# Patient Record
Sex: FEMALE | Race: WHITE | Employment: OTHER | URBAN - METROPOLITAN AREA
[De-identification: names, ages, dates, MRNs, and addresses within clinical notes are randomized per-mention and may not be internally consistent; named-entity substitution may affect disease eponyms.]

---

## 2017-03-28 ENCOUNTER — OFFICE VISIT (OUTPATIENT)
Dept: INTERNAL MEDICINE CLINIC | Age: 47
End: 2017-03-28
Payer: MEDICARE

## 2017-03-28 VITALS
WEIGHT: 293 LBS | RESPIRATION RATE: 21 BRPM | SYSTOLIC BLOOD PRESSURE: 120 MMHG | DIASTOLIC BLOOD PRESSURE: 80 MMHG | HEART RATE: 89 BPM | HEIGHT: 66 IN | OXYGEN SATURATION: 98 % | TEMPERATURE: 98.1 F | BODY MASS INDEX: 47.09 KG/M2

## 2017-03-28 DIAGNOSIS — N32.81 OAB (OVERACTIVE BLADDER): ICD-10-CM

## 2017-03-28 DIAGNOSIS — M15.9 PRIMARY OSTEOARTHRITIS INVOLVING MULTIPLE JOINTS: ICD-10-CM

## 2017-03-28 DIAGNOSIS — E66.01 MORBID OBESITY DUE TO EXCESS CALORIES (HCC): ICD-10-CM

## 2017-03-28 DIAGNOSIS — J31.0 CHRONIC RHINITIS: ICD-10-CM

## 2017-03-28 DIAGNOSIS — F34.1 DYSTHYMIA: Primary | ICD-10-CM

## 2017-03-28 DIAGNOSIS — J45.21 MILD INTERMITTENT ASTHMA WITH ACUTE EXACERBATION: ICD-10-CM

## 2017-03-28 DIAGNOSIS — L21.9 SEBORRHEIC DERMATITIS: ICD-10-CM

## 2017-03-28 PROCEDURE — 99214 OFFICE O/P EST MOD 30 MIN: CPT | Performed by: FAMILY MEDICINE

## 2017-03-28 RX ORDER — KETOCONAZOLE 20 MG/G
CREAM TOPICAL
Qty: 60 G | Refills: 1 | Status: SHIPPED | OUTPATIENT
Start: 2017-03-28 | End: 2020-07-29 | Stop reason: ALTCHOICE

## 2017-03-28 RX ORDER — BUPROPION HYDROCHLORIDE 300 MG/1
300 TABLET ORAL EVERY MORNING
Qty: 30 TABLET | Refills: 3 | Status: SHIPPED | OUTPATIENT
Start: 2017-03-28 | End: 2020-07-29 | Stop reason: ALTCHOICE

## 2017-03-28 RX ORDER — KETOCONAZOLE 20 MG/ML
SHAMPOO TOPICAL
Qty: 1 BOTTLE | Refills: 5 | Status: SHIPPED | OUTPATIENT
Start: 2017-03-28 | End: 2020-07-29 | Stop reason: ALTCHOICE

## 2017-03-28 ASSESSMENT — ENCOUNTER SYMPTOMS
SORE THROAT: 0
COLOR CHANGE: 0
SHORTNESS OF BREATH: 0
WHEEZING: 0
ANAL BLEEDING: 0
CONSTIPATION: 0
EYE DISCHARGE: 0
ABDOMINAL PAIN: 0
BACK PAIN: 0
EYE REDNESS: 0
COUGH: 0
EYE PAIN: 0
TROUBLE SWALLOWING: 0
FACIAL SWELLING: 0
STRIDOR: 0
ABDOMINAL DISTENTION: 0
CHEST TIGHTNESS: 0

## 2017-03-29 ENCOUNTER — TELEPHONE (OUTPATIENT)
Dept: INTERNAL MEDICINE CLINIC | Age: 47
End: 2017-03-29

## 2017-04-05 DIAGNOSIS — Z01.818 PREOPERATIVE CLEARANCE: Primary | ICD-10-CM

## 2017-04-12 ENCOUNTER — HOSPITAL ENCOUNTER (OUTPATIENT)
Age: 47
Discharge: HOME | End: 2017-04-12
Payer: MEDICARE

## 2017-04-12 ENCOUNTER — HOSPITAL ENCOUNTER (OUTPATIENT)
Dept: GENERAL RADIOLOGY | Age: 47
Discharge: HOME | End: 2017-04-12
Payer: MEDICARE

## 2017-04-12 DIAGNOSIS — Z01.818 PREOPERATIVE CLEARANCE: ICD-10-CM

## 2017-04-12 LAB
ABSOLUTE EOS #: 0.1 K/UL (ref 0–0.4)
ABSOLUTE LYMPH #: 2.2 K/UL (ref 1–4.8)
ABSOLUTE MONO #: 0.6 K/UL (ref 0.2–0.8)
ANION GAP SERPL CALCULATED.3IONS-SCNC: 13 MMOL/L (ref 9–17)
BASOPHILS # BLD: 0 % (ref 0–2)
BASOPHILS ABSOLUTE: 0 K/UL (ref 0–0.2)
BUN BLDV-MCNC: 14 MG/DL (ref 6–20)
BUN/CREAT BLD: 12 (ref 9–20)
CALCIUM SERPL-MCNC: 8.7 MG/DL (ref 8.6–10.4)
CHLORIDE BLD-SCNC: 102 MMOL/L (ref 98–107)
CO2: 27 MMOL/L (ref 20–31)
CREAT SERPL-MCNC: 1.16 MG/DL (ref 0.5–0.9)
DIFFERENTIAL TYPE: ABNORMAL
EOSINOPHILS RELATIVE PERCENT: 2 % (ref 1–4)
GFR AFRICAN AMERICAN: >60 ML/MIN
GFR NON-AFRICAN AMERICAN: 51 ML/MIN
GFR SERPL CREATININE-BSD FRML MDRD: ABNORMAL ML/MIN/{1.73_M2}
GFR SERPL CREATININE-BSD FRML MDRD: ABNORMAL ML/MIN/{1.73_M2}
GLUCOSE BLD-MCNC: 86 MG/DL (ref 70–99)
HCT VFR BLD CALC: 36.6 % (ref 36–46)
HEMOGLOBIN: 12.4 G/DL (ref 12–16)
LYMPHOCYTES # BLD: 32 % (ref 24–44)
MCH RBC QN AUTO: 30.4 PG (ref 26–34)
MCHC RBC AUTO-ENTMCNC: 33.7 G/DL (ref 31–37)
MCV RBC AUTO: 90.2 FL (ref 80–100)
MONOCYTES # BLD: 8 % (ref 1–7)
PDW BLD-RTO: 12.9 % (ref 11.5–14.5)
PLATELET # BLD: 234 K/UL (ref 130–400)
PLATELET ESTIMATE: ABNORMAL
PMV BLD AUTO: 7.3 FL (ref 6–12)
POTASSIUM SERPL-SCNC: 4.9 MMOL/L (ref 3.7–5.3)
RBC # BLD: 4.06 M/UL (ref 4–5.2)
RBC # BLD: ABNORMAL 10*6/UL
SEG NEUTROPHILS: 58 % (ref 36–66)
SEGMENTED NEUTROPHILS ABSOLUTE COUNT: 4 K/UL (ref 1.8–7.7)
SODIUM BLD-SCNC: 142 MMOL/L (ref 135–144)
WBC # BLD: 7 K/UL (ref 3.5–11)
WBC # BLD: ABNORMAL 10*3/UL

## 2017-04-12 PROCEDURE — 71020 XR CHEST STANDARD TWO VW: CPT

## 2017-04-12 PROCEDURE — 93005 ELECTROCARDIOGRAM TRACING: CPT

## 2017-04-12 PROCEDURE — 36415 COLL VENOUS BLD VENIPUNCTURE: CPT

## 2017-04-12 PROCEDURE — 85025 COMPLETE CBC W/AUTO DIFF WBC: CPT

## 2017-04-12 PROCEDURE — 80048 BASIC METABOLIC PNL TOTAL CA: CPT

## 2017-04-13 LAB
EKG ATRIAL RATE: 79 BPM
EKG P AXIS: 32 DEGREES
EKG P-R INTERVAL: 140 MS
EKG Q-T INTERVAL: 350 MS
EKG QRS DURATION: 88 MS
EKG QTC CALCULATION (BAZETT): 401 MS
EKG R AXIS: 63 DEGREES
EKG T AXIS: 46 DEGREES
EKG VENTRICULAR RATE: 79 BPM

## 2017-04-17 ENCOUNTER — TELEPHONE (OUTPATIENT)
Dept: INTERNAL MEDICINE CLINIC | Age: 47
End: 2017-04-17

## 2017-05-02 ENCOUNTER — HOSPITAL ENCOUNTER (OUTPATIENT)
Dept: PREADMISSION TESTING | Age: 47
Discharge: HOME | End: 2017-05-02
Payer: MEDICARE

## 2017-05-02 VITALS
BODY MASS INDEX: 47.09 KG/M2 | RESPIRATION RATE: 16 BRPM | HEART RATE: 90 BPM | WEIGHT: 293 LBS | DIASTOLIC BLOOD PRESSURE: 89 MMHG | HEIGHT: 66 IN | SYSTOLIC BLOOD PRESSURE: 147 MMHG | OXYGEN SATURATION: 98 %

## 2017-05-02 LAB
ANION GAP SERPL CALCULATED.3IONS-SCNC: 14 MMOL/L (ref 9–17)
BUN BLDV-MCNC: 9 MG/DL (ref 6–20)
CHLORIDE BLD-SCNC: 97 MMOL/L (ref 98–107)
CO2: 27 MMOL/L (ref 20–31)
CREAT SERPL-MCNC: 0.83 MG/DL (ref 0.5–0.9)
GFR AFRICAN AMERICAN: >60 ML/MIN
GFR NON-AFRICAN AMERICAN: >60 ML/MIN
GFR SERPL CREATININE-BSD FRML MDRD: NORMAL ML/MIN/{1.73_M2}
GFR SERPL CREATININE-BSD FRML MDRD: NORMAL ML/MIN/{1.73_M2}
POTASSIUM SERPL-SCNC: 4.5 MMOL/L (ref 3.7–5.3)
SODIUM BLD-SCNC: 138 MMOL/L (ref 135–144)
VITAMIN D 25-HYDROXY: 23.3 NG/ML (ref 30–100)

## 2017-05-02 PROCEDURE — 36415 COLL VENOUS BLD VENIPUNCTURE: CPT

## 2017-05-02 PROCEDURE — 82306 VITAMIN D 25 HYDROXY: CPT

## 2017-05-02 PROCEDURE — 82565 ASSAY OF CREATININE: CPT

## 2017-05-02 PROCEDURE — 80051 ELECTROLYTE PANEL: CPT

## 2017-05-02 PROCEDURE — 84520 ASSAY OF UREA NITROGEN: CPT

## 2017-05-02 RX ORDER — ALBUTEROL SULFATE 90 UG/1
2 AEROSOL, METERED RESPIRATORY (INHALATION) EVERY 6 HOURS PRN
COMMUNITY
End: 2020-12-01 | Stop reason: ALTCHOICE

## 2017-05-09 ENCOUNTER — OFFICE VISIT (OUTPATIENT)
Dept: INTERNAL MEDICINE CLINIC | Age: 47
End: 2017-05-09
Payer: MEDICARE

## 2017-05-09 VITALS
HEIGHT: 66 IN | RESPIRATION RATE: 19 BRPM | DIASTOLIC BLOOD PRESSURE: 70 MMHG | BODY MASS INDEX: 47.09 KG/M2 | TEMPERATURE: 98.3 F | WEIGHT: 293 LBS | HEART RATE: 92 BPM | OXYGEN SATURATION: 98 % | SYSTOLIC BLOOD PRESSURE: 130 MMHG

## 2017-05-09 DIAGNOSIS — F34.1 DYSTHYMIA: Primary | ICD-10-CM

## 2017-05-09 DIAGNOSIS — N32.81 OAB (OVERACTIVE BLADDER): ICD-10-CM

## 2017-05-09 DIAGNOSIS — J31.0 CHRONIC RHINITIS: ICD-10-CM

## 2017-05-09 DIAGNOSIS — M15.9 PRIMARY OSTEOARTHRITIS INVOLVING MULTIPLE JOINTS: ICD-10-CM

## 2017-05-09 PROCEDURE — 99213 OFFICE O/P EST LOW 20 MIN: CPT | Performed by: FAMILY MEDICINE

## 2017-05-09 RX ORDER — IBUPROFEN 800 MG/1
800 TABLET ORAL EVERY 8 HOURS PRN
Qty: 60 TABLET | Refills: 3 | Status: SHIPPED | OUTPATIENT
Start: 2017-05-09 | End: 2020-10-19

## 2017-05-09 ASSESSMENT — ENCOUNTER SYMPTOMS
STRIDOR: 0
SHORTNESS OF BREATH: 0
EYE DISCHARGE: 0
ABDOMINAL DISTENTION: 0
BACK PAIN: 0
CONSTIPATION: 0
EYE PAIN: 0
SORE THROAT: 0
COLOR CHANGE: 0
WHEEZING: 0
CHEST TIGHTNESS: 0
ABDOMINAL PAIN: 0
TROUBLE SWALLOWING: 0
COUGH: 0
EYE REDNESS: 0
ANAL BLEEDING: 0
FACIAL SWELLING: 0

## 2017-05-15 ENCOUNTER — ANESTHESIA EVENT (OUTPATIENT)
Dept: OPERATING ROOM | Age: 47
End: 2017-05-15
Payer: MEDICARE

## 2017-05-15 DIAGNOSIS — M17.11 PRIMARY OSTEOARTHRITIS OF RIGHT KNEE: ICD-10-CM

## 2017-05-16 ENCOUNTER — HOSPITAL ENCOUNTER (OUTPATIENT)
Age: 47
Setting detail: OUTPATIENT SURGERY
Discharge: HOME | End: 2017-05-16
Attending: ORTHOPAEDIC SURGERY | Admitting: ORTHOPAEDIC SURGERY
Payer: MEDICARE

## 2017-05-16 ENCOUNTER — ANESTHESIA (OUTPATIENT)
Dept: OPERATING ROOM | Age: 47
End: 2017-05-16
Payer: MEDICARE

## 2017-05-16 VITALS
TEMPERATURE: 98.4 F | DIASTOLIC BLOOD PRESSURE: 92 MMHG | HEIGHT: 66 IN | WEIGHT: 293 LBS | BODY MASS INDEX: 47.09 KG/M2 | OXYGEN SATURATION: 100 % | SYSTOLIC BLOOD PRESSURE: 162 MMHG | HEART RATE: 82 BPM | RESPIRATION RATE: 18 BRPM

## 2017-05-16 VITALS — DIASTOLIC BLOOD PRESSURE: 66 MMHG | SYSTOLIC BLOOD PRESSURE: 118 MMHG | OXYGEN SATURATION: 93 %

## 2017-05-16 LAB — HCG, PREGNANCY URINE (POC): NEGATIVE

## 2017-05-16 PROCEDURE — 7100000001 HC PACU RECOVERY - ADDTL 15 MIN: Performed by: ORTHOPAEDIC SURGERY

## 2017-05-16 PROCEDURE — 3700000001 HC ADD 15 MINUTES (ANESTHESIA): Performed by: ORTHOPAEDIC SURGERY

## 2017-05-16 PROCEDURE — 7100000011 HC PHASE II RECOVERY - ADDTL 15 MIN: Performed by: ORTHOPAEDIC SURGERY

## 2017-05-16 PROCEDURE — 2500000003 HC RX 250 WO HCPCS: Performed by: NURSE ANESTHETIST, CERTIFIED REGISTERED

## 2017-05-16 PROCEDURE — 6360000002 HC RX W HCPCS: Performed by: ORTHOPAEDIC SURGERY

## 2017-05-16 PROCEDURE — 2580000003 HC RX 258: Performed by: ORTHOPAEDIC SURGERY

## 2017-05-16 PROCEDURE — 7100000010 HC PHASE II RECOVERY - FIRST 15 MIN: Performed by: ORTHOPAEDIC SURGERY

## 2017-05-16 PROCEDURE — 3600000003 HC SURGERY LEVEL 3 BASE: Performed by: ORTHOPAEDIC SURGERY

## 2017-05-16 PROCEDURE — 84703 CHORIONIC GONADOTROPIN ASSAY: CPT

## 2017-05-16 PROCEDURE — 3600000013 HC SURGERY LEVEL 3 ADDTL 15MIN: Performed by: ORTHOPAEDIC SURGERY

## 2017-05-16 PROCEDURE — 3700000000 HC ANESTHESIA ATTENDED CARE: Performed by: ORTHOPAEDIC SURGERY

## 2017-05-16 PROCEDURE — 6360000002 HC RX W HCPCS: Performed by: NURSE ANESTHETIST, CERTIFIED REGISTERED

## 2017-05-16 PROCEDURE — 2580000003 HC RX 258: Performed by: ANESTHESIOLOGY

## 2017-05-16 PROCEDURE — 7100000000 HC PACU RECOVERY - FIRST 15 MIN: Performed by: ORTHOPAEDIC SURGERY

## 2017-05-16 PROCEDURE — 2500000003 HC RX 250 WO HCPCS: Performed by: ORTHOPAEDIC SURGERY

## 2017-05-16 PROCEDURE — 6360000002 HC RX W HCPCS: Performed by: ANESTHESIOLOGY

## 2017-05-16 PROCEDURE — 6360000002 HC RX W HCPCS

## 2017-05-16 PROCEDURE — 2720000010 HC SURG SUPPLY STERILE: Performed by: ORTHOPAEDIC SURGERY

## 2017-05-16 RX ORDER — SODIUM CHLORIDE 9 MG/ML
INJECTION, SOLUTION INTRAVENOUS CONTINUOUS
Status: DISCONTINUED | OUTPATIENT
Start: 2017-05-16 | End: 2017-05-16

## 2017-05-16 RX ORDER — FENTANYL CITRATE 50 UG/ML
50 INJECTION, SOLUTION INTRAMUSCULAR; INTRAVENOUS EVERY 5 MIN PRN
Status: DISCONTINUED | OUTPATIENT
Start: 2017-05-16 | End: 2017-05-16 | Stop reason: HOSPADM

## 2017-05-16 RX ORDER — PROPOFOL 10 MG/ML
INJECTION, EMULSION INTRAVENOUS PRN
Status: DISCONTINUED | OUTPATIENT
Start: 2017-05-16 | End: 2017-05-16 | Stop reason: SDUPTHER

## 2017-05-16 RX ORDER — BUPIVACAINE HYDROCHLORIDE AND EPINEPHRINE 5; 5 MG/ML; UG/ML
INJECTION, SOLUTION EPIDURAL; INTRACAUDAL; PERINEURAL PRN
Status: DISCONTINUED | OUTPATIENT
Start: 2017-05-16 | End: 2017-05-16 | Stop reason: HOSPADM

## 2017-05-16 RX ORDER — LIDOCAINE HYDROCHLORIDE 20 MG/ML
INJECTION, SOLUTION EPIDURAL; INFILTRATION; INTRACAUDAL; PERINEURAL PRN
Status: DISCONTINUED | OUTPATIENT
Start: 2017-05-16 | End: 2017-05-16 | Stop reason: SDUPTHER

## 2017-05-16 RX ORDER — OXYCODONE HYDROCHLORIDE AND ACETAMINOPHEN 5; 325 MG/1; MG/1
1-2 TABLET ORAL EVERY 6 HOURS PRN
Qty: 40 TABLET | Refills: 0 | Status: SHIPPED | OUTPATIENT
Start: 2017-05-16 | End: 2017-05-23

## 2017-05-16 RX ORDER — SODIUM CHLORIDE, SODIUM LACTATE, POTASSIUM CHLORIDE, CALCIUM CHLORIDE 600; 310; 30; 20 MG/100ML; MG/100ML; MG/100ML; MG/100ML
INJECTION, SOLUTION INTRAVENOUS CONTINUOUS
Status: DISCONTINUED | OUTPATIENT
Start: 2017-05-16 | End: 2017-05-16 | Stop reason: HOSPADM

## 2017-05-16 RX ORDER — ONDANSETRON 2 MG/ML
INJECTION INTRAMUSCULAR; INTRAVENOUS PRN
Status: DISCONTINUED | OUTPATIENT
Start: 2017-05-16 | End: 2017-05-16 | Stop reason: SDUPTHER

## 2017-05-16 RX ORDER — HYDROMORPHONE HCL 110MG/55ML
0.5 PATIENT CONTROLLED ANALGESIA SYRINGE INTRAVENOUS EVERY 5 MIN PRN
Status: DISCONTINUED | OUTPATIENT
Start: 2017-05-16 | End: 2017-05-16 | Stop reason: HOSPADM

## 2017-05-16 RX ORDER — DEXAMETHASONE SODIUM PHOSPHATE 10 MG/ML
INJECTION INTRAMUSCULAR; INTRAVENOUS PRN
Status: DISCONTINUED | OUTPATIENT
Start: 2017-05-16 | End: 2017-05-16 | Stop reason: SDUPTHER

## 2017-05-16 RX ORDER — ONDANSETRON 2 MG/ML
4 INJECTION INTRAMUSCULAR; INTRAVENOUS
Status: DISCONTINUED | OUTPATIENT
Start: 2017-05-16 | End: 2017-05-16 | Stop reason: HOSPADM

## 2017-05-16 RX ORDER — ROCURONIUM BROMIDE 10 MG/ML
INJECTION, SOLUTION INTRAVENOUS PRN
Status: DISCONTINUED | OUTPATIENT
Start: 2017-05-16 | End: 2017-05-16 | Stop reason: SDUPTHER

## 2017-05-16 RX ORDER — FENTANYL CITRATE 50 UG/ML
25 INJECTION, SOLUTION INTRAMUSCULAR; INTRAVENOUS EVERY 5 MIN PRN
Status: DISCONTINUED | OUTPATIENT
Start: 2017-05-16 | End: 2017-05-16 | Stop reason: HOSPADM

## 2017-05-16 RX ORDER — MIDAZOLAM HYDROCHLORIDE 1 MG/ML
INJECTION INTRAMUSCULAR; INTRAVENOUS PRN
Status: DISCONTINUED | OUTPATIENT
Start: 2017-05-16 | End: 2017-05-16 | Stop reason: SDUPTHER

## 2017-05-16 RX ORDER — LIDOCAINE HYDROCHLORIDE 10 MG/ML
1 INJECTION, SOLUTION EPIDURAL; INFILTRATION; INTRACAUDAL; PERINEURAL
Status: DISCONTINUED | OUTPATIENT
Start: 2017-05-16 | End: 2017-05-16 | Stop reason: HOSPADM

## 2017-05-16 RX ORDER — HYDROMORPHONE HCL 110MG/55ML
0.25 PATIENT CONTROLLED ANALGESIA SYRINGE INTRAVENOUS EVERY 5 MIN PRN
Status: DISCONTINUED | OUTPATIENT
Start: 2017-05-16 | End: 2017-05-16 | Stop reason: HOSPADM

## 2017-05-16 RX ORDER — SODIUM CHLORIDE 0.9 % (FLUSH) 0.9 %
10 SYRINGE (ML) INJECTION PRN
Status: DISCONTINUED | OUTPATIENT
Start: 2017-05-16 | End: 2017-05-16 | Stop reason: HOSPADM

## 2017-05-16 RX ORDER — MORPHINE SULFATE 10 MG/ML
INJECTION, SOLUTION INTRAMUSCULAR; INTRAVENOUS PRN
Status: DISCONTINUED | OUTPATIENT
Start: 2017-05-16 | End: 2017-05-16 | Stop reason: HOSPADM

## 2017-05-16 RX ORDER — FENTANYL CITRATE 50 UG/ML
INJECTION, SOLUTION INTRAMUSCULAR; INTRAVENOUS PRN
Status: DISCONTINUED | OUTPATIENT
Start: 2017-05-16 | End: 2017-05-16 | Stop reason: SDUPTHER

## 2017-05-16 RX ORDER — DOCUSATE SODIUM 100 MG/1
100 CAPSULE, LIQUID FILLED ORAL 2 TIMES DAILY PRN
Qty: 40 CAPSULE | Refills: 0 | Status: SHIPPED | OUTPATIENT
Start: 2017-05-16 | End: 2020-07-29 | Stop reason: ALTCHOICE

## 2017-05-16 RX ORDER — SODIUM CHLORIDE 0.9 % (FLUSH) 0.9 %
10 SYRINGE (ML) INJECTION EVERY 12 HOURS SCHEDULED
Status: DISCONTINUED | OUTPATIENT
Start: 2017-05-16 | End: 2017-05-16 | Stop reason: HOSPADM

## 2017-05-16 RX ADMIN — ONDANSETRON 4 MG: 2 INJECTION, SOLUTION INTRAMUSCULAR; INTRAVENOUS at 15:35

## 2017-05-16 RX ADMIN — SODIUM CHLORIDE, POTASSIUM CHLORIDE, SODIUM LACTATE AND CALCIUM CHLORIDE: 600; 310; 30; 20 INJECTION, SOLUTION INTRAVENOUS at 15:30

## 2017-05-16 RX ADMIN — CEFAZOLIN SODIUM 3 G: 1 INJECTION, POWDER, FOR SOLUTION INTRAMUSCULAR; INTRAVENOUS at 14:35

## 2017-05-16 RX ADMIN — FENTANYL CITRATE 50 MCG: 50 INJECTION INTRAMUSCULAR; INTRAVENOUS at 16:00

## 2017-05-16 RX ADMIN — FENTANYL CITRATE 25 MCG: 50 INJECTION INTRAMUSCULAR; INTRAVENOUS at 16:18

## 2017-05-16 RX ADMIN — DEXAMETHASONE SODIUM PHOSPHATE 10 MG: 10 INJECTION INTRAMUSCULAR; INTRAVENOUS at 15:35

## 2017-05-16 RX ADMIN — FENTANYL CITRATE 50 MCG: 50 INJECTION INTRAMUSCULAR; INTRAVENOUS at 16:15

## 2017-05-16 RX ADMIN — PROPOFOL 200 MG: 10 INJECTION, EMULSION INTRAVENOUS at 14:25

## 2017-05-16 RX ADMIN — LIDOCAINE HYDROCHLORIDE 100 MG: 20 INJECTION, SOLUTION EPIDURAL; INFILTRATION; INTRACAUDAL; PERINEURAL at 14:25

## 2017-05-16 RX ADMIN — MIDAZOLAM HYDROCHLORIDE 2 MG: 1 INJECTION, SOLUTION INTRAMUSCULAR; INTRAVENOUS at 14:19

## 2017-05-16 RX ADMIN — ROCURONIUM BROMIDE 50 MG: 10 INJECTION, SOLUTION INTRAVENOUS at 14:25

## 2017-05-16 RX ADMIN — FENTANYL CITRATE 100 MCG: 50 INJECTION, SOLUTION INTRAMUSCULAR; INTRAVENOUS at 14:25

## 2017-05-16 RX ADMIN — SUGAMMADEX 200 MG: 100 INJECTION, SOLUTION INTRAVENOUS at 15:40

## 2017-05-16 RX ADMIN — FENTANYL CITRATE 50 MCG: 50 INJECTION, SOLUTION INTRAMUSCULAR; INTRAVENOUS at 15:02

## 2017-05-16 RX ADMIN — SODIUM CHLORIDE, POTASSIUM CHLORIDE, SODIUM LACTATE AND CALCIUM CHLORIDE: 600; 310; 30; 20 INJECTION, SOLUTION INTRAVENOUS at 13:18

## 2017-05-16 ASSESSMENT — PAIN SCALES - GENERAL
PAINLEVEL_OUTOF10: 7
PAINLEVEL_OUTOF10: 10
PAINLEVEL_OUTOF10: 3
PAINLEVEL_OUTOF10: 9

## 2017-05-16 ASSESSMENT — PAIN DESCRIPTION - DESCRIPTORS
DESCRIPTORS: ACHING
DESCRIPTORS: ACHING

## 2017-05-16 ASSESSMENT — ENCOUNTER SYMPTOMS
SHORTNESS OF BREATH: 0
STRIDOR: 0

## 2017-05-16 ASSESSMENT — PAIN DESCRIPTION - PROGRESSION: CLINICAL_PROGRESSION: NOT CHANGED

## 2017-05-16 ASSESSMENT — PAIN DESCRIPTION - ORIENTATION: ORIENTATION: RIGHT;ANTERIOR

## 2017-05-16 ASSESSMENT — PAIN DESCRIPTION - FREQUENCY: FREQUENCY: CONTINUOUS

## 2017-05-16 ASSESSMENT — PAIN DESCRIPTION - LOCATION: LOCATION: KNEE

## 2017-05-16 ASSESSMENT — PAIN - FUNCTIONAL ASSESSMENT: PAIN_FUNCTIONAL_ASSESSMENT: 0-10

## 2017-05-16 ASSESSMENT — PAIN DESCRIPTION - PAIN TYPE: TYPE: ACUTE PAIN;CHRONIC PAIN;SURGICAL PAIN

## 2017-05-16 ASSESSMENT — PAIN DESCRIPTION - ONSET: ONSET: AWAKENED FROM SLEEP

## 2017-10-05 ENCOUNTER — OFFICE VISIT (OUTPATIENT)
Dept: FAMILY MEDICINE CLINIC | Age: 47
End: 2017-10-05
Payer: MEDICARE

## 2017-10-05 VITALS
HEIGHT: 66 IN | SYSTOLIC BLOOD PRESSURE: 143 MMHG | RESPIRATION RATE: 16 BRPM | HEART RATE: 84 BPM | DIASTOLIC BLOOD PRESSURE: 83 MMHG | WEIGHT: 293 LBS | BODY MASS INDEX: 47.09 KG/M2

## 2017-10-05 DIAGNOSIS — L71.9 ROSACEA: Primary | ICD-10-CM

## 2017-10-05 DIAGNOSIS — L40.9 PSORIASIS: ICD-10-CM

## 2017-10-05 PROCEDURE — 99215 OFFICE O/P EST HI 40 MIN: CPT | Performed by: FAMILY MEDICINE

## 2017-10-05 PROCEDURE — 96127 BRIEF EMOTIONAL/BEHAV ASSMT: CPT | Performed by: FAMILY MEDICINE

## 2017-10-05 RX ORDER — METRONIDAZOLE 10 MG/G
GEL TOPICAL DAILY
Qty: 1 TUBE | Refills: 5 | Status: SHIPPED | OUTPATIENT
Start: 2017-10-05 | End: 2020-07-29 | Stop reason: ALTCHOICE

## 2017-10-05 RX ORDER — CALCIPOTRIENE 0.05 MG/ML
SOLUTION TOPICAL 2 TIMES DAILY
Qty: 60 ML | Refills: 5 | Status: SHIPPED | OUTPATIENT
Start: 2017-10-05 | End: 2018-02-02 | Stop reason: ALTCHOICE

## 2017-10-05 RX ORDER — SOLIFENACIN SUCCINATE 10 MG/1
TABLET, FILM COATED ORAL
Refills: 0 | COMMUNITY
Start: 2017-08-28 | End: 2020-07-29 | Stop reason: ALTCHOICE

## 2017-10-05 ASSESSMENT — PATIENT HEALTH QUESTIONNAIRE - PHQ9
1. LITTLE INTEREST OR PLEASURE IN DOING THINGS: 0
SUM OF ALL RESPONSES TO PHQ9 QUESTIONS 1 & 2: 3
SUM OF ALL RESPONSES TO PHQ QUESTIONS 1-9: 9
10. IF YOU CHECKED OFF ANY PROBLEMS, HOW DIFFICULT HAVE THESE PROBLEMS MADE IT FOR YOU TO DO YOUR WORK, TAKE CARE OF THINGS AT HOME, OR GET ALONG WITH OTHER PEOPLE: 0
3. TROUBLE FALLING OR STAYING ASLEEP: 3
2. FEELING DOWN, DEPRESSED OR HOPELESS: 3
5. POOR APPETITE OR OVEREATING: 1
8. MOVING OR SPEAKING SO SLOWLY THAT OTHER PEOPLE COULD HAVE NOTICED. OR THE OPPOSITE, BEING SO FIGETY OR RESTLESS THAT YOU HAVE BEEN MOVING AROUND A LOT MORE THAN USUAL: 0
6. FEELING BAD ABOUT YOURSELF - OR THAT YOU ARE A FAILURE OR HAVE LET YOURSELF OR YOUR FAMILY DOWN: 2
4. FEELING TIRED OR HAVING LITTLE ENERGY: 0
7. TROUBLE CONCENTRATING ON THINGS, SUCH AS READING THE NEWSPAPER OR WATCHING TELEVISION: 0
9. THOUGHTS THAT YOU WOULD BE BETTER OFF DEAD, OR OF HURTING YOURSELF: 0

## 2017-10-05 NOTE — PROGRESS NOTES
40 MG tablet take 1 tablet by mouth every morning 30 tablet 3    desoximetasone (TOPICORT) 0.25 % OINT Apply twice daily to skin for psoriasis 60 g 3    NAPROXEN 500 MG EC tablet take 1 tablet by mouth twice a day with food 60 tablet 3    docusate sodium (COLACE) 100 MG capsule Take 1 capsule by mouth 2 times daily as needed for Constipation 40 capsule 0    albuterol sulfate  (90 BASE) MCG/ACT inhaler Inhale 2 puffs into the lungs every 6 hours as needed for Wheezing       Current Facility-Administered Medications   Medication Dose Route Frequency Provider Last Rate Last Dose    albuterol (PROVENTIL) nebulizer solution 2.5 mg  2.5 mg Nebulization 4x daily Jenelle Arce MD         ALLERGIES:  No Known Allergies    Social History   Substance Use Topics    Smoking status: Former Smoker     Packs/day: 0.60     Types: Cigarettes    Smokeless tobacco: Never Used    Alcohol use Yes      Comment: rarely        LDL Calculated (mg/dL)   Date Value   04/09/2015 96     HDL (mg/dL)   Date Value   04/09/2015 34 (A)     BUN (mg/dL)   Date Value   05/02/2017 9     CREATININE (mg/dL)   Date Value   05/02/2017 0.83     Glucose (mg/dL)   Date Value   04/12/2017 86     Hemoglobin A1C (%)   Date Value   12/15/2015 5.2              Subjective:      HPI  Here today to establish care  She has a history of psoriasis but was only on Nizoral it is in her skin scalp and was on our parts of her body but now she's developed some sort of a new rash on her face  She would like to go to some diet Center to help with her diet for weight loss and exercise. She is not interested in gastric bypass surgery  S    Review of Systems:     Constitutional: Negative for fever, appetite change and fatigue. Family social and medical history reviewed and unchanged     HENT: Negative. Negative for nosebleeds, trouble swallowing and neck pain. Eyes: Negative for photophobia and visual disturbance. Respiratory: Negative. Negative for chest tightness and shortness of breath. Cardiovascular: Negative. Negative for chest pain and leg swelling. Gastrointestinal: Negative. Negative for abdominal pain and blood in stool. Endocrine: Negative for cold intolerance and polyuria. Genitourinary: Negative for dysuria and hematuria. Musculoskeletal: Negative. Skin: Negative for rash. Allergic/Immunologic: Negative. Neurological: Negative. Negative for dizziness, weakness and numbness. Hematological: Negative. Negative for adenopathy. Does not bruise/bleed easily. Psychiatric/Behavioral: Negative for sleep disturbance, dysphoric mood and  decreased concentration. The patient is not nervous/anxious. Objective:     Physical Exam:     Nursing note and vitals reviewed. BP (!) 143/83  Pulse 84  Resp 16  Ht 5' 6.14\" (1.68 m)  Wt (!) 365 lb (165.6 kg)  BMI 58.66 kg/m2  Constitutional: She is oriented to person, place, and time. She   appears well-developed and well-nourished. HENT:   Head: Normocephalic and atraumatic. Scaling over scalp dry irritation around her face with erythematous base    Right Ear: External ear normal. Tympanic membrane is not erythematous. No middle ear effusion. Left Ear: External ear normal. Tympanic membrane is not erythematous. No middle ear effusion. Nose: No mucosal edema. Mouth/Throat: Oropharynx is clear and moist. No posterior oropharyngeal erythema. Eyes: Conjunctivae and EOM are normal. Pupils are equal, round, and reactive to light. Neck: Normal range of motion. Neck supple. No thyromegaly present. Cardiovascular: Normal rate, regular rhythm and normal heart sounds. No murmur heard. Pulmonary/Chest: Effort normal and breath sounds normal. She has no wheezes. Shehas no rales. Abdominal: Soft. Bowel sounds are normal. She exhibits no distension and no mass. There is no tenderness. There is no rebound and no guarding.

## 2017-10-05 NOTE — MR AVS SNAPSHOT
After Visit Summary             Emy Tolentino   10/5/2017 4:00 PM   Office Visit    Description:  Female : 1970   Provider:  Derrick Valencia DO   Department:  Comprehensive Care              Your Follow-Up and Future Appointments         Below is a list of your follow-up and future appointments. This may not be a complete list as you may have made appointments directly with providers that we are not aware of or your providers may have made some for you. Please call your providers to confirm appointments. It is important to keep your appointments. Please bring your current insurance card, photo ID, co-pay, and all medication bottles to your appointment. If self-pay, payment is expected at the time of service. Information from Your Visit        Department     Name Address Phone [facility] [address] [phone]      You Were Seen for:         Comments    Rosacea   [695. 3. ICD-9-CM]         Vital Signs     Blood Pressure Pulse Respirations Height Weight Body Mass Index    143/83 84 16 5' 6.14\" (1.68 m) 365 lb (165.6 kg) 58.66 kg/m2    Smoking Status                   Former Smoker           Additional Information about your Body Mass Index (BMI)           Your BMI as listed above is considered obese (30 or more). BMI is an estimate of body fat, calculated from your height and weight. The higher your BMI, the greater your risk of heart disease, high blood pressure, type 2 diabetes, stroke, gallstones, arthritis, sleep apnea, and certain cancers. BMI is not perfect. It may overestimate body fat in athletes and people who are more muscular. Even a small weight loss (between 5 and 10 percent of your current weight) by decreasing your calorie intake and becoming more physically active will help lower your risk of developing or worsening diseases associated with obesity. Learn more at: Swarm64.co.uk             Today's Medication Changes          These changes are accurate as of: 10/5/17  4:34 PM.  If you have any questions, ask your nurse or doctor. START taking these medications           calcipotriene 0.005 % solution   Commonly known as:  DOVONEX   Instructions:  Apply topically 2 times daily   Quantity:  60 mL   Refills:  5   Started by:  Mihaela Collins DO       metroNIDAZOLE 1 % gel   Commonly known as:  METROGEL   Instructions:  Apply topically daily   Quantity:  1 Tube   Refills:  5   Started by:  [facility],             Where to Get Your Medications      These medications were sent to [facility], [address] [facility] -  [phone]  [address] [place] [id number]     Phone:  [phone]     calcipotriene 0.005 % solution    metroNIDAZOLE 1 % gel               Your Current Medications Are              VESICARE 10 MG tablet     calcipotriene (DOVONEX) 0.005 % solution Apply topically 2 times daily    metroNIDAZOLE (METROGEL) 1 % gel Apply topically daily    ibuprofen (ADVIL;MOTRIN) 800 MG tablet Take 1 tablet by mouth every 8 hours as needed for Pain    buPROPion (WELLBUTRIN XL) 300 MG extended release tablet Take 1 tablet by mouth every morning    ketoconazole (NIZORAL) 2 % cream Apply twice daily as needed. ketoconazole (NIZORAL) 2 % shampoo Apply topically daily as needed.     PARoxetine (PAXIL) 40 MG tablet take 1 tablet by mouth every morning    desoximetasone (TOPICORT) 0.25 % OINT Apply twice daily to skin for psoriasis    NAPROXEN 500 MG EC tablet take 1 tablet by mouth twice a day with food    docusate sodium (COLACE) 100 MG capsule Take 1 capsule by mouth 2 times daily as needed for Constipation    albuterol sulfate  (90 BASE) MCG/ACT inhaler Inhale 2 puffs into the lungs every 6 hours as needed for Wheezing      Allergies No Known Allergies         Additional Information        Basic Information     Date Of Birth Sex Race Ethnicity Preferred Language    1970 Female White Non-/Non  English      Problem List as of 10/5/2017  Date Reviewed: 5/9/2017                Seborrheic dermatitis    Psoriasis    Anxiety    Primary osteoarthritis of right knee    Headache    Overactive bladder    Obesity    Osteoarthritis    Chronic rhinitis    OAB (overactive bladder)    Asthma    Depression      Preventive Care        Date Due    HIV screening is recommended for all people regardless of risk factors  aged 15-65 years at least once (lifetime) who have never been HIV tested. 11/28/1987    Tetanus Combination Vaccine (1 - Tdap) 11/28/1991    Yearly Flu Vaccine (1) 9/1/2017    Pneumococcal Vaccine - Pneumovax for adults aged 19-64 years with: chronic heart disease, chronic lung disease, diabetes mellitus, alcoholism, chronic liver disease, or cigarette smoking. (1 of 1 - PPSV23) 5/22/2018 (Originally 11/28/1991)    Pap Smear 12/20/2017    Cholesterol Screening 4/9/2020            [employer] Signup           Our records indicate that you have an active [employer] account. You can view your After Visit Summary by going to [email or web address] and logging in with your [employer] username and password. If you don't have a [employer] username and password but a parent or guardian has access to your record, the parent or guardian should login with their own [employer] username and password and access your record to view the After Visit Summary. Additional Information  If you have questions, please contact the physician practice where you receive care. Remember, [employer] is NOT to be used for urgent needs. For medical emergencies, dial 911. For questions regarding your [employer] account call [phone]. If you have a clinical question, please call your doctor's office.

## 2018-01-12 ENCOUNTER — HOSPITAL ENCOUNTER (OUTPATIENT)
Age: 48
Discharge: HOME | End: 2018-01-12
Payer: MEDICARE

## 2018-01-12 LAB
ABSOLUTE EOS #: 0.1 K/UL (ref 0–0.4)
ABSOLUTE IMMATURE GRANULOCYTE: NORMAL K/UL (ref 0–0.3)
ABSOLUTE LYMPH #: 1.7 K/UL (ref 1–4.8)
ABSOLUTE MONO #: 0.3 K/UL (ref 0.2–0.8)
ALBUMIN SERPL-MCNC: 4 G/DL (ref 3.5–5.2)
ALBUMIN/GLOBULIN RATIO: ABNORMAL (ref 1–2.5)
ALP BLD-CCNC: 83 U/L (ref 35–104)
ALT SERPL-CCNC: 14 U/L (ref 5–33)
ANION GAP SERPL CALCULATED.3IONS-SCNC: 14 MMOL/L (ref 9–17)
AST SERPL-CCNC: 17 U/L
BASOPHILS # BLD: 0 % (ref 0–2)
BASOPHILS ABSOLUTE: 0 K/UL (ref 0–0.2)
BILIRUB SERPL-MCNC: 0.67 MG/DL (ref 0.3–1.2)
BUN BLDV-MCNC: 9 MG/DL (ref 6–20)
BUN/CREAT BLD: 9 (ref 9–20)
CALCIUM SERPL-MCNC: 9 MG/DL (ref 8.6–10.4)
CHLORIDE BLD-SCNC: 99 MMOL/L (ref 98–107)
CHOLESTEROL, FASTING: 171 MG/DL
CHOLESTEROL/HDL RATIO: 5
CO2: 27 MMOL/L (ref 20–31)
CREAT SERPL-MCNC: 0.98 MG/DL (ref 0.5–0.9)
DIFFERENTIAL TYPE: NORMAL
EOSINOPHILS RELATIVE PERCENT: 2 % (ref 1–4)
GFR AFRICAN AMERICAN: >60 ML/MIN
GFR NON-AFRICAN AMERICAN: >60 ML/MIN
GFR SERPL CREATININE-BSD FRML MDRD: ABNORMAL ML/MIN/{1.73_M2}
GFR SERPL CREATININE-BSD FRML MDRD: ABNORMAL ML/MIN/{1.73_M2}
GLUCOSE FASTING: 84 MG/DL (ref 70–99)
HCT VFR BLD CALC: 36.7 % (ref 36–46)
HDLC SERPL-MCNC: 34 MG/DL
HEMOGLOBIN: 12.2 G/DL (ref 12–16)
IMMATURE GRANULOCYTES: NORMAL %
LDL CHOLESTEROL: 111 MG/DL (ref 0–130)
LYMPHOCYTES # BLD: 35 % (ref 24–44)
MCH RBC QN AUTO: 29.7 PG (ref 26–34)
MCHC RBC AUTO-ENTMCNC: 33.1 G/DL (ref 31–37)
MCV RBC AUTO: 89.6 FL (ref 80–100)
MONOCYTES # BLD: 6 % (ref 1–7)
PDW BLD-RTO: 12.9 % (ref 11.5–14.5)
PLATELET # BLD: 261 K/UL (ref 130–400)
PLATELET ESTIMATE: NORMAL
PMV BLD AUTO: 7.1 FL (ref 6–12)
POTASSIUM SERPL-SCNC: 4.2 MMOL/L (ref 3.7–5.3)
RBC # BLD: 4.1 M/UL (ref 4–5.2)
RBC # BLD: NORMAL 10*6/UL
RHEUMATOID FACTOR: 10.7 IU/ML
SEG NEUTROPHILS: 57 % (ref 36–66)
SEGMENTED NEUTROPHILS ABSOLUTE COUNT: 2.8 K/UL (ref 1.8–7.7)
SODIUM BLD-SCNC: 140 MMOL/L (ref 135–144)
TESTOSTERONE TOTAL: 23 NG/DL (ref 20–70)
TOTAL PROTEIN: 7.4 G/DL (ref 6.4–8.3)
TRIGLYCERIDE, FASTING: 128 MG/DL
TSH SERPL DL<=0.05 MIU/L-ACNC: 0.54 MIU/L (ref 0.3–5)
VITAMIN D 25-HYDROXY: 27 NG/ML (ref 30–100)
VLDLC SERPL CALC-MCNC: ABNORMAL MG/DL (ref 1–30)
WBC # BLD: 4.9 K/UL (ref 3.5–11)
WBC # BLD: NORMAL 10*3/UL

## 2018-01-12 PROCEDURE — 85025 COMPLETE CBC W/AUTO DIFF WBC: CPT

## 2018-01-12 PROCEDURE — 80053 COMPREHEN METABOLIC PANEL: CPT

## 2018-01-12 PROCEDURE — 84443 ASSAY THYROID STIM HORMONE: CPT

## 2018-01-12 PROCEDURE — 82627 DEHYDROEPIANDROSTERONE: CPT

## 2018-01-12 PROCEDURE — 83036 HEMOGLOBIN GLYCOSYLATED A1C: CPT

## 2018-01-12 PROCEDURE — 82671 ASSAY OF ESTROGENS: CPT

## 2018-01-12 PROCEDURE — 80061 LIPID PANEL: CPT

## 2018-01-12 PROCEDURE — 82306 VITAMIN D 25 HYDROXY: CPT

## 2018-01-12 PROCEDURE — 86431 RHEUMATOID FACTOR QUANT: CPT

## 2018-01-12 PROCEDURE — 86038 ANTINUCLEAR ANTIBODIES: CPT

## 2018-01-12 PROCEDURE — 84403 ASSAY OF TOTAL TESTOSTERONE: CPT

## 2018-01-12 PROCEDURE — 36415 COLL VENOUS BLD VENIPUNCTURE: CPT

## 2018-01-14 LAB
ESTIMATED AVERAGE GLUCOSE: 108 MG/DL
HBA1C MFR BLD: 5.4 % (ref 4–6)

## 2018-01-15 LAB
ANTI-NUCLEAR ANTIBODY (ANA): NEGATIVE
DHEAS (DHEA SULFATE): 131 UG/DL (ref 32–240)
ESTRADIOL LEVEL: 37.2 PG/ML
ESTROGEN TOTAL: 115.4 PG/ML
ESTRONE: 78.2 PG/ML

## 2018-01-22 ENCOUNTER — OFFICE VISIT (OUTPATIENT)
Dept: PODIATRY | Age: 48
End: 2018-01-22
Payer: MEDICARE

## 2018-01-22 VITALS — SYSTOLIC BLOOD PRESSURE: 132 MMHG | DIASTOLIC BLOOD PRESSURE: 82 MMHG

## 2018-01-22 DIAGNOSIS — M79.671 PAIN IN BOTH FEET: ICD-10-CM

## 2018-01-22 DIAGNOSIS — M79.672 PAIN IN BOTH FEET: ICD-10-CM

## 2018-01-22 DIAGNOSIS — B35.1 DERMATOPHYTOSIS OF NAIL: Primary | ICD-10-CM

## 2018-01-22 DIAGNOSIS — R60.0 EDEMA OF LOWER EXTREMITY: ICD-10-CM

## 2018-01-22 PROCEDURE — 1036F TOBACCO NON-USER: CPT | Performed by: PODIATRIST

## 2018-01-22 PROCEDURE — G8427 DOCREV CUR MEDS BY ELIG CLIN: HCPCS | Performed by: PODIATRIST

## 2018-01-22 PROCEDURE — 11721 DEBRIDE NAIL 6 OR MORE: CPT | Performed by: PODIATRIST

## 2018-01-22 PROCEDURE — G8417 CALC BMI ABV UP PARAM F/U: HCPCS | Performed by: PODIATRIST

## 2018-01-22 PROCEDURE — G8484 FLU IMMUNIZE NO ADMIN: HCPCS | Performed by: PODIATRIST

## 2018-02-02 ENCOUNTER — OFFICE VISIT (OUTPATIENT)
Dept: BARIATRICS/WEIGHT MGMT | Age: 48
End: 2018-02-02
Payer: MEDICARE

## 2018-02-02 VITALS
HEART RATE: 74 BPM | HEIGHT: 65 IN | DIASTOLIC BLOOD PRESSURE: 72 MMHG | WEIGHT: 293 LBS | SYSTOLIC BLOOD PRESSURE: 128 MMHG | RESPIRATION RATE: 20 BRPM | BODY MASS INDEX: 48.82 KG/M2

## 2018-02-02 DIAGNOSIS — F41.9 ANXIETY AND DEPRESSION: ICD-10-CM

## 2018-02-02 DIAGNOSIS — I10 ESSENTIAL HYPERTENSION: Primary | ICD-10-CM

## 2018-02-02 DIAGNOSIS — N32.81 OAB (OVERACTIVE BLADDER): ICD-10-CM

## 2018-02-02 DIAGNOSIS — M15.9 PRIMARY OSTEOARTHRITIS INVOLVING MULTIPLE JOINTS: ICD-10-CM

## 2018-02-02 DIAGNOSIS — F32.A ANXIETY AND DEPRESSION: ICD-10-CM

## 2018-02-02 DIAGNOSIS — E66.01 MORBID OBESITY WITH BMI OF 60.0-69.9, ADULT (HCC): ICD-10-CM

## 2018-02-02 DIAGNOSIS — M54.9 CHRONIC BACK PAIN, UNSPECIFIED BACK LOCATION, UNSPECIFIED BACK PAIN LATERALITY: ICD-10-CM

## 2018-02-02 DIAGNOSIS — G47.33 OSA (OBSTRUCTIVE SLEEP APNEA): ICD-10-CM

## 2018-02-02 DIAGNOSIS — G89.29 CHRONIC BACK PAIN, UNSPECIFIED BACK LOCATION, UNSPECIFIED BACK PAIN LATERALITY: ICD-10-CM

## 2018-02-02 PROCEDURE — 1036F TOBACCO NON-USER: CPT | Performed by: NURSE PRACTITIONER

## 2018-02-02 PROCEDURE — G8427 DOCREV CUR MEDS BY ELIG CLIN: HCPCS | Performed by: NURSE PRACTITIONER

## 2018-02-02 PROCEDURE — 99204 OFFICE O/P NEW MOD 45 MIN: CPT | Performed by: NURSE PRACTITIONER

## 2018-02-02 PROCEDURE — G8417 CALC BMI ABV UP PARAM F/U: HCPCS | Performed by: NURSE PRACTITIONER

## 2018-02-02 PROCEDURE — G8484 FLU IMMUNIZE NO ADMIN: HCPCS | Performed by: NURSE PRACTITIONER

## 2018-02-02 RX ORDER — LISINOPRIL 20 MG/1
20 TABLET ORAL DAILY
COMMUNITY
End: 2020-07-29 | Stop reason: ALTCHOICE

## 2018-02-02 RX ORDER — MELOXICAM 7.5 MG/1
7.5 TABLET ORAL DAILY
COMMUNITY
End: 2020-07-29 | Stop reason: ALTCHOICE

## 2018-02-02 NOTE — PROGRESS NOTES
unspecified back pain laterality     6. OAB (overactive bladder)     7. Morbid obesity with BMI of 60.0-69.9, adult ([facility] [address]         Plan:      Class schedule reviewed. Consent and confidentiality agreement discussed. Patient aware group medical appointment is voluntary and individual appointments are available as desired. [x]  EKG from 4/12/17 reviewed and acceptable. [x]  Labs from 1/12/18 done by PCP reviewed and acceptable. []  Diabetic teaching done. Low blood sugar protocol reviewed with pt.      []  Discussed targets and management of blood sugar, Hgb A1C, lipids, and blood pressure. []  Reviewed medications and discussed potential need for adjustments while following the program and losing weight.     []  Smoking cessation discussed. []  Avoidance of alcohol discussed. [x]  Specific questions answered. Follow up:  Return in about 6 weeks (around 3/13/2018). This encounters orders:  No orders of the defined types were placed in this encounter. This encounters prescriptions:  No orders of the defined types were placed in this encounter.       Electronically signed by:  Dg Powell CNP

## 2018-02-16 ENCOUNTER — HOSPITAL ENCOUNTER (OUTPATIENT)
Dept: SLEEP CENTER | Age: 48
Discharge: HOME | End: 2018-02-18
Payer: MEDICARE

## 2018-02-16 PROCEDURE — 95810 POLYSOM 6/> YRS 4/> PARAM: CPT

## 2018-02-16 ASSESSMENT — SLEEP AND FATIGUE QUESTIONNAIRES
ESS TOTAL SCORE: 3
HOW LIKELY ARE YOU TO NOD OFF OR FALL ASLEEP WHEN YOU ARE A PASSENGER IN A CAR FOR AN HOUR WITHOUT A BREAK: 1
HOW LIKELY ARE YOU TO NOD OFF OR FALL ASLEEP WHILE SITTING AND TALKING TO SOMEONE: 0
HOW LIKELY ARE YOU TO NOD OFF OR FALL ASLEEP WHILE LYING DOWN TO REST IN THE AFTERNOON WHEN CIRCUMSTANCES PERMIT: 1
HOW LIKELY ARE YOU TO NOD OFF OR FALL ASLEEP WHILE SITTING QUIETLY AFTER LUNCH WITHOUT ALCOHOL: 1
HOW LIKELY ARE YOU TO NOD OFF OR FALL ASLEEP WHILE SITTING INACTIVE IN A PUBLIC PLACE: 0
HOW LIKELY ARE YOU TO NOD OFF OR FALL ASLEEP IN A CAR, WHILE STOPPED FOR A FEW MINUTES IN TRAFFIC: 0
HOW LIKELY ARE YOU TO NOD OFF OR FALL ASLEEP WHILE WATCHING TV: 0
NECK CIRCUMFERENCE (INCHES): 18.5
HOW LIKELY ARE YOU TO NOD OFF OR FALL ASLEEP WHILE SITTING AND READING: 0

## 2018-02-17 VITALS — HEIGHT: 66 IN | BODY MASS INDEX: 47.09 KG/M2 | RESPIRATION RATE: 12 BRPM | WEIGHT: 293 LBS | HEART RATE: 83 BPM

## 2018-03-13 ENCOUNTER — OFFICE VISIT (OUTPATIENT)
Dept: BARIATRICS/WEIGHT MGMT | Age: 48
End: 2018-03-13
Payer: MEDICARE

## 2018-03-13 VITALS
HEART RATE: 68 BPM | WEIGHT: 293 LBS | HEIGHT: 66 IN | SYSTOLIC BLOOD PRESSURE: 128 MMHG | BODY MASS INDEX: 47.09 KG/M2 | DIASTOLIC BLOOD PRESSURE: 78 MMHG

## 2018-03-13 DIAGNOSIS — J45.909 UNCOMPLICATED ASTHMA, UNSPECIFIED ASTHMA SEVERITY, UNSPECIFIED WHETHER PERSISTENT: ICD-10-CM

## 2018-03-13 DIAGNOSIS — I10 ESSENTIAL HYPERTENSION: Primary | ICD-10-CM

## 2018-03-13 DIAGNOSIS — G47.33 OSA (OBSTRUCTIVE SLEEP APNEA): ICD-10-CM

## 2018-03-13 DIAGNOSIS — F41.9 ANXIETY AND DEPRESSION: ICD-10-CM

## 2018-03-13 DIAGNOSIS — G89.29 CHRONIC BACK PAIN, UNSPECIFIED BACK LOCATION, UNSPECIFIED BACK PAIN LATERALITY: ICD-10-CM

## 2018-03-13 DIAGNOSIS — E66.01 MORBID OBESITY WITH BMI OF 60.0-69.9, ADULT (HCC): ICD-10-CM

## 2018-03-13 DIAGNOSIS — F32.A ANXIETY AND DEPRESSION: ICD-10-CM

## 2018-03-13 DIAGNOSIS — M17.11 PRIMARY OSTEOARTHRITIS OF RIGHT KNEE: ICD-10-CM

## 2018-03-13 DIAGNOSIS — M54.9 CHRONIC BACK PAIN, UNSPECIFIED BACK LOCATION, UNSPECIFIED BACK PAIN LATERALITY: ICD-10-CM

## 2018-03-13 PROCEDURE — G8484 FLU IMMUNIZE NO ADMIN: HCPCS | Performed by: NURSE PRACTITIONER

## 2018-03-13 PROCEDURE — G8417 CALC BMI ABV UP PARAM F/U: HCPCS | Performed by: NURSE PRACTITIONER

## 2018-03-13 PROCEDURE — 99213 OFFICE O/P EST LOW 20 MIN: CPT | Performed by: NURSE PRACTITIONER

## 2018-03-13 PROCEDURE — 1036F TOBACCO NON-USER: CPT | Performed by: NURSE PRACTITIONER

## 2018-03-13 PROCEDURE — G8427 DOCREV CUR MEDS BY ELIG CLIN: HCPCS | Performed by: NURSE PRACTITIONER

## 2018-03-14 NOTE — PROGRESS NOTES
encounter. Prescriptions:  No orders of the defined types were placed in this encounter.       Electronically signed by:  Dg Powell CNP

## 2018-03-17 ENCOUNTER — HOSPITAL ENCOUNTER (OUTPATIENT)
Dept: SLEEP CENTER | Age: 48
Discharge: HOME | End: 2018-03-19
Payer: MEDICARE

## 2018-03-17 PROCEDURE — 95811 POLYSOM 6/>YRS CPAP 4/> PARM: CPT

## 2018-03-20 ENCOUNTER — OFFICE VISIT (OUTPATIENT)
Dept: BARIATRICS/WEIGHT MGMT | Age: 48
End: 2018-03-20
Payer: MEDICARE

## 2018-03-20 VITALS
BODY MASS INDEX: 47.09 KG/M2 | DIASTOLIC BLOOD PRESSURE: 74 MMHG | SYSTOLIC BLOOD PRESSURE: 126 MMHG | HEART RATE: 72 BPM | WEIGHT: 293 LBS | HEIGHT: 66 IN

## 2018-03-20 DIAGNOSIS — J45.909 UNCOMPLICATED ASTHMA, UNSPECIFIED ASTHMA SEVERITY, UNSPECIFIED WHETHER PERSISTENT: ICD-10-CM

## 2018-03-20 DIAGNOSIS — L40.9 PSORIASIS: ICD-10-CM

## 2018-03-20 DIAGNOSIS — M54.9 CHRONIC BACK PAIN, UNSPECIFIED BACK LOCATION, UNSPECIFIED BACK PAIN LATERALITY: ICD-10-CM

## 2018-03-20 DIAGNOSIS — N32.81 OVERACTIVE BLADDER: ICD-10-CM

## 2018-03-20 DIAGNOSIS — G47.33 OSA (OBSTRUCTIVE SLEEP APNEA): ICD-10-CM

## 2018-03-20 DIAGNOSIS — M17.11 PRIMARY OSTEOARTHRITIS OF RIGHT KNEE: ICD-10-CM

## 2018-03-20 DIAGNOSIS — F41.9 ANXIETY AND DEPRESSION: ICD-10-CM

## 2018-03-20 DIAGNOSIS — F32.A ANXIETY AND DEPRESSION: ICD-10-CM

## 2018-03-20 DIAGNOSIS — I10 ESSENTIAL HYPERTENSION: Primary | ICD-10-CM

## 2018-03-20 DIAGNOSIS — G89.29 CHRONIC BACK PAIN, UNSPECIFIED BACK LOCATION, UNSPECIFIED BACK PAIN LATERALITY: ICD-10-CM

## 2018-03-20 PROCEDURE — 99213 OFFICE O/P EST LOW 20 MIN: CPT | Performed by: NURSE PRACTITIONER

## 2018-03-20 PROCEDURE — 1036F TOBACCO NON-USER: CPT | Performed by: NURSE PRACTITIONER

## 2018-03-20 PROCEDURE — G8427 DOCREV CUR MEDS BY ELIG CLIN: HCPCS | Performed by: NURSE PRACTITIONER

## 2018-03-20 PROCEDURE — G8417 CALC BMI ABV UP PARAM F/U: HCPCS | Performed by: NURSE PRACTITIONER

## 2018-03-20 PROCEDURE — G8484 FLU IMMUNIZE NO ADMIN: HCPCS | Performed by: NURSE PRACTITIONER

## 2018-04-12 LAB
STATUS: NORMAL
STATUS: NORMAL

## 2018-05-17 ENCOUNTER — OFFICE VISIT (OUTPATIENT)
Dept: PODIATRY | Age: 48
End: 2018-05-17
Payer: MEDICARE

## 2018-05-17 VITALS — HEIGHT: 65 IN | WEIGHT: 293 LBS | RESPIRATION RATE: 16 BRPM | BODY MASS INDEX: 48.82 KG/M2 | HEART RATE: 68 BPM

## 2018-05-17 DIAGNOSIS — B35.1 DERMATOPHYTOSIS OF NAIL: ICD-10-CM

## 2018-05-17 DIAGNOSIS — I73.9 PVD (PERIPHERAL VASCULAR DISEASE) (HCC): ICD-10-CM

## 2018-05-17 DIAGNOSIS — M79.675 PAIN OF TOES OF BOTH FEET: Primary | ICD-10-CM

## 2018-05-17 DIAGNOSIS — M79.674 PAIN OF TOES OF BOTH FEET: Primary | ICD-10-CM

## 2018-05-17 DIAGNOSIS — R60.0 EDEMA OF LOWER EXTREMITY: ICD-10-CM

## 2018-05-17 PROCEDURE — G8427 DOCREV CUR MEDS BY ELIG CLIN: HCPCS | Performed by: PODIATRIST

## 2018-05-17 PROCEDURE — G8417 CALC BMI ABV UP PARAM F/U: HCPCS | Performed by: PODIATRIST

## 2018-05-17 PROCEDURE — 1036F TOBACCO NON-USER: CPT | Performed by: PODIATRIST

## 2018-05-17 PROCEDURE — 99213 OFFICE O/P EST LOW 20 MIN: CPT | Performed by: PODIATRIST

## 2018-05-17 PROCEDURE — 11721 DEBRIDE NAIL 6 OR MORE: CPT | Performed by: PODIATRIST

## 2019-03-27 ENCOUNTER — HOSPITAL ENCOUNTER (OUTPATIENT)
Age: 49
Discharge: HOME | End: 2019-03-27
Payer: MEDICARE

## 2019-03-27 LAB
ALBUMIN SERPL-MCNC: 3.5 G/DL (ref 3.5–5.2)
ALBUMIN/GLOBULIN RATIO: 1.2 (ref 1–2.5)
ALP BLD-CCNC: 79 U/L (ref 35–104)
ALT SERPL-CCNC: 10 U/L (ref 5–33)
ANION GAP SERPL CALCULATED.3IONS-SCNC: 12 MMOL/L (ref 9–17)
AST SERPL-CCNC: 11 U/L
BILIRUB SERPL-MCNC: 0.64 MG/DL (ref 0.3–1.2)
BUN BLDV-MCNC: 9 MG/DL (ref 6–20)
BUN/CREAT BLD: ABNORMAL (ref 9–20)
CALCIUM SERPL-MCNC: 8.2 MG/DL (ref 8.6–10.4)
CHLORIDE BLD-SCNC: 105 MMOL/L (ref 98–107)
CHOLESTEROL, FASTING: 115 MG/DL
CHOLESTEROL/HDL RATIO: 3.6
CO2: 24 MMOL/L (ref 20–31)
CREAT SERPL-MCNC: 0.8 MG/DL (ref 0.5–0.9)
GFR AFRICAN AMERICAN: >60 ML/MIN
GFR NON-AFRICAN AMERICAN: >60 ML/MIN
GFR SERPL CREATININE-BSD FRML MDRD: ABNORMAL ML/MIN/{1.73_M2}
GFR SERPL CREATININE-BSD FRML MDRD: ABNORMAL ML/MIN/{1.73_M2}
GLUCOSE BLD-MCNC: 98 MG/DL (ref 70–99)
HCT VFR BLD CALC: 34.4 % (ref 36.3–47.1)
HDLC SERPL-MCNC: 32 MG/DL
HEMOGLOBIN: 10.7 G/DL (ref 11.9–15.1)
LDL CHOLESTEROL: 70 MG/DL (ref 0–130)
MCH RBC QN AUTO: 28.8 PG (ref 25.2–33.5)
MCHC RBC AUTO-ENTMCNC: 31.1 G/DL (ref 28.4–34.8)
MCV RBC AUTO: 92.7 FL (ref 82.6–102.9)
NRBC AUTOMATED: 0 PER 100 WBC
PDW BLD-RTO: 13.2 % (ref 11.8–14.4)
PLATELET # BLD: 208 K/UL (ref 138–453)
PMV BLD AUTO: 10 FL (ref 8.1–13.5)
POTASSIUM SERPL-SCNC: 4.1 MMOL/L (ref 3.7–5.3)
RBC # BLD: 3.71 M/UL (ref 3.95–5.11)
SEDIMENTATION RATE, ERYTHROCYTE: 57 MM (ref 0–20)
SODIUM BLD-SCNC: 141 MMOL/L (ref 135–144)
TOTAL PROTEIN: 6.5 G/DL (ref 6.4–8.3)
TRIGLYCERIDE, FASTING: 65 MG/DL
VLDLC SERPL CALC-MCNC: ABNORMAL MG/DL (ref 1–30)
WBC # BLD: 6.2 K/UL (ref 3.5–11.3)

## 2019-03-27 PROCEDURE — 85027 COMPLETE CBC AUTOMATED: CPT

## 2019-03-27 PROCEDURE — 36415 COLL VENOUS BLD VENIPUNCTURE: CPT

## 2019-03-27 PROCEDURE — 80053 COMPREHEN METABOLIC PANEL: CPT

## 2019-03-27 PROCEDURE — 85651 RBC SED RATE NONAUTOMATED: CPT

## 2019-03-27 PROCEDURE — 80061 LIPID PANEL: CPT

## 2019-10-23 ENCOUNTER — HOSPITAL ENCOUNTER (OUTPATIENT)
Age: 49
Discharge: HOME | End: 2019-10-23
Payer: MEDICARE

## 2019-10-23 LAB
ALBUMIN SERPL-MCNC: 3.5 G/DL (ref 3.5–5.2)
ALBUMIN/GLOBULIN RATIO: 1.1 (ref 1–2.5)
ALP BLD-CCNC: 71 U/L (ref 35–104)
ALT SERPL-CCNC: 12 U/L (ref 5–33)
ANION GAP SERPL CALCULATED.3IONS-SCNC: 15 MMOL/L (ref 9–17)
AST SERPL-CCNC: 13 U/L
BILIRUB SERPL-MCNC: 0.48 MG/DL (ref 0.3–1.2)
BUN BLDV-MCNC: 12 MG/DL (ref 6–20)
BUN/CREAT BLD: NORMAL (ref 9–20)
CALCIUM SERPL-MCNC: 9 MG/DL (ref 8.6–10.4)
CHLORIDE BLD-SCNC: 105 MMOL/L (ref 98–107)
CHOLESTEROL, FASTING: 119 MG/DL
CHOLESTEROL/HDL RATIO: 3.2
CO2: 21 MMOL/L (ref 20–31)
CREAT SERPL-MCNC: 0.84 MG/DL (ref 0.5–0.9)
GFR AFRICAN AMERICAN: >60 ML/MIN
GFR NON-AFRICAN AMERICAN: >60 ML/MIN
GFR SERPL CREATININE-BSD FRML MDRD: NORMAL ML/MIN/{1.73_M2}
GFR SERPL CREATININE-BSD FRML MDRD: NORMAL ML/MIN/{1.73_M2}
GLUCOSE BLD-MCNC: 90 MG/DL (ref 70–99)
HDLC SERPL-MCNC: 37 MG/DL
LDL CHOLESTEROL: 66 MG/DL (ref 0–130)
POTASSIUM SERPL-SCNC: 4.5 MMOL/L (ref 3.7–5.3)
SODIUM BLD-SCNC: 141 MMOL/L (ref 135–144)
T3 FREE: 2.93 PG/ML (ref 2.02–4.43)
THYROXINE, FREE: 1.02 NG/DL (ref 0.93–1.7)
TOTAL PROTEIN: 6.8 G/DL (ref 6.4–8.3)
TRIGLYCERIDE, FASTING: 79 MG/DL
TSH SERPL DL<=0.05 MIU/L-ACNC: 1.04 MIU/L (ref 0.3–5)
VLDLC SERPL CALC-MCNC: ABNORMAL MG/DL (ref 1–30)

## 2019-10-23 PROCEDURE — 84439 ASSAY OF FREE THYROXINE: CPT

## 2019-10-23 PROCEDURE — 36415 COLL VENOUS BLD VENIPUNCTURE: CPT

## 2019-10-23 PROCEDURE — 83036 HEMOGLOBIN GLYCOSYLATED A1C: CPT

## 2019-10-23 PROCEDURE — 80053 COMPREHEN METABOLIC PANEL: CPT

## 2019-10-23 PROCEDURE — 84443 ASSAY THYROID STIM HORMONE: CPT

## 2019-10-23 PROCEDURE — 84481 FREE ASSAY (FT-3): CPT

## 2019-10-23 PROCEDURE — 80061 LIPID PANEL: CPT

## 2019-10-24 LAB
ESTIMATED AVERAGE GLUCOSE: 108 MG/DL
HBA1C MFR BLD: 5.4 % (ref 4–6)

## 2020-01-29 ENCOUNTER — TELEPHONE (OUTPATIENT)
Dept: DERMATOLOGY | Age: 50
End: 2020-01-29

## 2020-01-29 NOTE — TELEPHONE ENCOUNTER
LVM for patient to call back and schedule np appt with first available provider. Referral scanned into media.

## 2020-03-03 ENCOUNTER — OFFICE VISIT (OUTPATIENT)
Dept: BARIATRICS/WEIGHT MGMT | Age: 50
End: 2020-03-03
Payer: MEDICARE

## 2020-03-03 VITALS
HEART RATE: 78 BPM | BODY MASS INDEX: 47.09 KG/M2 | WEIGHT: 293 LBS | SYSTOLIC BLOOD PRESSURE: 126 MMHG | RESPIRATION RATE: 18 BRPM | HEIGHT: 66 IN | DIASTOLIC BLOOD PRESSURE: 74 MMHG

## 2020-03-03 PROBLEM — G89.29 BILATERAL CHRONIC KNEE PAIN: Status: ACTIVE | Noted: 2020-03-03

## 2020-03-03 PROBLEM — M25.561 BILATERAL CHRONIC KNEE PAIN: Status: ACTIVE | Noted: 2020-03-03

## 2020-03-03 PROBLEM — M25.562 BILATERAL CHRONIC KNEE PAIN: Status: ACTIVE | Noted: 2020-03-03

## 2020-03-03 PROCEDURE — 99214 OFFICE O/P EST MOD 30 MIN: CPT | Performed by: NURSE PRACTITIONER

## 2020-03-03 PROCEDURE — G8419 CALC BMI OUT NRM PARAM NOF/U: HCPCS | Performed by: NURSE PRACTITIONER

## 2020-03-03 PROCEDURE — G8484 FLU IMMUNIZE NO ADMIN: HCPCS | Performed by: NURSE PRACTITIONER

## 2020-03-03 PROCEDURE — 1036F TOBACCO NON-USER: CPT | Performed by: NURSE PRACTITIONER

## 2020-03-03 PROCEDURE — G8427 DOCREV CUR MEDS BY ELIG CLIN: HCPCS | Performed by: NURSE PRACTITIONER

## 2020-03-03 RX ORDER — ESCITALOPRAM OXALATE 20 MG/1
10 TABLET ORAL DAILY
COMMUNITY

## 2020-03-03 NOTE — PROGRESS NOTES
Clinical Induction for Group Lifestyle Balance Program Progress Note    Subjective     The patient is a 54 y.o. female being seen regarding supervised weight loss. The patient's PCP is Michelle Mcneal MD .  Highest adult weight was 398 lbs and lowest adult weight was 180 lbs. Her Body mass index is 64.4 kg/m². Waldo Boswell Her weight goal is 250 lbs. The patient reports that her obesity is significantly affecting her life on a daily basis. Weight Loss Program History:   She has tried nutritional counseling with dietitian. This attempt has been somewhat successful with weight loss, but has failed to sustain adequate weight loss. The patient has also tried self directed diet and exercise in attempts to sustain weight loss. Comorbid Conditions:  Significant diseases affecting this patient are: DENISHA, HTN, Anxiety/Depression, Chronic Back Pain, Psoriasis, Asthma, Arthritis, Chronic Bilateral Knee Pain, OAB. Allergies: Allergies   Allergen Reactions    No Known Allergies        Past Medical History:     Past Medical History:   Diagnosis Date    Allergic rhinitis     Anxiety     Arthritis     Asthma     Headache(784.0)     Obesity     Overactive bladder    .     Past Surgical History:  Past Surgical History:   Procedure Laterality Date    EYE SURGERY Bilateral     cataract removed with lens implants    KNEE SURGERY Right 05/16/2017    [place] KNEE SCOPE,DIAGNOSTIC Right 5/16/2017    RIGHT KNEE ARTHROSCOPY WITH MEDIAL MENISECTOMY AND CHONDROPLASTY performed by Brendon López DO at [address] [place] [address]         Family History:  Family History   Problem Relation Age of Onset    Arthritis Father        Social History:  Social History     Socioeconomic History    Marital status:      Spouse name: Not on file    Number of children: Not on file    Years of education: Not on file    Highest education level: Not on file   Occupational History    Not on file   Social Needs    Financial resource strain: Not on file    Food insecurity:     Worry: Not on file     Inability: Not on file    Transportation needs:     Medical: Not on file     Non-medical: Not on file   Tobacco Use    Smoking status: Former Smoker     Packs/day: 0.60     Types: Cigarettes    Smokeless tobacco: Never Used   Substance and Sexual Activity    Alcohol use: Yes     Comment: rarely    Drug use: No    Sexual activity: Not on file   Lifestyle    Physical activity:     Days per week: Not on file     Minutes per session: Not on file    Stress: Not on file   Relationships    Social connections:     Talks on phone: Not on file     Gets together: Not on file     Attends [employer] service: Not on file     Active member of club or organization: Not on file     Attends meetings of clubs or organizations: Not on file     Relationship status: Not on file    Intimate partner violence:     Fear of current or ex partner: Not on file     Emotionally abused: Not on file     Physically abused: Not on file     Forced sexual activity: Not on file   Other Topics Concern    Not on file   Social History Narrative    Not on file       Current Medications:  Current Outpatient Medications   Medication Sig Dispense Refill    VITAMIN D PO Take 1,000 Units by mouth      escitalopram (LEXAPRO) 20 MG tablet Take 20 mg by mouth daily      Compression Stockings MISC 30- 40 mm/hg 2 each 1    VESICARE 10 MG tablet   0    buPROPion HBr (APLENZIN) 174 MG TB24 Aplenzin 174 mg tablet,extended release      meloxicam (MOBIC) 7.5 MG tablet Take 7.5 mg by mouth daily      lisinopril (PRINIVIL;ZESTRIL) 20 MG tablet Take 20 mg by mouth daily      PARoxetine (PAXIL) 40 MG tablet take 1 tablet by mouth every morning (Patient not taking: Reported on 3/3/2020) 30 tablet 5    metroNIDAZOLE (METROGEL) 1 % gel Apply topically daily (Patient not taking: Reported on 3/3/2020) 1 Tube 5    NAPROXEN 500 MG EC tablet take 1 tablet by mouth twice a day with food

## 2020-03-10 ENCOUNTER — HOSPITAL ENCOUNTER (OUTPATIENT)
Age: 50
Setting detail: SPECIMEN
Discharge: HOME | End: 2020-03-10
Payer: MEDICARE

## 2020-03-10 ENCOUNTER — HOSPITAL ENCOUNTER (OUTPATIENT)
Age: 50
Discharge: HOME | End: 2020-03-10
Payer: MEDICARE

## 2020-03-10 LAB
ABSOLUTE EOS #: 0.13 K/UL (ref 0–0.44)
ABSOLUTE IMMATURE GRANULOCYTE: 0.03 K/UL (ref 0–0.3)
ABSOLUTE LYMPH #: 1.6 K/UL (ref 1.1–3.7)
ABSOLUTE MONO #: 0.48 K/UL (ref 0.1–1.2)
BASOPHILS # BLD: 0 % (ref 0–2)
BASOPHILS ABSOLUTE: <0.03 K/UL (ref 0–0.2)
DIFFERENTIAL TYPE: ABNORMAL
EOSINOPHILS RELATIVE PERCENT: 2 % (ref 1–4)
ESTIMATED AVERAGE GLUCOSE: 105 MG/DL
HBA1C MFR BLD: 5.3 % (ref 4–6)
HCT VFR BLD CALC: 36.8 % (ref 36.3–47.1)
HEMOGLOBIN: 11.8 G/DL (ref 11.9–15.1)
IMMATURE GRANULOCYTES: 1 %
LYMPHOCYTES # BLD: 27 % (ref 24–43)
MCH RBC QN AUTO: 29.4 PG (ref 25.2–33.5)
MCHC RBC AUTO-ENTMCNC: 32.1 G/DL (ref 28.4–34.8)
MCV RBC AUTO: 91.5 FL (ref 82.6–102.9)
MONOCYTES # BLD: 8 % (ref 3–12)
NRBC AUTOMATED: 0 PER 100 WBC
PDW BLD-RTO: 13.2 % (ref 11.8–14.4)
PLATELET # BLD: 267 K/UL (ref 138–453)
PLATELET ESTIMATE: ABNORMAL
PMV BLD AUTO: 9.8 FL (ref 8.1–13.5)
RBC # BLD: 4.02 M/UL (ref 3.95–5.11)
RBC # BLD: ABNORMAL 10*6/UL
SEG NEUTROPHILS: 62 % (ref 36–65)
SEGMENTED NEUTROPHILS ABSOLUTE COUNT: 3.69 K/UL (ref 1.5–8.1)
URIC ACID: 5.3 MG/DL (ref 2.4–5.7)
WBC # BLD: 6 K/UL (ref 3.5–11.3)
WBC # BLD: ABNORMAL 10*3/UL

## 2020-03-10 PROCEDURE — 85025 COMPLETE CBC W/AUTO DIFF WBC: CPT

## 2020-03-10 PROCEDURE — 36415 COLL VENOUS BLD VENIPUNCTURE: CPT

## 2020-03-10 PROCEDURE — 83036 HEMOGLOBIN GLYCOSYLATED A1C: CPT

## 2020-03-10 PROCEDURE — 93005 ELECTROCARDIOGRAM TRACING: CPT | Performed by: NURSE PRACTITIONER

## 2020-03-10 PROCEDURE — 84550 ASSAY OF BLOOD/URIC ACID: CPT

## 2020-03-11 LAB
EKG ATRIAL RATE: 75 BPM
EKG P AXIS: 14 DEGREES
EKG P-R INTERVAL: 172 MS
EKG Q-T INTERVAL: 386 MS
EKG QRS DURATION: 84 MS
EKG QTC CALCULATION (BAZETT): 431 MS
EKG R AXIS: 51 DEGREES
EKG T AXIS: 22 DEGREES
EKG VENTRICULAR RATE: 75 BPM

## 2020-06-21 ENCOUNTER — APPOINTMENT (OUTPATIENT)
Dept: CT IMAGING | Age: 50
End: 2020-06-21
Payer: MEDICARE

## 2020-06-21 ENCOUNTER — HOSPITAL ENCOUNTER (EMERGENCY)
Age: 50
Discharge: HOME | End: 2020-06-21
Attending: EMERGENCY MEDICINE
Payer: MEDICARE

## 2020-06-21 VITALS
HEART RATE: 91 BPM | SYSTOLIC BLOOD PRESSURE: 149 MMHG | DIASTOLIC BLOOD PRESSURE: 100 MMHG | RESPIRATION RATE: 14 BRPM | WEIGHT: 293 LBS | OXYGEN SATURATION: 97 % | BODY MASS INDEX: 48.82 KG/M2 | HEIGHT: 65 IN | TEMPERATURE: 98.1 F

## 2020-06-21 LAB
-: ABNORMAL
-: NORMAL
ABSOLUTE EOS #: 0.12 K/UL (ref 0–0.44)
ABSOLUTE IMMATURE GRANULOCYTE: 0.04 K/UL (ref 0–0.3)
ABSOLUTE LYMPH #: 1.76 K/UL (ref 1.1–3.7)
ABSOLUTE MONO #: 0.57 K/UL (ref 0.1–1.2)
ALBUMIN SERPL-MCNC: 3.6 G/DL (ref 3.5–5.2)
ALBUMIN/GLOBULIN RATIO: NORMAL (ref 1–2.5)
ALP BLD-CCNC: 77 U/L (ref 35–104)
ALT SERPL-CCNC: 15 U/L (ref 5–33)
AMORPHOUS: ABNORMAL
AMYLASE: 54 U/L (ref 28–100)
ANION GAP SERPL CALCULATED.3IONS-SCNC: 12 MMOL/L (ref 9–17)
AST SERPL-CCNC: 16 U/L
BACTERIA: ABNORMAL
BASOPHILS # BLD: 0 % (ref 0–2)
BASOPHILS ABSOLUTE: 0.03 K/UL (ref 0–0.2)
BILIRUB SERPL-MCNC: 0.44 MG/DL (ref 0.3–1.2)
BILIRUBIN URINE: NEGATIVE
BUN BLDV-MCNC: 11 MG/DL (ref 6–20)
BUN/CREAT BLD: 12 (ref 9–20)
CALCIUM SERPL-MCNC: 8.9 MG/DL (ref 8.6–10.4)
CASTS UA: ABNORMAL /LPF
CHLORIDE BLD-SCNC: 102 MMOL/L (ref 98–107)
CHP ED QC CHECK: NORMAL
CO2: 26 MMOL/L (ref 20–31)
COLOR: YELLOW
COMMENT UA: ABNORMAL
CREAT SERPL-MCNC: 0.9 MG/DL (ref 0.5–0.9)
CRYSTALS, UA: ABNORMAL /HPF
DIFFERENTIAL TYPE: ABNORMAL
EOSINOPHILS RELATIVE PERCENT: 1 % (ref 1–4)
EPITHELIAL CELLS UA: ABNORMAL /HPF (ref 0–5)
GFR AFRICAN AMERICAN: >60 ML/MIN
GFR NON-AFRICAN AMERICAN: >60 ML/MIN
GFR SERPL CREATININE-BSD FRML MDRD: NORMAL ML/MIN/{1.73_M2}
GFR SERPL CREATININE-BSD FRML MDRD: NORMAL ML/MIN/{1.73_M2}
GLUCOSE BLD-MCNC: 85 MG/DL (ref 70–99)
GLUCOSE URINE: NEGATIVE
HCT VFR BLD CALC: 35 % (ref 36.3–47.1)
HEMOGLOBIN: 11.2 G/DL (ref 11.9–15.1)
IMMATURE GRANULOCYTES: 0 %
KETONES, URINE: NEGATIVE
LEUKOCYTE ESTERASE, URINE: NEGATIVE
LIPASE: 38 U/L (ref 13–60)
LYMPHOCYTES # BLD: 20 % (ref 24–43)
MCH RBC QN AUTO: 29.1 PG (ref 25.2–33.5)
MCHC RBC AUTO-ENTMCNC: 32 G/DL (ref 28.4–34.8)
MCV RBC AUTO: 90.9 FL (ref 82.6–102.9)
MONOCYTES # BLD: 6 % (ref 3–12)
MUCUS: ABNORMAL
NITRITE, URINE: NEGATIVE
NRBC AUTOMATED: 0 PER 100 WBC
OTHER OBSERVATIONS UA: ABNORMAL
PDW BLD-RTO: 13 % (ref 11.8–14.4)
PH UA: 6 (ref 5–8)
PLATELET # BLD: 224 K/UL (ref 138–453)
PLATELET ESTIMATE: ABNORMAL
PMV BLD AUTO: 9.1 FL (ref 8.1–13.5)
POTASSIUM SERPL-SCNC: 4.4 MMOL/L (ref 3.7–5.3)
PREGNANCY TEST URINE, POC: NORMAL
PROTEIN UA: NEGATIVE
RBC # BLD: 3.85 M/UL (ref 3.95–5.11)
RBC # BLD: ABNORMAL 10*6/UL
RBC UA: ABNORMAL /HPF (ref 0–2)
REASON FOR REJECTION: NORMAL
RENAL EPITHELIAL, UA: ABNORMAL /HPF
SEG NEUTROPHILS: 73 % (ref 36–65)
SEGMENTED NEUTROPHILS ABSOLUTE COUNT: 6.48 K/UL (ref 1.5–8.1)
SODIUM BLD-SCNC: 140 MMOL/L (ref 135–144)
SPECIFIC GRAVITY UA: 1.02 (ref 1–1.03)
TOTAL PROTEIN: 6.5 G/DL (ref 6.4–8.3)
TRICHOMONAS: ABNORMAL
TURBIDITY: ABNORMAL
URINE HGB: ABNORMAL
UROBILINOGEN, URINE: NORMAL
WBC # BLD: 9 K/UL (ref 3.5–11.3)
WBC # BLD: ABNORMAL 10*3/UL
WBC UA: ABNORMAL /HPF (ref 0–5)
YEAST: ABNORMAL
ZZ NTE CLEAN UP: ORDERED TEST: NORMAL
ZZ NTE WITH NAME CLEAN UP: SPECIMEN SOURCE: NORMAL

## 2020-06-21 PROCEDURE — 6360000004 HC RX CONTRAST MEDICATION: Performed by: EMERGENCY MEDICINE

## 2020-06-21 PROCEDURE — 6370000000 HC RX 637 (ALT 250 FOR IP): Performed by: EMERGENCY MEDICINE

## 2020-06-21 PROCEDURE — 80053 COMPREHEN METABOLIC PANEL: CPT

## 2020-06-21 PROCEDURE — 74177 CT ABD & PELVIS W/CONTRAST: CPT

## 2020-06-21 PROCEDURE — 96374 THER/PROPH/DIAG INJ IV PUSH: CPT

## 2020-06-21 PROCEDURE — 2580000003 HC RX 258: Performed by: EMERGENCY MEDICINE

## 2020-06-21 PROCEDURE — 85025 COMPLETE CBC W/AUTO DIFF WBC: CPT

## 2020-06-21 PROCEDURE — 83690 ASSAY OF LIPASE: CPT

## 2020-06-21 PROCEDURE — 82150 ASSAY OF AMYLASE: CPT

## 2020-06-21 PROCEDURE — 99284 EMERGENCY DEPT VISIT MOD MDM: CPT

## 2020-06-21 PROCEDURE — 6360000002 HC RX W HCPCS: Performed by: EMERGENCY MEDICINE

## 2020-06-21 PROCEDURE — 81001 URINALYSIS AUTO W/SCOPE: CPT

## 2020-06-21 PROCEDURE — 81025 URINE PREGNANCY TEST: CPT

## 2020-06-21 RX ORDER — METRONIDAZOLE 500 MG/1
500 TABLET ORAL 3 TIMES DAILY
Qty: 21 TABLET | Refills: 0 | Status: SHIPPED | OUTPATIENT
Start: 2020-06-21 | End: 2020-06-28

## 2020-06-21 RX ORDER — FLUCONAZOLE 150 MG/1
150 TABLET ORAL DAILY
Qty: 3 TABLET | Refills: 0 | Status: SHIPPED | OUTPATIENT
Start: 2020-06-21 | End: 2020-06-24

## 2020-06-21 RX ORDER — 0.9 % SODIUM CHLORIDE 0.9 %
80 INTRAVENOUS SOLUTION INTRAVENOUS ONCE
Status: COMPLETED | OUTPATIENT
Start: 2020-06-21 | End: 2020-06-21

## 2020-06-21 RX ORDER — ONDANSETRON 4 MG/1
4 TABLET, ORALLY DISINTEGRATING ORAL 3 TIMES DAILY PRN
Qty: 21 TABLET | Refills: 0 | Status: SHIPPED | OUTPATIENT
Start: 2020-06-21 | End: 2020-07-29 | Stop reason: ALTCHOICE

## 2020-06-21 RX ORDER — CIPROFLOXACIN 500 MG/1
500 TABLET, FILM COATED ORAL ONCE
Status: COMPLETED | OUTPATIENT
Start: 2020-06-21 | End: 2020-06-21

## 2020-06-21 RX ORDER — METRONIDAZOLE 500 MG/1
500 TABLET ORAL ONCE
Status: COMPLETED | OUTPATIENT
Start: 2020-06-21 | End: 2020-06-21

## 2020-06-21 RX ORDER — ONDANSETRON 2 MG/ML
4 INJECTION INTRAMUSCULAR; INTRAVENOUS ONCE
Status: COMPLETED | OUTPATIENT
Start: 2020-06-21 | End: 2020-06-21

## 2020-06-21 RX ORDER — HYDROCODONE BITARTRATE AND ACETAMINOPHEN 5; 325 MG/1; MG/1
1 TABLET ORAL ONCE
Status: COMPLETED | OUTPATIENT
Start: 2020-06-21 | End: 2020-06-21

## 2020-06-21 RX ORDER — SODIUM CHLORIDE 0.9 % (FLUSH) 0.9 %
10 SYRINGE (ML) INJECTION ONCE
Status: COMPLETED | OUTPATIENT
Start: 2020-06-21 | End: 2020-06-21

## 2020-06-21 RX ORDER — CIPROFLOXACIN 500 MG/1
500 TABLET, FILM COATED ORAL 2 TIMES DAILY
Qty: 14 TABLET | Refills: 0 | Status: SHIPPED | OUTPATIENT
Start: 2020-06-21 | End: 2020-06-28

## 2020-06-21 RX ADMIN — ONDANSETRON 4 MG: 2 INJECTION INTRAMUSCULAR; INTRAVENOUS at 16:17

## 2020-06-21 RX ADMIN — Medication 10 ML: at 17:01

## 2020-06-21 RX ADMIN — METRONIDAZOLE 500 MG: 500 TABLET ORAL at 18:43

## 2020-06-21 RX ADMIN — IOPAMIDOL 75 ML: 755 INJECTION, SOLUTION INTRAVENOUS at 17:01

## 2020-06-21 RX ADMIN — CIPROFLOXACIN 500 MG: 500 TABLET ORAL at 18:43

## 2020-06-21 RX ADMIN — HYDROCODONE BITARTRATE AND ACETAMINOPHEN 1 TABLET: 5; 325 TABLET ORAL at 18:44

## 2020-06-21 RX ADMIN — SODIUM CHLORIDE 80 ML: 9 INJECTION, SOLUTION INTRAVENOUS at 17:01

## 2020-06-21 ASSESSMENT — ENCOUNTER SYMPTOMS
EYE DISCHARGE: 0
CHEST TIGHTNESS: 0
NAUSEA: 1
ABDOMINAL PAIN: 1
FACIAL SWELLING: 0
SHORTNESS OF BREATH: 0
EYE PAIN: 0
ABDOMINAL DISTENTION: 0
BACK PAIN: 0

## 2020-06-21 ASSESSMENT — PAIN DESCRIPTION - PAIN TYPE: TYPE: ACUTE PAIN

## 2020-06-21 ASSESSMENT — PAIN SCALES - GENERAL
PAINLEVEL_OUTOF10: 7
PAINLEVEL_OUTOF10: 9

## 2020-06-21 NOTE — ED PROVIDER NOTES
removed with lens implants    KNEE SURGERY Right 05/16/2017    [place] KNEE SCOPE,DIAGNOSTIC Right 5/16/2017    RIGHT KNEE ARTHROSCOPY WITH MEDIAL MENISECTOMY AND CHONDROPLASTY performed by Jeromy Jenkins DO at [address] [facility]       Previous Medications    ALBUTEROL SULFATE  (90 BASE) MCG/ACT INHALER    Inhale 2 puffs into the lungs every 6 hours as needed for Wheezing    BUPROPION (WELLBUTRIN XL) 300 MG EXTENDED RELEASE TABLET    Take 1 tablet by mouth every morning    BUPROPION HBR (APLENZIN) 174 MG TB24    Aplenzin 174 mg tablet,extended release    COMPRESSION STOCKINGS MISC    30- 40 mm/hg    DESOXIMETASONE (TOPICORT) 0.25 % OINT    Apply twice daily to skin for psoriasis    DOCUSATE SODIUM (COLACE) 100 MG CAPSULE    Take 1 capsule by mouth 2 times daily as needed for Constipation    ESCITALOPRAM (LEXAPRO) 20 MG TABLET    Take 20 mg by mouth daily    IBUPROFEN (ADVIL;MOTRIN) 800 MG TABLET    Take 1 tablet by mouth every 8 hours as needed for Pain    KETOCONAZOLE (NIZORAL) 2 % CREAM    Apply twice daily as needed. KETOCONAZOLE (NIZORAL) 2 % SHAMPOO    Apply topically daily as needed. LISINOPRIL (PRINIVIL;ZESTRIL) 20 MG TABLET    Take 20 mg by mouth daily    MELOXICAM (MOBIC) 7.5 MG TABLET    Take 7.5 mg by mouth daily    METRONIDAZOLE (METROGEL) 1 % GEL    Apply topically daily    NAPROXEN 500 MG EC TABLET    take 1 tablet by mouth twice a day with food    PAROXETINE (PAXIL) 40 MG TABLET    take 1 tablet by mouth every morning    VESICARE 10 MG TABLET        VITAMIN D PO    Take 1,000 Units by mouth     ALLERGIES     is allergic to no known allergies. FAMILY HISTORY     She indicated that her mother is alive. She indicated that her father is alive. SOCIAL HISTORY       Social History     Tobacco Use    Smoking status: Former Smoker     Packs/day: 0.60     Types: Cigarettes    Smokeless tobacco: Never Used   Substance Use Topics    Alcohol use:  Yes and concerns were answered prior to disposition and patient and/or family expressed understanding and agreement of treatment plan. CRITICAL CARE:              NIH STROKE SCALE:            PROCEDURES:    Procedures    DIAGNOSTIC RESULTS   EKG:All EKG's are interpreted by the Emergency Department Physician who either signs or Co-signs this chart in the absence of a cardiologist.        RADIOLOGY:All plain film, CT, MRI, and formal ultrasound images (except ED bedside ultrasound) are read by the radiologist, see reports below, unless otherwisenoted in MDM or here. CT ABDOMEN PELVIS W IV CONTRAST Additional Contrast? None   Final Result   1. Sigmoid diverticulosis with questionable mild inflammatory changes seen   along the sigmoid colon possibly representing early diverticulitis. CT   follow-up is recommended. 2. Cholelithiasis. LABS: All lab results were reviewed by myself, and all abnormals are listed below.   Labs Reviewed   URINALYSIS WITH MICROSCOPIC - Abnormal; Notable for the following components:       Result Value    Turbidity UA SLIGHTLY CLOUDY (*)     Urine Hgb 2+ (*)     All other components within normal limits   CBC WITH AUTO DIFFERENTIAL - Abnormal; Notable for the following components:    RBC 3.85 (*)     Hemoglobin 11.2 (*)     Hematocrit 35.0 (*)     Seg Neutrophils 73 (*)     Lymphocytes 20 (*)     All other components within normal limits   POCT URINE PREGNANCY - Normal   COMPREHENSIVE METABOLIC PANEL W/ REFLEX TO MG FOR LOW K   LIPASE   AMYLASE   SPECIMEN REJECTION       EMERGENCY DEPARTMENTCOURSE:         Vitals:    Vitals:    06/21/20 1459 06/21/20 1500   BP: (!) 149/100    Pulse: 91    Resp: 14    Temp: 98.1 °F (36.7 °C)    SpO2: 97%    Weight:  (!) 398 lb (180.5 kg)   Height:  5' 5\" (1.651 m)       The patient was given the following medications while in the emergency department:  Orders Placed This Encounter   Medications    ondansetron (ZOFRAN) injection 4 mg    0.9 %

## 2020-06-22 ENCOUNTER — CARE COORDINATION (OUTPATIENT)
Dept: CARE COORDINATION | Age: 50
End: 2020-06-22

## 2020-06-22 LAB — HCG, PREGNANCY URINE (POC): NEGATIVE

## 2020-06-22 NOTE — CARE COORDINATION
Sloan Rivas was seen at [facility] 2020- abd pain, nausea. Dx-diverticulitis colon, nausea. Given Cipro, Flagyl, Diflucan, and Zofran,. Advised to f/u with PCP. Spoke with Sloan Rivas. She stated her pain is coming down. She is taking her prescriptions- she has not needed nausea. Diet discussed and she voiced understanding. She stated she is going to call PCP to schedule f/u appt. She was in agreement to Get Well Loop- enrolled. Patient contacted regarding Alo Najera. Discussed COVID-19 related testing which was not done at this time. Test results were not done. Patient informed of results, if available? N/A    Care Transition Nurse/ Ambulatory Care Manager contacted the patient by telephone to perform post discharge assessment. Verified name and  with patient as identifiers. Provided introduction to self, and explanation of the CTN/ACM role, and reason for call due to risk factors for infection and/or exposure to COVID-19. Symptoms reviewed with patient who verbalized the following symptoms: no new symptoms, no worsening symptoms and abd pain and cramping, nausea. Due to no new or worsening symptoms encounter was not routed to provider for escalation. Discussed follow-up appointments. If no appointment was previously scheduled, appointment scheduling offered: Yes  [facility] follow up appointment(s):   Future Appointments   Date Time Provider Beata Philip   2020  5:30 PM [facility]   2020  3:15 PM Jose Roth MD  derm [facility]     Non-[facility] follow up appointment(s): N/A     Patient has following risk factors of: DENISHA, Obesity. CTN/ACM reviewed discharge instructions, medical action plan and red flags such as increased shortness of breath, increasing fever and signs of decompensation with patient who verbalized understanding.    Discussed exposure protocols and quarantine with CDC Guidelines What to do if you are sick with coronavirus disease 2019. Patient was given an opportunity for questions and concerns. The patient agrees to contact the [facility] exposure line [phone], [facility] department [place] [facility] Health: ([phone]) and PCP office for questions related to their healthcare. CTN/ACM provided contact information for future needs. Reviewed and educated patient on any new and changed medications related to discharge diagnosis     Patient/family/caregiver given information for GetWell Loop and agrees to enroll yes  Patient's preferred e-mail: [email or web address]   Patient's preferred phone number: [phone]  Based on Loop alert triggers, patient will be contacted by nurse care manager for worsening symptoms. Pt will be further monitored by COVID Loop Team based on severity of symptoms and risk factors.

## 2020-06-23 ENCOUNTER — TELEPHONE (OUTPATIENT)
Dept: GASTROENTEROLOGY | Age: 50
End: 2020-06-23

## 2020-07-06 NOTE — TELEPHONE ENCOUNTER
Patient called office asking if we have received her referral and informed that we have not. Stated that she will follow up with them. Writer thanked and call ended.

## 2020-07-14 ENCOUNTER — OFFICE VISIT (OUTPATIENT)
Dept: BARIATRICS/WEIGHT MGMT | Age: 50
End: 2020-07-14
Payer: MEDICARE

## 2020-07-14 VITALS
DIASTOLIC BLOOD PRESSURE: 78 MMHG | WEIGHT: 293 LBS | HEART RATE: 76 BPM | BODY MASS INDEX: 48.82 KG/M2 | SYSTOLIC BLOOD PRESSURE: 128 MMHG | HEIGHT: 65 IN

## 2020-07-14 PROCEDURE — 1036F TOBACCO NON-USER: CPT | Performed by: NURSE PRACTITIONER

## 2020-07-14 PROCEDURE — G8417 CALC BMI ABV UP PARAM F/U: HCPCS | Performed by: NURSE PRACTITIONER

## 2020-07-14 PROCEDURE — G8427 DOCREV CUR MEDS BY ELIG CLIN: HCPCS | Performed by: NURSE PRACTITIONER

## 2020-07-14 PROCEDURE — 99213 OFFICE O/P EST LOW 20 MIN: CPT | Performed by: NURSE PRACTITIONER

## 2020-07-15 NOTE — PROGRESS NOTES
Group Lifestyle Balance Follow-up Progress Note      Subjective     Patient is here for group medical appointment for Group Lifestyle Balance weight management program follow-up for the chronic conditions of DENISHA, HTN, Anxiety/Depression, Chronic Back Pain, Psoriasis, Asthma, Arthritis, Chronic Bilateral Knee Pain, OAB. [place] Perales Patient continues on the program and tolerating well. Total weight gain of 7 lbs. No current issues. Allergies: Allergies   Allergen Reactions    No Known Allergies        Past Medical History:     Past Medical History:   Diagnosis Date    Allergic rhinitis     Anxiety     Anxiety and depression     Arthritis     Asthma     Headache(784.0)     Obesity     Overactive bladder    .     Past Surgical History:  Past Surgical History:   Procedure Laterality Date    EYE SURGERY Bilateral     cataract removed with lens implants    KNEE SURGERY Right 05/16/2017    [place] KNEE SCOPE,DIAGNOSTIC Right 5/16/2017    RIGHT KNEE ARTHROSCOPY WITH MEDIAL MENISECTOMY AND CHONDROPLASTY performed by Gary Hatfield DO at [address]         Family History:  Family History   Problem Relation Age of Onset    Arthritis Father        Social History:  Social History     Socioeconomic History    Marital status:      Spouse name: Not on file    Number of children: Not on file    Years of education: Not on file    Highest education level: Not on file   Occupational History    Not on file   Social Needs    Financial resource strain: Not on file    Food insecurity     Worry: Not on file     Inability: Not on file   [employer] needs     Medical: Not on file     Non-medical: Not on file   Tobacco Use    Smoking status: Former Smoker     Packs/day: 0.60     Types: Cigarettes    Smokeless tobacco: Never Used   Substance and Sexual Activity    Alcohol use: Yes     Comment: rarely    Drug use: No    Sexual activity: Not on file   Lifestyle    Physical activity     Days per week: Not on file     Minutes per session: Not on file    Stress: Not on file   Relationships    Social connections     Talks on phone: Not on file     Gets together: Not on file     Attends [employer] service: Not on file     Active member of club or organization: Not on file     Attends meetings of clubs or organizations: Not on file     Relationship status: Not on file    Intimate partner violence     Fear of current or ex partner: Not on file     Emotionally abused: Not on file     Physically abused: Not on file     Forced sexual activity: Not on file   Other Topics Concern    Not on file   Social History Narrative    Not on file       Current Medications:  Current Outpatient Medications   Medication Sig Dispense Refill    ondansetron (ZOFRAN-ODT) 4 MG disintegrating tablet Take 1 tablet by mouth 3 times daily as needed for Nausea or Vomiting 21 tablet 0    VITAMIN D PO Take 1,000 Units by mouth      buPROPion HBr (APLENZIN) 174 MG TB24 Aplenzin 174 mg tablet,extended release      escitalopram (LEXAPRO) 20 MG tablet Take 20 mg by mouth daily      meloxicam (MOBIC) 7.5 MG tablet Take 7.5 mg by mouth daily      lisinopril (PRINIVIL;ZESTRIL) 20 MG tablet Take 20 mg by mouth daily      Compression Stockings MISC 30- 40 mm/hg 2 each 1    PARoxetine (PAXIL) 40 MG tablet take 1 tablet by mouth every morning (Patient not taking: Reported on 3/3/2020) 30 tablet 5    VESICARE 10 MG tablet   0    metroNIDAZOLE (METROGEL) 1 % gel Apply topically daily (Patient not taking: Reported on 3/3/2020) 1 Tube 5    NAPROXEN 500 MG EC tablet take 1 tablet by mouth twice a day with food (Patient not taking: Reported on 3/3/2020) 60 tablet 3    docusate sodium (COLACE) 100 MG capsule Take 1 capsule by mouth 2 times daily as needed for Constipation (Patient not taking: Reported on 3/3/2020) 40 capsule 0    ibuprofen (ADVIL;MOTRIN) 800 MG tablet Take 1 tablet by mouth every 8 hours as needed for Pain (Patient not taking: Reported on 3/3/2020) 60 tablet 3    albuterol sulfate  (90 BASE) MCG/ACT inhaler Inhale 2 puffs into the lungs every 6 hours as needed for Wheezing      buPROPion (WELLBUTRIN XL) 300 MG extended release tablet Take 1 tablet by mouth every morning (Patient not taking: Reported on 3/3/2020) 30 tablet 3    ketoconazole (NIZORAL) 2 % cream Apply twice daily as needed. (Patient not taking: Reported on 3/3/2020) 60 g 1    ketoconazole (NIZORAL) 2 % shampoo Apply topically daily as needed. (Patient not taking: Reported on 3/3/2020) 1 Bottle 5    desoximetasone (TOPICORT) 0.25 % OINT Apply twice daily to skin for psoriasis (Patient not taking: Reported on 3/3/2020) 60 g 3     Current Facility-Administered Medications   Medication Dose Route Frequency Provider Last Rate Last Dose    albuterol (PROVENTIL) nebulizer solution 2.5 mg  2.5 mg Nebulization 4x daily Kylee Mcdonough MD           Vital Signs:  /78 (Site: Right Upper Arm, Position: Sitting, Cuff Size: Large Adult)   Pulse 76   Ht 5' 5\" (1.651 m)   Wt (!) 400 lb (181.4 kg)   LMP 06/17/2020   BMI 66.56 kg/m²     BMI/Height/Weight:  Body mass index is 66.56 kg/m². Review of Systems  Constitutional: Weight gain      Objective:      Physical Exam   Vital signs reviewed. General Appearance: Well-developed and well-nourished. No acute distress. Skin: Warm, dry. Head: Normocephalic. Pulmonary/Chest: Normal respiratory effort. Musculoskeletal: Movement x4. Neurological:  Alert and oriented. Individual goal for this encounter:  I need both knees replaced and I need to get to at least 48 BMI for approval for surgery. After surgery I hopefully will feel better when I walk and then exercise will bd bearable. I have terrible eating habits and need help and support. X? Vital signs reviewed and discussed with patient. X? Labs/EKG reviewed and discussed with patient. ?  Blood sugar log reviewed and discussed with patient. ? Physical activity discussed. ? Medication changes recommended. ? Smoking cessation discussed. X ? Specific questions/concerns addressed. Specific group medical question(s) addressed in this encounter:  Discussion about metabolic syndrome. Group discussion topic for this encounter:     x? Welcome Jumpstart   ? Be a Fat & Calorie    ? Healthy Eating   ? Move Those Muscles   ? Tip the Calorie Balance   ? Take charge of What's Around You   ? Problem Solving   ? Step Up Your Physical Activity Plan   ? Manage Slips and Self-Defeating Thoughts   ? [address]   ? Make Social Cues Work for Josef Prajapati   ? Ways to Stay Motivated   ? Preparing for Long Term Self-Management   ? Take Charge of Your Lifestyle   ? Mindful Eating, Mindful Movement   ? Manage Your Stress   ? Sit Less for Health   ? More Volume, Fewer Calories   ? Stay Active   ? Balance Your Thoughts   ? Heart Health    ? Looking Back and Looking Forward    Total time:  90 minutes, greater than 50% of visit spent in group counseling/education. Assessment:       Diagnosis Orders   1. Essential hypertension     2. DENISHA (obstructive sleep apnea)     3. Anxiety and depression     4. Morbid obesity with BMI of 60.0-69.9, adult ([facility] [address]     5. Primary osteoarthritis involving multiple joints     6. Uncomplicated asthma, unspecified asthma severity, unspecified whether persistent     7. Psoriasis     8. Chronic back pain, unspecified back location, unspecified back pain laterality     9. Bilateral chronic knee pain         Plan:      Track food and weight. Return to clinic as per group medical appointment schedule. Follow-up:  Return in about 1 week (around 7/21/2020). Orders:  No orders of the defined types were placed in this encounter. Prescriptions:  No orders of the defined types were placed in this encounter.       Electronically signed by:  Yenni Ramirez CNP

## 2020-07-21 ENCOUNTER — OFFICE VISIT (OUTPATIENT)
Dept: BARIATRICS/WEIGHT MGMT | Age: 50
End: 2020-07-21
Payer: MEDICARE

## 2020-07-21 VITALS
DIASTOLIC BLOOD PRESSURE: 76 MMHG | HEART RATE: 80 BPM | WEIGHT: 293 LBS | HEIGHT: 65 IN | BODY MASS INDEX: 48.82 KG/M2 | SYSTOLIC BLOOD PRESSURE: 122 MMHG

## 2020-07-21 PROCEDURE — G8417 CALC BMI ABV UP PARAM F/U: HCPCS | Performed by: NURSE PRACTITIONER

## 2020-07-21 PROCEDURE — 99213 OFFICE O/P EST LOW 20 MIN: CPT | Performed by: NURSE PRACTITIONER

## 2020-07-21 PROCEDURE — G8427 DOCREV CUR MEDS BY ELIG CLIN: HCPCS | Performed by: NURSE PRACTITIONER

## 2020-07-21 PROCEDURE — 1036F TOBACCO NON-USER: CPT | Performed by: NURSE PRACTITIONER

## 2020-07-21 NOTE — PROGRESS NOTES
Group Lifestyle Balance Follow-up Progress Note      Subjective     Patient is here for group medical appointment for Group Lifestyle Balance weight management program follow-up for the chronic conditions of DENISHA, HTN, Anxiety/Depression, Chronic Back Pain, Psoriasis, Asthma, Arthritis, Chronic Bilateral Knee Pain, OAB. Allan Cowart Patient continues on the program and tolerating well. Total weight gain of 12 lbs. No current issues. Allergies: Allergies   Allergen Reactions    No Known Allergies        Past Medical History:     Past Medical History:   Diagnosis Date    Allergic rhinitis     Anxiety     Anxiety and depression     Arthritis     Asthma     Headache(784.0)     Obesity     Overactive bladder    .     Past Surgical History:  Past Surgical History:   Procedure Laterality Date    EYE SURGERY Bilateral     cataract removed with lens implants    KNEE SURGERY Right 05/16/2017    [place] KNEE SCOPE,DIAGNOSTIC Right 5/16/2017    RIGHT KNEE ARTHROSCOPY WITH MEDIAL MENISECTOMY AND CHONDROPLASTY performed by Antonio Brennan DO at [address]         Family History:  Family History   Problem Relation Age of Onset    Arthritis Father        Social History:  Social History     Socioeconomic History    Marital status:      Spouse name: Not on file    Number of children: Not on file    Years of education: Not on file    Highest education level: Not on file   Occupational History    Not on file   Social Needs    Financial resource strain: Not on file    Food insecurity     Worry: Not on file     Inability: Not on file   [employer] needs     Medical: Not on file     Non-medical: Not on file   Tobacco Use    Smoking status: Former Smoker     Packs/day: 0.60     Types: Cigarettes    Smokeless tobacco: Never Used   Substance and Sexual Activity    Alcohol use: Yes     Comment: rarely    Drug use: No    Sexual activity: Not on file   Lifestyle    Physical activity     Days per activity discussed. ? Medication changes recommended. ? Smoking cessation discussed. X ? Specific questions/concerns addressed. Specific group medical question(s) addressed in this encounter:  Discussion about fatty liver      Group discussion topic for this encounter:     ? Welcome Jumpstart   X? Be a Fat & Calorie    ? Healthy Eating   ? Move Those Muscles   ? Tip the Calorie Balance   ? Take charge of What's Around You   ? Problem Solving   ? Step Up Your Physical Activity Plan   ? Manage Slips and Self-Defeating Thoughts   ? [address]   ? Make Social Cues Work for Cesario Fountain   ? Ways to Stay Motivated   ? Preparing for Long Term Self-Management   ? Take Charge of Your Lifestyle   ? Mindful Eating, Mindful Movement   ? Manage Your Stress   ? Sit Less for Health   ? More Volume, Fewer Calories   ? Stay Active   ? Balance Your Thoughts   ? Heart Health    ? Looking Back and Looking Forward    Total time:  90 minutes, greater than 50% of visit spent in group counseling/education. Assessment:       Diagnosis Orders   1. Essential hypertension     2. DENISHA (obstructive sleep apnea)     3. Anxiety and depression     4. Morbid obesity with BMI of 60.0-69.9, adult ([facility] [address]     5. Uncomplicated asthma, unspecified asthma severity, unspecified whether persistent     6. Primary osteoarthritis of right knee     7. Psoriasis     8. Bilateral chronic knee pain     9. Chronic back pain, unspecified back location, unspecified back pain laterality         Plan:      Track food and weight. Return to clinic as per group medical appointment schedule. Follow-up:  Return in about 1 week (around 7/28/2020). Orders:  No orders of the defined types were placed in this encounter. Prescriptions:  No orders of the defined types were placed in this encounter.       Electronically signed by:  Peri Ruiz CNP

## 2020-07-29 ENCOUNTER — OFFICE VISIT (OUTPATIENT)
Dept: GASTROENTEROLOGY | Age: 50
End: 2020-07-29
Payer: MEDICARE

## 2020-07-29 ENCOUNTER — TELEPHONE (OUTPATIENT)
Dept: GASTROENTEROLOGY | Age: 50
End: 2020-07-29

## 2020-07-29 ENCOUNTER — OFFICE VISIT (OUTPATIENT)
Dept: DERMATOLOGY | Age: 50
End: 2020-07-29
Payer: MEDICARE

## 2020-07-29 VITALS
OXYGEN SATURATION: 96 % | DIASTOLIC BLOOD PRESSURE: 84 MMHG | HEART RATE: 82 BPM | TEMPERATURE: 97.5 F | HEIGHT: 65 IN | BODY MASS INDEX: 48.82 KG/M2 | WEIGHT: 293 LBS | SYSTOLIC BLOOD PRESSURE: 124 MMHG

## 2020-07-29 VITALS — TEMPERATURE: 98 F | BODY MASS INDEX: 66.9 KG/M2 | WEIGHT: 293 LBS | RESPIRATION RATE: 19 BRPM

## 2020-07-29 PROCEDURE — 99213 OFFICE O/P EST LOW 20 MIN: CPT | Performed by: INTERNAL MEDICINE

## 2020-07-29 PROCEDURE — G8427 DOCREV CUR MEDS BY ELIG CLIN: HCPCS | Performed by: DERMATOLOGY

## 2020-07-29 PROCEDURE — G8417 CALC BMI ABV UP PARAM F/U: HCPCS | Performed by: INTERNAL MEDICINE

## 2020-07-29 PROCEDURE — 99202 OFFICE O/P NEW SF 15 MIN: CPT | Performed by: DERMATOLOGY

## 2020-07-29 PROCEDURE — G8427 DOCREV CUR MEDS BY ELIG CLIN: HCPCS | Performed by: INTERNAL MEDICINE

## 2020-07-29 PROCEDURE — 1036F TOBACCO NON-USER: CPT | Performed by: INTERNAL MEDICINE

## 2020-07-29 PROCEDURE — 1036F TOBACCO NON-USER: CPT | Performed by: DERMATOLOGY

## 2020-07-29 PROCEDURE — G8417 CALC BMI ABV UP PARAM F/U: HCPCS | Performed by: DERMATOLOGY

## 2020-07-29 RX ORDER — KETOCONAZOLE 20 MG/ML
SHAMPOO TOPICAL
Qty: 120 ML | Refills: 5 | Status: SHIPPED | OUTPATIENT
Start: 2020-07-29 | End: 2020-10-19

## 2020-07-29 RX ORDER — PIMECROLIMUS 10 MG/G
CREAM TOPICAL
Qty: 30 G | Refills: 5 | Status: SHIPPED | OUTPATIENT
Start: 2020-07-29 | End: 2020-10-19

## 2020-07-29 RX ORDER — POLYETHYLENE GLYCOL 3350, SODIUM SULFATE ANHYDROUS, SODIUM BICARBONATE, SODIUM CHLORIDE, POTASSIUM CHLORIDE 236; 22.74; 6.74; 5.86; 2.97 G/4L; G/4L; G/4L; G/4L; G/4L
4 POWDER, FOR SOLUTION ORAL ONCE
Qty: 4000 ML | Refills: 0 | Status: SHIPPED | OUTPATIENT
Start: 2020-07-29 | End: 2020-07-29

## 2020-07-29 RX ORDER — TRIAMCINOLONE ACETONIDE 1 MG/ML
LOTION TOPICAL
Qty: 60 ML | Refills: 5 | Status: SHIPPED | OUTPATIENT
Start: 2020-07-29 | End: 2020-08-05

## 2020-07-29 ASSESSMENT — ENCOUNTER SYMPTOMS
ALLERGIC/IMMUNOLOGIC NEGATIVE: 1
ABDOMINAL PAIN: 1
SHORTNESS OF BREATH: 1
EYES NEGATIVE: 1

## 2020-07-29 NOTE — PROGRESS NOTES
Subjective:      Patient ID: Rola Melendez is a 54 y.o. female. HPI    Review of Systems   Constitutional: Negative. HENT: Negative. Eyes: Negative. Respiratory: Positive for shortness of breath. Gastrointestinal: Positive for abdominal pain. Endocrine: Negative. Genitourinary: Negative. Musculoskeletal: Positive for gait problem. Skin: Negative. Allergic/Immunologic: Negative. Hematological: Negative. Psychiatric/Behavioral: Negative. All other systems reviewed and are negative.       Objective:   Physical Exam    Assessment:            Plan:
54 y.o. female with a past history remarkable for obesity, anxiety, depression, referred for evaluation of prior diverticulitis. Smoker: quit 2 yrs ago   Drinking history: socially   Abdominal surgeries: none  Prior Colonoscopy: none   Prior EGD: none   FH of GI issues: none    PLAN:    1) left-sided diverticulitis-resolved patient had completed her antibiotic regimen and clinically improved with normal bowel movements. Will plan for diagnostic colonoscopy in approximately 4 weeks. Would like patient to return in approximate 4 weeks to evaluate clinically. 2) patient has no other GI symptoms    3) colonoscopy in approximately 1 months. Thank you for allowing me to participate in the care of Ms. Wilver Fink. For any further questions please do not hesitate to contact me. I have reviewed and agree with the MA/LPN ROS please refer to their documentation from today's encounter on a separate note. Arcadio Anderson MD, MPH   [facility]  Office #: [phone]          this note is created with the assistance of a speech recognition program.  While intending to generate a document that actually reflects the content of the visit, the document can still have some errors including those of syntax and sound a like substitutions which may escape proof reading. It such instances, actual meaning can be extrapolated by contextual diversion.

## 2020-07-29 NOTE — TELEPHONE ENCOUNTER
Patient scheduled for colonoscopy per Dr. Ambika Mims. COVID test: August 1 @ 10:10 am           [address]. Call #[phone] when arrive for COVID test  Procedure:  Date/Time: August 5 @ 8:30 am          * arrive 6:30  Location: [facility]  Reviewed Demetris Bustamante/Dul bowel prep in detail with patient. Advised patient will need a  for procedure. Patient expressed understanding. Advised to call the office with any questions. Post Procedure follow up: 09/22/2020 @ [facility]

## 2020-07-29 NOTE — PATIENT INSTRUCTIONS
- Elidel (pimecrolimus) twice daily to active psoriasis  - Triamcinolone lotion to scalp 3 times weekly  - Ketoconazole shampoo daily, leaving on scalp for 5 minutes prior to rinsing out  - Follow up in 6 months    It is possible your prescription(s) from today's visit will require a prior authorization. If your insurance requires a prior authorization or is too costly to fill, please notify our office as soon as possible so we may take the appropriate action.

## 2020-07-29 NOTE — PROGRESS NOTES
Dermatology Patient Note  [facility] [address]  [address]  [address] [place] [address]  Dept: [phone]  Dept Fax: [phone]      VISIT DATE: 7/29/2020   REFERRING PROVIDER: No ref. provider found      Fuentes Pool is a 54 y.o. female  who presents today in the office for:    New Patient (Pt states that she has used Psoriasis, she has used ketoconazole and topicort. She doesnt remember any names of the things she has tried. )      HISTORY OF PRESENT ILLNESS:  HPI Rash:    Negro Carter was seen today for initial evaluation of Psoriasis    Duration of Rash: years    Course: Persistent    Areas of Involvement: scalp, hands    Associated Symptoms: Irritation    Exacerbating Factors: none     Current Medications for this Rash:  None     Previously Tried Medications: ketoconazole, topicort    Problem Specific Family Hx: No Contributory Family History     Follows with rheum for arthritis      CURRENT MEDICATIONS:   Current Outpatient Medications   Medication Sig Dispense Refill    ketoconazole (NIZORAL) 2 % shampoo Apply 3-4 times weekly to scalp, leave on for five minutes prior to washing off 120 mL 5    triamcinolone (KENALOG) 0.1 % lotion Apply topically 3 times weekly to scalp.  60 mL 5    pimecrolimus (ELIDEL) 1 % cream Apply topically 2 times daily to face and hands 30 g 5    buPROPion HBr (APLENZIN) 174 MG TB24 Aplenzin 174 mg tablet,extended release      escitalopram (LEXAPRO) 20 MG tablet Take 20 mg by mouth daily      Compression Stockings MISC 30- 40 mm/hg 2 each 1    ibuprofen (ADVIL;MOTRIN) 800 MG tablet Take 1 tablet by mouth every 8 hours as needed for Pain 60 tablet 3    albuterol sulfate  (90 BASE) MCG/ACT inhaler Inhale 2 puffs into the lungs every 6 hours as needed for Wheezing      polyethylene glycol (GOLYTELY) 236 g solution Take 4,000 mLs by mouth once for 1 dose (Patient not taking: Reported on 7/29/2020) 4000 mL 0     Current Facility-Administered Medications   Medication Dose Route Frequency Provider Last Rate Last Dose    albuterol (PROVENTIL) nebulizer solution 2.5 mg  2.5 mg Nebulization 4x daily Alcides Teresa MD           ALLERGIES:   Allergies   Allergen Reactions    No Known Allergies        SOCIAL HISTORY:  Social History     Tobacco Use    Smoking status: Former Smoker     Packs/day: 0.60     Types: Cigarettes    Smokeless tobacco: Never Used   Substance Use Topics    Alcohol use: Yes     Comment: rarely       REVIEW OF SYSTEMS:  Review of Systems   Constitutional: Negative. Skin:Denies any new changing, growing or bleeding lesions or rashes except as described in the HPI     PHYSICAL EXAM:   /84 (Site: Left Upper Arm, Position: Sitting, Cuff Size: Large Adult)   Pulse 82   Temp 97.5 °F (36.4 °C)   Ht 5' 5\" (1.651 m)   Wt (!) 404 lb 6.4 oz (183.4 kg)   SpO2 96%   BMI 67.30 kg/m²     General Exam:  General Appearance: No acute distress, Well nourished     Neuro: Alert and oriented to person, place and time  Psych: Normal affect   Lymph Node: Not performed    Cutaneous Exam: Performed as documented in clinic note below. Sun-exposed skin,which includes the head/face, neck, both arms, digits and/or nails was examined. Pertinent Physical Exam Findings:  Physical Exam  Skin:     Comments: Scaling of scalp and ears    Scaling of cheeks    Psoriasis on knuckles         Medical Necessity of Exam Performed:   Distribution of patient concerns    Additional Diagnostic Testing performed during exam: Not performed ,  Not performed    ASSESSMENT:   Diagnosis Orders   1. Sebopsoriasis         Plan of Action is as Follows:  Assessment 1.  Sebopsoriasis  - Elidel (pimecrolimus) twice daily to active psoriasis - facial psoriasis - failed topicort  - Triamcinolone lotion to scalp 3 times weekly  - Ketoconazole shampoo daily, leaving on scalp for 5 minutes prior to rinsing out  - Follow up in 6 months Patient Instructions   - Elidel (pimecrolimus) twice daily to active psoriasis  - Triamcinolone lotion to scalp 3 times weekly  - Ketoconazole shampoo daily, leaving on scalp for 5 minutes prior to rinsing out  - Follow up in 6 months    It is possible your prescription(s) from today's visit will require a prior authorization. If your insurance requires a prior authorization or is too costly to fill, please notify our office as soon as possible so we may take the appropriate action. Photo surveillance performed: No    Follow-up: 6 months    This note was created with the assistance of aspeech-recognition program.  Although the intention is to generate a document that actually reflects thecontent of the visit, no guarantees can be provided that every mistake has been identified and corrected by editing.     Electronically signed by Bryn Quesada MD on 7/29/20 at [address] [place] [address] EDT

## 2020-07-29 NOTE — TELEPHONE ENCOUNTER
Patient called the office stating that she was lost in the buildings trying to find our office and that is why she is late. Informed  that the patient was on her way. Writer thanked and call ended.

## 2020-08-01 ENCOUNTER — HOSPITAL ENCOUNTER (OUTPATIENT)
Dept: PREADMISSION TESTING | Age: 50
Setting detail: SPECIMEN
Discharge: HOME | End: 2020-08-05
Payer: MEDICARE

## 2020-08-01 PROCEDURE — U0003 INFECTIOUS AGENT DETECTION BY NUCLEIC ACID (DNA OR RNA); SEVERE ACUTE RESPIRATORY SYNDROME CORONAVIRUS 2 (SARS-COV-2) (CORONAVIRUS DISEASE [COVID-19]), AMPLIFIED PROBE TECHNIQUE, MAKING USE OF HIGH THROUGHPUT TECHNOLOGIES AS DESCRIBED BY CMS-2020-01-R: HCPCS

## 2020-08-03 LAB — SARS-COV-2, NAA: NOT DETECTED

## 2020-08-04 ENCOUNTER — TELEPHONE (OUTPATIENT)
Dept: PRIMARY CARE CLINIC | Age: 50
End: 2020-08-04

## 2020-08-05 ENCOUNTER — ANESTHESIA (OUTPATIENT)
Dept: ENDOSCOPY | Age: 50
End: 2020-08-05
Payer: MEDICARE

## 2020-08-05 ENCOUNTER — ANESTHESIA EVENT (OUTPATIENT)
Dept: ENDOSCOPY | Age: 50
End: 2020-08-05
Payer: MEDICARE

## 2020-08-05 ENCOUNTER — HOSPITAL ENCOUNTER (OUTPATIENT)
Age: 50
Setting detail: OUTPATIENT SURGERY
Discharge: HOME | End: 2020-08-05
Attending: INTERNAL MEDICINE | Admitting: INTERNAL MEDICINE
Payer: MEDICARE

## 2020-08-05 VITALS — SYSTOLIC BLOOD PRESSURE: 140 MMHG | DIASTOLIC BLOOD PRESSURE: 92 MMHG | TEMPERATURE: 96.8 F | OXYGEN SATURATION: 94 %

## 2020-08-05 VITALS
HEIGHT: 65 IN | BODY MASS INDEX: 48.82 KG/M2 | RESPIRATION RATE: 18 BRPM | HEART RATE: 69 BPM | TEMPERATURE: 96.7 F | WEIGHT: 293 LBS | SYSTOLIC BLOOD PRESSURE: 130 MMHG | OXYGEN SATURATION: 96 % | DIASTOLIC BLOOD PRESSURE: 69 MMHG

## 2020-08-05 LAB
-: NORMAL
HCG, PREGNANCY URINE (POC): NEGATIVE

## 2020-08-05 PROCEDURE — 7100000031 HC ASPR PHASE II RECOVERY - ADDTL 15 MIN: Performed by: INTERNAL MEDICINE

## 2020-08-05 PROCEDURE — 7100000001 HC PACU RECOVERY - ADDTL 15 MIN: Performed by: INTERNAL MEDICINE

## 2020-08-05 PROCEDURE — 7100000011 HC PHASE II RECOVERY - ADDTL 15 MIN: Performed by: INTERNAL MEDICINE

## 2020-08-05 PROCEDURE — 2580000003 HC RX 258: Performed by: NURSE ANESTHETIST, CERTIFIED REGISTERED

## 2020-08-05 PROCEDURE — 45380 COLONOSCOPY AND BIOPSY: CPT | Performed by: INTERNAL MEDICINE

## 2020-08-05 PROCEDURE — 7100000030 HC ASPR PHASE II RECOVERY - FIRST 15 MIN: Performed by: INTERNAL MEDICINE

## 2020-08-05 PROCEDURE — 45385 COLONOSCOPY W/LESION REMOVAL: CPT | Performed by: INTERNAL MEDICINE

## 2020-08-05 PROCEDURE — 88305 TISSUE EXAM BY PATHOLOGIST: CPT

## 2020-08-05 PROCEDURE — 2709999900 HC NON-CHARGEABLE SUPPLY: Performed by: INTERNAL MEDICINE

## 2020-08-05 PROCEDURE — 3609010600 HC COLONOSCOPY POLYPECTOMY SNARE/COLD BIOPSY: Performed by: INTERNAL MEDICINE

## 2020-08-05 PROCEDURE — 3700000001 HC ADD 15 MINUTES (ANESTHESIA): Performed by: INTERNAL MEDICINE

## 2020-08-05 PROCEDURE — 3700000000 HC ANESTHESIA ATTENDED CARE: Performed by: INTERNAL MEDICINE

## 2020-08-05 PROCEDURE — 7100000010 HC PHASE II RECOVERY - FIRST 15 MIN: Performed by: INTERNAL MEDICINE

## 2020-08-05 PROCEDURE — 81025 URINE PREGNANCY TEST: CPT

## 2020-08-05 PROCEDURE — 2580000003 HC RX 258: Performed by: ANESTHESIOLOGY

## 2020-08-05 PROCEDURE — 6360000002 HC RX W HCPCS: Performed by: NURSE ANESTHETIST, CERTIFIED REGISTERED

## 2020-08-05 PROCEDURE — 7100000000 HC PACU RECOVERY - FIRST 15 MIN: Performed by: INTERNAL MEDICINE

## 2020-08-05 RX ORDER — PROMETHAZINE HYDROCHLORIDE 25 MG/ML
6.25 INJECTION, SOLUTION INTRAMUSCULAR; INTRAVENOUS
Status: DISCONTINUED | OUTPATIENT
Start: 2020-08-05 | End: 2020-08-05 | Stop reason: CLARIF

## 2020-08-05 RX ORDER — 0.9 % SODIUM CHLORIDE 0.9 %
500 INTRAVENOUS SOLUTION INTRAVENOUS
Status: DISCONTINUED | OUTPATIENT
Start: 2020-08-05 | End: 2020-08-05 | Stop reason: HOSPADM

## 2020-08-05 RX ORDER — HYDRALAZINE HYDROCHLORIDE 20 MG/ML
5 INJECTION INTRAMUSCULAR; INTRAVENOUS EVERY 10 MIN PRN
Status: DISCONTINUED | OUTPATIENT
Start: 2020-08-05 | End: 2020-08-05 | Stop reason: HOSPADM

## 2020-08-05 RX ORDER — SODIUM CHLORIDE, SODIUM LACTATE, POTASSIUM CHLORIDE, CALCIUM CHLORIDE 600; 310; 30; 20 MG/100ML; MG/100ML; MG/100ML; MG/100ML
INJECTION, SOLUTION INTRAVENOUS CONTINUOUS PRN
Status: DISCONTINUED | OUTPATIENT
Start: 2020-08-05 | End: 2020-08-05 | Stop reason: SDUPTHER

## 2020-08-05 RX ORDER — DIPHENHYDRAMINE HYDROCHLORIDE 50 MG/ML
12.5 INJECTION INTRAMUSCULAR; INTRAVENOUS
Status: DISCONTINUED | OUTPATIENT
Start: 2020-08-05 | End: 2020-08-05 | Stop reason: HOSPADM

## 2020-08-05 RX ORDER — LABETALOL 20 MG/4 ML (5 MG/ML) INTRAVENOUS SYRINGE
5 EVERY 10 MIN PRN
Status: DISCONTINUED | OUTPATIENT
Start: 2020-08-05 | End: 2020-08-05 | Stop reason: HOSPADM

## 2020-08-05 RX ORDER — PROPOFOL 10 MG/ML
INJECTION, EMULSION INTRAVENOUS CONTINUOUS PRN
Status: DISCONTINUED | OUTPATIENT
Start: 2020-08-05 | End: 2020-08-05 | Stop reason: SDUPTHER

## 2020-08-05 RX ORDER — PROPOFOL 10 MG/ML
INJECTION, EMULSION INTRAVENOUS PRN
Status: DISCONTINUED | OUTPATIENT
Start: 2020-08-05 | End: 2020-08-05 | Stop reason: SDUPTHER

## 2020-08-05 RX ORDER — SODIUM CHLORIDE, SODIUM LACTATE, POTASSIUM CHLORIDE, CALCIUM CHLORIDE 600; 310; 30; 20 MG/100ML; MG/100ML; MG/100ML; MG/100ML
INJECTION, SOLUTION INTRAVENOUS CONTINUOUS
Status: DISCONTINUED | OUTPATIENT
Start: 2020-08-05 | End: 2020-08-05 | Stop reason: HOSPADM

## 2020-08-05 RX ORDER — ONDANSETRON 2 MG/ML
4 INJECTION INTRAMUSCULAR; INTRAVENOUS
Status: DISCONTINUED | OUTPATIENT
Start: 2020-08-05 | End: 2020-08-05 | Stop reason: HOSPADM

## 2020-08-05 RX ADMIN — PROPOFOL 50 MG: 10 INJECTION, EMULSION INTRAVENOUS at 08:31

## 2020-08-05 RX ADMIN — SODIUM CHLORIDE, POTASSIUM CHLORIDE, SODIUM LACTATE AND CALCIUM CHLORIDE: 600; 310; 30; 20 INJECTION, SOLUTION INTRAVENOUS at 07:39

## 2020-08-05 RX ADMIN — SODIUM CHLORIDE, POTASSIUM CHLORIDE, SODIUM LACTATE AND CALCIUM CHLORIDE: 600; 310; 30; 20 INJECTION, SOLUTION INTRAVENOUS at 08:26

## 2020-08-05 RX ADMIN — PROPOFOL 125 MCG/KG/MIN: 10 INJECTION, EMULSION INTRAVENOUS at 08:31

## 2020-08-05 RX ADMIN — PROPOFOL 50 MG: 10 INJECTION, EMULSION INTRAVENOUS at 08:35

## 2020-08-05 ASSESSMENT — PULMONARY FUNCTION TESTS
PIF_VALUE: 1
PIF_VALUE: 0
PIF_VALUE: 1
PIF_VALUE: 0
PIF_VALUE: 1
PIF_VALUE: 1
PIF_VALUE: 0
PIF_VALUE: 1
PIF_VALUE: 0
PIF_VALUE: 1
PIF_VALUE: 0
PIF_VALUE: 1
PIF_VALUE: 0
PIF_VALUE: 0
PIF_VALUE: 1
PIF_VALUE: 1
PIF_VALUE: 0
PIF_VALUE: 0
PIF_VALUE: 1
PIF_VALUE: 1
PIF_VALUE: 0
PIF_VALUE: 0
PIF_VALUE: 1
PIF_VALUE: 0
PIF_VALUE: 1

## 2020-08-05 ASSESSMENT — PAIN SCALES - GENERAL
PAINLEVEL_OUTOF10: 0

## 2020-08-05 ASSESSMENT — ENCOUNTER SYMPTOMS: SHORTNESS OF BREATH: 0

## 2020-08-05 ASSESSMENT — PAIN - FUNCTIONAL ASSESSMENT: PAIN_FUNCTIONAL_ASSESSMENT: 0-10

## 2020-08-05 NOTE — ANESTHESIA POSTPROCEDURE EVALUATION
Department of Anesthesiology  Postprocedure Note    Patient: Yovanny Hackett  MRN: [id number]  YOB: 1970  Date of evaluation: 8/5/2020  Time:  10:33 AM     Procedure Summary     Date:  08/05/20 Room / Location:  [place] [address] 03 / [address] [facility]    Anesthesia Start:  2991 Anesthesia Stop:  1627    Procedure:  COLONOSCOPY POLYPECTOMY SNARE/COLD BIOPSY (N/A ) Diagnosis:       (DIVERTICULITIS)      (PAT ON ADMIT)    Surgeon:  Bria Mccarthy MD Responsible Provider:  Peyton Felipe MD    Anesthesia Type:  MAC ASA Status:  3          Anesthesia Type: MAC    Isa Phase I: Isa Score: 9    Isa Phase II: Isa Score: 10    Last vitals: Reviewed and per EMR flowsheets.        Anesthesia Post Evaluation    Comments: POST- ANESTHESIA EVALUATION       Pt Name: Yovanny Hackett  MRN: [id number]  YOB: 1970  Date of evaluation: 8/5/2020  Time:  10:33 AM      /69   Pulse 69   Temp 96.7 °F (35.9 °C) (Temporal)   Resp 18   Ht 5' 5\" (1.651 m)   Wt (!) 302 lb (137 kg)   SpO2 96%   BMI 50.26 kg/m²      Consciousness Level  Awake  Cardiopulmonary Status  Stable  Pain Adequately Treated YES  Nausea / Vomiting  NO  Adequate Hydration  YES  Anesthesia Related Complications NONE      Electronically signed by Peyton Felipe MD on 8/5/2020 at 10:33 AM

## 2020-08-05 NOTE — OP NOTE
[facility]     [facility] ENDOSCOPY     COLONOSCOPY    PROCEDURE DATE: 08/05/20    REFERRING PHYSICIAN: No ref. provider found     PRIMARY CARE PROVIDER: Lashae Covington MD    ATTENDING PHYSICIAN: Una Beard MD     HISTORY: Ms. Matt Waters is a 54 y.o. female who presents to the [address] [place] [address] endoscopy unit for colonoscopy. The patient's clinical history is remarkable for morbid obesity, HTN, DENISHA, chronic lower back pain, prior history of diverticulitis, referred for diagnostic colonoscopy. She is currently medically stable and appropriate for the planned procedure. PREOPERATIVE DIAGNOSIS: Prior history of diverticulitis. PROCEDURES:   Transanal Colonoscopy with polypectomy (snare cautery), polypectomy (cold biopsy). POSTPROCEDURE DIAGNOSIS:    FAIR to POOR PREP (worse on the right colon, semi-solid stool present occupying the cecum and right colon, poor visual)     1) 6 mm Sessile appearing polyp identified in the proximal ascending colon s/p cold biopsy and removal  2) No obvious large diverticular disease identified on the left colon to explain prior history of diverticulitis  3) Rectosigmoid polyp (#1), 7mm, pedunculated s/p hot snare polypectomy and removal  4) Rectosigmoid polyp (#2), 6mm s/p hot snare polypectomy and removal  5) Rectal polyp 5mm s/p hot snare polypectomy and removal  6) Small external hemorrhoids    MEDICATIONS:     MAC per anesthesia     EBL <10cc    INSTRUMENT: Olympus CF-H180 AL Pediatric flexible Colonoscope. PREPARATION: The nature and character of the procedure as well as risks, benefits, and alternatives were discussed with the patient and informed consent was obtained. Complications were said to include, but were not limited to: medication allergy, medication reaction, cardiovascular and respiratory problems, bleeding, perforation, infection, and/or missed diagnosis.  Following arrival in the endoscopy room, the patient was placed in the left lateral decubitus position and final time-out accomplished in the presence of the nursing staff. Baseline vital signs were obtained and reviewed, and IV sedation was subsequently initiated. FINDINGS: Rectal examination demonstrated no significant visible external abnormality and digital palpation was unremarkable. Following adequate conscious sedation the colonoscope was introduced and advanced under direct visualization to the cecum, which was identified by the ileocecal valve and appendiceal orifice. The bowel preparation was felt to be FAIR to POOR. This included large amounts of green, thick stool that was not completely able to be adequately irrigated and aspirated. Cecal intubation time was 9 minutes. Once maximally inserted, the endoscope was withdrawn and the mucosa was carefully inspected. The mucosal exam was revealed polyps in the ascending colon, rectosigmoid, and rectal segments (described below). Retroflexion was performed in the rectum and small internal hemorrhoids. Withdrawal time was 23 minutes. IMPRESSION:     FAIR to POOR PREP (worse on the right colon, semi-solid stool present occupying the cecum and right colon, poor visual)     1) 6 mm Sessile appearing polyp identified in the proximal ascending colon s/p cold biopsy and removal  2) No obvious large diverticular disease identified on the left colon to explain prior history of diverticulitis  3) Rectosigmoid polyp (#1), 7mm, pedunculated s/p hot snare polypectomy and removal  4) Rectosigmoid polyp (#2), 6mm s/p hot snare polypectomy and removal  5) Rectal polyp 5mm s/p hot snare polypectomy and removal  6) Small external hemorrhoids    RECOMMENDATIONS:   1) Follow up with referring provider, as previously scheduled. 2) Repeat Colonoscopy in 6 months to 1 yr with extended bowel prep regimen. 3) Follow up path results in GI clinic. Avoid NSAIDS, AP/AC therapy for  24 hrs.  Recommend counseling of dietary modifications and weight

## 2020-08-05 NOTE — FLOWSHEET NOTE
08/05/20 0826   Encounter Summary   Services provided to: Patient   Referral/Consult From: [facility]   Continue Visiting   (8/5/20)   Complexity of Encounter Low   Length of Encounter 15 minutes   Spiritual Assessment Completed Yes   Routine   Type Pre-procedure   Assessment Coping; Approachable;Calm   Intervention Sustaining presence/ Ministry of presence;Nurtured hope;Prayer   Outcome Comfort;Expressed gratitude

## 2020-08-05 NOTE — ANESTHESIA PRE PROCEDURE
knee M17.11    Psoriasis L40.9    Seborrheic dermatitis L21.9    Essential hypertension I10    DENISHA (obstructive sleep apnea) G47.33    Anxiety and depression F41.9, F32.9    Chronic back pain M54.9, G89.29    Morbid obesity with BMI of 60.0-69.9, adult ([facility]) E66.01, Z68.44    Bilateral chronic knee pain M25.561, M25.562, G89.29       Past Medical History:        Diagnosis Date    Allergic rhinitis     Anxiety     Anxiety and depression     Arthritis     Asthma     Diverticulitis     Headache(784.0)     Obesity     Overactive bladder        Past Surgical History:        Procedure Laterality Date    EYE SURGERY Bilateral     cataract removed with lens implants    KNEE SURGERY Right 05/16/2017    DC KNEE SCOPE,DIAGNOSTIC Right 5/16/2017    RIGHT KNEE ARTHROSCOPY WITH MEDIAL MENISECTOMY AND CHONDROPLASTY performed by Melody Mcmahon DO at [facility] 12 History:    Social History     Tobacco Use    Smoking status: Former Smoker     Packs/day: 0.60     Types: Cigarettes    Smokeless tobacco: Never Used   Substance Use Topics    Alcohol use: Yes     Comment: rarely                                Counseling given: Not Answered      Vital Signs (Current):   Vitals:    08/05/20 0715   BP: 99/67   Pulse: 74   Resp: 16   Temp: 97.8 °F (36.6 °C)   TempSrc: Infrared   SpO2: 94%   Weight: (!) 302 lb (137 kg)   Height: 5' 5\" (1.651 m)                                              BP Readings from Last 3 Encounters:   08/05/20 99/67   07/29/20 124/84   07/21/20 122/76       NPO Status: Time of last liquid consumption: 0023                        Time of last solid consumption: 1800                        Date of last liquid consumption: 08/05/20                        Date of last solid food consumption: 08/03/20    BMI:   Wt Readings from Last 3 Encounters:   08/05/20 (!) 302 lb (137 kg)   07/29/20 (!) 404 lb 6.4 oz (183.4 kg)   07/29/20 (!) 402 lb (182.3 kg)     Body mass index is 50.26 kg/m². CBC:   Lab Results   Component Value Date    WBC 9.0 06/21/2020    RBC 3.85 06/21/2020    HGB 11.2 06/21/2020    HCT 35.0 06/21/2020    MCV 90.9 06/21/2020    RDW 13.0 06/21/2020     06/21/2020       CMP:   Lab Results   Component Value Date     06/21/2020    K 4.4 06/21/2020     06/21/2020    CO2 26 06/21/2020    BUN 11 06/21/2020    CREATININE 0.90 06/21/2020    GFRAA >60 06/21/2020    LABGLOM >60 06/21/2020    GLUCOSE 85 06/21/2020    PROT 6.5 06/21/2020    CALCIUM 8.9 06/21/2020    BILITOT 0.44 06/21/2020    ALKPHOS 77 06/21/2020    AST 16 06/21/2020    ALT 15 06/21/2020       POC Tests: No results for input(s): POCGLU, POCNA, POCK, POCCL, POCBUN, POCHEMO, POCHCT in the last 72 hours.     Coags: No results found for: PROTIME, INR, APTT    HCG (If Applicable):   Lab Results   Component Value Date    PREGTESTUR  06/21/2020      Comment:      neg    HCG NEGATIVE 06/21/2020        ABGs: No results found for: PHART, PO2ART, GFS9BLZ, KMA5JKX, BEART, V8GJGGHS     Type & Screen (If Applicable):  No results found for: LABABO, LABRH    Drug/Infectious Status (If Applicable):  No results found for: HIV, HEPCAB    COVID-19 Screening (If Applicable):   Lab Results   Component Value Date    COVID19 Not Detected 08/01/2020         Anesthesia Evaluation  Patient summary reviewed and Nursing notes reviewed no history of anesthetic complications:   Airway: Mallampati: III  TM distance: >3 FB   Neck ROM: full  Mouth opening: > = 3 FB Dental:          Pulmonary:normal exam  breath sounds clear to auscultation  (+) sleep apnea: on CPAP,  asthma:     (-) shortness of breath and recent URI                           Cardiovascular:    (+) hypertension:,         Rhythm: regular  Rate: normal                    Neuro/Psych:   (+) headaches:, psychiatric history:            GI/Hepatic/Renal:   (+) bowel prep, morbid obesity          Endo/Other:    (+) : arthritis: OA., .                 Abdominal:

## 2020-08-05 NOTE — H&P
sigmoid colon possibly representing early diverticulitis.         Pelvis: Urinary bladder is grossly unremarkable.  Uterus is grossly    unremarkable.  No adnexal mass.         Peritoneum/Retroperitoneum: No free air, free fluid or lymphadenopathy.         Bones/Soft Tissues: Abdominal wall demonstrates no acute findings.  Osseous    structures demonstrate degenerative change.              Impression    1. Sigmoid diverticulosis with questionable mild inflammatory changes seen    along the sigmoid colon possibly representing early diverticulitis.  CT    follow-up is recommended. 2. Cholelithiasis.                   PAST MEDICAL HISTORY     Past Medical History:   Diagnosis Date    Allergic rhinitis     Anxiety     Anxiety and depression     Arthritis     Asthma     Diverticulitis     Headache(784.0)     Obesity     Overactive bladder        SURGICAL HISTORY       Past Surgical History:   Procedure Laterality Date    EYE SURGERY Bilateral     cataract removed with lens implants    KNEE SURGERY Right 05/16/2017    CT KNEE SCOPE,DIAGNOSTIC Right 5/16/2017    RIGHT KNEE ARTHROSCOPY WITH MEDIAL MENISECTOMY AND CHONDROPLASTY [facility]       Family History   Problem Relation Age of Onset    Arthritis Father        SOCIAL HISTORY       Social History     Socioeconomic History    Marital status:      Spouse name: None    Number of children: None    Years of education: None    Highest education level: None   Occupational History    None   Social Needs    Financial resource strain: None    Food insecurity     Worry: None     Inability: None    Transportation needs     Medical: None     Non-medical: None   Tobacco Use    Smoking status: Former Smoker     Packs/day: 0.60     Types: Cigarettes    Smokeless tobacco: Never Used   Substance and Sexual Activity    Alcohol use: Yes     Comment: rarely    Drug use: No    Sexual activity: None   Lifestyle    Physical activity     Days per week: None     Minutes per session: None    Stress: None   Relationships    Social connections     Talks on phone: None     Gets together: None     Attends Mu-ism service: None     Active member of club or organization: None     Attends meetings of clubs or organizations: None     Relationship status: None    Intimate partner violence     Fear of current or ex partner: None     Emotionally abused: None     Physically abused: None     Forced sexual activity: None   Other Topics Concern    None   Social History Narrative    None         REVIEW OF SYSTEMS      Allergies   Allergen Reactions    No Known Allergies        No current facility-administered medications on file prior to encounter. Current Outpatient Medications on File Prior to Encounter   Medication Sig Dispense Refill    buPROPion HBr (APLENZIN) 174 MG TB24 Aplenzin 174 mg tablet,extended release      escitalopram (LEXAPRO) 20 MG tablet Take 20 mg by mouth daily      ketoconazole (NIZORAL) 2 % shampoo Apply 3-4 times weekly to scalp, leave on for five minutes prior to washing off 120 mL 5    triamcinolone (KENALOG) 0.1 % lotion Apply topically 3 times weekly to scalp. 60 mL 5    pimecrolimus (ELIDEL) 1 % cream Apply topically 2 times daily to face and hands 30 g 5    Compression Stockings MISC 30- 40 mm/hg 2 each 1    ibuprofen (ADVIL;MOTRIN) 800 MG tablet Take 1 tablet by mouth every 8 hours as needed for Pain 60 tablet 3    albuterol sulfate  (90 BASE) MCG/ACT inhaler Inhale 2 puffs into the lungs every 6 hours as needed for Wheezing       Negative except for what is mentioned in the HPI. GENERAL PHYSICAL EXAM     Vitals: BP 99/67   Pulse 74   Temp 97.8 °F (36.6 °C) (Infrared)   Resp 16   Ht 5' 5\" (1.651 m)   Wt (!) 302 lb (137 kg)   SpO2 94%   BMI 50.26 kg/m²  Body mass index is 50.26 kg/m².      GENERAL APPEARANCE:   Juvenal Burden is 54 y.o.,  female, moderately obese, nourished, conscious, alert. Does not appear to be in any distress or pain at this time. SKIN:  Warm, dry, no cyanosis or jaundice. HEAD:  Normocephalic, atraumatic. EYES:  Pupils equal, reactive to light. EARS:  No discharge, no marked hearing loss. NOSE:  No rhinorrhea, epistaxis or septal deformity. THROAT:  Not congested. No ulceration bleeding or discharge. NECK:  No stiffness, trachea central.  No palpable masses or L.N.                 CHEST:  Symmetrical and equal on expansion. HEART:  RRR S1 > S2. No audible murmurs or gallops. LUNGS:  Equal on expansion, normal breath sounds. No wheezing, rhonchi or rales. ABDOMEN:  Obese. NABS x 4 quads. Soft on palpation. No localized tenderness. No guarding or rigidity. LYMPHATICS:  No palpable cervical lymphadenopathy. LOCOMOTOR, BACK AND SPINE:  No tenderness or deformities. EXTREMITIES:  Symmetrical, no pretibial edema. No calf tenderness. No discoloration or ulcerations. NEUROLOGIC:  The patient is conscious, alert, oriented. No facial drooping. Speech clear. No apparent focal sensory or motor deficits.              PROVISIONAL DIAGNOSES / SURGERY:      DIVERTICULITIS    COLONOSCOPY DIAGNOSTIC    Patient Active Problem List    Diagnosis Date Noted    Bilateral chronic knee pain 03/03/2020    Essential hypertension 02/02/2018    DENISHA (obstructive sleep apnea) 02/02/2018    Anxiety and depression 02/02/2018    Chronic back pain 02/02/2018    Morbid obesity with BMI of 60.0-69.9, [facility] 02/02/2018    Seborrheic dermatitis 03/28/2017    Psoriasis 04/13/2016    Primary osteoarthritis of right knee 10/02/2015    Osteoarthritis 04/18/2014    Chronic rhinitis 04/18/2014    OAB (overactive bladder) 04/18/2014    Asthma 04/18/2014    Headache     Overactive bladder     Obesity            JONATHAN Warner - CNP on 8/5/2020 at 7:23 AM

## 2020-08-06 ENCOUNTER — OFFICE VISIT (OUTPATIENT)
Dept: BARIATRICS/WEIGHT MGMT | Age: 50
End: 2020-08-06
Payer: MEDICARE

## 2020-08-06 VITALS
WEIGHT: 293 LBS | DIASTOLIC BLOOD PRESSURE: 82 MMHG | SYSTOLIC BLOOD PRESSURE: 120 MMHG | HEART RATE: 84 BPM | HEIGHT: 65 IN | BODY MASS INDEX: 48.82 KG/M2

## 2020-08-06 LAB — SURGICAL PATHOLOGY REPORT: NORMAL

## 2020-08-06 PROCEDURE — 1036F TOBACCO NON-USER: CPT | Performed by: NURSE PRACTITIONER

## 2020-08-06 PROCEDURE — G8427 DOCREV CUR MEDS BY ELIG CLIN: HCPCS | Performed by: NURSE PRACTITIONER

## 2020-08-06 PROCEDURE — G8417 CALC BMI ABV UP PARAM F/U: HCPCS | Performed by: NURSE PRACTITIONER

## 2020-08-06 PROCEDURE — 99213 OFFICE O/P EST LOW 20 MIN: CPT | Performed by: NURSE PRACTITIONER

## 2020-08-06 NOTE — PROGRESS NOTES
Group Lifestyle Balance Follow-up Progress Note      Subjective     Patient is here for group medical appointment for Group Lifestyle Balance weight management program follow-up for the chronic conditions of DENISHA, HTN, Anxiety/Depression, Chronic Back Pain, Psoriasis, Asthma, Arthritis, Chronic Bilateral Knee Pain, OAB. Audie Quigley Patient continues on the program and tolerating well. Total weight gain of 1 lb. No current issues. Allergies: Allergies   Allergen Reactions    No Known Allergies        Past Medical History:     Past Medical History:   Diagnosis Date    Allergic rhinitis     Anxiety     Anxiety and depression     Arthritis     Asthma     Diverticulitis     Headache(784.0)     Obesity     Overactive bladder    .     Past Surgical History:  Past Surgical History:   Procedure Laterality Date    COLONOSCOPY N/A 8/5/2020    COLONOSCOPY POLYPECTOMY SNARE/COLD BIOPSY performed by Zoraida Mo MD at [address] Bilateral     cataract removed with lens implants    KNEE SURGERY Right 05/16/2017    [place] KNEE SCOPE,DIAGNOSTIC Right 5/16/2017    RIGHT KNEE ARTHROSCOPY WITH MEDIAL MENISECTOMY AND CHONDROPLASTY performed by Kaila Paulino DO at [address]         Family History:  Family History   Problem Relation Age of Onset    Arthritis Father        Social History:  Social History     Socioeconomic History    Marital status:      Spouse name: Not on file    Number of children: Not on file    Years of education: Not on file    Highest education level: Not on file   Occupational History    Not on file   Social Needs    Financial resource strain: Not on file    Food insecurity     Worry: Not on file     Inability: Not on file    Transportation needs     Medical: Not on file     Non-medical: Not on file   Tobacco Use    Smoking status: Former Smoker     Packs/day: 0.60     Types: Cigarettes    Smokeless tobacco: Never Used   Substance and Sexual Activity    Alcohol use: Yes     Comment: rarely    Drug use: No    Sexual activity: Not on file   Lifestyle    Physical activity     Days per week: Not on file     Minutes per session: Not on file    Stress: Not on file   Relationships    Social connections     Talks on phone: Not on file     Gets together: Not on file     Attends Hinduism service: Not on file     Active member of club or organization: Not on file     Attends meetings of clubs or organizations: Not on file     Relationship status: Not on file    Intimate partner violence     Fear of current or ex partner: Not on file     Emotionally abused: Not on file     Physically abused: Not on file     Forced sexual activity: Not on file   Other Topics Concern    Not on file   Social History Narrative    Not on file       Current Medications:  Current Outpatient Medications   Medication Sig Dispense Refill    ketoconazole (NIZORAL) 2 % shampoo Apply 3-4 times weekly to scalp, leave on for five minutes prior to washing off 120 mL 5    pimecrolimus (ELIDEL) 1 % cream Apply topically 2 times daily to face and hands 30 g 5    buPROPion HBr (APLENZIN) 174 MG TB24 Aplenzin 174 mg tablet,extended release      escitalopram (LEXAPRO) 20 MG tablet Take 20 mg by mouth daily      Compression Stockings MISC 30- 40 mm/hg 2 each 1    ibuprofen (ADVIL;MOTRIN) 800 MG tablet Take 1 tablet by mouth every 8 hours as needed for Pain 60 tablet 3    albuterol sulfate  (90 BASE) MCG/ACT inhaler Inhale 2 puffs into the lungs every 6 hours as needed for Wheezing       Current Facility-Administered Medications   Medication Dose Route Frequency Provider Last Rate Last Dose    albuterol (PROVENTIL) nebulizer solution 2.5 mg  2.5 mg Nebulization 4x daily Brien Mary MD           Vital Signs:  /82 (Site: Right Upper Arm, Position: Sitting, Cuff Size: Large Adult)   Pulse 84   Ht 5' 5\" (1.651 m)   Wt (!) 394 lb (178.7 kg)   BMI 65.57 kg/m² BMI/Height/Weight:  Body mass index is 65.57 kg/m². Review of Systems  Constitutional: Weight gain      Objective:      Physical Exam   Vital signs reviewed. General Appearance: Well-developed and well-nourished. No acute distress. Skin: Warm, dry. Head: Normocephalic. Pulmonary/Chest: Normal respiratory effort. Musculoskeletal: Movement x4. Neurological:  Alert and oriented. Individual goal for this encounter:  I need both knees replaced and I need to get to at least 48 BMI for approval for surgery. After surgery I hopefully will feel better when I walk and then exercise will bd bearable. I have terrible eating habits and need help and support. X? Vital signs reviewed and discussed with patient. ? Labs/EKG reviewed and discussed with patient. ? Blood sugar log reviewed and discussed with patient. ? Physical activity discussed. ? Medication changes recommended. ? Smoking cessation discussed. X ? Specific questions/concerns addressed. Specific group medical question(s) addressed in this encounter:  Discussion about gallbladder disease. Group discussion topic for this encounter:     ? Kelly Coley   ? Be a Fat & Calorie    ? Healthy Eating   ? Move Those Muscles  X ? Tip the Calorie Balance   ? Take charge of What's Around You   ? Problem Solving   ? Step Up Your Physical Activity Plan   ? Manage Slips and Self-Defeating Thoughts   ? [address]   ? Make Social Cues Work for Mitzy Ferguson   ? Ways to Stay Motivated   ? Preparing for Long Term Self-Management   ? Take Charge of Your Lifestyle   ? Mindful Eating, Mindful Movement   ? Manage Your Stress   ? Sit Less for Health   ? More Volume, Fewer Calories   ? Stay Active   ? Balance Your Thoughts   ? Heart Health    ? Looking Back and Looking Forward    Total time:  90 minutes, greater than 50% of visit spent in group counseling/education. Assessment:       Diagnosis Orders   1. Essential hypertension     2. DENISHA (obstructive sleep apnea)     3. Anxiety and depression     4. Morbid obesity with BMI of 60.0-69.9, adult ([facility]     5. Uncomplicated asthma, unspecified asthma severity, unspecified whether persistent     6. Primary osteoarthritis of right knee     7. Psoriasis     8. Chronic back pain, unspecified back location, unspecified back pain laterality     9. Bilateral chronic knee pain         Plan:      Track food and weight. Return to clinic as per group medical appointment schedule. Follow-up:  Return in about 1 week (around 8/13/2020). Orders:  No orders of the defined types were placed in this encounter. Prescriptions:  No orders of the defined types were placed in this encounter.       Electronically signed by:  Maria Luisa Marin CNP

## 2020-08-20 ENCOUNTER — OFFICE VISIT (OUTPATIENT)
Dept: BARIATRICS/WEIGHT MGMT | Age: 50
End: 2020-08-20
Payer: MEDICARE

## 2020-08-20 VITALS
HEIGHT: 65 IN | DIASTOLIC BLOOD PRESSURE: 78 MMHG | BODY MASS INDEX: 48.82 KG/M2 | HEART RATE: 80 BPM | WEIGHT: 293 LBS | SYSTOLIC BLOOD PRESSURE: 122 MMHG

## 2020-08-20 PROCEDURE — 1036F TOBACCO NON-USER: CPT | Performed by: NURSE PRACTITIONER

## 2020-08-20 PROCEDURE — 99213 OFFICE O/P EST LOW 20 MIN: CPT | Performed by: NURSE PRACTITIONER

## 2020-08-20 PROCEDURE — G8417 CALC BMI ABV UP PARAM F/U: HCPCS | Performed by: NURSE PRACTITIONER

## 2020-08-20 PROCEDURE — G8427 DOCREV CUR MEDS BY ELIG CLIN: HCPCS | Performed by: NURSE PRACTITIONER

## 2020-08-20 NOTE — PROGRESS NOTES
Group Lifestyle Balance Follow-up Progress Note      Subjective     Patient is here for group medical appointment for Group Lifestyle Balance weight management program follow-up for the chronic conditions of DENISHA, HTN, Anxiety/Depression, Chronic Back Pain, Psoriasis, Asthma, Arthritis, Chronic Bilateral Knee Pain, OAB. Martin Hammond Patient continues on the program and tolerating well. Total weight gain of 4 lbs. No current issues. Allergies: Allergies   Allergen Reactions    No Known Allergies        Past Medical History:     Past Medical History:   Diagnosis Date    Allergic rhinitis     Anxiety     Anxiety and depression     Arthritis     Asthma     Diverticulitis     Headache(784.0)     Obesity     Overactive bladder    .     Past Surgical History:  Past Surgical History:   Procedure Laterality Date    COLONOSCOPY N/A 8/5/2020    COLONOSCOPY POLYPECTOMY SNARE/COLD BIOPSY performed by Maria Castro MD at [address] Bilateral     cataract removed with lens implants    KNEE SURGERY Right 05/16/2017    DE KNEE SCOPE,DIAGNOSTIC Right 5/16/2017    RIGHT KNEE ARTHROSCOPY WITH MEDIAL MENISECTOMY AND CHONDROPLASTY performed by Domingo Christiansen DO at [address]         Family History:  Family History   Problem Relation Age of Onset    Arthritis Father        Social History:  Social History     Socioeconomic History    Marital status:      Spouse name: Not on file    Number of children: Not on file    Years of education: Not on file    Highest education level: Not on file   Occupational History    Not on file   Social Needs    Financial resource strain: Not on file    Food insecurity     Worry: Not on file     Inability: Not on file    Transportation needs     Medical: Not on file     Non-medical: Not on file   Tobacco Use    Smoking status: Former Smoker     Packs/day: 0.60     Types: Cigarettes    Smokeless tobacco: Never Used   Substance and Sexual Activity sleep apnea)     2. Essential hypertension     3. Anxiety and depression     4. Morbid obesity with BMI of 60.0-69.9, adult ([facility]     5. Primary osteoarthritis involving multiple joints     6. Uncomplicated asthma, unspecified asthma severity, unspecified whether persistent     7. Chronic back pain, unspecified back location, unspecified back pain laterality     8. Psoriasis     9. Bilateral chronic knee pain         Plan:      Track food and weight. Return to clinic as per group medical appointment schedule. Follow-up:  Return in about 1 week (around 8/27/2020). Orders:  No orders of the defined types were placed in this encounter. Prescriptions:  No orders of the defined types were placed in this encounter.       Electronically signed by:  Buzz Royal CNP

## 2020-08-27 ENCOUNTER — OFFICE VISIT (OUTPATIENT)
Dept: BARIATRICS/WEIGHT MGMT | Age: 50
End: 2020-08-27
Payer: MEDICARE

## 2020-08-27 VITALS
WEIGHT: 293 LBS | SYSTOLIC BLOOD PRESSURE: 126 MMHG | DIASTOLIC BLOOD PRESSURE: 74 MMHG | BODY MASS INDEX: 48.82 KG/M2 | HEIGHT: 65 IN | HEART RATE: 80 BPM

## 2020-08-27 PROCEDURE — G8417 CALC BMI ABV UP PARAM F/U: HCPCS | Performed by: NURSE PRACTITIONER

## 2020-08-27 PROCEDURE — G8427 DOCREV CUR MEDS BY ELIG CLIN: HCPCS | Performed by: NURSE PRACTITIONER

## 2020-08-27 PROCEDURE — 99213 OFFICE O/P EST LOW 20 MIN: CPT | Performed by: NURSE PRACTITIONER

## 2020-08-27 PROCEDURE — 1036F TOBACCO NON-USER: CPT | Performed by: NURSE PRACTITIONER

## 2020-08-27 NOTE — PROGRESS NOTES
Group Lifestyle Balance Follow-up Progress Note      Subjective     Patient is here for group medical appointment for Group Lifestyle Balance weight management program follow-up for the chronic conditions of DENISHA, HTN, Anxiety/Depression, Chronic Back Pain, Psoriasis, Asthma, Arthritis, Chronic Bilateral Knee Pain, OAB. Enid Morales Patient continues on the program and tolerating well. Total weight gain of 2 lbs. No current issues. Allergies: Allergies   Allergen Reactions    No Known Allergies        Past Medical History:     Past Medical History:   Diagnosis Date    Allergic rhinitis     Anxiety     Anxiety and depression     Arthritis     Asthma     Diverticulitis     Headache(784.0)     Obesity     Overactive bladder    .     Past Surgical History:  Past Surgical History:   Procedure Laterality Date    COLONOSCOPY N/A 8/5/2020    COLONOSCOPY POLYPECTOMY SNARE/COLD BIOPSY performed by Aniyah Zavaleta MD at [address] [facility] Bilateral     cataract removed with lens implants    KNEE SURGERY Right 05/16/2017    [place] KNEE SCOPE,DIAGNOSTIC Right 5/16/2017    RIGHT KNEE ARTHROSCOPY WITH MEDIAL MENISECTOMY AND CHONDROPLASTY performed by Emma Mcclure DO at [facility]         Family History:  Family History   Problem Relation Age of Onset    Arthritis Father        Social History:  Social History     Socioeconomic History    Marital status:      Spouse name: Not on file    Number of children: Not on file    Years of education: Not on file    Highest education level: Not on file   Occupational History    Not on file   Social Needs    Financial resource strain: Not on file    Food insecurity     Worry: Not on file     Inability: Not on file    Transportation needs     Medical: Not on file     Non-medical: Not on file   Tobacco Use    Smoking status: Former Smoker     Packs/day: 0.60     Types: Cigarettes    Smokeless tobacco: Never Used   Substance and Sexual Activity  Alcohol use: Yes     Comment: rarely    Drug use: No    Sexual activity: Not on file   Lifestyle    Physical activity     Days per week: Not on file     Minutes per session: Not on file    Stress: Not on file   Relationships    Social connections     Talks on phone: Not on file     Gets together: Not on file     Attends Mosque service: Not on file     Active member of club or organization: Not on file     Attends meetings of clubs or organizations: Not on file     Relationship status: Not on file    Intimate partner violence     Fear of current or ex partner: Not on file     Emotionally abused: Not on file     Physically abused: Not on file     Forced sexual activity: Not on file   Other Topics Concern    Not on file   Social History Narrative    Not on file       Current Medications:  Current Outpatient Medications   Medication Sig Dispense Refill    ketoconazole (NIZORAL) 2 % shampoo Apply 3-4 times weekly to scalp, leave on for five minutes prior to washing off 120 mL 5    pimecrolimus (ELIDEL) 1 % cream Apply topically 2 times daily to face and hands 30 g 5    buPROPion HBr (APLENZIN) 174 MG TB24 Aplenzin 174 mg tablet,extended release      escitalopram (LEXAPRO) 20 MG tablet Take 20 mg by mouth daily      Compression Stockings MISC 30- 40 mm/hg 2 each 1    ibuprofen (ADVIL;MOTRIN) 800 MG tablet Take 1 tablet by mouth every 8 hours as needed for Pain 60 tablet 3    albuterol sulfate  (90 BASE) MCG/ACT inhaler Inhale 2 puffs into the lungs every 6 hours as needed for Wheezing       Current Facility-Administered Medications   Medication Dose Route Frequency Provider Last Rate Last Dose    albuterol (PROVENTIL) nebulizer solution 2.5 mg  2.5 mg Nebulization 4x daily Terese Andrea MD           Vital Signs:  /74 (Site: Right Upper Arm, Position: Sitting, Cuff Size: Large Adult)   Pulse 80   Ht 5' 5\" (1.651 m)   Wt (!) 395 lb (179.2 kg)   BMI 65.73 kg/m² BMI/Height/Weight:  Body mass index is 65.73 kg/m². Review of Systems  Constitutional: Weight gain      Objective:      Physical Exam   Vital signs reviewed. General Appearance: Well-developed and well-nourished. No acute distress. Skin: Warm, dry. Head: Normocephalic. Pulmonary/Chest: Normal respiratory effort. Musculoskeletal: Movement x4. Neurological:  Alert and oriented. Individual goal for this encounter:  I need both knees replaced and I need to get to at least 48 BMI for approval for surgery. After surgery I hopefully will feel better when I walk and then exercise will bd bearable. I have terrible eating habits and need help and support. X? Vital signs reviewed and discussed with patient. ? Labs/EKG reviewed and discussed with patient. ? Blood sugar log reviewed and discussed with patient. ? Physical activity discussed. ? Medication changes recommended. ? Smoking cessation discussed. X ? Specific questions/concerns addressed. Specific group medical question(s) addressed in this encounter:  Discussion about stress. Group discussion topic for this encounter:     ? Yessi Yoo   ? Be a Fat & Calorie    ? Healthy Eating   ? Move Those Muscles   ? Tip the Calorie Balance   ? Take charge of What's Around You   ? Problem Solving  X ? Step Up Your Physical Activity Plan   ? Manage Slips and Self-Defeating Thoughts   ? [address]   ? Make Social Cues Work for Kylee Daughters   ? Ways to Stay Motivated   ? Preparing for Long Term Self-Management   ? Take Charge of Your Lifestyle   ? Mindful Eating, Mindful Movement   ? Manage Your Stress   ? Sit Less for Health   ? More Volume, Fewer Calories   ? Stay Active   ? Balance Your Thoughts   ? Heart Health    ? Looking Back and Looking Forward    Total time:  90 minutes, greater than 50% of visit spent in group counseling/education. Assessment:       Diagnosis Orders   1.  Essential

## 2020-09-10 ENCOUNTER — OFFICE VISIT (OUTPATIENT)
Dept: BARIATRICS/WEIGHT MGMT | Age: 50
End: 2020-09-10
Payer: MEDICARE

## 2020-09-10 VITALS
SYSTOLIC BLOOD PRESSURE: 120 MMHG | HEIGHT: 65 IN | DIASTOLIC BLOOD PRESSURE: 80 MMHG | WEIGHT: 293 LBS | HEART RATE: 74 BPM | BODY MASS INDEX: 48.82 KG/M2

## 2020-09-10 PROCEDURE — 1036F TOBACCO NON-USER: CPT | Performed by: NURSE PRACTITIONER

## 2020-09-10 PROCEDURE — 99213 OFFICE O/P EST LOW 20 MIN: CPT | Performed by: NURSE PRACTITIONER

## 2020-09-10 PROCEDURE — G8427 DOCREV CUR MEDS BY ELIG CLIN: HCPCS | Performed by: NURSE PRACTITIONER

## 2020-09-10 PROCEDURE — G8417 CALC BMI ABV UP PARAM F/U: HCPCS | Performed by: NURSE PRACTITIONER

## 2020-09-10 NOTE — PROGRESS NOTES
 Alcohol use: Yes     Comment: rarely    Drug use: No    Sexual activity: Not on file   Lifestyle    Physical activity     Days per week: Not on file     Minutes per session: Not on file    Stress: Not on file   Relationships    Social connections     Talks on phone: Not on file     Gets together: Not on file     Attends Christianity service: Not on file     Active member of club or organization: Not on file     Attends meetings of clubs or organizations: Not on file     Relationship status: Not on file    Intimate partner violence     Fear of current or ex partner: Not on file     Emotionally abused: Not on file     Physically abused: Not on file     Forced sexual activity: Not on file   Other Topics Concern    Not on file   Social History Narrative    Not on file       Current Medications:  Current Outpatient Medications   Medication Sig Dispense Refill    ketoconazole (NIZORAL) 2 % shampoo Apply 3-4 times weekly to scalp, leave on for five minutes prior to washing off 120 mL 5    pimecrolimus (ELIDEL) 1 % cream Apply topically 2 times daily to face and hands 30 g 5    buPROPion HBr (APLENZIN) 174 MG TB24 Aplenzin 174 mg tablet,extended release      escitalopram (LEXAPRO) 20 MG tablet Take 20 mg by mouth daily      Compression Stockings MISC 30- 40 mm/hg 2 each 1    ibuprofen (ADVIL;MOTRIN) 800 MG tablet Take 1 tablet by mouth every 8 hours as needed for Pain 60 tablet 3    albuterol sulfate  (90 BASE) MCG/ACT inhaler Inhale 2 puffs into the lungs every 6 hours as needed for Wheezing       Current Facility-Administered Medications   Medication Dose Route Frequency Provider Last Rate Last Dose    albuterol (PROVENTIL) nebulizer solution 2.5 mg  2.5 mg Nebulization 4x daily Rani Caballero MD           Vital Signs:  /80 (Site: Right Upper Arm, Position: Sitting, Cuff Size: Large Adult)   Pulse 74   Ht 5' 5\" (1.651 m)   Wt (!) 393 lb (178.3 kg)   BMI 65.40 kg/m² BMI/Height/Weight:  Body mass index is 65.4 kg/m². Review of Systems  Constitutional: No weight loss/gain      Objective:      Physical Exam   Vital signs reviewed. General Appearance: Well-developed and well-nourished. No acute distress. Skin: Warm, dry. Head: Normocephalic. Pulmonary/Chest: Normal respiratory effort. Musculoskeletal: Movement x4. Neurological:  Alert and oriented. Individual goal for this encounter:  I need both knees replaced and I need to get to at least 48 BMI for approval for surgery. After surgery I hopefully will feel better when I walk and then exercise will bd bearable. I have terrible eating habits and need help and support. X? Vital signs reviewed and discussed with patient. ? Labs/EKG reviewed and discussed with patient. ? Blood sugar log reviewed and discussed with patient. ? Physical activity discussed. ? Medication changes recommended. ? Smoking cessation discussed. X ? Specific questions/concerns addressed. Specific group medical question(s) addressed in this encounter:  Discussion about varicose veins. Group discussion topic for this encounter:     ? Rainer Everts   ? Be a Fat & Calorie    ? Healthy Eating   ? Move Those Muscles   ? Tip the Calorie Balance   ? Take charge of What's Around You   ? Problem Solving   ? Step Up Your Physical Activity Plan   ? Manage Slips and Self-Defeating Thoughts  X ? [address]   ? Make Social Cues Work for Rohan Carrasquillo   ? Ways to Stay Motivated   ? Preparing for Long Term Self-Management   ? Take Charge of Your Lifestyle   ? Mindful Eating, Mindful Movement   ? Manage Your Stress   ? Sit Less for Health   ? More Volume, Fewer Calories   ? Stay Active   ? Balance Your Thoughts   ? Heart Health    ? Looking Back and Looking Forward    Total time:  90 minutes, greater than 50% of visit spent in group counseling/education. Assessment:       Diagnosis Orders   1. Essential hypertension     2. DENISHA (obstructive sleep apnea)     3. Anxiety and depression     4. Primary osteoarthritis involving multiple joints     5. Uncomplicated asthma, unspecified asthma severity, unspecified whether persistent     6. Psoriasis     7. Chronic back pain, unspecified back location, unspecified back pain laterality     8. Bilateral chronic knee pain     9. Morbid obesity with BMI of 60.0-69.9, adult ([facility] [address]         Plan:      Track food and weight. Return to clinic as per group medical appointment schedule. Follow-up:  Return in about 1 week (around 9/17/2020). Orders:  No orders of the defined types were placed in this encounter. Prescriptions:  No orders of the defined types were placed in this encounter.       Electronically signed by:  Joseph Crabtree CNP

## 2020-09-16 ENCOUNTER — OFFICE VISIT (OUTPATIENT)
Dept: GASTROENTEROLOGY | Age: 50
End: 2020-09-16
Payer: MEDICARE

## 2020-09-16 VITALS — WEIGHT: 293 LBS | BODY MASS INDEX: 66.13 KG/M2 | TEMPERATURE: 97.3 F

## 2020-09-16 PROCEDURE — 99213 OFFICE O/P EST LOW 20 MIN: CPT | Performed by: INTERNAL MEDICINE

## 2020-09-16 PROCEDURE — G8427 DOCREV CUR MEDS BY ELIG CLIN: HCPCS | Performed by: INTERNAL MEDICINE

## 2020-09-16 PROCEDURE — 1036F TOBACCO NON-USER: CPT | Performed by: INTERNAL MEDICINE

## 2020-09-16 PROCEDURE — G8417 CALC BMI ABV UP PARAM F/U: HCPCS | Performed by: INTERNAL MEDICINE

## 2020-09-16 ASSESSMENT — ENCOUNTER SYMPTOMS
ALLERGIC/IMMUNOLOGIC NEGATIVE: 1
EYES NEGATIVE: 1
ANAL BLEEDING: 0
RECTAL PAIN: 0
ABDOMINAL DISTENTION: 0
WHEEZING: 0
SINUS PRESSURE: 0
CHOKING: 0
CONSTIPATION: 0
ABDOMINAL PAIN: 0
BLOOD IN STOOL: 0
VOMITING: 0
DIARRHEA: 0
SHORTNESS OF BREATH: 0
TROUBLE SWALLOWING: 0
RESPIRATORY NEGATIVE: 1
GASTROINTESTINAL NEGATIVE: 1
BACK PAIN: 0
COUGH: 0
SORE THROAT: 0
VOICE CHANGE: 0
NAUSEA: 0

## 2020-09-16 NOTE — PROGRESS NOTES
Emotionally abused: Not on file     Physically abused: Not on file     Forced sexual activity: Not on file   Other Topics Concern    Not on file   Social History Narrative    Not on file       REVIEW OF SYSTEMS: A 12-point review of systems was obtained and pertinent positives and negatives were listed below. REVIEW OF SYSTEMS:     Constitutional: No fever, no chills, no lethargy, no weakness. HEENT:  No headache, otalgia, itchy eyes, nasal discharge or sore throat. Cardiac:  No chest pain, dyspnea, orthopnea or PND. Chest:   No cough, phlegm or wheezing. Abdomen:      Detailed by MA   Neuro:  No focal weakness, abnormal movements or seizure like activity. Skin:   No rashes, no itching. :   No hematuria, no pyuria, no dysuria, no flank pain. Extremities:  No swelling or joint pains. ROS was otherwise negative    Review of Systems    PHYSICAL EXAMINATION: Vital signs reviewed per the nursing documentation. Temp 97.3 °F (36.3 °C) (Temporal)   Wt (!) 397 lb 6.4 oz (180.3 kg)   BMI 66.13 kg/m²   Body mass index is 66.13 kg/m². Physical Exam    Physical Exam   Constitutional: Patient is oriented to person, place, and time. Patient appears well-developed and well-nourished. HENT:   Head: Normocephalic and atraumatic. Eyes: Pupils are equal, round, and reactive to light. EOM are normal.   Neck: Normal range of motion. Neck supple. No JVD present. No tracheal deviation present. No thyromegaly present. Cardiovascular: Normal rate, regular rhythm, normal heart sounds and intact distal pulses. Pulmonary/Chest: Effort normal and breath sounds normal. No stridor. No respiratory distress. He has no wheezes. He has no rales. He exhibits no tenderness. Abdominal: Soft. Bowel sounds are normal. He exhibits no distension and no mass. There is no tenderness. There is no rebound and no guarding. No hernia. Musculoskeletal: Normal range of motion.    Lymphadenopathy:    Patient has no cervical adenopathy. Neurological: Patient is alert and oriented to person, place, and time. Psychiatric: Patient has a normal mood and affect. Patient behavior is normal.       LABORATORY DATA: Reviewed  Lab Results   Component Value Date    WBC 9.0 06/21/2020    HGB 11.2 (L) 06/21/2020    HCT 35.0 (L) 06/21/2020    MCV 90.9 06/21/2020     06/21/2020     06/21/2020    K 4.4 06/21/2020     06/21/2020    CO2 26 06/21/2020    BUN 11 06/21/2020    CREATININE 0.90 06/21/2020    LABALBU 3.6 06/21/2020    BILITOT 0.44 06/21/2020    ALKPHOS 77 06/21/2020    AST 16 06/21/2020    ALT 15 06/21/2020         Lab Results   Component Value Date    RBC 3.85 (L) 06/21/2020    HGB 11.2 (L) 06/21/2020    MCV 90.9 06/21/2020    MCH 29.1 06/21/2020    MCHC 32.0 06/21/2020    RDW 13.0 06/21/2020    MPV 9.1 06/21/2020    BASOPCT 0 06/21/2020    LYMPHSABS 1.76 06/21/2020    MONOSABS 0.57 06/21/2020    NEUTROABS 6.48 06/21/2020    EOSABS 0.12 06/21/2020    BASOSABS 0.03 06/21/2020         DIAGNOSTIC TESTING:     [facility]      [facility] ENDOSCOPY      COLONOSCOPY     PROCEDURE DATE: 08/05/20     REFERRING PHYSICIAN: No ref. provider found      PRIMARY CARE PROVIDER: Arnie Laws MD     ATTENDING PHYSICIAN: Alycia Saravia MD      HISTORY: Ms. Miah Mahmood is a 54 y.o. female who presents to the [facility] endoscopy unit for colonoscopy. The patient's clinical history is remarkable for morbid obesity, HTN, DENISHA, chronic lower back pain, prior history of diverticulitis, referred for diagnostic colonoscopy.  She is currently medically stable and appropriate for the planned procedure.         PREOPERATIVE DIAGNOSIS: Prior history of diverticulitis.      PROCEDURES:   Transanal Colonoscopy with polypectomy (snare cautery), polypectomy (cold biopsy).      POSTPROCEDURE DIAGNOSIS:     FAIR to POOR PREP (worse on the right colon, semi-solid stool present occupying the cecum and right colon, poor visual)      1) 6 mm Sessile appearing polyp identified in the proximal ascending colon s/p cold biopsy and removal  2) No obvious large diverticular disease identified on the left colon to explain prior history of diverticulitis  3) Rectosigmoid polyp (#1), 7mm, pedunculated s/p hot snare polypectomy and removal  4) Rectosigmoid polyp (#2), 6mm s/p hot snare polypectomy and removal  5) Rectal polyp 5mm s/p hot snare polypectomy and removal  6) Small external hemorrhoids     MEDICATIONS:      MAC per anesthesia      EBL <10cc     INSTRUMENT: Olympus CF-H180 AL Pediatric flexible Colonoscope.      PREPARATION: The nature and character of the procedure as well as risks, benefits, and alternatives were discussed with the patient and informed consent was obtained. Complications were said to include, but were not limited to: medication allergy, medication reaction, cardiovascular and respiratory problems, bleeding, perforation, infection, and/or missed diagnosis. Following arrival in the endoscopy room, the patient was placed in the left lateral decubitus position and final time-out accomplished in the presence of the nursing staff. Baseline vital signs were obtained and reviewed, and IV sedation was subsequently initiated.         FINDINGS: Rectal examination demonstrated no significant visible external abnormality and digital palpation was unremarkable. Following adequate conscious sedation the colonoscope was introduced and advanced under direct visualization to the cecum, which was identified by the ileocecal valve and appendiceal orifice. The bowel preparation was felt to be FAIR to POOR. This included large amounts of green, thick stool that was not completely able to be adequately irrigated and aspirated. Cecal intubation time was 9 minutes.      Once maximally inserted, the endoscope was withdrawn and the mucosa was carefully inspected.  The mucosal exam was revealed polyps in the ascending colon, rectosigmoid, and rectal segments (described below). Retroflexion was performed in the rectum and small internal hemorrhoids. Withdrawal time was 23 minutes.         IMPRESSION:      FAIR to POOR PREP (worse on the right colon, semi-solid stool present occupying the cecum and right colon, poor visual)      1) 6 mm Sessile appearing polyp identified in the proximal ascending colon s/p cold biopsy and removal  2) No obvious large diverticular disease identified on the left colon to explain prior history of diverticulitis  3) Rectosigmoid polyp (#1), 7mm, pedunculated s/p hot snare polypectomy and removal  4) Rectosigmoid polyp (#2), 6mm s/p hot snare polypectomy and removal  5) Rectal polyp 5mm s/p hot snare polypectomy and removal  6) Small external hemorrhoids     RECOMMENDATIONS:   1) Follow up with referring provider, as previously scheduled. 2) Repeat Colonoscopy in 6 months to 1 yr with extended bowel prep regimen. 3) Follow up path results in GI clinic. Avoid NSAIDS, AP/AC therapy for  24 hrs. Recommend counseling of dietary modifications and weight loss management. SPECIMEN \"A\":  ASCENDING COLON, POLYPECTOMY:          SESSILE SERRATED ADENOMA           SPECIMEN \"B\":  RECTOSIGMOID REGION, POLYPECTOMY:          HYPERPLASTIC POLYP           SPECIMEN \"C\": [address], POLYPECTOMY:          HYPERPLASTIC POLYP                 SPECIMEN \"D\":  RECTUM, POLYPECTOMY:          HYPERPLASTIC POLYP     IMPRESSION: Winston Zuniga is a 54 y.o. female with a past history remarkable for obesity, anxiety, depression, referred for evaluation of prior diverticulitis. Patient underwent a recent colonoscopy, here to discuss results. Denies any GI complaints. Colonoscopy findings and pathology discussed with the patient and she was identified to have a sessile serrated adenomas and hyperplastic polyps    PLAN:    1) sessile serrated adenoma and hyperplastic polyps-follow up colonoscopy in 6 months.   She will require extended bowel prep regimen for next

## 2020-09-16 NOTE — PROGRESS NOTES
Review of Systems   Constitutional: Negative. Negative for appetite change, fatigue and unexpected weight change. HENT: Negative. Negative for dental problem, postnasal drip, sinus pressure, sore throat, trouble swallowing and voice change. Denies   Eyes: Negative. Negative for visual disturbance. Respiratory: Negative. Negative for cough, choking, shortness of breath and wheezing. Cardiovascular: Negative for chest pain, palpitations and leg swelling. Gastrointestinal: Negative. Negative for abdominal distention, abdominal pain, anal bleeding, blood in stool, constipation, diarrhea, nausea, rectal pain and vomiting. Denies   Endocrine: Negative. Genitourinary: Negative. Negative for difficulty urinating. Musculoskeletal: Positive for gait problem. Negative for arthralgias, back pain and myalgias. Skin: Negative. Allergic/Immunologic: Negative. Negative for environmental allergies and food allergies. Neurological: Negative for dizziness, weakness, light-headedness, numbness and headaches. Hematological: Negative. Does not bruise/bleed easily. Psychiatric/Behavioral: Negative. Negative for sleep disturbance. The patient is not nervous/anxious. All other systems reviewed and are negative.

## 2020-10-08 ENCOUNTER — OFFICE VISIT (OUTPATIENT)
Dept: BARIATRICS/WEIGHT MGMT | Age: 50
End: 2020-10-08
Payer: MEDICARE

## 2020-10-08 VITALS
DIASTOLIC BLOOD PRESSURE: 80 MMHG | HEIGHT: 65 IN | HEART RATE: 74 BPM | SYSTOLIC BLOOD PRESSURE: 126 MMHG | BODY MASS INDEX: 48.82 KG/M2 | WEIGHT: 293 LBS

## 2020-10-08 PROCEDURE — G8427 DOCREV CUR MEDS BY ELIG CLIN: HCPCS | Performed by: NURSE PRACTITIONER

## 2020-10-08 PROCEDURE — G8484 FLU IMMUNIZE NO ADMIN: HCPCS | Performed by: NURSE PRACTITIONER

## 2020-10-08 PROCEDURE — 1036F TOBACCO NON-USER: CPT | Performed by: NURSE PRACTITIONER

## 2020-10-08 PROCEDURE — G8417 CALC BMI ABV UP PARAM F/U: HCPCS | Performed by: NURSE PRACTITIONER

## 2020-10-08 PROCEDURE — 99213 OFFICE O/P EST LOW 20 MIN: CPT | Performed by: NURSE PRACTITIONER

## 2020-10-08 NOTE — PROGRESS NOTES
Group Lifestyle Balance Follow-up Progress Note      Subjective     Patient is here for group medical appointment for Group Lifestyle Balance weight management program follow-up for the chronic conditions of DENISHA, HTN, Anxiety/Depression, Chronic Back Pain, Psoriasis, Asthma, Arthritis, Chronic Bilateral Knee Pain, OAB. Nura Malcolm Patient continues on the program and tolerating well. Total weight gain of 7 lbs. No current issues. Allergies: Allergies   Allergen Reactions    No Known Allergies        Past Medical History:     Past Medical History:   Diagnosis Date    Allergic rhinitis     Anxiety     Anxiety and depression     Arthritis     Asthma     Diverticulitis     Headache(784.0)     Obesity     Overactive bladder    .     Past Surgical History:  Past Surgical History:   Procedure Laterality Date    COLONOSCOPY N/A 8/5/2020    COLONOSCOPY POLYPECTOMY SNARE/COLD BIOPSY performed by Zaina Martinez MD at [address] [place] [address] Bilateral     cataract removed with lens implants    KNEE SURGERY Right 05/16/2017    [place] KNEE SCOPE,DIAGNOSTIC Right 5/16/2017    RIGHT KNEE ARTHROSCOPY WITH MEDIAL MENISECTOMY AND CHONDROPLASTY performed by Nicolás Soriano DO at [facility]         Family History:  Family History   Problem Relation Age of Onset    Arthritis Father        Social History:  Social History     Socioeconomic History    Marital status:      Spouse name: Not on file    Number of children: Not on file    Years of education: Not on file    Highest education level: Not on file   Occupational History    Not on file   Social Needs    Financial resource strain: Not on file    Food insecurity     Worry: Not on file     Inability: Not on file    Transportation needs     Medical: Not on file     Non-medical: Not on file   Tobacco Use    Smoking status: Former Smoker     Packs/day: 0.60     Types: Cigarettes    Smokeless tobacco: Never Used   Substance and Sexual Activity  Alcohol use: Yes     Comment: rarely    Drug use: No    Sexual activity: Not on file   Lifestyle    Physical activity     Days per week: Not on file     Minutes per session: Not on file    Stress: Not on file   Relationships    Social connections     Talks on phone: Not on file     Gets together: Not on file     Attends Holiness service: Not on file     Active member of club or organization: Not on file     Attends meetings of clubs or organizations: Not on file     Relationship status: Not on file    Intimate partner violence     Fear of current or ex partner: Not on file     Emotionally abused: Not on file     Physically abused: Not on file     Forced sexual activity: Not on file   Other Topics Concern    Not on file   Social History Narrative    Not on file       Current Medications:  Current Outpatient Medications   Medication Sig Dispense Refill    ketoconazole (NIZORAL) 2 % shampoo Apply 3-4 times weekly to scalp, leave on for five minutes prior to washing off 120 mL 5    pimecrolimus (ELIDEL) 1 % cream Apply topically 2 times daily to face and hands 30 g 5    buPROPion HBr (APLENZIN) 174 MG TB24 Aplenzin 174 mg tablet,extended release      escitalopram (LEXAPRO) 20 MG tablet Take 20 mg by mouth daily      Compression Stockings MISC 30- 40 mm/hg 2 each 1    ibuprofen (ADVIL;MOTRIN) 800 MG tablet Take 1 tablet by mouth every 8 hours as needed for Pain 60 tablet 3    albuterol sulfate  (90 BASE) MCG/ACT inhaler Inhale 2 puffs into the lungs every 6 hours as needed for Wheezing       Current Facility-Administered Medications   Medication Dose Route Frequency Provider Last Rate Last Dose    albuterol (PROVENTIL) nebulizer solution 2.5 mg  2.5 mg Nebulization 4x daily Alecia Prieto MD           Vital Signs:  /80 (Site: Right Upper Arm, Position: Sitting, Cuff Size: Large Adult)   Pulse 74   Ht 5' 5\" (1.651 m)   Wt (!) 400 lb (181.4 kg)   BMI 66.56 kg/m² BMI/Height/Weight:  Body mass index is 66.56 kg/m². Review of Systems  Constitutional: Weight gain      Objective:      Physical Exam   Vital signs reviewed. General Appearance: Well-developed and well-nourished. No acute distress. Skin: Warm, dry. Head: Normocephalic. Pulmonary/Chest: Normal respiratory effort. Musculoskeletal: Movement x4. Neurological:  Alert and oriented. Individual goal for this encounter:  I need both knees replaced and I need to get to at least 48 BMI for approval for surgery. After surgery I hopefully will feel better when I walk and then exercise will bd bearable. I have terrible eating habits and need help and support. X? Vital signs reviewed and discussed with patient. ? Labs/EKG reviewed and discussed with patient. ? Blood sugar log reviewed and discussed with patient. ? Physical activity discussed. ? Medication changes recommended. ? Smoking cessation discussed. X ? Specific questions/concerns addressed. Specific group medical question(s) addressed in this encounter:  Discussion about GERD. Group discussion topic for this encounter:     ? Tiffany Reilly   ? Be a Fat & Calorie   X ? Healthy Eating   ? Move Those Muscles   ? Tip the Calorie Balance   ? Take charge of What's Around You   ? Problem Solving   ? Step Up Your Physical Activity Plan   ? Manage Slips and Self-Defeating Thoughts   ? [address]   ? Make Social Cues Work for Luis Garrison   ? Ways to Stay Motivated   ? Preparing for Long Term Self-Management   ? Take Charge of Your Lifestyle   ? Mindful Eating, Mindful Movement   ? Manage Your Stress   ? Sit Less for Health   ? More Volume, Fewer Calories   ? Stay Active   ? Balance Your Thoughts   ? Heart Health    ? Looking Back and Looking Forward    Total time:  90 minutes, greater than 50% of visit spent in group counseling/education. Assessment:       Diagnosis Orders   1.  DENISHA (obstructive sleep

## 2020-10-19 ENCOUNTER — HOSPITAL ENCOUNTER (EMERGENCY)
Age: 50
Discharge: HOME | End: 2020-10-19
Attending: EMERGENCY MEDICINE
Payer: MEDICARE

## 2020-10-19 ENCOUNTER — APPOINTMENT (OUTPATIENT)
Dept: CT IMAGING | Age: 50
End: 2020-10-19
Payer: MEDICARE

## 2020-10-19 VITALS
SYSTOLIC BLOOD PRESSURE: 139 MMHG | OXYGEN SATURATION: 99 % | HEART RATE: 72 BPM | HEIGHT: 65 IN | RESPIRATION RATE: 20 BRPM | TEMPERATURE: 98.1 F | BODY MASS INDEX: 48.82 KG/M2 | WEIGHT: 293 LBS | DIASTOLIC BLOOD PRESSURE: 67 MMHG

## 2020-10-19 LAB
ABSOLUTE EOS #: 0.09 K/UL (ref 0–0.44)
ABSOLUTE IMMATURE GRANULOCYTE: 0.03 K/UL (ref 0–0.3)
ABSOLUTE LYMPH #: 1.47 K/UL (ref 1.1–3.7)
ABSOLUTE MONO #: 0.41 K/UL (ref 0.1–1.2)
ALBUMIN SERPL-MCNC: 3.7 G/DL (ref 3.5–5.2)
ALBUMIN/GLOBULIN RATIO: ABNORMAL (ref 1–2.5)
ALP BLD-CCNC: 86 U/L (ref 35–104)
ALT SERPL-CCNC: 14 U/L (ref 5–33)
ANION GAP SERPL CALCULATED.3IONS-SCNC: 10 MMOL/L (ref 9–17)
AST SERPL-CCNC: 16 U/L
BASOPHILS # BLD: 0 % (ref 0–2)
BASOPHILS ABSOLUTE: <0.03 K/UL (ref 0–0.2)
BILIRUB SERPL-MCNC: 0.56 MG/DL (ref 0.3–1.2)
BUN BLDV-MCNC: 11 MG/DL (ref 6–20)
BUN/CREAT BLD: 13 (ref 9–20)
CALCIUM SERPL-MCNC: 8.7 MG/DL (ref 8.6–10.4)
CHLORIDE BLD-SCNC: 101 MMOL/L (ref 98–107)
CO2: 26 MMOL/L (ref 20–31)
CREAT SERPL-MCNC: 0.86 MG/DL (ref 0.5–0.9)
DIFFERENTIAL TYPE: ABNORMAL
EOSINOPHILS RELATIVE PERCENT: 2 % (ref 1–4)
GFR AFRICAN AMERICAN: >60 ML/MIN
GFR NON-AFRICAN AMERICAN: >60 ML/MIN
GFR SERPL CREATININE-BSD FRML MDRD: ABNORMAL ML/MIN/{1.73_M2}
GFR SERPL CREATININE-BSD FRML MDRD: ABNORMAL ML/MIN/{1.73_M2}
GLUCOSE BLD-MCNC: 109 MG/DL (ref 70–99)
HCG QUALITATIVE: NEGATIVE
HCT VFR BLD CALC: 37.6 % (ref 36.3–47.1)
HEMOGLOBIN: 11.8 G/DL (ref 11.9–15.1)
IMMATURE GRANULOCYTES: 1 %
LIPASE: 25 U/L (ref 13–60)
LYMPHOCYTES # BLD: 27 % (ref 24–43)
MCH RBC QN AUTO: 28.6 PG (ref 25.2–33.5)
MCHC RBC AUTO-ENTMCNC: 31.4 G/DL (ref 28.4–34.8)
MCV RBC AUTO: 91 FL (ref 82.6–102.9)
MONOCYTES # BLD: 8 % (ref 3–12)
NRBC AUTOMATED: 0 PER 100 WBC
PDW BLD-RTO: 13.2 % (ref 11.8–14.4)
PLATELET # BLD: 227 K/UL (ref 138–453)
PLATELET ESTIMATE: ABNORMAL
PMV BLD AUTO: 9 FL (ref 8.1–13.5)
POTASSIUM SERPL-SCNC: 4.3 MMOL/L (ref 3.7–5.3)
RBC # BLD: 4.13 M/UL (ref 3.95–5.11)
RBC # BLD: ABNORMAL 10*6/UL
SEG NEUTROPHILS: 62 % (ref 36–65)
SEGMENTED NEUTROPHILS ABSOLUTE COUNT: 3.36 K/UL (ref 1.5–8.1)
SODIUM BLD-SCNC: 137 MMOL/L (ref 135–144)
TOTAL PROTEIN: 6.9 G/DL (ref 6.4–8.3)
WBC # BLD: 5.4 K/UL (ref 3.5–11.3)
WBC # BLD: ABNORMAL 10*3/UL

## 2020-10-19 PROCEDURE — 85025 COMPLETE CBC W/AUTO DIFF WBC: CPT

## 2020-10-19 PROCEDURE — 6360000004 HC RX CONTRAST MEDICATION: Performed by: EMERGENCY MEDICINE

## 2020-10-19 PROCEDURE — 99282 EMERGENCY DEPT VISIT SF MDM: CPT

## 2020-10-19 PROCEDURE — 74177 CT ABD & PELVIS W/CONTRAST: CPT

## 2020-10-19 PROCEDURE — 96375 TX/PRO/DX INJ NEW DRUG ADDON: CPT

## 2020-10-19 PROCEDURE — 81003 URINALYSIS AUTO W/O SCOPE: CPT

## 2020-10-19 PROCEDURE — 99283 EMERGENCY DEPT VISIT LOW MDM: CPT

## 2020-10-19 PROCEDURE — 84703 CHORIONIC GONADOTROPIN ASSAY: CPT

## 2020-10-19 PROCEDURE — 80053 COMPREHEN METABOLIC PANEL: CPT

## 2020-10-19 PROCEDURE — 2580000003 HC RX 258: Performed by: PHYSICIAN ASSISTANT

## 2020-10-19 PROCEDURE — 83690 ASSAY OF LIPASE: CPT

## 2020-10-19 PROCEDURE — 96374 THER/PROPH/DIAG INJ IV PUSH: CPT

## 2020-10-19 PROCEDURE — 2580000003 HC RX 258: Performed by: EMERGENCY MEDICINE

## 2020-10-19 PROCEDURE — 6360000002 HC RX W HCPCS: Performed by: PHYSICIAN ASSISTANT

## 2020-10-19 RX ORDER — 0.9 % SODIUM CHLORIDE 0.9 %
1000 INTRAVENOUS SOLUTION INTRAVENOUS ONCE
Status: COMPLETED | OUTPATIENT
Start: 2020-10-19 | End: 2020-10-19

## 2020-10-19 RX ORDER — MORPHINE SULFATE 4 MG/ML
4 INJECTION, SOLUTION INTRAMUSCULAR; INTRAVENOUS ONCE
Status: COMPLETED | OUTPATIENT
Start: 2020-10-19 | End: 2020-10-19

## 2020-10-19 RX ORDER — HYDROCODONE BITARTRATE AND ACETAMINOPHEN 5; 325 MG/1; MG/1
1-2 TABLET ORAL EVERY 8 HOURS PRN
Qty: 15 TABLET | Refills: 0 | Status: SHIPPED | OUTPATIENT
Start: 2020-10-19 | End: 2020-10-22

## 2020-10-19 RX ORDER — 0.9 % SODIUM CHLORIDE 0.9 %
80 INTRAVENOUS SOLUTION INTRAVENOUS ONCE
Status: COMPLETED | OUTPATIENT
Start: 2020-10-19 | End: 2020-10-19

## 2020-10-19 RX ORDER — ONDANSETRON 2 MG/ML
4 INJECTION INTRAMUSCULAR; INTRAVENOUS ONCE
Status: COMPLETED | OUTPATIENT
Start: 2020-10-19 | End: 2020-10-19

## 2020-10-19 RX ORDER — SODIUM CHLORIDE 0.9 % (FLUSH) 0.9 %
10 SYRINGE (ML) INJECTION PRN
Status: DISCONTINUED | OUTPATIENT
Start: 2020-10-19 | End: 2020-10-19 | Stop reason: HOSPADM

## 2020-10-19 RX ADMIN — MORPHINE SULFATE 4 MG: 4 INJECTION, SOLUTION INTRAMUSCULAR; INTRAVENOUS at 13:15

## 2020-10-19 RX ADMIN — SODIUM CHLORIDE 80 ML: 9 INJECTION, SOLUTION INTRAVENOUS at 14:11

## 2020-10-19 RX ADMIN — IOPAMIDOL 100 ML: 755 INJECTION, SOLUTION INTRAVENOUS at 14:11

## 2020-10-19 RX ADMIN — ONDANSETRON 4 MG: 2 INJECTION INTRAMUSCULAR; INTRAVENOUS at 13:15

## 2020-10-19 RX ADMIN — SODIUM CHLORIDE 1000 ML: 9 INJECTION, SOLUTION INTRAVENOUS at 13:18

## 2020-10-19 RX ADMIN — Medication 10 ML: at 14:12

## 2020-10-19 ASSESSMENT — PAIN SCALES - GENERAL: PAINLEVEL_OUTOF10: 8

## 2020-10-19 NOTE — ED PROVIDER NOTES
EMERGENCY DEPARTMENT ENCOUNTER   ATTENDING ATTESTATION     Pt Name: Meet Fernando  MRN: [id number]  [place] 1970  Date of evaluation: 10/19/20   Meet Fernando is a 54 y.o. female with CC: Abdominal Pain    MDM:   The patient is a 51-year-old female who presented to the emergency department secondary to abdominal pain. No acute findings and laboratory analysis or imaging, patient is discharged home with outpatient follow-up and given parameters to return to the emergency department. CRITICAL CARE:       EKG: All EKG's are interpreted by the Emergency Department Physician who either signs or Co-signs this chart in the absence of a cardiologist.      RADIOLOGY:All plain film, CT, MRI, and formal ultrasound images (except ED bedside ultrasound) are read by the radiologist, see reports below, unless otherwise noted in MDM or here. CT ABDOMEN PELVIS W IV CONTRAST    (Results Pending)     LABS: All lab results were reviewed by myself, and all abnormals are listed below. Labs Reviewed   CBC WITH AUTO DIFFERENTIAL   LIPASE   COMPREHENSIVE METABOLIC PANEL   HCG, SERUM, QUALITATIVE     CONSULTS:  None  FINAL IMPRESSION    No diagnosis found.         PASTMEDICAL HISTORY     Past Medical History:   Diagnosis Date    Allergic rhinitis     Anxiety     Anxiety and depression     Arthritis     Asthma     Diverticulitis     Headache(784.0)     Obesity     Overactive bladder      SURGICAL HISTORY       Past Surgical History:   Procedure Laterality Date    COLONOSCOPY N/A 8/5/2020    COLONOSCOPY POLYPECTOMY SNARE/COLD BIOPSY performed by Aissatou Rodriges MD at [address] Bilateral     cataract removed with lens implants    KNEE SURGERY Right 05/16/2017    [place] KNEE SCOPE,DIAGNOSTIC Right 5/16/2017    RIGHT KNEE ARTHROSCOPY WITH MEDIAL MENISECTOMY AND CHONDROPLASTY performed by Marce Major DO at [address]       Previous Medications    ALBUTEROL SULFATE  (90 BASE) MCG/ACT INHALER    Inhale 2 puffs into the lungs every 6 hours as needed for Wheezing    BUPROPION HBR (APLENZIN) 174 MG TB24    Aplenzin 174 mg tablet,extended release    COMPRESSION STOCKINGS MISC    30- 40 mm/hg    ESCITALOPRAM (LEXAPRO) 20 MG TABLET    Take 20 mg by mouth daily     ALLERGIES     is allergic to no known allergies. FAMILY HISTORY     She indicated that her mother is alive. She indicated that her father is alive. SOCIAL HISTORY       Social History     Tobacco Use    Smoking status: Former Smoker     Packs/day: 0.60     Types: Cigarettes    Smokeless tobacco: Never Used   Substance Use Topics    Alcohol use: Yes     Comment: rarely    Drug use: No       I personally evaluated and examined the patient in conjunction with the APC and agree with the assessment, treatment plan, and disposition of the patient as recorded by the APC.    Betzy Singh MD  Attending Emergency Physician          Betzy Singh MD  22/37/69 [id number]

## 2020-10-19 NOTE — ED PROVIDER NOTES
[facility] ED  [facility]      Pt Name: Darrel Melissa  MRN: [id number]  [place] 1970  Date ofevaluation: 10/19/2020  Provider: Charmayne Roys, PA-C    CHIEF COMPLAINT       Chief Complaint   Patient presents with    Abdominal Pain         HISTORY OF PRESENT ILLNESS  (Location/Symptom, Timing/Onset, Context/Setting, Quality, Duration, Modifying Factors, Severity.)   Darrel Melissa is a 54 y.o. female who presents to the emergency department with right-sided abdominal pain. 9.  No history of gallbladder disease but however feels the pain is more lower on the right side today. For some nausea denies any vomiting. No fevers or chills. Appointment left stools. No definite alleviating or aggravating factors. Pain described as mild to moderate, constant and sharp. Nursing Notes were reviewed.     ALLERGIES     No known allergies    CURRENT MEDICATIONS       Previous Medications    ALBUTEROL SULFATE  (90 BASE) MCG/ACT INHALER    Inhale 2 puffs into the lungs every 6 hours as needed for Wheezing    BUPROPION HBR (APLENZIN) 174 MG TB24    Aplenzin 174 mg tablet,extended release    COMPRESSION STOCKINGS MISC    30- 40 mm/hg    ESCITALOPRAM (LEXAPRO) 20 MG TABLET    Take 20 mg by mouth daily       PAST MEDICAL HISTORY         Diagnosis Date    Allergic rhinitis     Anxiety     Anxiety and depression     Arthritis     Asthma     Diverticulitis     Headache(784.0)     Obesity     Overactive bladder        SURGICAL HISTORY           Procedure Laterality Date    COLONOSCOPY N/A 8/5/2020    COLONOSCOPY POLYPECTOMY SNARE/COLD BIOPSY performed by Lashell Alonzo MD at [address] Bilateral     cataract removed with lens implants    KNEE SURGERY Right 05/16/2017    [place] KNEE SCOPE,DIAGNOSTIC Right 5/16/2017    RIGHT KNEE ARTHROSCOPY WITH MEDIAL MENISECTOMY AND CHONDROPLASTY performed by Demetrice River DO at [address]  FAMILY HISTORY           Problem Relation Age of Onset    Arthritis Father      Family Status   Relation Name Status    Mother  Alive    Father  Alive        SOCIAL HISTORY      reports that she has quit smoking. Her smoking use included cigarettes. She smoked 0.60 packs per day. She has never used smokeless tobacco. She reports current alcohol use. She reports that she does not use drugs. REVIEW OFSYSTEMS    (2-9 systems for level 4, 10 or more for level 5)   Review of Systems    Except as noted above the remainder of the review of systems was reviewed and negative. PHYSICAL EXAM    (up to 7 for level 4, 8 or more for level 5)     ED Triage Vitals [10/19/20 1228]   BP Temp Temp src Pulse Resp SpO2 Height Weight   139/67 98.1 °F (36.7 °C) -- 72 20 99 % 5' 5\" (1.651 m) (!) 400 lb (181.4 kg)      Physical Exam  Constitutional:       Appearance: She is well-developed. HENT:      Head: Normocephalic and atraumatic. Neck:      Musculoskeletal: Normal range of motion and neck supple. Cardiovascular:      Rate and Rhythm: Normal rate and regular rhythm. Pulmonary:      Effort: Pulmonary effort is normal.      Breath sounds: Normal breath sounds. Abdominal:      Palpations: Abdomen is soft. Tenderness: There is abdominal tenderness in the right upper quadrant and right lower quadrant. Musculoskeletal: Normal range of motion. Skin:     General: Skin is warm. Findings: No rash. Neurological:      Mental Status: She is alert and oriented to person, place, and time.    Psychiatric:         Behavior: Behavior normal.                 DIAGNOSTIC RESULTS     EKG: All EKG's are interpreted by the Emergency Department Physician who either signs or Co-signs this chart in the absence of a cardiologist.        RADIOLOGY:   Non-plain film images such as CT, Ultrasound and MRI are read by the radiologist. Plain radiographic images arevisualized and preliminarily interpreted by the emergency

## 2020-10-19 NOTE — ED NOTES
Bed: 12  Expected date: 10/19/20  Expected time: 12:21 PM  Means of arrival: [address]  Comments:  [facility] [address] [facility].  Dede Crespo  10/19/20 [id number]

## 2020-11-03 ENCOUNTER — OFFICE VISIT (OUTPATIENT)
Dept: BARIATRICS/WEIGHT MGMT | Age: 50
End: 2020-11-03
Payer: MEDICARE

## 2020-11-03 VITALS
HEART RATE: 70 BPM | BODY MASS INDEX: 48.82 KG/M2 | DIASTOLIC BLOOD PRESSURE: 78 MMHG | SYSTOLIC BLOOD PRESSURE: 126 MMHG | WEIGHT: 293 LBS | HEIGHT: 65 IN

## 2020-11-03 PROCEDURE — G8417 CALC BMI ABV UP PARAM F/U: HCPCS | Performed by: NURSE PRACTITIONER

## 2020-11-03 PROCEDURE — G8484 FLU IMMUNIZE NO ADMIN: HCPCS | Performed by: NURSE PRACTITIONER

## 2020-11-03 PROCEDURE — 99213 OFFICE O/P EST LOW 20 MIN: CPT | Performed by: NURSE PRACTITIONER

## 2020-11-03 PROCEDURE — 1036F TOBACCO NON-USER: CPT | Performed by: NURSE PRACTITIONER

## 2020-11-03 PROCEDURE — G8427 DOCREV CUR MEDS BY ELIG CLIN: HCPCS | Performed by: NURSE PRACTITIONER

## 2020-11-03 NOTE — PROGRESS NOTES
Group Lifestyle Balance Follow-up Progress Note      Subjective     Patient is here for group medical appointment for Group Lifestyle Balance weight management program follow-up for the chronic conditions of DENISHA, HTN, Anxiety/Depression, Chronic Back Pain, Psoriasis, Asthma, Arthritis, Chronic Bilateral Knee Pain, OAB. Tomas Granados Patient continues on the program and tolerating well. Total weight gain of 2 lbs. No current issues. Allergies: Allergies   Allergen Reactions    No Known Allergies        Past Medical History:     Past Medical History:   Diagnosis Date    Allergic rhinitis     Anxiety     Anxiety and depression     Arthritis     Asthma     Diverticulitis     Headache(784.0)     Obesity     Overactive bladder    .     Past Surgical History:  Past Surgical History:   Procedure Laterality Date    COLONOSCOPY N/A 8/5/2020    COLONOSCOPY POLYPECTOMY SNARE/COLD BIOPSY performed by Aida Jean Baptiste MD at [address] [place] [address] Bilateral     cataract removed with lens implants    KNEE SURGERY Right 05/16/2017    [place] KNEE SCOPE,DIAGNOSTIC Right 5/16/2017    RIGHT KNEE ARTHROSCOPY WITH MEDIAL MENISECTOMY AND CHONDROPLASTY performed by Sienna Webb DO at [facility]         Family History:  Family History   Problem Relation Age of Onset    Arthritis Father        Social History:  Social History     Socioeconomic History    Marital status:      Spouse name: Not on file    Number of children: Not on file    Years of education: Not on file    Highest education level: Not on file   Occupational History    Not on file   Social Needs    Financial resource strain: Not on file    Food insecurity     Worry: Not on file     Inability: Not on file    Transportation needs     Medical: Not on file     Non-medical: Not on file   Tobacco Use    Smoking status: Former Smoker     Packs/day: 0.60     Types: Cigarettes    Smokeless tobacco: Never Used   Substance and Sexual Activity  Alcohol use: Yes     Comment: rarely    Drug use: No    Sexual activity: Not on file   Lifestyle    Physical activity     Days per week: Not on file     Minutes per session: Not on file    Stress: Not on file   Relationships    Social connections     Talks on phone: Not on file     Gets together: Not on file     Attends Taoism service: Not on file     Active member of club or organization: Not on file     Attends meetings of clubs or organizations: Not on file     Relationship status: Not on file    Intimate partner violence     Fear of current or ex partner: Not on file     Emotionally abused: Not on file     Physically abused: Not on file     Forced sexual activity: Not on file   Other Topics Concern    Not on file   Social History Narrative    Not on file       Current Medications:  Current Outpatient Medications   Medication Sig Dispense Refill    buPROPion HBr (APLENZIN) 174 MG TB24 Aplenzin 174 mg tablet,extended release      escitalopram (LEXAPRO) 20 MG tablet Take 20 mg by mouth daily      Compression Stockings MISC 30- 40 mm/hg 2 each 1    albuterol sulfate  (90 BASE) MCG/ACT inhaler Inhale 2 puffs into the lungs every 6 hours as needed for Wheezing       Current Facility-Administered Medications   Medication Dose Route Frequency Provider Last Rate Last Dose    albuterol (PROVENTIL) nebulizer solution 2.5 mg  2.5 mg Nebulization 4x daily Yokasta Montana MD           Vital Signs:  /78 (Site: Right Upper Arm, Position: Sitting, Cuff Size: Large Adult)   Pulse 70   Ht 5' 5\" (1.651 m)   Wt (!) 395 lb (179.2 kg)   LMP 10/19/2020   BMI 65.73 kg/m²     BMI/Height/Weight:  Body mass index is 65.73 kg/m². Review of Systems  Constitutional: Weight gain      Objective:      Physical Exam   Vital signs reviewed. General Appearance: Well-developed and well-nourished. No acute distress. Skin: Warm, dry. Head: Normocephalic.    Pulmonary/Chest: Normal respiratory effort. Musculoskeletal: Movement x4. Neurological:  Alert and oriented. Individual goal for this encounter:  I need both knees replaced and I need to get to at least 48 BMI for approval for surgery. After surgery I hopefully will feel better when I walk and then exercise will bd bearable. I have terrible eating habits and need help and support. X? Vital signs reviewed and discussed with patient. ? Labs/EKG reviewed and discussed with patient. ? Blood sugar log reviewed and discussed with patient. ? Physical activity discussed. ? Medication changes recommended. ? Smoking cessation discussed. X ? Specific questions/concerns addressed. Specific group medical question(s) addressed in this encounter:  Discussion about metabolism. Group discussion topic for this encounter:     ? [place]   ? Be a Fat & Calorie    ? Healthy Eating   ? Move Those Muscles   ? Tip the Calorie Balance   ? Take charge of What's Around You   ? Problem Solving   ? Step Up Your Physical Activity Plan   ? Manage Slips and Self-Defeating Thoughts   ? [address]   ? Make Social Cues Work for Teodoro Tru   ? Ways to Stay Motivated   ? Preparing for Long Term Self-Management   ? Take Charge of Your Lifestyle   ? Mindful Eating, Mindful Movement   ? Manage Your Stress   ? Sit Less for Health   ? More Volume, Fewer Calories   ? Stay Active  X ? Balance Your Thoughts   ? Heart Health    ? Looking Back and Looking Forward    Total time:  90 minutes, greater than 50% of visit spent in group counseling/education. Assessment:       Diagnosis Orders   1. Essential hypertension     2. DENISHA (obstructive sleep apnea)     3. Anxiety and depression     4. OAB (overactive bladder)     5. Uncomplicated asthma, unspecified asthma severity, unspecified whether persistent     6. Primary osteoarthritis of right knee     7. Psoriasis     8.  Chronic back pain, unspecified back location, unspecified back pain laterality     9. Bilateral chronic knee pain     10. Morbid obesity with BMI of 60.0-69.9, adult ([facility] [address]         Plan:      Track food and weight. Return to clinic as per group medical appointment schedule. Follow-up:  Return in about 1 month (around 12/3/2020). Orders:  No orders of the defined types were placed in this encounter. Prescriptions:  No orders of the defined types were placed in this encounter.       Electronically signed by:  Rosario Winters CNP

## 2020-12-01 ENCOUNTER — HOSPITAL ENCOUNTER (OUTPATIENT)
Dept: PREADMISSION TESTING | Age: 50
Discharge: HOME | End: 2020-12-05
Payer: MEDICARE

## 2020-12-01 VITALS
DIASTOLIC BLOOD PRESSURE: 70 MMHG | TEMPERATURE: 96.5 F | BODY MASS INDEX: 48.82 KG/M2 | WEIGHT: 293 LBS | HEIGHT: 65 IN | RESPIRATION RATE: 20 BRPM | HEART RATE: 88 BPM | OXYGEN SATURATION: 96 % | SYSTOLIC BLOOD PRESSURE: 144 MMHG

## 2020-12-01 LAB
ANION GAP SERPL CALCULATED.3IONS-SCNC: 9 MMOL/L (ref 9–17)
BUN BLDV-MCNC: 10 MG/DL (ref 6–20)
BUN/CREAT BLD: 11 (ref 9–20)
CALCIUM SERPL-MCNC: 8.9 MG/DL (ref 8.6–10.4)
CHLORIDE BLD-SCNC: 102 MMOL/L (ref 98–107)
CO2: 28 MMOL/L (ref 20–31)
CREAT SERPL-MCNC: 0.9 MG/DL (ref 0.5–0.9)
GFR AFRICAN AMERICAN: >60 ML/MIN
GFR NON-AFRICAN AMERICAN: >60 ML/MIN
GFR SERPL CREATININE-BSD FRML MDRD: ABNORMAL ML/MIN/{1.73_M2}
GFR SERPL CREATININE-BSD FRML MDRD: ABNORMAL ML/MIN/{1.73_M2}
GLUCOSE BLD-MCNC: 106 MG/DL (ref 70–99)
HCT VFR BLD CALC: 37.8 % (ref 36.3–47.1)
HEMOGLOBIN: 11.5 G/DL (ref 11.9–15.1)
MCH RBC QN AUTO: 28 PG (ref 25.2–33.5)
MCHC RBC AUTO-ENTMCNC: 30.4 G/DL (ref 28.4–34.8)
MCV RBC AUTO: 92 FL (ref 82.6–102.9)
NRBC AUTOMATED: 0 PER 100 WBC
PDW BLD-RTO: 13.1 % (ref 11.8–14.4)
PLATELET # BLD: 231 K/UL (ref 138–453)
PMV BLD AUTO: 9.3 FL (ref 8.1–13.5)
POTASSIUM SERPL-SCNC: 4.2 MMOL/L (ref 3.7–5.3)
RBC # BLD: 4.11 M/UL (ref 3.95–5.11)
SODIUM BLD-SCNC: 139 MMOL/L (ref 135–144)
WBC # BLD: 5.5 K/UL (ref 3.5–11.3)

## 2020-12-01 PROCEDURE — 85027 COMPLETE CBC AUTOMATED: CPT

## 2020-12-01 PROCEDURE — 36415 COLL VENOUS BLD VENIPUNCTURE: CPT

## 2020-12-01 PROCEDURE — 93005 ELECTROCARDIOGRAM TRACING: CPT | Performed by: ANESTHESIOLOGY

## 2020-12-01 PROCEDURE — 80048 BASIC METABOLIC PNL TOTAL CA: CPT

## 2020-12-01 RX ORDER — DICYCLOMINE HCL 20 MG
1 TABLET ORAL EVERY 8 HOURS PRN
COMMUNITY
Start: 2020-11-20 | End: 2022-01-05 | Stop reason: SDUPTHER

## 2020-12-01 RX ORDER — ACETAMINOPHEN 500 MG
1000 TABLET ORAL EVERY 6 HOURS PRN
Status: ON HOLD | COMMUNITY
End: 2022-11-01 | Stop reason: HOSPADM

## 2020-12-01 NOTE — H&P
History and Physical Service   [facility] [place] - INPATIENT    HISTORY AND PHYSICAL EXAMINATION            Date of Evaluation: 12/1/2020  Patient name:  Aria Wiley  MRN:   [id number]  YOB: 1970  PCP:    So Rust MD    History Obtained From:     Patient, medical records    History of Present Illness: This is Aria Wiley a 52 y.o. female who presents today for a PRE-TETING APPOINTMENT PRIOR TO A LAPAROSCOPIC ROBOTIC CHOLECYSTECTOMY  by Dr. Moe Main 12/15/2020 @1300. For TREATMENT OF GALL STONES. THE PATIENT HAS HAD PROBLEMS WITH HER GALL BLADDER FOR \"YEARS \" THE COLIC ATTACKS HAVE BECOME MORE FREQUENT AND MORE SEVERE. SHE HAS OCCASIONAL NAUSEA ,NO VOMITING. HAS OCCASIONAL DIARRHEA , SHE HAS CHILLS . ON OCCASION. SHE PRESENETS FOR SURGICAL CORRECTION. Past Medical History:     Past Medical History:   Diagnosis Date    Allergic rhinitis     Anxiety     Anxiety and depression     Arthritis     Asthma     no longer using inhaler or nebulizer    Diverticulitis     Headache(784.0)     Obesity     Overactive bladder     Sleep apnea     uses machine nightly        Past Surgical History:     Past Surgical History:   Procedure Laterality Date    COLONOSCOPY N/A 8/5/2020    COLONOSCOPY POLYPECTOMY SNARE/COLD BIOPSY performed by Becky Thacker MD at [address] [place] [address] Bilateral     cataract removed with lens implants    KNEE SURGERY Right 05/16/2017    CA KNEE SCOPE,DIAGNOSTIC Right 5/16/2017    RIGHT KNEE ARTHROSCOPY WITH MEDIAL MENISECTOMY AND CHONDROPLASTY performed by James Valladares DO at [facility]          Medications Prior to Admission:     Prior to Admission medications    Medication Sig Start Date End Date Taking?  Authorizing Provider   dicyclomine (BENTYL) 20 MG tablet Take 1 tablet by mouth every 8 hours as needed 11/20/20  Yes Historical Provider, MD   acetaminophen (TYLENOL) 500 MG tablet Take 1,000 mg by mouth every 6 hours as needed for Pain   Yes Historical Provider, MD   buPROPion HBr (APLENZIN) 174 MG TB24 Take 1 tablet by mouth daily    Yes Historical Provider, MD   escitalopram (LEXAPRO) 20 MG tablet Take 20 mg by mouth daily   Yes Historical Provider, MD   Compression Stockings MISC 30- 36 mm/hg 1/22/18   German Alaniz DPM        Allergies:     No known allergies    Social History:     Tobacco:    reports that she quit smoking about 3 years ago. Her smoking use included cigarettes. She smoked 0.60 packs per day. She has never used smokeless tobacco.  Alcohol:      reports current alcohol use. Drug Use:  reports no history of drug use. Family History:     Family History   Problem Relation Age of Onset    Arthritis Father        Review of Systems:     Positive and Negative as described in HPI. CONSTITUTIONAL:  negative for fevers, HAS OCCASIONALchills, sweats, fatigue, weight loss  HEENT:  WEARS GLASSES  for vision, hearing changes, runny nose, throat pain  RESPIRATORY:  negative for shortness of breath, cough, congestion, wheezing. HAS OBSTRUCTIVE SLEEP APNEA USES A C-PAP MACHINE  CARDIOVASCULAR:  negative for chest pain, palpitations.   GASTROINTESTINAL:  negative for nausea, vomiting, diarrhea, constipation, change in bowel habits, abdominal pain   GENITOURINARY:  negative for difficulty of urination, burning with urination, frequency   INTEGUMENT:  negative for rash, skin lesions, easy bruising HAS PSORIASIS   HEMATOLOGIC/LYMPHATIC:  negative for swelling/edema   ALLERGIC/IMMUNOLOGIC:  negative for urticaria , itching  ENDOCRINE:  negative increase in drinking, increase in urination, hot or cold intolerance  MUSCULOSKELETAL:  HAS BACK AND KNEE  joint pains, muscle aches, swelling of joints IMPAIRED MOBILITY ,USES A CANE AND A WHEEL CHAIR FOR ASSISTANCE   NEUROLOGICAL:  negative for headaches, dizziness, lightheadedness, numbness, pain, tingling extremities  BEHAVIOR/PSYCH: IS BEING TREATED FOR  depression, anxiety    Physical Exam:   BP (!) 144/70   Pulse 88   Temp 96.5 °F (35.8 °C) (Temporal)   Resp 20   Ht 5' 5\" (1.651 m)   Wt (!) 402 lb 11.2 oz (182.7 kg)   LMP 11/02/2020   SpO2 96%   BMI 67.01 kg/m²   Patient's last menstrual period was 11/02/2020. No obstetric history on file. No results for input(s): POCGLU in the last 72 hours. General Appearance:  alert, well appearing, and in no acute distress MORBIDLY OBESE BMI = 67.1  Mental status: oriented to person, place, and time with normal affect  Head:  normocephalic, atraumatic. Eye: no icterus, redness, pupils equal and reactive, extraocular eye movements intact, conjunctiva clear  Ear: normal external ear, no discharge, hearing intact  Nose:  no drainage noted  Mouth: mucous membranes moist  Neck: supple, no carotid bruits, thyroid not palpable  Lungs: Bilateral DIMINISHED BUT  equal air entry, clear to ausculation, no wheezing, rales or rhonchi, normal effort  Cardiovascular: HR  normal rate, regular rhythm, no murmur, gallop, rub.   Abdomen: Soft, nontender, nondistended, normal bowel sounds  Neurologic: There are no new focal motor or sensory deficits, normal muscle tone and bulk, no abnormal sensation, normal speech, cranial nerves II through XII grossly intact  Skin: No gross lesions, rashes, bruising or bleeding on exposed skin area  Extremities:  peripheral pulses palpable, no pedal edema or calf pain with palpation  Psych: normal affect     Investigations:      Laboratory Testing:  Recent Results (from the past 24 hour(s))   CBC    Collection Time: 12/01/20  1:33 PM   Result Value Ref Range    WBC 5.5 3.5 - 11.3 k/uL    RBC 4.11 3.95 - 5.11 m/uL    Hemoglobin 11.5 (L) 11.9 - 15.1 g/dL    Hematocrit 37.8 36.3 - 47.1 %    MCV 92.0 82.6 - 102.9 fL    MCH 28.0 25.2 - 33.5 pg    MCHC 30.4 28.4 - 34.8 g/dL    RDW 13.1 11.8 - 14.4 %    Platelets 772 286 - 229 k/uL    MPV 9.3 8.1 - 13.5 fL    NRBC Automated 0.0 0.0 per 100 WBC   Basic Metabolic Panel Collection Time: 12/01/20  1:33 PM   Result Value Ref Range    Glucose 106 (H) 70 - 99 mg/dL    BUN 10 6 - 20 mg/dL    CREATININE 0.90 0.50 - 0.90 mg/dL    Bun/Cre Ratio 11 9 - 20    Calcium 8.9 8.6 - 10.4 mg/dL    Sodium 139 135 - 144 mmol/L    Potassium 4.2 3.7 - 5.3 mmol/L    Chloride 102 98 - 107 mmol/L    CO2 28 20 - 31 mmol/L    Anion Gap 9 9 - 17 mmol/L    GFR Non-African American >60 >60 mL/min    GFR African American >60 >60 mL/min    GFR Comment          GFR Staging NOT REPORTED        Recent Labs     12/01/20  1333   HGB 11.5*   HCT 37.8   WBC 5.5   MCV 92.0      K 4.2      CO2 28   BUN 10   CREATININE 0.90   GLUCOSE 106*       No results for input(s): COVID19 in the last 720 hours. Imaging/Diagnostics:    No results found. Diagnosis:      1. GALL STONES     Plans:     [facility]  12/1/2020  2:22 PM

## 2020-12-01 NOTE — PRE-PROCEDURE INSTRUCTIONS
ARRIVE AT [facility] [address] ON Tuesday, 12/15/2020 at 11:00 AM    COVID test scheduled for 12/11/2020 @ 1:40pm    On the Day of Surgery: Drive up to the main entrance and remain in your vehicle. Call [phone] from your vehicle for additional instructions. No family members or other visitors will be able to enter the building with you. Continue to take your home medications as you normally do up to and including the night before surgery with the exception of any blood thinning medications. Please stop any blood thinning medications as directed by your surgeon or prescribing physician. Failure to stop certain medications may interfere with your scheduled surgery. These may include:  Aspirin, Warfarin (Coumadin), Clopidogrel (Plavix), Ibuprofen (Motrin, Advil), Naproxen (Aleve), Meloxicam (Mobic), Celecoxib (Celebrex), Eliquis, Pradaxa, Xarelto, Effient, Fish Oil, Herbal supplements. none       Please take the following medication(s) the day of surgery with a small sip of water:  none      PREPARING FOR YOUR SURGERY:     Before surgery, you can play an important role in your own health. Because skin is not sterile, we need to be sure that your skin is as free of germs as possible before surgery by carefully washing before surgery. Preparing or prepping skin before surgery can reduce the risk of a surgical site infection.   Do not shave the area of your body where your surgery will be performed unless you received specific permission from your physician. You will need to shower at home the night before surgery and the morning of surgery with a special soap called chlorhexidine gluconate (CHG*). *Not to be used by people allergic to Chlorhexidine Gluconate (CHG). Following these instructions will help you be sure that your skin is clean before surgery. Instructions on cleaning your skin before surgery:      The night before your surgery:      You will need to shower with warm water (not hot) and the CHG soap.  Use a clean wash cloth and a clean towel. Have clean clothes available to put on after the shower.   First wash your hair with regular shampoo. Rinse your hair and body thoroughly to remove the shampoo.  Wash your face and genital area (private parts) with your regular soap or water only. Thoroughly rinse your body with warm water from the neck down.  Turn water off to prevent rinsing the soap off too soon.  With a clean wet washcloth and half of the CHG soap in the bottle, lather your entire body from the neck down. Do not use CHG soap near your eyes or ears to avoid injury to those areas.  Wash thoroughly, paying special attention to the area where your surgery will be performed.  Wash your body gently for five (5) minutes. Avoid scrubbing your skin too hard.  Turn the water back on and rinse your body thoroughly.  Pat yourself dry with a clean, soft towel. Do not apply lotion, cream or powder.  Dress with clean freshly washed clothes. The morning of surgery:     Repeat shower following steps above - using remaining half of CHG soap in bottle. Patient Instructions:    [facility] If you are having any type of anesthesia you are to have nothing to eat or drink after midnight the night before your surgery. This includes gum, hard candy, mints, water or smoking or chewing tobacco.  The only exception to this is a small sip of water to take with any morning dose of heart, blood pressure, or seizure medications. No alcoholic beverages for 24 hours prior to surgery.  Brush your teeth but do not swallow water.  Bring your eye glasses and case with you. No contacts are to be worn the day of surgery. You also may bring your hearing aids. Most surgical procedures involving anesthesia will require that you remove your dentures prior to surgery.      If you are on C-PAP or Bi-PAP at home and plan on staying in the hospital overnight for your surgery please bring the machine with you. · Do not wear any jewelry or body piercings day of surgery. Also, NO lotion, perfume or deodorant to be used the day of surgery. No nail polish on the operative extremity (arm/leg surgeries)    · If you are staying overnight with us, please bring a small bag of necessary personal items.  Please wear loose, comfortable clothing. If you are potentially going to have a cast or brace bring clothing that will fit over them.  In case of illness - If you have cold or flu like symptoms (high fever, runny nose, sore throat, cough, etc.) rash, nausea, vomiting, loose stools, and/or recent contact with someone who has a contagious disease (chicken pox, measles, etc.) Please call your doctor before coming to the hospital.         Day of Surgery/Procedure:    As a patient at [facility] you can expect quality medical and nursing care that is centered on your individual needs. Our goal is to make your surgical experience as comfortable as possible    . Transportation After Your Surgery/Procedure: You will need a friend or family member to drive you home after your procedure. Your  must be 25years of age or older. Discharge instructions will be reviewed with family/friend by phone. A taxi cab or any other form of public transportation is not acceptable. Someone must remain with you for the first 24 hours after your surgery if you receive anesthesia or medication. If you do not have someone to stay with you, your procedure may be cancelled.       If you have any other questions regarding your procedure or the day of surgery, please call [phone]      _________________________  ____________________________

## 2020-12-01 NOTE — H&P (VIEW-ONLY)
History and Physical Service   [facility] - INPATIENT    HISTORY AND PHYSICAL EXAMINATION            Date of Evaluation: 12/1/2020  Patient name:  Jennifer Fuentes  MRN:   [id number]  YOB: 1970  PCP:    Melody Sigala MD    History Obtained From:     Patient, medical records    History of Present Illness: This is Jennifer Fuentes a 52 y.o. female who presents today for a PRE-TETING APPOINTMENT PRIOR TO A LAPAROSCOPIC ROBOTIC CHOLECYSTECTOMY  by Dr. Marli Redd 12/15/2020 @1300. For TREATMENT OF GALL STONES. THE PATIENT HAS HAD PROBLEMS WITH HER GALL BLADDER FOR \"YEARS \" THE COLIC ATTACKS HAVE BECOME MORE FREQUENT AND MORE SEVERE. SHE HAS OCCASIONAL NAUSEA ,NO VOMITING. HAS OCCASIONAL DIARRHEA , SHE HAS CHILLS . ON OCCASION. SHE PRESENETS FOR SURGICAL CORRECTION. Past Medical History:     Past Medical History:   Diagnosis Date    Allergic rhinitis     Anxiety     Anxiety and depression     Arthritis     Asthma     no longer using inhaler or nebulizer    Diverticulitis     Headache(784.0)     Obesity     Overactive bladder     Sleep apnea     uses machine nightly        Past Surgical History:     Past Surgical History:   Procedure Laterality Date    COLONOSCOPY N/A 8/5/2020    COLONOSCOPY POLYPECTOMY SNARE/COLD BIOPSY performed by Laure Bruno MD at [address] [place] [address] Bilateral     cataract removed with lens implants    KNEE SURGERY Right 05/16/2017    [place] KNEE SCOPE,DIAGNOSTIC Right 5/16/2017    RIGHT KNEE ARTHROSCOPY WITH MEDIAL MENISECTOMY AND CHONDROPLASTY performed by Fabi Marie DO at [address] [place] [address]          Medications Prior to Admission:     Prior to Admission medications    Medication Sig Start Date End Date Taking?  Authorizing Provider   dicyclomine (BENTYL) 20 MG tablet Take 1 tablet by mouth every 8 hours as needed 11/20/20  Yes Historical Provider, MD acetaminophen (TYLENOL) 500 MG tablet Take 1,000 mg by mouth every 6 hours as needed for Pain   Yes Historical Provider, MD   buPROPion HBr (APLENZIN) 174 MG TB24 Take 1 tablet by mouth daily    Yes Historical Provider, MD   escitalopram (LEXAPRO) 20 MG tablet Take 20 mg by mouth daily   Yes Historical Provider, MD   Compression Stockings MISC 30- 36 mm/hg 1/22/18   Renita Villarreal DPM        Allergies:     No known allergies    Social History:     Tobacco:    reports that she quit smoking about 3 years ago. Her smoking use included cigarettes. She smoked 0.60 packs per day. She has never used smokeless tobacco.  Alcohol:      reports current alcohol use. Drug Use:  reports no history of drug use. Family History:     Family History   Problem Relation Age of Onset    Arthritis Father        Review of Systems:     Positive and Negative as described in HPI. CONSTITUTIONAL:  negative for fevers, HAS OCCASIONALchills, sweats, fatigue, weight loss  HEENT:  WEARS GLASSES  for vision, hearing changes, runny nose, throat pain  RESPIRATORY:  negative for shortness of breath, cough, congestion, wheezing. HAS OBSTRUCTIVE SLEEP APNEA USES A C-PAP MACHINE  CARDIOVASCULAR:  negative for chest pain, palpitations.   GASTROINTESTINAL:  negative for nausea, vomiting, diarrhea, constipation, change in bowel habits, abdominal pain   GENITOURINARY:  negative for difficulty of urination, burning with urination, frequency   INTEGUMENT:  negative for rash, skin lesions, easy bruising HAS PSORIASIS   HEMATOLOGIC/LYMPHATIC:  negative for swelling/edema   ALLERGIC/IMMUNOLOGIC:  negative for urticaria , itching  ENDOCRINE:  negative increase in drinking, increase in urination, hot or cold intolerance  MUSCULOSKELETAL:  HAS BACK AND KNEE  joint pains, muscle aches, swelling of joints IMPAIRED MOBILITY ,USES A CANE AND A WHEEL CHAIR FOR ASSISTANCE NEUROLOGICAL:  negative for headaches, dizziness, lightheadedness, numbness, pain, tingling extremities  BEHAVIOR/PSYCH: IS BEING TREATED FOR  depression, anxiety    Physical Exam:   BP (!) 144/70   Pulse 88   Temp 96.5 °F (35.8 °C) (Temporal)   Resp 20   Ht 5' 5\" (1.651 m)   Wt (!) 402 lb 11.2 oz (182.7 kg)   LMP 11/02/2020   SpO2 96%   BMI 67.01 kg/m²   Patient's last menstrual period was 11/02/2020. No obstetric history on file. No results for input(s): POCGLU in the last 72 hours. General Appearance:  alert, well appearing, and in no acute distress MORBIDLY OBESE BMI = 67.1  Mental status: oriented to person, place, and time with normal affect  Head:  normocephalic, atraumatic. Eye: no icterus, redness, pupils equal and reactive, extraocular eye movements intact, conjunctiva clear  Ear: normal external ear, no discharge, hearing intact  Nose:  no drainage noted  Mouth: mucous membranes moist  Neck: supple, no carotid bruits, thyroid not palpable  Lungs: Bilateral DIMINISHED BUT  equal air entry, clear to ausculation, no wheezing, rales or rhonchi, normal effort  Cardiovascular: HR  normal rate, regular rhythm, no murmur, gallop, rub.   Abdomen: Soft, nontender, nondistended, normal bowel sounds  Neurologic: There are no new focal motor or sensory deficits, normal muscle tone and bulk, no abnormal sensation, normal speech, cranial nerves II through XII grossly intact  Skin: No gross lesions, rashes, bruising or bleeding on exposed skin area  Extremities:  peripheral pulses palpable, no pedal edema or calf pain with palpation  Psych: normal affect     Investigations:      Laboratory Testing:  Recent Results (from the past 24 hour(s))   CBC    Collection Time: 12/01/20  1:33 PM   Result Value Ref Range    WBC 5.5 3.5 - 11.3 k/uL    RBC 4.11 3.95 - 5.11 m/uL    Hemoglobin 11.5 (L) 11.9 - 15.1 g/dL    Hematocrit 37.8 36.3 - 47.1 %    MCV 92.0 82.6 - 102.9 fL    MCH 28.0 25.2 - 33.5 pg MCHC 30.4 28.4 - 34.8 g/dL    RDW 13.1 11.8 - 14.4 %    Platelets 493 865 - 547 k/uL    MPV 9.3 8.1 - 13.5 fL    NRBC Automated 0.0 0.0 per 100 WBC   Basic Metabolic Panel    Collection Time: 12/01/20  1:33 PM   Result Value Ref Range    Glucose 106 (H) 70 - 99 mg/dL    BUN 10 6 - 20 mg/dL    CREATININE 0.90 0.50 - 0.90 mg/dL    Bun/Cre Ratio 11 9 - 20    Calcium 8.9 8.6 - 10.4 mg/dL    Sodium 139 135 - 144 mmol/L    Potassium 4.2 3.7 - 5.3 mmol/L    Chloride 102 98 - 107 mmol/L    CO2 28 20 - 31 mmol/L    Anion Gap 9 9 - 17 mmol/L    GFR Non-African American >60 >60 mL/min    GFR African American >60 >60 mL/min    GFR Comment          GFR Staging NOT REPORTED        Recent Labs     12/01/20  1333   HGB 11.5*   HCT 37.8   WBC 5.5   MCV 92.0      K 4.2      CO2 28   BUN 10   CREATININE 0.90   GLUCOSE 106*       No results for input(s): COVID19 in the last 720 hours. Imaging/Diagnostics:    No results found. Diagnosis:      1. GALL STONES     Plans:     1. [facility]  12/1/2020  2:22 PM

## 2020-12-02 LAB
EKG ATRIAL RATE: 72 BPM
EKG P AXIS: 45 DEGREES
EKG P-R INTERVAL: 192 MS
EKG Q-T INTERVAL: 376 MS
EKG QRS DURATION: 96 MS
EKG QTC CALCULATION (BAZETT): 411 MS
EKG R AXIS: 39 DEGREES
EKG T AXIS: 29 DEGREES
EKG VENTRICULAR RATE: 72 BPM

## 2020-12-02 PROCEDURE — 93010 ELECTROCARDIOGRAM REPORT: CPT | Performed by: INTERNAL MEDICINE

## 2020-12-08 ENCOUNTER — OFFICE VISIT (OUTPATIENT)
Dept: BARIATRICS/WEIGHT MGMT | Age: 50
End: 2020-12-08
Payer: MEDICARE

## 2020-12-08 PROCEDURE — G8427 DOCREV CUR MEDS BY ELIG CLIN: HCPCS | Performed by: NURSE PRACTITIONER

## 2020-12-08 PROCEDURE — 1036F TOBACCO NON-USER: CPT | Performed by: NURSE PRACTITIONER

## 2020-12-08 PROCEDURE — 99213 OFFICE O/P EST LOW 20 MIN: CPT | Performed by: NURSE PRACTITIONER

## 2020-12-08 PROCEDURE — G8484 FLU IMMUNIZE NO ADMIN: HCPCS | Performed by: NURSE PRACTITIONER

## 2020-12-08 PROCEDURE — G8417 CALC BMI ABV UP PARAM F/U: HCPCS | Performed by: NURSE PRACTITIONER

## 2020-12-10 VITALS
SYSTOLIC BLOOD PRESSURE: 122 MMHG | DIASTOLIC BLOOD PRESSURE: 72 MMHG | WEIGHT: 293 LBS | BODY MASS INDEX: 48.82 KG/M2 | HEART RATE: 68 BPM | HEIGHT: 65 IN

## 2020-12-10 NOTE — PROGRESS NOTES
Group Lifestyle Balance Follow-up Progress Note      Subjective     Patient is here for group medical appointment for Group Lifestyle Balance weight management program follow-up for the chronic conditions of DENISHA, HTN, Anxiety/Depression, Chronic Back Pain, Psoriasis, Asthma, Arthritis, Chronic Bilateral Knee Pain, OAB. Patient continues on the program and tolerating well. Total weight gain of 6 lbs. No current issues. Allergies: Allergies   Allergen Reactions    No Known Allergies        Past Medical History:     Past Medical History:   Diagnosis Date    Allergic rhinitis     Anxiety     Anxiety and depression     Arthritis     Asthma     no longer using inhaler or nebulizer    Diverticulitis     Headache(784.0)     Obesity     Overactive bladder     Sleep apnea     uses machine nightly   .     Past Surgical History:  Past Surgical History:   Procedure Laterality Date    COLONOSCOPY N/A 8/5/2020    COLONOSCOPY POLYPECTOMY SNARE/COLD BIOPSY performed by Lisa Leal MD at [facility] Bilateral     cataract removed with lens implants    KNEE SURGERY Right 05/16/2017    GA KNEE SCOPE,DIAGNOSTIC Right 5/16/2017    RIGHT KNEE ARTHROSCOPY WITH MEDIAL MENISECTOMY AND CHONDROPLASTY performed by Tfif Parra DO at [address] [facility]         Family History:  Family History   Problem Relation Age of Onset    Arthritis Father        Social History:  Social History     Socioeconomic History    Marital status:      Spouse name: Not on file    Number of children: Not on file    Years of education: Not on file    Highest education level: Not on file   Occupational History    Not on file   Social Needs    Financial resource strain: Not on file    Food insecurity     Worry: Not on file     Inability: Not on file    Transportation needs     Medical: Not on file     Non-medical: Not on file   Tobacco Use    Smoking status: Former Smoker     Packs/day: 0.60     Types: Cigarettes     Last attempt to quit: 2017     Years since quitting: 3.9    Smokeless tobacco: Never Used   Substance and Sexual Activity    Alcohol use: Yes     Comment: rarely    Drug use: No    Sexual activity: Not on file   Lifestyle    Physical activity     Days per week: Not on file     Minutes per session: Not on file    Stress: Not on file   Relationships    Social connections     Talks on phone: Not on file     Gets together: Not on file     Attends [employer]ism service: Not on file     Active member of club or organization: Not on file     Attends meetings of clubs or organizations: Not on file     Relationship status: Not on file    Intimate partner violence     Fear of current or ex partner: Not on file     Emotionally abused: Not on file     Physically abused: Not on file     Forced sexual activity: Not on file   Other Topics Concern    Not on file   Social History Narrative    Not on file       Current Medications:  Current Outpatient Medications   Medication Sig Dispense Refill    dicyclomine (BENTYL) 20 MG tablet Take 1 tablet by mouth every 8 hours as needed      acetaminophen (TYLENOL) 500 MG tablet Take 1,000 mg by mouth every 6 hours as needed for Pain      buPROPion HBr (APLENZIN) 174 MG TB24 Take 1 tablet by mouth daily       escitalopram (LEXAPRO) 20 MG tablet Take 20 mg by mouth daily      Compression Stockings MISC 30- 40 mm/hg 2 each 1     Current Facility-Administered Medications   Medication Dose Route Frequency Provider Last Rate Last Dose    albuterol (PROVENTIL) nebulizer solution 2.5 mg  2.5 mg Nebulization 4x daily Robbin Arango MD           Vital Signs:  /72 (Site: Right Upper Arm, Position: Sitting, Cuff Size: Large Adult)   Pulse 68   Ht 5' 5\" (1.651 m)   Wt (!) 399 lb (181 kg)   BMI 66.40 kg/m²     BMI/Height/Weight:  Body mass index is 66.4 kg/m².       Review of Systems  Constitutional: Weight gain      Objective:      Physical Exam   Vital signs reviewed. General Appearance: Well-developed and well-nourished. No acute distress. Skin: Warm, dry. Head: Normocephalic. Pulmonary/Chest: Normal respiratory effort. Musculoskeletal: Movement x4. Neurological:  Alert and oriented. Individual goal for this encounter:  I need both knees replaced and I need to get to at least 48 BMI for approval for surgery. After surgery I hopefully will feel better when I walk and then exercise will bd bearable. I have terrible eating habits and need help and support. X? Vital signs reviewed and discussed with patient. ? Labs/EKG reviewed and discussed with patient. ? Blood sugar log reviewed and discussed with patient. ? Physical activity discussed. ? Medication changes recommended. ? Smoking cessation discussed. X ? Specific questions/concerns addressed. Specific group medical question(s) addressed in this encounter:  Discussion about fluid retention. Group discussion topic for this encounter:     ? [place]   ? Be a Fat & Calorie    ? Healthy Eating   ? Move Those Muscles   ? Tip the Calorie Balance   ? Take charge of What's Around You   ? Problem Solving   ? Step Up Your Physical Activity Plan   ? Manage Slips and Self-Defeating Thoughts   ? [address]   ? Make Social Cues Work for [place]   ? Ways to Stay Motivated   ? Preparing for Long Term Self-Management   ? Take Charge of Your Lifestyle   ? Mindful Eating, Mindful Movement   ? Manage Your Stress   ? Sit Less for Health   ? More Volume, Fewer Calories   ? Stay Active   ? Balance Your Thoughts  X ? Heart Health    ? Looking Back and Looking Forward    Total time:  90 minutes, greater than 50% of visit spent in group counseling/education. Assessment:       Diagnosis Orders   1. DENISHA (obstructive sleep apnea)     2. Essential hypertension     3. Morbid obesity with BMI of 60.0-69.9, adult ([facility] [address]     4. Anxiety and depression     5.  Osteoarthritis of multiple joints, unspecified osteoarthritis type     6. Uncomplicated asthma, unspecified asthma severity, unspecified whether persistent     7. Bilateral chronic knee pain     8. Chronic back pain, unspecified back location, unspecified back pain laterality     9. Psoriasis         Plan:      Track food and weight. Return to clinic as per group medical appointment schedule. Follow-up:  Return in about 1 month (around 1/8/2021). Orders:  No orders of the defined types were placed in this encounter. Prescriptions:  No orders of the defined types were placed in this encounter.       Electronically signed by:  Mayelin Lopez CNP

## 2020-12-11 ENCOUNTER — HOSPITAL ENCOUNTER (OUTPATIENT)
Dept: LAB | Age: 50
Setting detail: SPECIMEN
Discharge: HOME | End: 2020-12-11
Payer: MEDICARE

## 2020-12-11 PROCEDURE — U0003 INFECTIOUS AGENT DETECTION BY NUCLEIC ACID (DNA OR RNA); SEVERE ACUTE RESPIRATORY SYNDROME CORONAVIRUS 2 (SARS-COV-2) (CORONAVIRUS DISEASE [COVID-19]), AMPLIFIED PROBE TECHNIQUE, MAKING USE OF HIGH THROUGHPUT TECHNOLOGIES AS DESCRIBED BY CMS-2020-01-R: HCPCS

## 2020-12-12 LAB
SARS-COV-2, RAPID: NORMAL
SARS-COV-2: NORMAL
SARS-COV-2: NOT DETECTED
SOURCE: NORMAL

## 2020-12-15 ENCOUNTER — HOSPITAL ENCOUNTER (OUTPATIENT)
Age: 50
Setting detail: OUTPATIENT SURGERY
Discharge: HOME | End: 2020-12-15
Attending: SURGERY | Admitting: SURGERY
Payer: MEDICARE

## 2020-12-15 ENCOUNTER — ANESTHESIA EVENT (OUTPATIENT)
Dept: OPERATING ROOM | Age: 50
End: 2020-12-15
Payer: MEDICARE

## 2020-12-15 ENCOUNTER — ANESTHESIA (OUTPATIENT)
Dept: OPERATING ROOM | Age: 50
End: 2020-12-15
Payer: MEDICARE

## 2020-12-15 VITALS — SYSTOLIC BLOOD PRESSURE: 190 MMHG | DIASTOLIC BLOOD PRESSURE: 102 MMHG | OXYGEN SATURATION: 93 % | TEMPERATURE: 96.8 F

## 2020-12-15 VITALS
OXYGEN SATURATION: 94 % | HEART RATE: 68 BPM | DIASTOLIC BLOOD PRESSURE: 93 MMHG | RESPIRATION RATE: 18 BRPM | TEMPERATURE: 97.3 F | SYSTOLIC BLOOD PRESSURE: 175 MMHG

## 2020-12-15 LAB — HCG, PREGNANCY URINE (POC): NEGATIVE

## 2020-12-15 PROCEDURE — 7100000010 HC PHASE II RECOVERY - FIRST 15 MIN: Performed by: SURGERY

## 2020-12-15 PROCEDURE — 6360000002 HC RX W HCPCS: Performed by: SURGERY

## 2020-12-15 PROCEDURE — 2500000003 HC RX 250 WO HCPCS: Performed by: SURGERY

## 2020-12-15 PROCEDURE — 6360000002 HC RX W HCPCS: Performed by: ANESTHESIOLOGY

## 2020-12-15 PROCEDURE — 7100000011 HC PHASE II RECOVERY - ADDTL 15 MIN: Performed by: SURGERY

## 2020-12-15 PROCEDURE — 6370000000 HC RX 637 (ALT 250 FOR IP): Performed by: ANESTHESIOLOGY

## 2020-12-15 PROCEDURE — 2580000003 HC RX 258: Performed by: ANESTHESIOLOGY

## 2020-12-15 PROCEDURE — 7100000000 HC PACU RECOVERY - FIRST 15 MIN: Performed by: SURGERY

## 2020-12-15 PROCEDURE — 88304 TISSUE EXAM BY PATHOLOGIST: CPT

## 2020-12-15 PROCEDURE — 6360000002 HC RX W HCPCS: Performed by: NURSE ANESTHETIST, CERTIFIED REGISTERED

## 2020-12-15 PROCEDURE — 2500000003 HC RX 250 WO HCPCS: Performed by: NURSE ANESTHETIST, CERTIFIED REGISTERED

## 2020-12-15 PROCEDURE — 2780000010 HC IMPLANT OTHER: Performed by: SURGERY

## 2020-12-15 PROCEDURE — 3600000019 HC SURGERY ROBOT ADDTL 15MIN: Performed by: SURGERY

## 2020-12-15 PROCEDURE — 2580000003 HC RX 258: Performed by: SURGERY

## 2020-12-15 PROCEDURE — 2709999900 HC NON-CHARGEABLE SUPPLY: Performed by: SURGERY

## 2020-12-15 PROCEDURE — 3700000001 HC ADD 15 MINUTES (ANESTHESIA): Performed by: SURGERY

## 2020-12-15 PROCEDURE — 3600000009 HC SURGERY ROBOT BASE: Performed by: SURGERY

## 2020-12-15 PROCEDURE — 3700000000 HC ANESTHESIA ATTENDED CARE: Performed by: SURGERY

## 2020-12-15 PROCEDURE — 81025 URINE PREGNANCY TEST: CPT

## 2020-12-15 PROCEDURE — S2900 ROBOTIC SURGICAL SYSTEM: HCPCS | Performed by: SURGERY

## 2020-12-15 PROCEDURE — 7100000001 HC PACU RECOVERY - ADDTL 15 MIN: Performed by: SURGERY

## 2020-12-15 RX ORDER — HYDROMORPHONE HCL 110MG/55ML
0.25 PATIENT CONTROLLED ANALGESIA SYRINGE INTRAVENOUS EVERY 5 MIN PRN
Status: DISCONTINUED | OUTPATIENT
Start: 2020-12-15 | End: 2020-12-15 | Stop reason: HOSPADM

## 2020-12-15 RX ORDER — LIDOCAINE HYDROCHLORIDE 20 MG/ML
INJECTION, SOLUTION EPIDURAL; INFILTRATION; INTRACAUDAL; PERINEURAL PRN
Status: DISCONTINUED | OUTPATIENT
Start: 2020-12-15 | End: 2020-12-15 | Stop reason: SDUPTHER

## 2020-12-15 RX ORDER — NEOSTIGMINE METHYLSULFATE 5 MG/5 ML
SYRINGE (ML) INTRAVENOUS PRN
Status: DISCONTINUED | OUTPATIENT
Start: 2020-12-15 | End: 2020-12-15 | Stop reason: SDUPTHER

## 2020-12-15 RX ORDER — INDOCYANINE GREEN AND WATER 25 MG
7.5 KIT INJECTION ONCE
Status: COMPLETED | OUTPATIENT
Start: 2020-12-15 | End: 2020-12-15

## 2020-12-15 RX ORDER — ROCURONIUM BROMIDE 10 MG/ML
INJECTION, SOLUTION INTRAVENOUS PRN
Status: DISCONTINUED | OUTPATIENT
Start: 2020-12-15 | End: 2020-12-15 | Stop reason: SDUPTHER

## 2020-12-15 RX ORDER — SENNA AND DOCUSATE SODIUM 50; 8.6 MG/1; MG/1
2 TABLET, FILM COATED ORAL DAILY
Qty: 14 TABLET | Refills: 0 | Status: SHIPPED | OUTPATIENT
Start: 2020-12-15 | End: 2020-12-22

## 2020-12-15 RX ORDER — SUCCINYLCHOLINE/SOD CL,ISO/PF 100 MG/5ML
SYRINGE (ML) INTRAVENOUS PRN
Status: DISCONTINUED | OUTPATIENT
Start: 2020-12-15 | End: 2020-12-15 | Stop reason: SDUPTHER

## 2020-12-15 RX ORDER — ONDANSETRON 2 MG/ML
INJECTION INTRAMUSCULAR; INTRAVENOUS PRN
Status: DISCONTINUED | OUTPATIENT
Start: 2020-12-15 | End: 2020-12-15 | Stop reason: SDUPTHER

## 2020-12-15 RX ORDER — ROCURONIUM BROMIDE 10 MG/ML
INJECTION, SOLUTION INTRAVENOUS PRN
Status: DISCONTINUED | OUTPATIENT
Start: 2020-12-15 | End: 2020-12-15

## 2020-12-15 RX ORDER — SODIUM CHLORIDE, SODIUM LACTATE, POTASSIUM CHLORIDE, CALCIUM CHLORIDE 600; 310; 30; 20 MG/100ML; MG/100ML; MG/100ML; MG/100ML
INJECTION, SOLUTION INTRAVENOUS CONTINUOUS
Status: DISCONTINUED | OUTPATIENT
Start: 2020-12-15 | End: 2020-12-15 | Stop reason: HOSPADM

## 2020-12-15 RX ORDER — SODIUM CHLORIDE 0.9 % (FLUSH) 0.9 %
10 SYRINGE (ML) INJECTION PRN
Status: DISCONTINUED | OUTPATIENT
Start: 2020-12-15 | End: 2020-12-15 | Stop reason: HOSPADM

## 2020-12-15 RX ORDER — FENTANYL CITRATE 50 UG/ML
INJECTION, SOLUTION INTRAMUSCULAR; INTRAVENOUS PRN
Status: DISCONTINUED | OUTPATIENT
Start: 2020-12-15 | End: 2020-12-15 | Stop reason: SDUPTHER

## 2020-12-15 RX ORDER — GLYCOPYRROLATE 1 MG/5 ML
SYRINGE (ML) INTRAVENOUS PRN
Status: DISCONTINUED | OUTPATIENT
Start: 2020-12-15 | End: 2020-12-15 | Stop reason: SDUPTHER

## 2020-12-15 RX ORDER — DEXAMETHASONE SODIUM PHOSPHATE 10 MG/ML
INJECTION, SOLUTION INTRAMUSCULAR; INTRAVENOUS PRN
Status: DISCONTINUED | OUTPATIENT
Start: 2020-12-15 | End: 2020-12-15 | Stop reason: SDUPTHER

## 2020-12-15 RX ORDER — SODIUM CHLORIDE 0.9 % (FLUSH) 0.9 %
10 SYRINGE (ML) INJECTION EVERY 12 HOURS SCHEDULED
Status: DISCONTINUED | OUTPATIENT
Start: 2020-12-15 | End: 2020-12-15 | Stop reason: HOSPADM

## 2020-12-15 RX ORDER — FENTANYL CITRATE 50 UG/ML
25 INJECTION, SOLUTION INTRAMUSCULAR; INTRAVENOUS EVERY 5 MIN PRN
Status: DISCONTINUED | OUTPATIENT
Start: 2020-12-15 | End: 2020-12-15 | Stop reason: HOSPADM

## 2020-12-15 RX ORDER — FENTANYL CITRATE 50 UG/ML
50 INJECTION, SOLUTION INTRAMUSCULAR; INTRAVENOUS EVERY 5 MIN PRN
Status: COMPLETED | OUTPATIENT
Start: 2020-12-15 | End: 2020-12-15

## 2020-12-15 RX ORDER — PROPOFOL 10 MG/ML
INJECTION, EMULSION INTRAVENOUS PRN
Status: DISCONTINUED | OUTPATIENT
Start: 2020-12-15 | End: 2020-12-15 | Stop reason: SDUPTHER

## 2020-12-15 RX ORDER — OXYCODONE HYDROCHLORIDE AND ACETAMINOPHEN 5; 325 MG/1; MG/1
1 TABLET ORAL EVERY 6 HOURS PRN
Qty: 20 TABLET | Refills: 0 | Status: SHIPPED | OUTPATIENT
Start: 2020-12-15 | End: 2020-12-20

## 2020-12-15 RX ORDER — SODIUM CHLORIDE 9 MG/ML
INJECTION, SOLUTION INTRAVENOUS CONTINUOUS
Status: DISCONTINUED | OUTPATIENT
Start: 2020-12-15 | End: 2020-12-15

## 2020-12-15 RX ORDER — ONDANSETRON 4 MG/1
4 TABLET, FILM COATED ORAL 3 TIMES DAILY PRN
Qty: 15 TABLET | Refills: 0 | Status: SHIPPED | OUTPATIENT
Start: 2020-12-15 | End: 2021-12-20

## 2020-12-15 RX ORDER — OXYCODONE HYDROCHLORIDE AND ACETAMINOPHEN 5; 325 MG/1; MG/1
1 TABLET ORAL ONCE
Status: COMPLETED | OUTPATIENT
Start: 2020-12-15 | End: 2020-12-15

## 2020-12-15 RX ORDER — ONDANSETRON 2 MG/ML
4 INJECTION INTRAMUSCULAR; INTRAVENOUS
Status: DISCONTINUED | OUTPATIENT
Start: 2020-12-15 | End: 2020-12-15 | Stop reason: HOSPADM

## 2020-12-15 RX ORDER — LIDOCAINE HYDROCHLORIDE 10 MG/ML
1 INJECTION, SOLUTION EPIDURAL; INFILTRATION; INTRACAUDAL; PERINEURAL
Status: DISCONTINUED | OUTPATIENT
Start: 2020-12-15 | End: 2020-12-15 | Stop reason: HOSPADM

## 2020-12-15 RX ORDER — HYDROMORPHONE HCL 110MG/55ML
0.5 PATIENT CONTROLLED ANALGESIA SYRINGE INTRAVENOUS EVERY 5 MIN PRN
Status: DISCONTINUED | OUTPATIENT
Start: 2020-12-15 | End: 2020-12-15 | Stop reason: HOSPADM

## 2020-12-15 RX ADMIN — FENTANYL CITRATE 50 MCG: 50 INJECTION, SOLUTION INTRAMUSCULAR; INTRAVENOUS at 14:41

## 2020-12-15 RX ADMIN — PROPOFOL 200 MG: 10 INJECTION, EMULSION INTRAVENOUS at 12:56

## 2020-12-15 RX ADMIN — DEXAMETHASONE SODIUM PHOSPHATE 10 MG: 10 INJECTION INTRAMUSCULAR; INTRAVENOUS at 13:05

## 2020-12-15 RX ADMIN — Medication 100 MCG: at 12:56

## 2020-12-15 RX ADMIN — Medication 3 MG: at 13:45

## 2020-12-15 RX ADMIN — FENTANYL CITRATE 50 MCG: 50 INJECTION, SOLUTION INTRAMUSCULAR; INTRAVENOUS at 14:20

## 2020-12-15 RX ADMIN — SODIUM CHLORIDE, POTASSIUM CHLORIDE, SODIUM LACTATE AND CALCIUM CHLORIDE: 600; 310; 30; 20 INJECTION, SOLUTION INTRAVENOUS at 12:16

## 2020-12-15 RX ADMIN — Medication 100 MG: at 12:56

## 2020-12-15 RX ADMIN — LIDOCAINE HYDROCHLORIDE 100 MG: 20 INJECTION, SOLUTION EPIDURAL; INFILTRATION; INTRACAUDAL; PERINEURAL at 12:56

## 2020-12-15 RX ADMIN — INDOCYANINE GREEN AND WATER 7.5 MG: KIT at 12:18

## 2020-12-15 RX ADMIN — ROCURONIUM BROMIDE 50 MG: 10 INJECTION, SOLUTION INTRAVENOUS at 13:05

## 2020-12-15 RX ADMIN — FENTANYL CITRATE 50 MCG: 50 INJECTION, SOLUTION INTRAMUSCULAR; INTRAVENOUS at 14:33

## 2020-12-15 RX ADMIN — ONDANSETRON 4 MG: 2 INJECTION, SOLUTION INTRAMUSCULAR; INTRAVENOUS at 13:40

## 2020-12-15 RX ADMIN — PROPOFOL 50 MG: 10 INJECTION, EMULSION INTRAVENOUS at 13:29

## 2020-12-15 RX ADMIN — FENTANYL CITRATE 50 MCG: 50 INJECTION, SOLUTION INTRAMUSCULAR; INTRAVENOUS at 14:25

## 2020-12-15 RX ADMIN — CEFAZOLIN SODIUM 3 G: 10 INJECTION, POWDER, FOR SOLUTION INTRAVENOUS at 13:09

## 2020-12-15 RX ADMIN — OXYCODONE HYDROCHLORIDE AND ACETAMINOPHEN 1 TABLET: 5; 325 TABLET ORAL at 15:15

## 2020-12-15 RX ADMIN — Medication 0.6 MG: at 13:45

## 2020-12-15 ASSESSMENT — PULMONARY FUNCTION TESTS
PIF_VALUE: 3
PIF_VALUE: 35
PIF_VALUE: 33
PIF_VALUE: 33
PIF_VALUE: 36
PIF_VALUE: 35
PIF_VALUE: 35
PIF_VALUE: 10
PIF_VALUE: 32
PIF_VALUE: 33
PIF_VALUE: 34
PIF_VALUE: 31
PIF_VALUE: 3
PIF_VALUE: 35
PIF_VALUE: 35
PIF_VALUE: 34
PIF_VALUE: 28
PIF_VALUE: 2
PIF_VALUE: 2
PIF_VALUE: 35
PIF_VALUE: 35
PIF_VALUE: 31
PIF_VALUE: 1
PIF_VALUE: 35
PIF_VALUE: 10
PIF_VALUE: 1
PIF_VALUE: 1
PIF_VALUE: 36
PIF_VALUE: 27
PIF_VALUE: 28
PIF_VALUE: 35
PIF_VALUE: 3
PIF_VALUE: 31
PIF_VALUE: 28
PIF_VALUE: 35
PIF_VALUE: 10
PIF_VALUE: 32
PIF_VALUE: 35
PIF_VALUE: 32
PIF_VALUE: 35
PIF_VALUE: 35
PIF_VALUE: 2
PIF_VALUE: 12
PIF_VALUE: 2
PIF_VALUE: 35
PIF_VALUE: 32
PIF_VALUE: 35
PIF_VALUE: 1
PIF_VALUE: 2
PIF_VALUE: 1
PIF_VALUE: 9
PIF_VALUE: 2
PIF_VALUE: 35
PIF_VALUE: 2
PIF_VALUE: 33
PIF_VALUE: 3
PIF_VALUE: 35
PIF_VALUE: 34
PIF_VALUE: 2
PIF_VALUE: 33
PIF_VALUE: 0
PIF_VALUE: 0
PIF_VALUE: 34

## 2020-12-15 ASSESSMENT — PAIN DESCRIPTION - ONSET
ONSET: ON-GOING
ONSET: ON-GOING

## 2020-12-15 ASSESSMENT — PAIN DESCRIPTION - FREQUENCY
FREQUENCY: CONTINUOUS
FREQUENCY: CONTINUOUS

## 2020-12-15 ASSESSMENT — PAIN SCALES - GENERAL
PAINLEVEL_OUTOF10: 7
PAINLEVEL_OUTOF10: 9
PAINLEVEL_OUTOF10: 7
PAINLEVEL_OUTOF10: 8

## 2020-12-15 ASSESSMENT — PAIN - FUNCTIONAL ASSESSMENT
PAIN_FUNCTIONAL_ASSESSMENT: PREVENTS OR INTERFERES SOME ACTIVE ACTIVITIES AND ADLS
PAIN_FUNCTIONAL_ASSESSMENT: PREVENTS OR INTERFERES SOME ACTIVE ACTIVITIES AND ADLS

## 2020-12-15 ASSESSMENT — PAIN DESCRIPTION - PAIN TYPE
TYPE: SURGICAL PAIN
TYPE: SURGICAL PAIN

## 2020-12-15 ASSESSMENT — PAIN DESCRIPTION - DESCRIPTORS
DESCRIPTORS: PATIENT UNABLE TO DESCRIBE
DESCRIPTORS: PATIENT UNABLE TO DESCRIBE

## 2020-12-15 ASSESSMENT — PAIN DESCRIPTION - PROGRESSION
CLINICAL_PROGRESSION: GRADUALLY IMPROVING
CLINICAL_PROGRESSION: NOT CHANGED

## 2020-12-15 ASSESSMENT — PAIN DESCRIPTION - ORIENTATION
ORIENTATION: MID
ORIENTATION: MID

## 2020-12-15 ASSESSMENT — PAIN DESCRIPTION - LOCATION
LOCATION: ABDOMEN
LOCATION: ABDOMEN

## 2020-12-15 NOTE — ANESTHESIA PRE PROCEDURE
Department of Anesthesiology  Preprocedure Note       Name:  Vivian Chaudhry   Age:  52 y.o.  :  1970                                          MRN:  [id number]         Date:  12/15/2020      Surgeon: Elicia Suarez):  Acacia Mejia MD    Procedure: Procedure(s):  CHOLECYSTECTOMY LAPAROSCOPIC ROBOTIC XI    Medications prior to admission:   Prior to Admission medications    Medication Sig Start Date End Date Taking? Authorizing Provider   dicyclomine (BENTYL) 20 MG tablet Take 1 tablet by mouth every 8 hours as needed 20  Yes Historical Provider, MD   buPROPion HBr (APLENZIN) 174 MG TB24 Take 1 tablet by mouth daily    Yes Historical Provider, MD   escitalopram (LEXAPRO) 20 MG tablet Take 20 mg by mouth daily   Yes Historical Provider, MD   acetaminophen (TYLENOL) 500 MG tablet Take 1,000 mg by mouth every 6 hours as needed for Pain    Historical Provider, MD   Compression Stockings MISC 30- 40 mm/hg 18   Jurgen Rose DPM       Current medications:    Current Facility-Administered Medications   Medication Dose Route Frequency Provider Last Rate Last Admin    lactated ringers infusion   Intravenous Continuous Germania Wadsworth  mL/hr at 12/15/20 1216 New Bag at 12/15/20 1216    sodium chloride flush 0.9 % injection 10 mL  10 mL Intravenous 2 times per day Germania Wadsworth MD        sodium chloride flush 0.9 % injection 10 mL  10 mL Intravenous PRN Germania Wadsworth MD        lidocaine PF 1 % injection 1 mL  1 mL Intradermal Once PRN Germania Wadsworth MD        ceFAZolin (ANCEF) 3 g in dextrose 5 % 100 mL IVPB  3 g Intravenous Once Acacia Mejia MD           Allergies:     Allergies   Allergen Reactions    No Known Allergies        Problem List:    Patient Active Problem List   Diagnosis Code    Headache R51.9    Overactive bladder N32.81    Osteoarthritis M19.90    Chronic rhinitis J31.0    OAB (overactive bladder) N32.81    Asthma J45.909    Primary osteoarthritis of right knee M17.11  Psoriasis L40.9    Seborrheic dermatitis L21.9    Essential hypertension I10    DENISHA (obstructive sleep apnea) G47.33    Anxiety and depression F41.9, F32.9    Chronic back pain M54.9, G89.29    Morbid obesity with BMI of 60.0-69.9, adult ([facility]) E66.01, Z68.44    Bilateral chronic knee pain M25.561, M25.562, G89.29    Diverticulitis of large intestine without perforation or abscess without bleeding K57.32       Past Medical History:        Diagnosis Date    Allergic rhinitis     Anxiety     Anxiety and depression     Arthritis     Asthma     no longer using inhaler or nebulizer    Diverticulitis     Headache(784.0)     Obesity     Overactive bladder     Sleep apnea     uses machine nightly       Past Surgical History:        Procedure Laterality Date    COLONOSCOPY N/A 8/5/2020    COLONOSCOPY POLYPECTOMY SNARE/COLD BIOPSY performed by Isacc Huynh MD at [address] Bilateral     cataract removed with lens implants    KNEE SURGERY Right 05/16/2017    [place] KNEE SCOPE,DIAGNOSTIC Right 5/16/2017    RIGHT KNEE ARTHROSCOPY WITH MEDIAL MENISECTOMY AND CHONDROPLASTY performed by Cm Hebert DO at [address] [facility]         Social History:    Social History     Tobacco Use    Smoking status: Former Smoker     Packs/day: 0.60     Types: Cigarettes     Quit date: 2017     Years since quitting: 3.9    Smokeless tobacco: Never Used   Substance Use Topics    Alcohol use: Yes     Comment: rarely                                Counseling given: Not Answered      Vital Signs (Current):   Vitals:    12/15/20 1200 12/15/20 1201   BP:  (!) 177/79   Pulse: 82 80   Resp: 22    Temp: 96.9 °F (36.1 °C)    TempSrc: Temporal    SpO2: 99% 99%                                              BP Readings from Last 3 Encounters:   12/15/20 (!) 177/79   12/10/20 122/72   12/01/20 (!) 144/70       NPO Status: Time of last liquid consumption: 2300 Time of last solid consumption: 2200                        Date of last liquid consumption: 12/14/20                        Date of last solid food consumption: 12/14/20    BMI:   Wt Readings from Last 3 Encounters:   12/10/20 (!) 399 lb (181 kg)   12/01/20 (!) 402 lb 11.2 oz (182.7 kg)   11/03/20 (!) 395 lb (179.2 kg)     There is no height or weight on file to calculate BMI.    CBC:   Lab Results   Component Value Date    WBC 5.5 12/01/2020    RBC 4.11 12/01/2020    HGB 11.5 12/01/2020    HCT 37.8 12/01/2020    MCV 92.0 12/01/2020    RDW 13.1 12/01/2020     12/01/2020       CMP:   Lab Results   Component Value Date     12/01/2020    K 4.2 12/01/2020     12/01/2020    CO2 28 12/01/2020    BUN 10 12/01/2020    CREATININE 0.90 12/01/2020    GFRAA >60 12/01/2020    LABGLOM >60 12/01/2020    GLUCOSE 106 12/01/2020    PROT 6.9 10/19/2020    CALCIUM 8.9 12/01/2020    BILITOT 0.56 10/19/2020    ALKPHOS 86 10/19/2020    AST 16 10/19/2020    ALT 14 10/19/2020       POC Tests: No results for input(s): POCGLU, POCNA, POCK, POCCL, POCBUN, POCHEMO, POCHCT in the last 72 hours.     Coags: No results found for: PROTIME, INR, APTT    HCG (If Applicable):   Lab Results   Component Value Date    PREGTESTUR  06/21/2020      Comment:      neg    HCG NEGATIVE 12/15/2020        ABGs: No results found for: PHART, PO2ART, YME3RWI, PUW5OXN, BEART, U8TPDNEW     Type & Screen (If Applicable):  No results found for: LABABO, LABRH    Drug/Infectious Status (If Applicable):  No results found for: HIV, HEPCAB    COVID-19 Screening (If Applicable):   Lab Results   Component Value Date    COVID19 Not Detected 12/11/2020    COVID19 Not Detected 08/01/2020         Anesthesia Evaluation   no history of anesthetic complications:   Airway: Mallampati: II  TM distance: >3 FB   Neck ROM: full  Mouth opening: > = 3 FB Dental:          Pulmonary:   (+) sleep apnea: on CPAP, Cardiovascular:    (+) hypertension:,     (-)  angina and PND                Neuro/Psych:               GI/Hepatic/Renal:   (+) morbid obesity          Endo/Other:                     Abdominal:           Vascular:                                        Anesthesia Plan      general     ASA 3       Induction: intravenous. Anesthetic plan and risks discussed with patient.                       Tadeo Rios MD   12/15/2020

## 2020-12-15 NOTE — OP NOTE
Operative Note      Patient: Good Spaulding  YOB: 1970  MRN: [id number]    Date of Procedure: 12/15/2020    Pre-Op Diagnosis: Symptomatic cholelithiasis    Post-Op Diagnosis: Same       Procedure(s):  CHOLECYSTECTOMY LAPAROSCOPIC ROBOTIC XI    Surgeon(s):  Sailaja Trinidad MD    Assistant:   Resident: Abhijeet Horton DO    Anesthesia: General    Estimated Blood Loss (mL): Minimal    Complications: None    Specimens:   ID Type Source Tests Collected by Time Destination   A : GALLBLADDER AND CONTENTS Tissue Gallbladder SURGICAL PATHOLOGY Sailaja Trinidad MD 12/15/2020 1337        Implants:  * No implants in log *      Drains: * No LDAs found *    Findings: Chronic cholecystitis with cholelithiasis    Indications for procedure: This is a very pleasant 63-year-old female with a history of recurrent episodes of biliary colic. Secondary to her morbid obesity, she was put off by several surgeons. She became increasingly symptomatic. The risks and benefits of robotic assisted laparoscopic cholecystectomy were discussed with the patient and verbal and written consent was obtained. This includes the risks of bleeding, infection, bile leak, bile duct injury, bowel injury, retained common bile duct stones, postcholecystectomy dumping syndrome, risks of anesthetic, incisional or port site hernias, need for conversion to an open procedure and patient verbalized understanding. Detailed Description of Procedure:   After verbal and written consent, the patient was brought to the operating room. After appropriate monitoring, general anesthesia was administered. A timeout was performed with the staff in the room confirming both the patient and the procedure to be performed. The procedure was begun with a sterile prep and drape. Local anesthesia was infiltrated into the skin and subcutaneous tissue of the infraumbilical abdominal wall. A vertical infraumbilical incision was made with a scalpel.   Dissection was carried down through the subcutaneous tissue with cautery. The midline fascia was identified and opened vertically. Transfascial Vicryl sutures were placed. The peritoneal cavity was entered bluntly. A balloontipped trocar was inserted into the peritoneal cavity and pneumoperitoneum was established. The peritoneal cavity was inspected. There was noted to be a markedly distended gallbladder with severe fatty infiltration of the liver in the right upper quadrant. 3 additional blunt trochars were placed into the peritoneal cavity under direct visualization. This includes an 8 Winters trocar in the left midabdomen, an 8 Winters trocar in the right lower abdomen, and a 5 mm assistant port in the right upper quadrant. An additional 8 mm trocar was telescoped through the balloontipped trocar. The patient was positioned in steep head up and rolled to the left. Through the assistant port, the fundus of the gallbladder was retracted cephalad. Peritoneal adhesions were taken down with a monopolar hook and fenestrated bipolar. The neck of the gallbladder was exposed. The lower third of the gallbladder was dissected free from the gallbladder fossa with cautery. The cystic duct and cystic artery identified and dissected circumferentially. The critical view was obtained. ICG was used to confirm the biliary anatomy. Clips were then placed proximally and distally on the cystic duct and cystic artery. These structures were then divided with the cut mode of cautery. The gallbladder was amputated from the gallbladder fossa with cautery. There was a hepatic vein branch in the gallbladder fossa which was also clipped. The gallbladder was placed with an Endo Catch bag and treated through the periumbilical trocar site. This trocar was reinserted. The right upper quadrant was irrigated locally and all visible irrigant was evacuated.   Once hemostasis was assured and all visible irrigant had been evacuated, the instruments and trochars were removed. Pneumoperitoneum was evacuated. The periumbilical fascial defect was closed with a figure-of-eight PDS suture. Skin was closed with running Monocryl suture. Dermabond was applied to the wounds. The sponge, lap, needle, and instrument count were correct at the end of the case. The patient was awakened from anesthesia and transported to the recovery in stable condition. There were no immediate complications.     Electronically signed by Sailaja Trinidad MD on 12/15/2020 at 1:55 PM

## 2020-12-15 NOTE — INTERVAL H&P NOTE
History and Physical Update    Pt Name: Good Spaulding  MRN: [id number]  YOB: 1970  Date of evaluation: 12/15/2020      [x] I have reviewed the H&P found in [facility] dated  by Adán Freeman NP which meets the criteria for an Interval History and Physical note and is attached below. Procedure: Laparoscopic Robotic Cholecystectomy  Dx: Leodan Lingkers Stones  [x] I have examined  Good Spaulding and there are no changes to the patient or plans. States last gallbladder attack was 2 months ago. Denies recent illness fever or chills. Denies the use of blood thinners. Last ate and drank 10pm.     Vital signs: BP (!) 177/79   Pulse 80   Temp 96.9 °F (36.1 °C) (Temporal)   Resp 22   SpO2 99%     Allergies:  No known allergies    Medications:   No current facility-administered medications on file prior to encounter. Current Outpatient Medications on File Prior to Encounter   Medication Sig Dispense Refill    buPROPion HBr (APLENZIN) 174 MG TB24 Take 1 tablet by mouth daily       escitalopram (LEXAPRO) 20 MG tablet Take 20 mg by mouth daily      Compression Stockings MISC 30- 40 mm/hg 2 each 1            Prior to Admission medications    Medication Sig Start Date End Date Taking? Authorizing Provider   dicyclomine (BENTYL) 20 MG tablet Take 1 tablet by mouth every 8 hours as needed 11/20/20   Historical Provider, MD   acetaminophen (TYLENOL) 500 MG tablet Take 1,000 mg by mouth every 6 hours as needed for Pain    Historical Provider, MD   buPROPion HBr (APLENZIN) 174 MG TB24 Take 1 tablet by mouth daily     Historical Provider, MD   escitalopram (LEXAPRO) 20 MG tablet Take 20 mg by mouth daily    Historical Provider, MD   Compression Stockings MISC 30- 40 mm/hg 1/22/18   Alma Delia Elizabeth DPM       This is a 52 y.o. female who is  pleasant, cooperative, alert and oriented x3, in no acute distress. Heart: Heart sounds are normal.  Regular rate and rhythm without murmur, gallop or rub.    Lungs:clear to auscultation without wheezes or rales    Abdomen: soft, nontender, nondistended, no masses or organomegaly.        Recent Labs     12/11/20  [address] Not Detected                   JONATHAN Moscoso CNP  Electronically signed 12/15/2020 at 12:09 PM

## 2020-12-17 LAB — SURGICAL PATHOLOGY REPORT: NORMAL

## 2021-01-08 ENCOUNTER — TELEPHONE (OUTPATIENT)
Dept: BARIATRICS/WEIGHT MGMT | Age: 51
End: 2021-01-08

## 2021-01-08 NOTE — TELEPHONE ENCOUNTER
Attended Surgical Info Session on 1/6/21  With Dr. Denise Denny   with  [facility]. Patient informed the following: This is NOT a guarantee of payment  When stating that you have a Benefit or Coverage for Bariatric Surgery - that means that you may qualify for the surgery  Bariatric Surgery is considered an elective procedure, patient is responsible to know their benefits . Any information we obtain when calling your insurance  is not  a guarantee of  coverage  and/or  benefit. Appointment Note :   New Patient , [facility]   3   month visits,  PG Fee $200,  Advise to bring completed new    patient  packet. Remind  Patient of  $200  Program fee with  $ 100  Required at  Second visit with office on initial dietician visit. Remind Patient they must be nicotine free. They will be tested at the beginning of the program and prior to surgery. Advise  Patient  Responsible for out of pocket, copay at medical visits,  Deductible and coinsurance applied to medical visits and procedure. You will be responsible for any of the following:  · Copays   · Deductibles   · Co insurances     The items mentioned above are  indicated or required by your insurance plan. Your deductible and coinsurance are applied to medical visits and procedures.      Verified with patient if he or she has had any previous bariatric surgery? no  ( If yes ,advise patient of transfer of care process and program fee)

## 2021-01-12 ENCOUNTER — OFFICE VISIT (OUTPATIENT)
Dept: BARIATRICS/WEIGHT MGMT | Age: 51
End: 2021-01-12
Payer: MEDICARE

## 2021-01-12 VITALS
DIASTOLIC BLOOD PRESSURE: 82 MMHG | SYSTOLIC BLOOD PRESSURE: 136 MMHG | WEIGHT: 293 LBS | HEART RATE: 76 BPM | BODY MASS INDEX: 48.82 KG/M2 | HEIGHT: 65 IN

## 2021-01-12 DIAGNOSIS — F32.A ANXIETY AND DEPRESSION: ICD-10-CM

## 2021-01-12 DIAGNOSIS — F41.9 ANXIETY AND DEPRESSION: ICD-10-CM

## 2021-01-12 DIAGNOSIS — G89.29 CHRONIC BACK PAIN, UNSPECIFIED BACK LOCATION, UNSPECIFIED BACK PAIN LATERALITY: ICD-10-CM

## 2021-01-12 DIAGNOSIS — G47.33 OSA (OBSTRUCTIVE SLEEP APNEA): ICD-10-CM

## 2021-01-12 DIAGNOSIS — M25.561 BILATERAL CHRONIC KNEE PAIN: ICD-10-CM

## 2021-01-12 DIAGNOSIS — M25.562 BILATERAL CHRONIC KNEE PAIN: ICD-10-CM

## 2021-01-12 DIAGNOSIS — G89.29 BILATERAL CHRONIC KNEE PAIN: ICD-10-CM

## 2021-01-12 DIAGNOSIS — E66.01 MORBID OBESITY WITH BMI OF 60.0-69.9, ADULT (HCC): ICD-10-CM

## 2021-01-12 DIAGNOSIS — I10 ESSENTIAL HYPERTENSION: Primary | ICD-10-CM

## 2021-01-12 DIAGNOSIS — L40.9 PSORIASIS: ICD-10-CM

## 2021-01-12 DIAGNOSIS — M54.9 CHRONIC BACK PAIN, UNSPECIFIED BACK LOCATION, UNSPECIFIED BACK PAIN LATERALITY: ICD-10-CM

## 2021-01-12 DIAGNOSIS — J45.909 UNCOMPLICATED ASTHMA, UNSPECIFIED ASTHMA SEVERITY, UNSPECIFIED WHETHER PERSISTENT: ICD-10-CM

## 2021-01-12 PROCEDURE — 1036F TOBACCO NON-USER: CPT | Performed by: NURSE PRACTITIONER

## 2021-01-12 PROCEDURE — G8484 FLU IMMUNIZE NO ADMIN: HCPCS | Performed by: NURSE PRACTITIONER

## 2021-01-12 PROCEDURE — 99213 OFFICE O/P EST LOW 20 MIN: CPT | Performed by: NURSE PRACTITIONER

## 2021-01-12 PROCEDURE — G8417 CALC BMI ABV UP PARAM F/U: HCPCS | Performed by: NURSE PRACTITIONER

## 2021-01-12 PROCEDURE — G8427 DOCREV CUR MEDS BY ELIG CLIN: HCPCS | Performed by: NURSE PRACTITIONER

## 2021-01-12 NOTE — PROGRESS NOTES
Group Lifestyle Balance Follow-up Progress Note      Subjective     Patient is here for group medical appointment for Group Lifestyle Balance weight management program follow-up for the chronic conditions of DENISHA, HTN, Anxiety/Depression, Chronic Back Pain, Psoriasis, Asthma, Arthritis, Chronic Bilateral Knee Pain, OAB. Patient continues on the program and tolerating well. Total weight gain of 2 lbs. No current issues. Allergies: Allergies   Allergen Reactions    No Known Allergies        Past Medical History:     Past Medical History:   Diagnosis Date    Allergic rhinitis     Anxiety     Anxiety and depression     Arthritis     Asthma     no longer using inhaler or nebulizer    Diverticulitis     Headache(784.0)     Obesity     Overactive bladder     Sleep apnea     uses machine nightly   .     Past Surgical History:  Past Surgical History:   Procedure Laterality Date    CHOLECYSTECTOMY, LAPAROSCOPIC N/A 12/15/2020    CHOLECYSTECTOMY LAPAROSCOPIC ROBOTIC XI performed by Abena Naidu MD at [address] COLONOSCOPY N/A 8/5/2020    COLONOSCOPY POLYPECTOMY SNARE/COLD BIOPSY performed by Des Newton MD at [address] [place] [address] Bilateral     cataract removed with lens implants    KNEE SURGERY Right 05/16/2017    MA KNEE SCOPE,DIAGNOSTIC Right 5/16/2017    RIGHT KNEE ARTHROSCOPY WITH MEDIAL MENISECTOMY AND CHONDROPLASTY performed by Juan Jackson DO at [address] [place] [address]         Family History:  Family History   Problem Relation Age of Onset    Arthritis Father        Social History:  Social History     Socioeconomic History    Marital status:      Spouse name: Not on file    Number of children: Not on file    Years of education: Not on file    Highest education level: Not on file   Occupational History    Not on file   Social Needs    Financial resource strain: Not on file    Food insecurity     Worry: Not on file     Inability: Not on file  Transportation needs     Medical: Not on file     Non-medical: Not on file   Tobacco Use    Smoking status: Former Smoker     Packs/day: 0.60     Types: Cigarettes     Quit date: 2017     Years since quittin.0    Smokeless tobacco: Never Used   Substance and Sexual Activity    Alcohol use: Yes     Comment: rarely    Drug use: No    Sexual activity: Not on file   Lifestyle    Physical activity     Days per week: Not on file     Minutes per session: Not on file    Stress: Not on file   Relationships    Social connections     Talks on phone: Not on file     Gets together: Not on file     Attends Catholic service: Not on file     Active member of club or organization: Not on file     Attends meetings of clubs or organizations: Not on file     Relationship status: Not on file    Intimate partner violence     Fear of current or ex partner: Not on file     Emotionally abused: Not on file     Physically abused: Not on file     Forced sexual activity: Not on file   Other Topics Concern    Not on file   Social History Narrative    Not on file       Current Medications:  Current Outpatient Medications   Medication Sig Dispense Refill    ondansetron (ZOFRAN) 4 MG tablet Take 1 tablet by mouth 3 times daily as needed for Nausea or Vomiting 15 tablet 0    dicyclomine (BENTYL) 20 MG tablet Take 1 tablet by mouth every 8 hours as needed      acetaminophen (TYLENOL) 500 MG tablet Take 1,000 mg by mouth every 6 hours as needed for Pain      buPROPion HBr (APLENZIN) 174 MG TB24 Take 1 tablet by mouth daily       escitalopram (LEXAPRO) 20 MG tablet Take 20 mg by mouth daily      Compression Stockings MISC 30- 40 mm/hg 2 each 1     Current Facility-Administered Medications   Medication Dose Route Frequency Provider Last Rate Last Admin    albuterol (PROVENTIL) nebulizer solution 2.5 mg  2.5 mg Nebulization 4x daily Hannah Sanders MD           Vital Signs: /82 (Site: Right Upper Arm, Position: Sitting, Cuff Size: Large Adult)   Pulse 76   Ht 5' 5\" (1.651 m)   Wt (!) 395 lb (179.2 kg)   BMI 65.73 kg/m²     BMI/Height/Weight:  Body mass index is 65.73 kg/m². Review of Systems  Constitutional: Weight gain      Objective:      Physical Exam   Vital signs reviewed. General Appearance: Well-developed and well-nourished. No acute distress. Skin: Warm, dry. Head: Normocephalic. Pulmonary/Chest: Normal respiratory effort. Musculoskeletal: Movement x4. Neurological:  Alert and oriented. Individual goal for this encounter:  I need both knees replaced and I need to get to at least 48 BMI for approval for surgery. After surgery I hopefully will feel better when I walk and then exercise will bd bearable. I have terrible eating habits and need help and support. X? Vital signs reviewed and discussed with patient. ? Labs/EKG reviewed and discussed with patient. ? Blood sugar log reviewed and discussed with patient. ? Physical activity discussed. ? Medication changes recommended. ? Smoking cessation discussed. X ? Specific questions/concerns addressed. Specific group medical question(s) addressed in this encounter:  Discussion about cancer. Group discussion topic for this encounter:     ? Cleatrice Fiddler   ? Be a Fat & Calorie    ? Healthy Eating   ? Move Those Muscles   ? Tip the Calorie Balance   ? Take charge of What's Around You   ? Problem Solving   ? Step Up Your Physical Activity Plan   ? Manage Slips and Self-Defeating Thoughts   ? [address]   ? Make Social Cues Work for [employer]   ? Ways to Stay Motivated   ? Preparing for Long Term Self-Management   ? Take Charge of Your Lifestyle   ? Mindful Eating, Mindful Movement   ? Manage Your Stress  X ? Sit Less for Health   ? More Volume, Fewer Calories   ? Stay Active   ? Balance Your Thoughts   ?  Heart Health ? Looking Back and Looking Forward    Total time:  90 minutes, greater than 50% of visit spent in group counseling/education. Assessment:       Diagnosis Orders   1. Essential hypertension     2. DENISHA (obstructive sleep apnea)     3. Anxiety and depression     4. Morbid obesity with BMI of 60.0-69.9, adult ([facility] [address]     5. Uncomplicated asthma, unspecified asthma severity, unspecified whether persistent     6. Psoriasis     7. Chronic back pain, unspecified back location, unspecified back pain laterality     8. Bilateral chronic knee pain         Plan:      Track food and weight. Return to clinic as per group medical appointment schedule. Follow-up:  Return in about 1 month (around 2/12/2021). Orders:  No orders of the defined types were placed in this encounter. Prescriptions:  No orders of the defined types were placed in this encounter.       Electronically signed by:  Jamari Teixeira CNP

## 2021-01-14 ENCOUNTER — TELEPHONE (OUTPATIENT)
Dept: GASTROENTEROLOGY | Age: 51
End: 2021-01-14

## 2021-01-14 NOTE — TELEPHONE ENCOUNTER
Writer returned call. Reviewed patients symptoms with her. She is having abdominal pain, bloating. Unsure if the blood was from her rectum. No black stools. Patient was advised to continue taking PPI and Bentyl until we see her in office. She states they seem to be helping her. Patient thanked Tran Haque.

## 2021-01-14 NOTE — TELEPHONE ENCOUNTER
Patient called to schedule an appointment with Dr. Leonor Angelo for abdominal pain and blood in her stool. Scheduled patient to be seen next Friday, 1/22/21 in [place] but patient questioned if she should wait that long to be seen. Patient asked if a nurse could call her and advise her on her symptoms and verify if she should wait that long. Please advise.

## 2021-02-02 ENCOUNTER — OFFICE VISIT (OUTPATIENT)
Dept: BARIATRICS/WEIGHT MGMT | Age: 51
End: 2021-02-02
Payer: MEDICARE

## 2021-02-02 VITALS
WEIGHT: 293 LBS | BODY MASS INDEX: 48.82 KG/M2 | HEIGHT: 65 IN | HEART RATE: 80 BPM | SYSTOLIC BLOOD PRESSURE: 130 MMHG | DIASTOLIC BLOOD PRESSURE: 74 MMHG

## 2021-02-02 DIAGNOSIS — G47.33 OSA (OBSTRUCTIVE SLEEP APNEA): ICD-10-CM

## 2021-02-02 DIAGNOSIS — G89.29 BILATERAL CHRONIC KNEE PAIN: ICD-10-CM

## 2021-02-02 DIAGNOSIS — M25.562 BILATERAL CHRONIC KNEE PAIN: ICD-10-CM

## 2021-02-02 DIAGNOSIS — I10 ESSENTIAL HYPERTENSION: Primary | ICD-10-CM

## 2021-02-02 DIAGNOSIS — E66.01 MORBID OBESITY WITH BMI OF 60.0-69.9, ADULT (HCC): ICD-10-CM

## 2021-02-02 DIAGNOSIS — G89.29 CHRONIC BACK PAIN, UNSPECIFIED BACK LOCATION, UNSPECIFIED BACK PAIN LATERALITY: ICD-10-CM

## 2021-02-02 DIAGNOSIS — M54.9 CHRONIC BACK PAIN, UNSPECIFIED BACK LOCATION, UNSPECIFIED BACK PAIN LATERALITY: ICD-10-CM

## 2021-02-02 DIAGNOSIS — F41.9 ANXIETY AND DEPRESSION: ICD-10-CM

## 2021-02-02 DIAGNOSIS — J45.909 UNCOMPLICATED ASTHMA, UNSPECIFIED ASTHMA SEVERITY, UNSPECIFIED WHETHER PERSISTENT: ICD-10-CM

## 2021-02-02 DIAGNOSIS — F32.A ANXIETY AND DEPRESSION: ICD-10-CM

## 2021-02-02 DIAGNOSIS — M25.561 BILATERAL CHRONIC KNEE PAIN: ICD-10-CM

## 2021-02-02 PROCEDURE — 99213 OFFICE O/P EST LOW 20 MIN: CPT | Performed by: NURSE PRACTITIONER

## 2021-02-02 PROCEDURE — G8417 CALC BMI ABV UP PARAM F/U: HCPCS | Performed by: NURSE PRACTITIONER

## 2021-02-02 PROCEDURE — 1036F TOBACCO NON-USER: CPT | Performed by: NURSE PRACTITIONER

## 2021-02-02 PROCEDURE — G8484 FLU IMMUNIZE NO ADMIN: HCPCS | Performed by: NURSE PRACTITIONER

## 2021-02-02 PROCEDURE — G8427 DOCREV CUR MEDS BY ELIG CLIN: HCPCS | Performed by: NURSE PRACTITIONER

## 2021-02-02 NOTE — PROGRESS NOTES
Group Lifestyle Balance Follow-up Progress Note      Subjective     Patient is here for group medical appointment for Group Lifestyle Balance weight management program follow-up for the chronic conditions of DENISHA, HTN, Anxiety/Depression, Chronic Back Pain, Psoriasis, Asthma, Arthritis, Chronic Bilateral Knee Pain, OAB. Patient continues on the program and tolerating well. Total weight gain of 6 lbs. No current issues. Allergies: Allergies   Allergen Reactions    No Known Allergies        Past Medical History:     Past Medical History:   Diagnosis Date    Allergic rhinitis     Anxiety     Anxiety and depression     Arthritis     Asthma     no longer using inhaler or nebulizer    Diverticulitis     Headache(784.0)     Obesity     Overactive bladder     Sleep apnea     uses machine nightly   .     Past Surgical History:  Past Surgical History:   Procedure Laterality Date    CHOLECYSTECTOMY, LAPAROSCOPIC N/A 12/15/2020    CHOLECYSTECTOMY LAPAROSCOPIC ROBOTIC XI performed by Abena Naidu MD at [address] [facility] COLONOSCOPY N/A 8/5/2020    COLONOSCOPY POLYPECTOMY SNARE/COLD BIOPSY performed by Des Newton MD at [address] [facility] Bilateral     cataract removed with lens implants    KNEE SURGERY Right 05/16/2017    MI KNEE SCOPE,DIAGNOSTIC Right 5/16/2017    RIGHT KNEE ARTHROSCOPY WITH MEDIAL MENISECTOMY AND CHONDROPLASTY performed by Juan Jackson DO at [address]         Family History:  Family History   Problem Relation Age of Onset    Arthritis Father        Social History:  Social History     Socioeconomic History    Marital status:      Spouse name: Not on file    Number of children: Not on file    Years of education: Not on file    Highest education level: Not on file   Occupational History    Not on file   Social Needs    Financial resource strain: Not on file    Food insecurity     Worry: Not on file     Inability: Not on file   [employer] Large Adult)   Pulse 80   Ht 5' 5\" (1.651 m)   Wt (!) 399 lb (181 kg)   BMI 66.40 kg/m²     BMI/Height/Weight:  Body mass index is 66.4 kg/m². Review of Systems  Constitutional: Weight gain      Objective:      Physical Exam   Vital signs reviewed. General Appearance: Well-developed and well-nourished. No acute distress. Skin: Warm, dry. Head: Normocephalic. Pulmonary/Chest: Normal respiratory effort. Musculoskeletal: Movement x4. Neurological:  Alert and oriented. Individual goal for this encounter:  I need both knees replaced and I need to get to at least 48 BMI for approval for surgery. After surgery I hopefully will feel better when I walk and then exercise will bd bearable. I have terrible eating habits and need help and support. X? Vital signs reviewed and discussed with patient. ? Labs/EKG reviewed and discussed with patient. ? Blood sugar log reviewed and discussed with patient. ? Physical activity discussed. ? Medication changes recommended. ? Smoking cessation discussed. X ? Specific questions/concerns addressed. Specific group medical question(s) addressed in this encounter:  Discussion about obesity hypoventilation syndrome. Group discussion topic for this encounter:     ? [employer]   ? Be a Fat & Calorie    ? Healthy Eating   ? Move Those Muscles   ? Tip the Calorie Balance   ? Take charge of What's Around You   ? Problem Solving   ? Step Up Your Physical Activity Plan   ? Manage Slips and Self-Defeating Thoughts   ? [address]   ? Make Social Cues Work for [employer] [place]   ? Ways to Stay Motivated   ? Preparing for Long Term Self-Management   ? Take Charge of Your Lifestyle   ? Mindful Eating, Mindful Movement   ? Manage Your Stress   ? Sit Less for Health  X ? More Volume, Fewer Calories   ? Stay Active   ? Balance Your Thoughts   ? Heart Health    ?  Looking Back and Looking Forward    Total time:  90 minutes, greater than 50% of visit spent in group counseling/education. Assessment:       Diagnosis Orders   1. Essential hypertension     2. DENISHA (obstructive sleep apnea)     3. Anxiety and depression     4. Morbid obesity with BMI of 60.0-69.9, adult ([facility] [address]     5. Uncomplicated asthma, unspecified asthma severity, unspecified whether persistent     6. Chronic back pain, unspecified back location, unspecified back pain laterality     7. Bilateral chronic knee pain         Plan:      Track food and weight. Return to clinic as per group medical appointment schedule. Follow-up:  Return in about 1 month (around 3/2/2021). Orders:  No orders of the defined types were placed in this encounter. Prescriptions:  No orders of the defined types were placed in this encounter.       Electronically signed by:  Brandon Pack CNP

## 2021-02-10 ENCOUNTER — TELEPHONE (OUTPATIENT)
Dept: GASTROENTEROLOGY | Age: 51
End: 2021-02-10

## 2021-02-10 ENCOUNTER — OFFICE VISIT (OUTPATIENT)
Dept: GASTROENTEROLOGY | Age: 51
End: 2021-02-10
Payer: MEDICARE

## 2021-02-10 VITALS — DIASTOLIC BLOOD PRESSURE: 79 MMHG | WEIGHT: 293 LBS | BODY MASS INDEX: 66.4 KG/M2 | SYSTOLIC BLOOD PRESSURE: 138 MMHG

## 2021-02-10 DIAGNOSIS — R10.13 DYSPEPSIA: Primary | ICD-10-CM

## 2021-02-10 PROCEDURE — G8484 FLU IMMUNIZE NO ADMIN: HCPCS | Performed by: INTERNAL MEDICINE

## 2021-02-10 PROCEDURE — G8417 CALC BMI ABV UP PARAM F/U: HCPCS | Performed by: INTERNAL MEDICINE

## 2021-02-10 PROCEDURE — 99213 OFFICE O/P EST LOW 20 MIN: CPT | Performed by: INTERNAL MEDICINE

## 2021-02-10 PROCEDURE — 1036F TOBACCO NON-USER: CPT | Performed by: INTERNAL MEDICINE

## 2021-02-10 PROCEDURE — G8427 DOCREV CUR MEDS BY ELIG CLIN: HCPCS | Performed by: INTERNAL MEDICINE

## 2021-02-10 RX ORDER — POLYETHYLENE GLYCOL 3350 17 G/17G
POWDER, FOR SOLUTION ORAL
Qty: 238 G | Refills: 0 | Status: SHIPPED | OUTPATIENT
Start: 2021-02-10 | End: 2021-03-11

## 2021-02-10 RX ORDER — POLYETHYLENE GLYCOL 3350 17 G/17G
17 POWDER, FOR SOLUTION ORAL DAILY
Qty: 2 BOTTLE | Refills: 0 | COMMUNITY
Start: 2021-02-10 | End: 2021-02-12

## 2021-02-10 RX ORDER — MONTELUKAST SODIUM 4 MG/1
1 TABLET, CHEWABLE ORAL 2 TIMES DAILY
Qty: 60 TABLET | Refills: 3 | Status: SHIPPED | OUTPATIENT
Start: 2021-02-10 | End: 2021-03-11

## 2021-02-10 RX ORDER — BISACODYL 5 MG
TABLET, DELAYED RELEASE (ENTERIC COATED) ORAL
Qty: 4 TABLET | Refills: 0 | Status: SHIPPED | OUTPATIENT
Start: 2021-02-10 | End: 2021-03-11

## 2021-02-10 RX ORDER — OMEPRAZOLE 20 MG/1
20 CAPSULE, DELAYED RELEASE ORAL DAILY
Qty: 30 CAPSULE | Refills: 3 | Status: SHIPPED | OUTPATIENT
Start: 2021-02-10 | End: 2021-04-14 | Stop reason: ALTCHOICE

## 2021-02-10 ASSESSMENT — ENCOUNTER SYMPTOMS
EYES NEGATIVE: 1
ANAL BLEEDING: 0
ALLERGIC/IMMUNOLOGIC NEGATIVE: 1
WHEEZING: 0
SINUS PRESSURE: 0
RECTAL PAIN: 0
SORE THROAT: 0
BLOOD IN STOOL: 0
BACK PAIN: 0
DIARRHEA: 1
ABDOMINAL DISTENTION: 0
SHORTNESS OF BREATH: 0
ABDOMINAL PAIN: 1
CONSTIPATION: 0
TROUBLE SWALLOWING: 0
VOMITING: 0
VOICE CHANGE: 0
RESPIRATORY NEGATIVE: 1
NAUSEA: 1
COUGH: 0
CHOKING: 0

## 2021-02-10 NOTE — PROGRESS NOTES
Quit date: 2017     Years since quittin.1    Smokeless tobacco: Never Used   Substance and Sexual Activity    Alcohol use: Yes     Comment: rarely    Drug use: No    Sexual activity: Not on file   Lifestyle    Physical activity     Days per week: Not on file     Minutes per session: Not on file    Stress: Not on file   Relationships    Social connections     Talks on phone: Not on file     Gets together: Not on file     Attends Catholic service: Not on file     Active member of club or organization: Not on file     Attends meetings of clubs or organizations: Not on file     Relationship status: Not on file    Intimate partner violence     Fear of current or ex partner: Not on file     Emotionally abused: Not on file     Physically abused: Not on file     Forced sexual activity: Not on file   Other Topics Concern    Not on file   Social History Narrative    Not on file       REVIEW OF SYSTEMS: A 12-point review of systems was obtained and pertinent positives and negatives were listed below. REVIEW OF SYSTEMS:     Constitutional: No fever, no chills, no lethargy, no weakness. HEENT:  No headache, otalgia, itchy eyes, nasal discharge or sore throat. Cardiac:  No chest pain, dyspnea, orthopnea or PND. Chest:   No cough, phlegm or wheezing. Abdomen:      Detailed by MA   Neuro:  No focal weakness, abnormal movements or seizure like activity. Skin:   No rashes, no itching. :   No hematuria, no pyuria, no dysuria, no flank pain. Extremities:  No swelling or joint pains. ROS was otherwise negative    Review of Systems   Constitutional: Negative. Negative for appetite change, fatigue and unexpected weight change. HENT: Negative. Negative for dental problem, postnasal drip, sinus pressure, sore throat, trouble swallowing and voice change. Denies   Eyes: Negative. Negative for visual disturbance. Respiratory: Negative. Negative for cough, choking, shortness of breath and wheezing. Cardiovascular: Negative for chest pain, palpitations and leg swelling. Gastrointestinal: Positive for abdominal pain, diarrhea and nausea. Negative for abdominal distention, anal bleeding, blood in stool, constipation, rectal pain and vomiting. Denies   Endocrine: Negative. Genitourinary: Negative. Negative for difficulty urinating. Musculoskeletal: Positive for gait problem. Negative for arthralgias, back pain and myalgias. Skin: Negative. Allergic/Immunologic: Negative. Negative for environmental allergies and food allergies. Neurological: Negative for dizziness, weakness, light-headedness, numbness and headaches. Hematological: Negative. Does not bruise/bleed easily. Psychiatric/Behavioral: Negative. Negative for sleep disturbance. The patient is not nervous/anxious. All other systems reviewed and are negative. PHYSICAL EXAMINATION: Vital signs reviewed per the nursing documentation. BP (!) 187/84   Wt (!) 399 lb (181 kg)   BMI 66.40 kg/m²   Body mass index is 66.4 kg/m². Physical Exam    Physical Exam   Constitutional: Patient is oriented to person, place, and time. Patient appears well-developed and well-nourished. HENT:   Head: Normocephalic and atraumatic. Eyes: Pupils are equal, round, and reactive to light. EOM are normal.   Neck: Normal range of motion. Neck supple. No JVD present. No tracheal deviation present. No thyromegaly present. Cardiovascular: Normal rate, regular rhythm, normal heart sounds and intact distal pulses. Pulmonary/Chest: Effort normal and breath sounds normal. No stridor. No respiratory distress. He has no wheezes. He has no rales. He exhibits no tenderness. Abdominal: Soft. Bowel sounds are normal. He exhibits no distension and no mass. There is no tenderness. There is no rebound and no guarding. No hernia. Musculoskeletal: Normal range of motion. Lymphadenopathy:    Patient has no cervical adenopathy.    Neurological: Patient is alert and oriented to person, place, and time. Psychiatric: Patient has a normal mood and affect. Patient behavior is normal.       LABORATORY DATA: Reviewed  Lab Results   Component Value Date    WBC 5.5 12/01/2020    HGB 11.5 (L) 12/01/2020    HCT 37.8 12/01/2020    MCV 92.0 12/01/2020     12/01/2020     12/01/2020    K 4.2 12/01/2020     12/01/2020    CO2 28 12/01/2020    BUN 10 12/01/2020    CREATININE 0.90 12/01/2020    LABALBU 3.7 10/19/2020    BILITOT 0.56 10/19/2020    ALKPHOS 86 10/19/2020    AST 16 10/19/2020    ALT 14 10/19/2020         Lab Results   Component Value Date    RBC 4.11 12/01/2020    HGB 11.5 (L) 12/01/2020    MCV 92.0 12/01/2020    MCH 28.0 12/01/2020    MCHC 30.4 12/01/2020    RDW 13.1 12/01/2020    MPV 9.3 12/01/2020    BASOPCT 0 10/19/2020    LYMPHSABS 1.47 10/19/2020    MONOSABS 0.41 10/19/2020    NEUTROABS 3.36 10/19/2020    EOSABS 0.09 10/19/2020    BASOSABS <0.03 10/19/2020         DIAGNOSTIC TESTING:     No results found. IMPRESSION: Ms.Darling KALIE Morataya is a 54 y.o. female with a past history remarkable for obesity, anxiety, depression, referred for evaluation of prior diverticulitis.   Patient underwent a recent colonoscopy, results were discussed on last visit. Reports abdominal pain since gallbladder was removed. Reports diarrhea as well. 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 PLAN:    1) Frequent bowel movements with loose in nature, nonbloody --- postprandial pain and nausea with exaggerated gastrocolic reflex    2) nonspecific dyspepsia--- will place on prilosec 20mg daily and Cholestid. Is possible patient may on diarrhea phase after her recent cholecystectomy performed in December 2020. Plan for diagnostic upper endoscopy.     3) need for repeat colonoscopy due to poor prepextended bowel prep--- 2 days of Miralax + CLD + standard bowel prep        Thank you for allowing me to participate in the care of Ms. Nikki Monique. For any further questions please do not hesitate to contact me. I have reviewed and agree with the ROS entered by the MA/LPN from today's encounter documented in a separate note. Steven Boo MD, MPH   [facility]  Office #: [phone]    this note is created with the assistance of a speech recognition program.  While intending to generate a document that actually reflects the content of the visit, the document can still have some errors including those of syntax and sound a like substitutions which may escape proof reading. It such instances, actual meaning can be extrapolated by contextual diversion.

## 2021-02-12 ENCOUNTER — TELEPHONE (OUTPATIENT)
Dept: GASTROENTEROLOGY | Age: 51
End: 2021-02-12

## 2021-02-12 ENCOUNTER — HOSPITAL ENCOUNTER (OUTPATIENT)
Age: 51
Discharge: HOME | End: 2021-02-12
Payer: MEDICARE

## 2021-02-12 DIAGNOSIS — R10.13 DYSPEPSIA: ICD-10-CM

## 2021-02-12 LAB
ABSOLUTE EOS #: 0.1 K/UL (ref 0–0.44)
ABSOLUTE IMMATURE GRANULOCYTE: <0.03 K/UL (ref 0–0.3)
ABSOLUTE LYMPH #: 1.61 K/UL (ref 1.1–3.7)
ABSOLUTE MONO #: 0.3 K/UL (ref 0.1–1.2)
ALBUMIN SERPL-MCNC: 3.7 G/DL (ref 3.5–5.2)
ALBUMIN/GLOBULIN RATIO: 1.2 (ref 1–2.5)
ALP BLD-CCNC: 86 U/L (ref 35–104)
ALT SERPL-CCNC: 12 U/L (ref 5–33)
ANION GAP SERPL CALCULATED.3IONS-SCNC: 9 MMOL/L (ref 9–17)
AST SERPL-CCNC: 12 U/L
BASOPHILS # BLD: 0 % (ref 0–2)
BASOPHILS ABSOLUTE: <0.03 K/UL (ref 0–0.2)
BILIRUB SERPL-MCNC: 0.52 MG/DL (ref 0.3–1.2)
BILIRUBIN DIRECT: 0.14 MG/DL
BILIRUBIN, INDIRECT: 0.38 MG/DL (ref 0–1)
BUN BLDV-MCNC: 13 MG/DL (ref 6–20)
BUN/CREAT BLD: NORMAL (ref 9–20)
CALCIUM SERPL-MCNC: 8.7 MG/DL (ref 8.6–10.4)
CHLORIDE BLD-SCNC: 103 MMOL/L (ref 98–107)
CO2: 28 MMOL/L (ref 20–31)
CREAT SERPL-MCNC: 0.83 MG/DL (ref 0.5–0.9)
DIFFERENTIAL TYPE: ABNORMAL
EOSINOPHILS RELATIVE PERCENT: 2 % (ref 1–4)
GFR AFRICAN AMERICAN: >60 ML/MIN
GFR NON-AFRICAN AMERICAN: >60 ML/MIN
GFR SERPL CREATININE-BSD FRML MDRD: NORMAL ML/MIN/{1.73_M2}
GFR SERPL CREATININE-BSD FRML MDRD: NORMAL ML/MIN/{1.73_M2}
GLOBULIN: NORMAL G/DL (ref 1.5–3.8)
GLUCOSE BLD-MCNC: 89 MG/DL (ref 70–99)
HCT VFR BLD CALC: 37.7 % (ref 36.3–47.1)
HEMOGLOBIN: 11.6 G/DL (ref 11.9–15.1)
IMMATURE GRANULOCYTES: 0 %
LYMPHOCYTES # BLD: 33 % (ref 24–43)
MCH RBC QN AUTO: 28.2 PG (ref 25.2–33.5)
MCHC RBC AUTO-ENTMCNC: 30.8 G/DL (ref 28.4–34.8)
MCV RBC AUTO: 91.5 FL (ref 82.6–102.9)
MONOCYTES # BLD: 6 % (ref 3–12)
NRBC AUTOMATED: 0 PER 100 WBC
PDW BLD-RTO: 13.3 % (ref 11.8–14.4)
PLATELET # BLD: ABNORMAL K/UL (ref 138–453)
PLATELET ESTIMATE: ABNORMAL
PLATELET, FLUORESCENCE: NORMAL K/UL (ref 138–453)
PLATELET, IMMATURE FRACTION: NORMAL % (ref 1.1–10.3)
PMV BLD AUTO: ABNORMAL FL (ref 8.1–13.5)
POTASSIUM SERPL-SCNC: 4.2 MMOL/L (ref 3.7–5.3)
RBC # BLD: 4.12 M/UL (ref 3.95–5.11)
RBC # BLD: ABNORMAL 10*6/UL
SEG NEUTROPHILS: 58 % (ref 36–65)
SEGMENTED NEUTROPHILS ABSOLUTE COUNT: 2.88 K/UL (ref 1.5–8.1)
SODIUM BLD-SCNC: 140 MMOL/L (ref 135–144)
TOTAL PROTEIN: 6.9 G/DL (ref 6.4–8.3)
WBC # BLD: 4.9 K/UL (ref 3.5–11.3)
WBC # BLD: ABNORMAL 10*3/UL

## 2021-02-12 PROCEDURE — 36415 COLL VENOUS BLD VENIPUNCTURE: CPT

## 2021-02-12 PROCEDURE — 85025 COMPLETE CBC W/AUTO DIFF WBC: CPT

## 2021-02-12 PROCEDURE — 80076 HEPATIC FUNCTION PANEL: CPT

## 2021-02-12 PROCEDURE — 80048 BASIC METABOLIC PNL TOTAL CA: CPT

## 2021-02-12 PROCEDURE — 83014 H PYLORI DRUG ADMIN: CPT

## 2021-02-12 PROCEDURE — 83013 H PYLORI (C-13) BREATH: CPT

## 2021-02-12 PROCEDURE — 85055 RETICULATED PLATELET ASSAY: CPT

## 2021-02-12 NOTE — TELEPHONE ENCOUNTER
Patient called back stating that her bowel movement today had dark red blood and having cramping. Would like a call back to discuss.

## 2021-02-12 NOTE — TELEPHONE ENCOUNTER
Writer called patient and left a message wondering   if the blood is on the tissue or in the toilet or in her  Stool could make a difference. Writer stated if her stools are hard a softener could help but if her bleeding gets worse or the cramping gets worse she should go be checked out at the ER. She can also call Monday and make an appointment.

## 2021-02-12 NOTE — TELEPHONE ENCOUNTER
Patient called office and asking if the recent labs ordered need to be fasting. Please follow up with the patient.

## 2021-02-14 LAB — H PYLORI BREATH TEST: NEGATIVE

## 2021-02-19 ENCOUNTER — HOSPITAL ENCOUNTER (OUTPATIENT)
Dept: LAB | Age: 51
Setting detail: SPECIMEN
Discharge: HOME | End: 2021-02-19
Payer: MEDICARE

## 2021-02-19 DIAGNOSIS — Z01.818 PREOP TESTING: Primary | ICD-10-CM

## 2021-02-19 PROCEDURE — U0003 INFECTIOUS AGENT DETECTION BY NUCLEIC ACID (DNA OR RNA); SEVERE ACUTE RESPIRATORY SYNDROME CORONAVIRUS 2 (SARS-COV-2) (CORONAVIRUS DISEASE [COVID-19]), AMPLIFIED PROBE TECHNIQUE, MAKING USE OF HIGH THROUGHPUT TECHNOLOGIES AS DESCRIBED BY CMS-2020-01-R: HCPCS

## 2021-02-19 PROCEDURE — U0005 INFEC AGEN DETEC AMPLI PROBE: HCPCS

## 2021-02-20 LAB
SARS-COV-2: NORMAL
SARS-COV-2: NOT DETECTED
SOURCE: NORMAL

## 2021-02-22 NOTE — TELEPHONE ENCOUNTER
Pt called in today to cancel her 2/23/21 EGD/colon proc d/t ill, headache earache. She is calling pcp ofc to get medication.   She will call us back to resched when she is feeling better

## 2021-02-24 ENCOUNTER — TELEMEDICINE (OUTPATIENT)
Dept: DERMATOLOGY | Age: 51
End: 2021-02-24
Payer: MEDICARE

## 2021-02-24 DIAGNOSIS — L71.9 ROSACEA: ICD-10-CM

## 2021-02-24 DIAGNOSIS — L40.8 SEBOPSORIASIS: Primary | ICD-10-CM

## 2021-02-24 PROCEDURE — G8428 CUR MEDS NOT DOCUMENT: HCPCS | Performed by: DERMATOLOGY

## 2021-02-24 PROCEDURE — 1036F TOBACCO NON-USER: CPT | Performed by: DERMATOLOGY

## 2021-02-24 PROCEDURE — 99214 OFFICE O/P EST MOD 30 MIN: CPT | Performed by: DERMATOLOGY

## 2021-02-24 PROCEDURE — G8417 CALC BMI ABV UP PARAM F/U: HCPCS | Performed by: DERMATOLOGY

## 2021-02-24 PROCEDURE — G8484 FLU IMMUNIZE NO ADMIN: HCPCS | Performed by: DERMATOLOGY

## 2021-02-24 RX ORDER — SULFACETAMIDE SODIUM 100 MG/ML
LOTION TOPICAL
Qty: 118 ML | Refills: 2 | Status: SHIPPED | OUTPATIENT
Start: 2021-02-24

## 2021-02-24 RX ORDER — TRIAMCINOLONE ACETONIDE 0.25 MG/G
CREAM TOPICAL
Qty: 80 G | Refills: 2 | Status: SHIPPED | OUTPATIENT
Start: 2021-02-24 | End: 2022-05-23 | Stop reason: SDUPTHER

## 2021-02-24 RX ORDER — KETOCONAZOLE 20 MG/ML
SHAMPOO TOPICAL
Qty: 120 ML | Refills: 2 | Status: SHIPPED | OUTPATIENT
Start: 2021-02-24 | End: 2022-05-23 | Stop reason: SDUPTHER

## 2021-02-24 RX ORDER — FLUOCINONIDE TOPICAL SOLUTION USP, 0.05% 0.5 MG/ML
SOLUTION TOPICAL
Qty: 60 ML | Refills: 2 | Status: SHIPPED | OUTPATIENT
Start: 2021-02-24 | End: 2021-06-01

## 2021-02-24 NOTE — PROGRESS NOTES
- ketoconazole (NIZORAL) 2 % shampoo; Apply 3-4 times weekly to scalp, leave on for five minutes prior to washing off  Dispense: 120 mL; Refill: 2  - fluocinonide (LIDEX) 0.05 % external solution; Apply to scalp daily for rash  Dispense: 60 mL; Refill: 2    2. Rosacea  - Sulfacetamide Sodium, Acne, 10 % LOTN; Apply to face daily  Dispense: 118 mL; Refill: 2          RTC 3 months in person    There are no Patient Instructions on file for this visit. This note was created with the assistance of a speech-recognition program.  Although the intention is to generate a document that actually reflects the content of the visit, no guarantees can be provided that every mistake has been identified and corrected byediting.     Electronically signed by Jb Edwards MD on 2/24/21 at 12:32 PM EST

## 2021-03-07 ENCOUNTER — APPOINTMENT (OUTPATIENT)
Dept: CT IMAGING | Age: 51
End: 2021-03-07
Payer: MEDICARE

## 2021-03-07 ENCOUNTER — APPOINTMENT (OUTPATIENT)
Dept: ULTRASOUND IMAGING | Age: 51
End: 2021-03-07
Payer: MEDICARE

## 2021-03-07 ENCOUNTER — HOSPITAL ENCOUNTER (EMERGENCY)
Age: 51
Discharge: HOME | End: 2021-03-07
Attending: EMERGENCY MEDICINE
Payer: MEDICARE

## 2021-03-07 VITALS
HEIGHT: 65 IN | HEART RATE: 97 BPM | SYSTOLIC BLOOD PRESSURE: 169 MMHG | OXYGEN SATURATION: 100 % | WEIGHT: 293 LBS | BODY MASS INDEX: 48.82 KG/M2 | RESPIRATION RATE: 20 BRPM | DIASTOLIC BLOOD PRESSURE: 93 MMHG | TEMPERATURE: 98.2 F

## 2021-03-07 DIAGNOSIS — N39.0 URINARY TRACT INFECTION WITHOUT HEMATURIA, SITE UNSPECIFIED: Primary | ICD-10-CM

## 2021-03-07 DIAGNOSIS — N83.202 CYST OF LEFT OVARY: ICD-10-CM

## 2021-03-07 LAB
-: ABNORMAL
ABSOLUTE EOS #: 0.1 K/UL (ref 0–0.44)
ABSOLUTE IMMATURE GRANULOCYTE: 0.03 K/UL (ref 0–0.3)
ABSOLUTE LYMPH #: 1.37 K/UL (ref 1.1–3.7)
ABSOLUTE MONO #: 0.45 K/UL (ref 0.1–1.2)
ALBUMIN SERPL-MCNC: 3.5 G/DL (ref 3.5–5.2)
ALBUMIN/GLOBULIN RATIO: NORMAL (ref 1–2.5)
ALP BLD-CCNC: 92 U/L (ref 35–104)
ALT SERPL-CCNC: 10 U/L (ref 5–33)
AMORPHOUS: ABNORMAL
ANION GAP SERPL CALCULATED.3IONS-SCNC: 11 MMOL/L (ref 9–17)
AST SERPL-CCNC: 13 U/L
BACTERIA: ABNORMAL
BASOPHILS # BLD: 0 % (ref 0–2)
BASOPHILS ABSOLUTE: <0.03 K/UL (ref 0–0.2)
BILIRUB SERPL-MCNC: 0.62 MG/DL (ref 0.3–1.2)
BILIRUBIN URINE: NEGATIVE
BUN BLDV-MCNC: 11 MG/DL (ref 6–20)
BUN/CREAT BLD: 13 (ref 9–20)
CALCIUM SERPL-MCNC: 8.9 MG/DL (ref 8.6–10.4)
CASTS UA: ABNORMAL /LPF
CHLORIDE BLD-SCNC: 101 MMOL/L (ref 98–107)
CO2: 24 MMOL/L (ref 20–31)
COLOR: YELLOW
COMMENT UA: ABNORMAL
CREAT SERPL-MCNC: 0.82 MG/DL (ref 0.5–0.9)
CRYSTALS, UA: ABNORMAL /HPF
D-DIMER QUANTITATIVE: 0.31 MG/L FEU (ref 0–0.59)
DIFFERENTIAL TYPE: ABNORMAL
EOSINOPHILS RELATIVE PERCENT: 1 % (ref 1–4)
EPITHELIAL CELLS UA: ABNORMAL /HPF (ref 0–5)
GFR AFRICAN AMERICAN: >60 ML/MIN
GFR NON-AFRICAN AMERICAN: >60 ML/MIN
GFR SERPL CREATININE-BSD FRML MDRD: NORMAL ML/MIN/{1.73_M2}
GFR SERPL CREATININE-BSD FRML MDRD: NORMAL ML/MIN/{1.73_M2}
GLUCOSE BLD-MCNC: 87 MG/DL (ref 70–99)
GLUCOSE URINE: NEGATIVE
HCG QUALITATIVE: NEGATIVE
HCT VFR BLD CALC: 38.8 % (ref 36.3–47.1)
HEMOGLOBIN: 11.2 G/DL (ref 11.9–15.1)
IMMATURE GRANULOCYTES: 0 %
KETONES, URINE: NEGATIVE
LEUKOCYTE ESTERASE, URINE: ABNORMAL
LYMPHOCYTES # BLD: 19 % (ref 24–43)
MCH RBC QN AUTO: 27.8 PG (ref 25.2–33.5)
MCHC RBC AUTO-ENTMCNC: 28.9 G/DL (ref 28.4–34.8)
MCV RBC AUTO: 96.3 FL (ref 82.6–102.9)
MONOCYTES # BLD: 6 % (ref 3–12)
MUCUS: ABNORMAL
MYOGLOBIN: 33 NG/ML (ref 25–58)
NITRITE, URINE: POSITIVE
NRBC AUTOMATED: 0 PER 100 WBC
OTHER OBSERVATIONS UA: ABNORMAL
PDW BLD-RTO: 13 % (ref 11.8–14.4)
PH UA: 7 (ref 5–8)
PLATELET # BLD: 215 K/UL (ref 138–453)
PLATELET ESTIMATE: ABNORMAL
PMV BLD AUTO: 9.1 FL (ref 8.1–13.5)
POTASSIUM SERPL-SCNC: 4.6 MMOL/L (ref 3.7–5.3)
PROTEIN UA: ABNORMAL
RBC # BLD: 4.03 M/UL (ref 3.95–5.11)
RBC # BLD: ABNORMAL 10*6/UL
RBC UA: ABNORMAL /HPF (ref 0–2)
RENAL EPITHELIAL, UA: ABNORMAL /HPF
SEG NEUTROPHILS: 74 % (ref 36–65)
SEGMENTED NEUTROPHILS ABSOLUTE COUNT: 5.44 K/UL (ref 1.5–8.1)
SODIUM BLD-SCNC: 136 MMOL/L (ref 135–144)
SPECIFIC GRAVITY UA: 1.02 (ref 1–1.03)
TOTAL PROTEIN: 6.9 G/DL (ref 6.4–8.3)
TRICHOMONAS: ABNORMAL
TROPONIN INTERP: NORMAL
TROPONIN T: NORMAL NG/ML
TROPONIN, HIGH SENSITIVITY: <6 NG/L (ref 0–14)
TURBIDITY: ABNORMAL
URINE HGB: ABNORMAL
UROBILINOGEN, URINE: NORMAL
WBC # BLD: 7.4 K/UL (ref 3.5–11.3)
WBC # BLD: ABNORMAL 10*3/UL
WBC UA: ABNORMAL /HPF (ref 0–5)
YEAST: ABNORMAL

## 2021-03-07 PROCEDURE — 74176 CT ABD & PELVIS W/O CONTRAST: CPT

## 2021-03-07 PROCEDURE — 87077 CULTURE AEROBIC IDENTIFY: CPT

## 2021-03-07 PROCEDURE — 2580000003 HC RX 258: Performed by: EMERGENCY MEDICINE

## 2021-03-07 PROCEDURE — 76856 US EXAM PELVIC COMPLETE: CPT

## 2021-03-07 PROCEDURE — 93005 ELECTROCARDIOGRAM TRACING: CPT | Performed by: EMERGENCY MEDICINE

## 2021-03-07 PROCEDURE — 81001 URINALYSIS AUTO W/SCOPE: CPT

## 2021-03-07 PROCEDURE — 87086 URINE CULTURE/COLONY COUNT: CPT

## 2021-03-07 PROCEDURE — 6360000002 HC RX W HCPCS: Performed by: EMERGENCY MEDICINE

## 2021-03-07 PROCEDURE — 93976 VASCULAR STUDY: CPT

## 2021-03-07 PROCEDURE — 85025 COMPLETE CBC W/AUTO DIFF WBC: CPT

## 2021-03-07 PROCEDURE — 84703 CHORIONIC GONADOTROPIN ASSAY: CPT

## 2021-03-07 PROCEDURE — 85379 FIBRIN DEGRADATION QUANT: CPT

## 2021-03-07 PROCEDURE — 99283 EMERGENCY DEPT VISIT LOW MDM: CPT

## 2021-03-07 PROCEDURE — 96374 THER/PROPH/DIAG INJ IV PUSH: CPT

## 2021-03-07 PROCEDURE — 84484 ASSAY OF TROPONIN QUANT: CPT

## 2021-03-07 PROCEDURE — 83874 ASSAY OF MYOGLOBIN: CPT

## 2021-03-07 PROCEDURE — 6370000000 HC RX 637 (ALT 250 FOR IP): Performed by: EMERGENCY MEDICINE

## 2021-03-07 PROCEDURE — 80053 COMPREHEN METABOLIC PANEL: CPT

## 2021-03-07 PROCEDURE — 87186 SC STD MICRODIL/AGAR DIL: CPT

## 2021-03-07 PROCEDURE — 76830 TRANSVAGINAL US NON-OB: CPT

## 2021-03-07 RX ORDER — KETOROLAC TROMETHAMINE 15 MG/ML
15 INJECTION, SOLUTION INTRAMUSCULAR; INTRAVENOUS ONCE
Status: COMPLETED | OUTPATIENT
Start: 2021-03-07 | End: 2021-03-07

## 2021-03-07 RX ORDER — CEPHALEXIN 500 MG/1
500 CAPSULE ORAL ONCE
Status: COMPLETED | OUTPATIENT
Start: 2021-03-07 | End: 2021-03-07

## 2021-03-07 RX ORDER — CEPHALEXIN 500 MG/1
500 CAPSULE ORAL 2 TIMES DAILY
Qty: 14 CAPSULE | Refills: 0 | Status: SHIPPED | OUTPATIENT
Start: 2021-03-07 | End: 2021-03-14

## 2021-03-07 RX ORDER — SODIUM CHLORIDE 9 MG/ML
INJECTION, SOLUTION INTRAVENOUS CONTINUOUS
Status: DISCONTINUED | OUTPATIENT
Start: 2021-03-07 | End: 2021-03-07 | Stop reason: HOSPADM

## 2021-03-07 RX ORDER — HYDROCODONE BITARTRATE AND ACETAMINOPHEN 5; 325 MG/1; MG/1
1 TABLET ORAL EVERY 6 HOURS PRN
Qty: 12 TABLET | Refills: 0 | Status: SHIPPED | OUTPATIENT
Start: 2021-03-07 | End: 2021-03-10

## 2021-03-07 RX ORDER — IBUPROFEN 800 MG/1
800 TABLET ORAL EVERY 6 HOURS PRN
Qty: 25 TABLET | Refills: 1 | Status: SHIPPED | OUTPATIENT
Start: 2021-03-07 | End: 2021-04-14 | Stop reason: ALTCHOICE

## 2021-03-07 RX ADMIN — SODIUM CHLORIDE: 9 INJECTION, SOLUTION INTRAVENOUS at 14:51

## 2021-03-07 RX ADMIN — CEPHALEXIN 500 MG: 500 CAPSULE ORAL at 18:12

## 2021-03-07 RX ADMIN — KETOROLAC TROMETHAMINE 15 MG: 15 INJECTION, SOLUTION INTRAMUSCULAR; INTRAVENOUS at 14:43

## 2021-03-07 ASSESSMENT — ENCOUNTER SYMPTOMS
BACK PAIN: 0
DIARRHEA: 1
SHORTNESS OF BREATH: 1
CONSTIPATION: 0
BLOOD IN STOOL: 0
VOMITING: 0
TROUBLE SWALLOWING: 0
ABDOMINAL PAIN: 1

## 2021-03-07 NOTE — ED NOTES
Ultrasound at bedside     [facility], [address] [place] [address]  03/07/21 3219

## 2021-03-07 NOTE — ED PROVIDER NOTES
EMERGENCY DEPARTMENT ENCOUNTER    Pt Name: Marta Friday  MRN: [id number]  [place] 1970  Date of evaluation: 3/7/21  CHIEF COMPLAINT       Chief Complaint   Patient presents with    Flank Pain     R side    Shortness of Breath     HISTORY OF PRESENT ILLNESS   Patient is a 42-year-old female here with right-sided abdominal pain and flank pain. She states it started this morning when she was laying down. States it started suddenly. States it hurts to take a deep breath. She states it is to the right side of her abdomen and on her side and it radiates around to her flank and up to the right side of her chest. She had a cholecystectomy about 3 months ago. She has history of sleep apnea, obesity. Denies any fevers or cough. Denies any headache neck pain or extremity pain. Denies history of heart disease blood clots recent leg swelling or hemoptysis. Denies any urinary symptoms. REVIEW OF SYSTEMS     Review of Systems   Constitutional: Negative for chills and fever. HENT: Negative for trouble swallowing. Eyes: Negative for visual disturbance. Respiratory: Positive for shortness of breath. Cardiovascular: Positive for chest pain. Gastrointestinal: Positive for abdominal pain and diarrhea. Negative for blood in stool, constipation and vomiting. Genitourinary: Negative for difficulty urinating. Musculoskeletal: Negative for back pain and neck pain. Skin: Negative for rash. Neurological: Negative for weakness. Psychiatric/Behavioral: Negative for confusion.      PASTMEDICAL HISTORY     Past Medical History:   Diagnosis Date    Allergic rhinitis     Anxiety     Anxiety and depression     Arthritis     Asthma     no longer using inhaler or nebulizer    Diverticulitis     Headache(784.0)     Obesity     Overactive bladder     Sleep apnea     uses machine nightly     SURGICAL HISTORY       Past Surgical History:   Procedure Laterality Date    CHOLECYSTECTOMY, LAPAROSCOPIC N/A 12/15/2020    CHOLECYSTECTOMY LAPAROSCOPIC ROBOTIC XI performed by Tami Wallace MD at [address] [facility] COLONOSCOPY N/A 8/5/2020    COLONOSCOPY POLYPECTOMY SNARE/COLD BIOPSY performed by Manisha Simpson MD at [facility] Bilateral     cataract removed with lens implants    KNEE SURGERY Right 05/16/2017    OR KNEE SCOPE,DIAGNOSTIC Right 5/16/2017    RIGHT KNEE ARTHROSCOPY WITH MEDIAL MENISECTOMY AND CHONDROPLASTY performed by Acacia Castellon DO at [address] [facility]       Discharge Medication List as of 3/7/2021  6:01 PM      CONTINUE these medications which have NOT CHANGED    Details   triamcinolone (KENALOG) 0.025 % cream Apply to rash on face and ears daily, Disp-80 g, R-2, Normal      Sulfacetamide Sodium, Acne, 10 % LOTN Apply to face daily, Disp-118 mL, R-2Normal      ketoconazole (NIZORAL) 2 % shampoo Apply 3-4 times weekly to scalp, leave on for five minutes prior to washing off, Disp-120 mL, R-2, Normal      fluocinonide (LIDEX) 0.05 % external solution Apply to scalp daily for rash, Disp-60 mL, R-2, Normal      omeprazole (PRILOSEC) 20 MG delayed release capsule Take 1 capsule by mouth daily, Disp-30 capsule, R-3Normal      colestipol (COLESTID) 1 g tablet Take 1 tablet by mouth 2 times daily, Disp-60 tablet, R-3Normal      polyethylene glycol (MIRALAX) 17 GM/SCOOP powder Use as directed by following your instructions given by office. , Disp-238 g, R-0Normal      bisacodyl (BISACODYL) 5 MG EC tablet Please take as directed the day prior to your colonoscopy. Follow instructions given at physician office. , Disp-4 tablet, R-0Normal      magnesium citrate solution Take 296 mLs by mouth once for 1 dose, Disp-296 mL, R-0Normal      ondansetron (ZOFRAN) 4 MG tablet Take 1 tablet by mouth 3 times daily as needed for Nausea or Vomiting, Disp-15 tablet, R-0Print      dicyclomine (BENTYL) 20 MG tablet Take 1 tablet by mouth every 8 hours as neededHistorical Med acetaminophen (TYLENOL) 500 MG tablet Take 1,000 mg by mouth every 6 hours as needed for PainHistorical Med      buPROPion HBr (APLENZIN) 174 MG TB24 Take 1 tablet by mouth daily Historical Med      escitalopram (LEXAPRO) 20 MG tablet Take 20 mg by mouth dailyHistorical Med      Compression Stockings MISC Disp-2 each, R-1, Print30- 40 mm/hg           ALLERGIES     is allergic to no known allergies. FAMILY HISTORY     She indicated that her mother is alive. She indicated that her father is alive. SOCIAL HISTORY       Social History     Tobacco Use    Smoking status: Former Smoker     Packs/day: 0.60     Types: Cigarettes     Quit date:      Years since quittin.1    Smokeless tobacco: Never Used   Substance Use Topics    Alcohol use: Yes     Comment: rarely    Drug use: No     PHYSICAL EXAM     INITIAL VITALS: BP (!) 169/93   Pulse 97   Temp 98.2 °F (36.8 °C) (Oral)   Resp 20   Ht 5' 5\" (1.651 m)   Wt (!) 394 lb (178.7 kg)   SpO2 100%   BMI 65.57 kg/m²    Physical Exam  Vitals signs and nursing note reviewed. Constitutional:       General: She is not in acute distress. Appearance: She is obese. She is not ill-appearing, toxic-appearing or diaphoretic. HENT:      Head: Normocephalic and atraumatic. Eyes:      Extraocular Movements: Extraocular movements intact. Pupils: Pupils are equal, round, and reactive to light. Neck:      Musculoskeletal: Normal range of motion and neck supple. Cardiovascular:      Rate and Rhythm: Normal rate and regular rhythm. Pulmonary:      Effort: Pulmonary effort is normal. No respiratory distress. Abdominal:      General: There is no distension. Palpations: Abdomen is soft. There is no mass or pulsatile mass. Tenderness: There is abdominal tenderness in the right upper quadrant and right lower quadrant. There is no right CVA tenderness, left CVA tenderness, guarding or rebound.  Negative signs include Alejandro's sign and McBurney's sign.      Hernia: No hernia is present. Musculoskeletal: Normal range of motion. General: No deformity. Right lower leg: No edema. Left lower leg: No edema. Skin:     General: Skin is warm and dry. Capillary Refill: Capillary refill takes less than 2 seconds. Findings: No rash. Neurological:      General: No focal deficit present. Mental Status: She is alert and oriented to person, place, and time. Psychiatric:         Thought Content: Thought content normal.         MEDICAL DECISION MAKING:          Please see ED Course below for MDM/ED course. DDx: PE, pneumonia, kidney stone, colitis    All patient's question's and concerns were answered prior to disposition and patient and/or family expressed understanding and agreement of treatment plan. NIH STROKE SCALE:            PROCEDURES:    Procedures    DIAGNOSTIC RESULTS   EKG:All EKG's are interpreted by the Emergency Department Physician who either signs or Co-signs this chart in the absence of a cardiologist.    Normal sinus rhythm rate of 63 normal intervals normal axis no ST elevations or depressions no T wave changes, no Q waves, nonspecific EKG    RADIOLOGY:All plain film, CT, MRI, and formal ultrasound images (except ED bedside ultrasound) are read by the radiologist, see reports below, unless otherwisenoted in MDM or here. US PELVIS COMPLETE   Final Result   Limited study secondary to patient's body habitus. There appears to be a hypoechoic structure likely related to a cystic   structure which was seen on recent CT. Follow-up in 6-12 weeks is recommended to re-evaluate. If findings persist MRI of the pelvis may be needed to fully evaluate. US NON OB TRANSVAGINAL   Final Result   Limited study secondary to patient's body habitus. There appears to be a hypoechoic structure likely related to a cystic   structure which was seen on recent CT.       Follow-up in 6-12 weeks is She is afebrile she is 100% on room air. She is very obese. She has tenderness to the right side very lateral aspect of her abdomen. There is no skin changes. Will check labs including D-dimer, will treat her pain and check a urinalysis and reassess. Lab work thus far is unremarkable. CT of the abdomen pelvis is pending without contrast.  Awaiting urinalysis. D-dimer is negative doubt PE at this time. Work-up pending, patient signed out to Dr. Shruthi Lacey pending CT and urinalysis. Vitals:    Vitals:    03/07/21 1312   BP: (!) 169/93   Pulse: 97   Resp: 20   Temp: 98.2 °F (36.8 °C)   TempSrc: Oral   SpO2: 100%   Weight: (!) 394 lb (178.7 kg)   Height: 5' 5\" (1.651 m)       The patient was given the following medications while in the emergency department:  Orders Placed This Encounter   Medications    ketorolac (TORADOL) injection 15 mg    DISCONTD: 0.9 % sodium chloride infusion    cephALEXin (KEFLEX) capsule 500 mg    cephALEXin (KEFLEX) 500 MG capsule     Sig: Take 1 capsule by mouth 2 times daily for 7 days     Dispense:  14 capsule     Refill:  0    ibuprofen (ADVIL;MOTRIN) 800 MG tablet     Sig: Take 1 tablet by mouth every 6 hours as needed for Pain     Dispense:  25 tablet     Refill:  1    HYDROcodone-acetaminophen (NORCO) 5-325 MG per tablet     Sig: Take 1 tablet by mouth every 6 hours as needed for Pain for up to 3 days. Intended supply: 3 days. Take lowest dose possible to manage pain     Dispense:  12 tablet     Refill:  0     CONSULTS:  None    FINAL IMPRESSION      1. Urinary tract infection without hematuria, site unspecified    2. Cyst of left ovary          DISPOSITION/PLAN   DISPOSITION  Decision to discharge      PATIENT REFERRED TO:  No follow-up provider specified.   DISCHARGE MEDICATIONS:  Discharge Medication List as of 3/7/2021  6:01 PM      START taking these medications    Details   cephALEXin (KEFLEX) 500 MG capsule Take 1 capsule by mouth 2 times daily for 7 days, Disp-14 capsule, R-0Print      ibuprofen (ADVIL;MOTRIN) 800 MG tablet Take 1 tablet by mouth every 6 hours as needed for Pain, Disp-25 tablet, R-1Print      HYDROcodone-acetaminophen (NORCO) 5-325 MG per tablet Take 1 tablet by mouth every 6 hours as needed for Pain for up to 3 days. Intended supply: 3 days. Take lowest dose possible to manage pain, Disp-12 tablet, R-0Print           Yumi Zhang MD  Attending Emergency Physician    This note was created with the assistance of a speech-recognition program. While intending to generate a document that actually reflects the content of the visit, no guarantees can be provided that every mistake has been identified and corrected by editing.                     Yumi Zhang MD  03/08/21 8836

## 2021-03-07 NOTE — ED PROVIDER NOTES
read by the radiologist and the images and interpretations are directly viewed by the emergency physician. US PELVIS COMPLETE   Preliminary Result   Limited study secondary to patient's body habitus. There appears to be a hypoechoic structure likely related to a cystic   structure which was seen on recent CT. Follow-up in 6-12 weeks is recommended to re-evaluate. If findings persist MRI of the pelvis may be needed to fully evaluate. US NON OB TRANSVAGINAL   Preliminary Result   Limited study secondary to patient's body habitus. There appears to be a hypoechoic structure likely related to a cystic   structure which was seen on recent CT. Follow-up in 6-12 weeks is recommended to re-evaluate. If findings persist MRI of the pelvis may be needed to fully evaluate. US DUP ABD PEL RETRO SCROT LIMITED   Preliminary Result   Limited study secondary to patient's body habitus. There appears to be a hypoechoic structure likely related to a cystic   structure which was seen on recent CT. Follow-up in 6-12 weeks is recommended to re-evaluate. If findings persist MRI of the pelvis may be needed to fully evaluate. CT ABDOMEN PELVIS WO CONTRAST Additional Contrast? None   Final Result   1. Left ovarian enlargement measuring 4.4 x 6.0 cm. Further evaluation with   pelvic ultrasound recommended. 2. No obstructing calculus, hydronephrosis, or hydroureter. 3. Mild colonic diverticulosis. Moderate stool burden. 4. Normal gas-filled appendix. 5. Fatty liver. Prior cholecystectomy. LABS: All lab results were reviewed by myself, and all abnormals are listed below.   Labs Reviewed   CBC WITH AUTO DIFFERENTIAL - Abnormal; Notable for the following components:       Result Value    Hemoglobin 11.2 (*)     Seg Neutrophils 74 (*)     Lymphocytes 19 (*)     All other components within normal limits   URINE RT REFLEX TO CULTURE - Abnormal; Notable for the following components:    Turbidity UA CLOUDY (*)     Urine Hgb TRACE (*)     Protein, UA TRACE (*)     Nitrite, Urine POSITIVE (*)     Leukocyte Esterase, Urine SMALL (*)     All other components within normal limits   MICROSCOPIC URINALYSIS - Abnormal; Notable for the following components:    Bacteria, UA MANY (*)     All other components within normal limits   CULTURE, URINE   COMPREHENSIVE METABOLIC PANEL   HCG, SERUM, QUALITATIVE   D-DIMER, QUANTITATIVE   TROP/MYOGLOBIN           Disposition   DISPOSITION:    DISPOSITION Decision To Discharge 03/07/2021 05:58:45 PM      CLINICAL IMPRESSION:  1. Urinary tract infection without hematuria, site unspecified    2. Cyst of left ovary        PATIENT REFERRED TO:  No follow-up provider specified. DISCHARGE MEDICATIONS:  New Prescriptions    CEPHALEXIN (KEFLEX) 500 MG CAPSULE    Take 1 capsule by mouth 2 times daily for 7 days    HYDROCODONE-ACETAMINOPHEN (NORCO) 5-325 MG PER TABLET    Take 1 tablet by mouth every 6 hours as needed for Pain for up to 3 days. Intended supply: 3 days.  Take lowest dose possible to manage pain    IBUPROFEN (ADVIL;MOTRIN) 800 MG TABLET    Take 1 tablet by mouth every 6 hours as needed for Pain       Coleman German MD  Attending Emergency Physician              Coleman German MD  96/14/21 1800

## 2021-03-08 LAB
EKG ATRIAL RATE: 63 BPM
EKG P AXIS: 49 DEGREES
EKG P-R INTERVAL: 188 MS
EKG Q-T INTERVAL: 406 MS
EKG QRS DURATION: 88 MS
EKG QTC CALCULATION (BAZETT): 415 MS
EKG R AXIS: 57 DEGREES
EKG T AXIS: 42 DEGREES
EKG VENTRICULAR RATE: 63 BPM

## 2021-03-08 PROCEDURE — 93010 ELECTROCARDIOGRAM REPORT: CPT | Performed by: INTERNAL MEDICINE

## 2021-03-09 ENCOUNTER — OFFICE VISIT (OUTPATIENT)
Dept: BARIATRICS/WEIGHT MGMT | Age: 51
End: 2021-03-09
Payer: MEDICARE

## 2021-03-09 VITALS
HEIGHT: 65 IN | DIASTOLIC BLOOD PRESSURE: 82 MMHG | BODY MASS INDEX: 48.82 KG/M2 | WEIGHT: 293 LBS | SYSTOLIC BLOOD PRESSURE: 130 MMHG

## 2021-03-09 DIAGNOSIS — M25.561 BILATERAL CHRONIC KNEE PAIN: ICD-10-CM

## 2021-03-09 DIAGNOSIS — M54.9 CHRONIC BACK PAIN, UNSPECIFIED BACK LOCATION, UNSPECIFIED BACK PAIN LATERALITY: ICD-10-CM

## 2021-03-09 DIAGNOSIS — F41.9 ANXIETY AND DEPRESSION: ICD-10-CM

## 2021-03-09 DIAGNOSIS — J45.909 UNCOMPLICATED ASTHMA, UNSPECIFIED ASTHMA SEVERITY, UNSPECIFIED WHETHER PERSISTENT: ICD-10-CM

## 2021-03-09 DIAGNOSIS — L40.9 PSORIASIS: ICD-10-CM

## 2021-03-09 DIAGNOSIS — M25.562 BILATERAL CHRONIC KNEE PAIN: ICD-10-CM

## 2021-03-09 DIAGNOSIS — G47.33 OSA (OBSTRUCTIVE SLEEP APNEA): ICD-10-CM

## 2021-03-09 DIAGNOSIS — E66.01 MORBID OBESITY WITH BMI OF 60.0-69.9, ADULT (HCC): ICD-10-CM

## 2021-03-09 DIAGNOSIS — G89.29 BILATERAL CHRONIC KNEE PAIN: ICD-10-CM

## 2021-03-09 DIAGNOSIS — G89.29 CHRONIC BACK PAIN, UNSPECIFIED BACK LOCATION, UNSPECIFIED BACK PAIN LATERALITY: ICD-10-CM

## 2021-03-09 DIAGNOSIS — F32.A ANXIETY AND DEPRESSION: ICD-10-CM

## 2021-03-09 DIAGNOSIS — I10 ESSENTIAL HYPERTENSION: Primary | ICD-10-CM

## 2021-03-09 LAB
CULTURE: ABNORMAL
Lab: ABNORMAL
SPECIMEN DESCRIPTION: ABNORMAL

## 2021-03-09 PROCEDURE — 99213 OFFICE O/P EST LOW 20 MIN: CPT | Performed by: NURSE PRACTITIONER

## 2021-03-09 PROCEDURE — G8417 CALC BMI ABV UP PARAM F/U: HCPCS | Performed by: NURSE PRACTITIONER

## 2021-03-09 PROCEDURE — G8484 FLU IMMUNIZE NO ADMIN: HCPCS | Performed by: NURSE PRACTITIONER

## 2021-03-09 PROCEDURE — G8427 DOCREV CUR MEDS BY ELIG CLIN: HCPCS | Performed by: NURSE PRACTITIONER

## 2021-03-09 PROCEDURE — 1036F TOBACCO NON-USER: CPT | Performed by: NURSE PRACTITIONER

## 2021-03-09 NOTE — PROGRESS NOTES
Group Lifestyle Balance Follow-up Progress Note      Subjective     Patient is here for group medical appointment for Group Lifestyle Balance weight management program follow-up for the chronic conditions of DENISHA, HTN, Anxiety/Depression, Chronic Back Pain, Psoriasis, Asthma, Arthritis, Chronic Bilateral Knee Pain, OAB. Patient continues on the program and tolerating well. Total weight gain of 7 lbs. No current issues. Allergies: Allergies   Allergen Reactions    No Known Allergies        Past Medical History:     Past Medical History:   Diagnosis Date    Allergic rhinitis     Anxiety     Anxiety and depression     Arthritis     Asthma     no longer using inhaler or nebulizer    Diverticulitis     Headache(784.0)     Obesity     Overactive bladder     Sleep apnea     uses machine nightly   .     Past Surgical History:  Past Surgical History:   Procedure Laterality Date    CHOLECYSTECTOMY, LAPAROSCOPIC N/A 12/15/2020    CHOLECYSTECTOMY LAPAROSCOPIC ROBOTIC XI performed by Toshia Whiteside MD at [address] [facility] COLONOSCOPY N/A 8/5/2020    COLONOSCOPY POLYPECTOMY SNARE/COLD BIOPSY performed by Shelly Wang MD at [address] [place] [address] Bilateral     cataract removed with lens implants    KNEE SURGERY Right 05/16/2017    [place] KNEE SCOPE,DIAGNOSTIC Right 5/16/2017    RIGHT KNEE ARTHROSCOPY WITH MEDIAL MENISECTOMY AND CHONDROPLASTY performed by Chuckie Montano DO at [address] [facility]         Family History:  Family History   Problem Relation Age of Onset    Arthritis Father        Social History:  Social History     Socioeconomic History    Marital status:      Spouse name: Not on file    Number of children: Not on file    Years of education: Not on file    Highest education level: Not on file   Occupational History    Not on file   Social Needs    Financial resource strain: Not on file    Food insecurity     Worry: Not on file     Inability: Not on file   [employer] needs     Medical: Not on file     Non-medical: Not on file   Tobacco Use    Smoking status: Former Smoker     Packs/day: 0.60     Types: Cigarettes     Quit date: 2017     Years since quittin.1    Smokeless tobacco: Never Used   Substance and Sexual Activity    Alcohol use: Yes     Comment: rarely    Drug use: No    Sexual activity: Not on file   Lifestyle    Physical activity     Days per week: Not on file     Minutes per session: Not on file    Stress: Not on file   Relationships    Social connections     Talks on phone: Not on file     Gets together: Not on file     Attends Scientologist service: Not on file     Active member of club or organization: Not on file     Attends meetings of clubs or organizations: Not on file     Relationship status: Not on file    Intimate partner violence     Fear of current or ex partner: Not on file     Emotionally abused: Not on file     Physically abused: Not on file     Forced sexual activity: Not on file   Other Topics Concern    Not on file   Social History Narrative    Not on file       Current Medications:  Current Outpatient Medications   Medication Sig Dispense Refill    cephALEXin (KEFLEX) 500 MG capsule Take 1 capsule by mouth 2 times daily for 7 days 14 capsule 0    ibuprofen (ADVIL;MOTRIN) 800 MG tablet Take 1 tablet by mouth every 6 hours as needed for Pain 25 tablet 1    HYDROcodone-acetaminophen (NORCO) 5-325 MG per tablet Take 1 tablet by mouth every 6 hours as needed for Pain for up to 3 days. Intended supply: 3 days.  Take lowest dose possible to manage pain 12 tablet 0    triamcinolone (KENALOG) 0.025 % cream Apply to rash on face and ears daily 80 g 2    Sulfacetamide Sodium, Acne, 10 % LOTN Apply to face daily 118 mL 2    ketoconazole (NIZORAL) 2 % shampoo Apply 3-4 times weekly to scalp, leave on for five minutes prior to washing off 120 mL 2    fluocinonide (LIDEX) 0.05 % external solution Apply to scalp daily for rash 60 mL 2    omeprazole (PRILOSEC) 20 MG delayed release capsule Take 1 capsule by mouth daily 30 capsule 3    colestipol (COLESTID) 1 g tablet Take 1 tablet by mouth 2 times daily 60 tablet 3    polyethylene glycol (MIRALAX) 17 GM/SCOOP powder Use as directed by following your instructions given by office. 238 g 0    bisacodyl (BISACODYL) 5 MG EC tablet Please take as directed the day prior to your colonoscopy. Follow instructions given at physician office. 4 tablet 0    magnesium citrate solution Take 296 mLs by mouth once for 1 dose 296 mL 0    ondansetron (ZOFRAN) 4 MG tablet Take 1 tablet by mouth 3 times daily as needed for Nausea or Vomiting 15 tablet 0    dicyclomine (BENTYL) 20 MG tablet Take 1 tablet by mouth every 8 hours as needed      acetaminophen (TYLENOL) 500 MG tablet Take 1,000 mg by mouth every 6 hours as needed for Pain      buPROPion HBr (APLENZIN) 174 MG TB24 Take 1 tablet by mouth daily       escitalopram (LEXAPRO) 20 MG tablet Take 20 mg by mouth daily      Compression Stockings MISC 30- 40 mm/hg 2 each 1     Current Facility-Administered Medications   Medication Dose Route Frequency Provider Last Rate Last Admin    albuterol (PROVENTIL) nebulizer solution 2.5 mg  2.5 mg Nebulization 4x daily Alejandro De La Cruz MD           Vital Signs:  /82 (Site: Right Upper Arm, Position: Sitting, Cuff Size: Large Adult)   Ht 5' 5\" (1.651 m)   Wt (!) 400 lb (181.4 kg)   BMI 66.56 kg/m²     BMI/Height/Weight:  Body mass index is 66.56 kg/m². Review of Systems  Constitutional: Weight gain      Objective:      Physical Exam   Vital signs reviewed. General Appearance: Well-developed and well-nourished. No acute distress. Skin: Warm, dry. Head: Normocephalic. Pulmonary/Chest: Normal respiratory effort. Musculoskeletal: Movement x4. Neurological:  Alert and oriented.          Individual goal for this encounter:  I need both knees replaced and I need to get to at least 50 BMI for approval for surgery. After surgery I hopefully will feel better when I walk and then exercise will bd bearable. I have terrible eating habits and need help and support. X? Vital signs reviewed and discussed with patient. ? Labs/EKG reviewed and discussed with patient. ? Blood sugar log reviewed and discussed with patient. ? Physical activity discussed. ? Medication changes recommended. ? Smoking cessation discussed. X ? Specific questions/concerns addressed. Specific group medical question(s) addressed in this encounter:  Discussion about Vitamin D.      Group discussion topic for this encounter:     ? Welcome Cynda Pean   ? Be a Fat & Calorie    ? Healthy Eating   ? Move Those Muscles   ? Tip the Calorie Balance   ? Take charge of What's Around You   ? Problem Solving   ? Step Up Your Physical Activity Plan   ? Manage Slips and Self-Defeating Thoughts   ? [address]   ? Make Social Cues Work for [place]   ? Ways to Stay Motivated   ? Preparing for Long Term Self-Management   X? Take Charge of Your Lifestyle   ? Mindful Eating, Mindful Movement   ? Manage Your Stress   ? Sit Less for Health   ? More Volume, Fewer Calories   ? Stay Active   ? Balance Your Thoughts   ? Heart Health    ? Looking Back and Looking Forward    Total time:  90 minutes, greater than 50% of visit spent in group counseling/education. Assessment:       Diagnosis Orders   1. Essential hypertension     2. DENISHA (obstructive sleep apnea)     3. Anxiety and depression     4. Morbid obesity with BMI of 60.0-69.9, adult ([facility] [address]     5. Uncomplicated asthma, unspecified asthma severity, unspecified whether persistent     6. Psoriasis     7. Chronic back pain, unspecified back location, unspecified back pain laterality     8. Bilateral chronic knee pain         Plan:      Track food and weight. Return to clinic as per group medical appointment schedule.        Follow-up:  Return in about 1 month (around 4/9/2021). Orders:  No orders of the defined types were placed in this encounter. Prescriptions:  No orders of the defined types were placed in this encounter.       Electronically signed by:  Karma Manuel CNP

## 2021-03-11 ENCOUNTER — OFFICE VISIT (OUTPATIENT)
Dept: BARIATRICS/WEIGHT MGMT | Age: 51
End: 2021-03-11
Payer: MEDICARE

## 2021-03-11 VITALS
DIASTOLIC BLOOD PRESSURE: 72 MMHG | HEIGHT: 65 IN | HEART RATE: 60 BPM | SYSTOLIC BLOOD PRESSURE: 128 MMHG | TEMPERATURE: 97.2 F | WEIGHT: 293 LBS | BODY MASS INDEX: 48.82 KG/M2

## 2021-03-11 DIAGNOSIS — K21.9 GASTROESOPHAGEAL REFLUX DISEASE WITHOUT ESOPHAGITIS: ICD-10-CM

## 2021-03-11 DIAGNOSIS — G47.33 OSA (OBSTRUCTIVE SLEEP APNEA): Primary | ICD-10-CM

## 2021-03-11 DIAGNOSIS — E66.01 MORBID OBESITY WITH BMI OF 60.0-69.9, ADULT (HCC): ICD-10-CM

## 2021-03-11 PROCEDURE — 1036F TOBACCO NON-USER: CPT | Performed by: SURGERY

## 2021-03-11 PROCEDURE — G8427 DOCREV CUR MEDS BY ELIG CLIN: HCPCS | Performed by: SURGERY

## 2021-03-11 PROCEDURE — 99204 OFFICE O/P NEW MOD 45 MIN: CPT | Performed by: SURGERY

## 2021-03-11 PROCEDURE — G8484 FLU IMMUNIZE NO ADMIN: HCPCS | Performed by: SURGERY

## 2021-03-11 PROCEDURE — G8417 CALC BMI ABV UP PARAM F/U: HCPCS | Performed by: SURGERY

## 2021-03-11 NOTE — PROGRESS NOTES
Pharmacy Progress Note       ASSESSMENT:    + Urine Culture          PLAN    D/C on Keflex. Is sensitive. No recommended changes. Kenny Souza.  Constantin Pierre, PharmD, [address]  Emergency Department and Critical Care Pharmacist

## 2021-03-11 NOTE — PATIENT INSTRUCTIONS
Schedule a Vaccine  When you qualify to receive the vaccine, call the [facility]) COVID-19 Vaccination Hotline to schedule your appointment or to get additional information about the [facility]) locations which are offering the COVID-19 vaccine. To be 94% effective, it's important that you receive two doses of one of the COVID-19 vaccines. -If you are receiving the Bernard Peter vaccine, your second shot will be scheduled as close to 21 days after the first shot as possible. -If you are receiving the Moderna vaccine, your second shot will be scheduled as close to 28 days after the first shot as possible. [facility]) COVID-19 Vaccination Hotline: [phone]    Links to [facility]) website and [facility] website:    [employer]/[facility]-monitoring-[email or web address]-[email or web address]-vaccine/[email or web address]    [email or web address]

## 2021-03-11 NOTE — LETTER
Visit Date: 3/11/2021    Patient: Luis Alberto Ramirez  YOB: 1970    Dear Dr. Pau Mujica MD,      I had the pleasure of seeing Rocky Porter in the office today for a consult for weight loss surgery. Her current Weight: (!) 401 lb (181.9 kg), which gives her a Body mass index is 67.77 kg/m². .  We had a long discussion regarding surgical options for weight loss and improvement in co-morbidities. She is considering a bariatric  procedure. We will start the preoperative workup, which includes bloodwork, psychological evaluation, support group attendance, and preoperative medical clearance from you. We will also initiate pre-certification for surgery, which requires a letter of medical necessity from you and often office notes documenting a weight history and co-morbidities. Rocky Porter will require 3 months of medically supervised visits as specified by the patient's insurance. Thank you for allowing me to participate in the care of your patient. If you have any questions or concerns, please do not hesitate to call.       Sincerely,     Tho Dozier DO  Director of Bariatric and Minimally Invasive Surgery  [facility]  [address], [facility], [address] [place] [address] Lesli: [phone]  F: [phone]

## 2021-03-21 NOTE — PROGRESS NOTES
[facility]  [facility]  [address] [address]  Dept: [phone]    SURGICAL WEIGHT MANAGEMENT PROGRAM  PROGRESS NOTE INITIAL EVALUATION     Patient: Eyad Whitaker        Service Date: 3/11/2021      HPI:     Chief Complaint   Patient presents with    Bariatric, Initial Visit    Weight Loss       The patient is a pleasant 52y.o. year old female  with morbid obesity, who stands Height: 5' 4.5\" (163.8 cm) tall with a weight of Weight: (!) 401 lb (181.9 kg) , resulting in a BMI of Body mass index is 67.77 kg/m². . The patient suffers from multiple co-morbidities as a result of morbid obesity, including: Hypertension, Asthma, Obstructive Sleep Apnea, GERD, Degenerative Joint Disease (DJD) and Anxiety. She has suffered from obesity for many years. The patient denies  a history of myocardial infarction, deep vein thrombosis, pulmonary embolism, renal failure, hepatic failure and stroke. The patient has failed multiple attempts at non-surgical weight loss, and is now seeking surgical intervention to promote permanent and consistent weight loss. She  has chosen Sleeve Gastrectomy. She is well educated regarding it, as she has recently viewed our weight loss surgery informational seminar .      Medical History:  Past Medical History:   Diagnosis Date    Allergic rhinitis     Anxiety     Anxiety and depression     Arthritis     Asthma     no longer using inhaler or nebulizer    Diverticulitis     Headache(784.0)     HTN (hypertension)     Obesity     Overactive bladder     Sleep apnea     uses machine nightly       Surgical History:  Past Surgical History:   Procedure Laterality Date    CATARACT REMOVAL      CHOLECYSTECTOMY, LAPAROSCOPIC N/A 12/15/2020    CHOLECYSTECTOMY LAPAROSCOPIC ROBOTIC XI performed by Rosa ePrrin MD at [address] [facility] N/A 8/5/2020    COLONOSCOPY POLYPECTOMY SNARE/COLD BIOPSY performed by Unique Edwards MD at [facility]    EYE SURGERY Bilateral     cataract removed with lens implants    KNEE SURGERY Right 2017    [place] KNEE SCOPE,DIAGNOSTIC Right 2017    RIGHT KNEE ARTHROSCOPY WITH MEDIAL MENISECTOMY AND CHONDROPLASTY performed by Mariola Morales DO at [place] [address]         Family History:      Problem Relation Age of Onset    Arthritis Father        Social History:   Social History     Tobacco Use    Smoking status: Former Smoker     Packs/day: 0.60     Types: Cigarettes     Quit date:      Years since quittin.2    Smokeless tobacco: Never Used   Substance Use Topics    Alcohol use: Yes     Comment: rarely    Drug use: No       Current Med List:  Current Outpatient Medications   Medication Sig Dispense Refill    ibuprofen (ADVIL;MOTRIN) 800 MG tablet Take 1 tablet by mouth every 6 hours as needed for Pain 25 tablet 1    omeprazole (PRILOSEC) 20 MG delayed release capsule Take 1 capsule by mouth daily 30 capsule 3    ondansetron (ZOFRAN) 4 MG tablet Take 1 tablet by mouth 3 times daily as needed for Nausea or Vomiting 15 tablet 0    dicyclomine (BENTYL) 20 MG tablet Take 1 tablet by mouth every 8 hours as needed      acetaminophen (TYLENOL) 500 MG tablet Take 1,000 mg by mouth every 6 hours as needed for Pain      buPROPion HBr (APLENZIN) 174 MG TB24 Take 1 tablet by mouth daily       escitalopram (LEXAPRO) 20 MG tablet Take 20 mg by mouth daily      triamcinolone (KENALOG) 0.025 % cream Apply to rash on face and ears daily 80 g 2    Sulfacetamide Sodium, Acne, 10 % LOTN Apply to face daily 118 mL 2    ketoconazole (NIZORAL) 2 % shampoo Apply 3-4 times weekly to scalp, leave on for five minutes prior to washing off 120 mL 2    fluocinonide (LIDEX) 0.05 % external solution Apply to scalp daily for rash 60 mL 2    magnesium citrate solution Take 296 mLs by mouth once for 1 dose 296 mL 0    Compression Stockings MISC 30- 40 mm/hg 2 each 1     Current Facility-Administered Medications   Medication Dose Route Frequency Provider Last Rate Last Admin    albuterol (PROVENTIL) nebulizer solution 2.5 mg  2.5 mg Nebulization 4x daily Nestor Suárez MD            Allergies   Allergen Reactions    No Known Allergies        SOCIAL:      This patient is alone for the evaluation today. [] HIV Risk Factors (i.e.) intravenous drug abuser; at risk sexual behavior; received blood products    [] TB Risk Factors (i.e.) Medically underserved, institutional care, foreign born, endemic area; exposure to active case    [] Hepatitis B&C Risk Factors (i.e.) Received blood transfusion prior to 1992; recreational drug use; high risk sexual behaviors; tattoos or body piercings; contact with blood or needle sticks in the workplace    Comprehension    Ability to grasp concepts and respond to questions:   [x] High   [] Medium   [] Low    Motivation    [x] Asks Questions; eager to learn   [] Needs education   [] Extreme anxiety    [] uncooperative   [] Denies need for education    English Speaking Ability    [x] Speaks English well   [x] Reads English well   [x] Understands spoken [employer]    [] Understands written English   [] No need for interpretive support      [] Might benefit from interpretive support   []  required for all services     REVIEW OF SYSTEMS: (Negative unless marked otherwise)       Do you or have you had any of the following?   Cardiovascular YES NO Respiratory YES NO   High Blood Pressure   []   [] COPD   []   []   Heart Attack   []   [] TB/Positive skin Test   []   []   Congestive Heart Failure   []   [] Obstructive Sleep Apnea   []   []   Coronary Artery Disease   []   [] Asthma   []   [x]   Circulation Problems   []   []      Activity Intolerance   []   [] Gastrointestinal YES NO   Peripheral Vascular Disease   []   [] Gastric Problems   []   []        Colorectal problems   []   []   Hematological YES NO Ulcer disease   []   []   Bleeding Tendencies   []   [] Liver disease   []   []   Blood Transfusion last 30d   []   [] Gallstones   []   []   Anemia   []   [] Refulx or Heartburn   []   []   Blood Clots   []   []      High Cholesterol   []   [] Muscoloskeletal YES NO   High Triglycerides   []   [] Joint Limitations   []   []      Muscle Weakness   []   []   Eyes, Ears, Nose, Throat YES NO Multiple Sclerosis   []   []   Cataracts   []   [] Arthritis   []   []   Glasses   []   []      Blurred Vision   []   [] Cancer   []   []   Hearing Aids   []   [] Type:     Ringing in Ears   []   []      Difficulty Swallowing   []   [] Encodrine YES NO      Diabetes   []   []   Neurological YES NO Thyroid   []   []   Stroke   []   []      Seizure   []   [] Psychiatric Disorder YES NO   Dizziness/Blackouts/Fainting   []   [] Depression   []   []   Memory Impairement   []   [] Bipolar   []   []   Parkinson's   []   [] Anxiety disorder   []   []           Genitourinary/Gyn YES NO Skin Intact   [x]   []   Urinary Infection   []   []      Stones   []   [] Sleep   YES NO   Kidney Disease   []   [] Excessive daytime sleepiness   [x]   []   Incontinent   []   [] Snoring   [x]   []   Irregular menstrual cycles   []   [] Unrefreshed sleep   [x]   []   Possibly Pregnant? []     [] Other:      Date of LMP:   Preferred location:            PRESENT ILLNESS:     Weight Parameters  Weight (!) 401 lb (181.9 kg)   Height 5' 4.5\" (1.638 m)   BMI Body mass index is 67.77 kg/m². IBW     EBW               IMMUNIZATION STATUS    There is no immunization history on file for this patient.     FALLS ASSESSMENT    [] LOW RISK FOR FALLS    [] MODERATE RISK FOR FALLS    [] Difficulty walking/selfcare    [] Falls in the past 2 months    [] Suspicion of Clinician    [] Other:      SMOKING CESSATION     [] Not needed     [] Instructed to stop smoking    [] Pamphlet community resources given     VTE SCREEN    [] Family hx DVT/PE  /   [] Personal hx of DVT/PE    [x] Denies any family or personal hx of DVT/PE    Physician Review    [x] Past medical, family, & social history reviewed and discussed with patient. Review of surgery and post-surgical changes (by surgeon for surgical patients only)    [x] Lifelong diet expectations reviewed with patient    [x] Need for lifelong vitamin supplementation reviewed with patient    PHYSICAL EXAMINATION:      /72   Pulse 60   Temp 97.2 °F (36.2 °C)   Ht 5' 4.5\" (1.638 m)   Wt (!) 401 lb (181.9 kg)   BMI 67.77 kg/m²     Constitutional:  Vital signs are normal. The patient appears well-developed   HEENT:      Head: Normocephalic. Atraumatic     Eyes: pupils are equal and reactive. No scleral icterus is present. Neck: No mass and no thyromegaly present. Cardiovascular: Normal rate, regular rhythm, S1 normal and S2 normal.  Bilateral pulses present. Pulmonary/Chest: Effort normal and breath sounds normal. No retractions. Abdominal: Soft. Normal appearance. There is no organomegaly. No tenderness. There is no rigidity, no rebound, no guarding and no Alejandro's sign. Musculoskeletal:      Right lower leg: Normal. No tenderness and no edema. Left lower leg: Normal. No tenderness and no edema. Lymphadenopathy:     No cervical adenopathy, No Exrtemity Adenopathy. Neurological: The patient is alert and oriented. Moving all four extremities equally, sensation grossly intact bilateral.  Skin: Skin is warm, dry and intact. Psychiatric: The patient has a normal mood and affect. Speech is normal and behavior is normal. Judgment and thought content normal. Cognition and memory are normal.     RECOMMENDATIONS:     We spent a great deal of time discussing the risks and benefits of Sleeve Gastrectomy, including but not limited to injury to intra-abdominal organs, breakdown of the gastric staple line, the need for re-operative therapy,  prolonged hospitalization,  mechanical ventilation,  and death.  We discussed the possibility of bleeding, the need for blood transfusions, blood

## 2021-03-22 ENCOUNTER — TELEPHONE (OUTPATIENT)
Dept: GASTROENTEROLOGY | Age: 51
End: 2021-03-22

## 2021-03-24 ENCOUNTER — NURSE ONLY (OUTPATIENT)
Dept: BARIATRICS/WEIGHT MGMT | Age: 51
End: 2021-03-24

## 2021-03-24 NOTE — PROGRESS NOTES
Medical Nutrition Therapy  Initial Nutrition Assessment for Metabolic/ Bariatric Surgery  Required insurance visit prior to surgery:  3  Shared with patient the importance of documenting exercise and staying at or below start weight during visits. Pt reports:     Heather Terry is a 52 y.o. female with a date of birth of 1970. There were no vitals filed for this visit. BMI: There is no height or weight on file to calculate BMI. Obesity Classification: Class III    Weight History: Wt Readings from Last 3 Encounters:   03/11/21 (!) 401 lb (181.9 kg)   03/09/21 (!) 400 lb (181.4 kg)   03/07/21 (!) 394 lb (178.7 kg)        How does your weight affect your daily activities?joint pain      What would be different in your life if you felt healthier and fit? Why is that important to you now? Do you drink alcohol? . NO    Do you use tobacco in the form of cigarettes, cigars, chew or any vapor appliance? No    Weight History      Moshe Morataya's highest adult weight was 408 lbs at age 52. Moshe Morataya's lowest adult weight was 185 lbs at age 27. Physical Activity  Do you participate in a structured exercise program, step counting or regular physical activity? no      Instructions and exercise logs were provided to patient today see goal sheet and plan. Previous weight loss attempts  Patient has participated in the following weight loss programs:   lowfat diet, RD counseling, hypnosis and self directed calorie restriction      Nutrition History  Have you ever been diagnosed with an eating disorder? No  Have you ever had problems tolerating a multivitamin or mineral supplement?no  Have you ever been diagnosed with a vitamin or mineral deficiency? no   P Patient does not have Taoist/cultural food concerns. Patient dines out to a sit down restaurant 1 times per week. Patient dines out to a fast food restaurant 1 times per week. Patient does have grazing.    Patient does have

## 2021-03-31 ENCOUNTER — ANESTHESIA EVENT (OUTPATIENT)
Dept: OPERATING ROOM | Age: 51
End: 2021-03-31
Payer: MEDICARE

## 2021-04-03 ENCOUNTER — HOSPITAL ENCOUNTER (OUTPATIENT)
Dept: LAB | Age: 51
Setting detail: SPECIMEN
Discharge: HOME | End: 2021-04-03
Payer: MEDICARE

## 2021-04-03 DIAGNOSIS — Z01.818 PREOP TESTING: Primary | ICD-10-CM

## 2021-04-03 PROCEDURE — U0003 INFECTIOUS AGENT DETECTION BY NUCLEIC ACID (DNA OR RNA); SEVERE ACUTE RESPIRATORY SYNDROME CORONAVIRUS 2 (SARS-COV-2) (CORONAVIRUS DISEASE [COVID-19]), AMPLIFIED PROBE TECHNIQUE, MAKING USE OF HIGH THROUGHPUT TECHNOLOGIES AS DESCRIBED BY CMS-2020-01-R: HCPCS

## 2021-04-03 PROCEDURE — U0005 INFEC AGEN DETEC AMPLI PROBE: HCPCS

## 2021-04-04 LAB
SARS-COV-2: NORMAL
SARS-COV-2: NOT DETECTED
SOURCE: NORMAL

## 2021-04-05 ENCOUNTER — TELEPHONE (OUTPATIENT)
Dept: PRIMARY CARE CLINIC | Age: 51
End: 2021-04-05

## 2021-04-08 ENCOUNTER — ANESTHESIA (OUTPATIENT)
Dept: OPERATING ROOM | Age: 51
End: 2021-04-08
Payer: MEDICARE

## 2021-04-08 ENCOUNTER — HOSPITAL ENCOUNTER (OUTPATIENT)
Age: 51
Setting detail: OUTPATIENT SURGERY
Discharge: HOME | End: 2021-04-08
Attending: INTERNAL MEDICINE | Admitting: INTERNAL MEDICINE
Payer: MEDICARE

## 2021-04-08 VITALS
OXYGEN SATURATION: 100 % | HEIGHT: 65 IN | TEMPERATURE: 98.5 F | RESPIRATION RATE: 20 BRPM | BODY MASS INDEX: 48.82 KG/M2 | WEIGHT: 293 LBS | HEART RATE: 77 BPM | SYSTOLIC BLOOD PRESSURE: 122 MMHG | DIASTOLIC BLOOD PRESSURE: 84 MMHG

## 2021-04-08 VITALS
SYSTOLIC BLOOD PRESSURE: 151 MMHG | RESPIRATION RATE: 15 BRPM | DIASTOLIC BLOOD PRESSURE: 87 MMHG | OXYGEN SATURATION: 98 %

## 2021-04-08 LAB — HCG, PREGNANCY URINE (POC): NEGATIVE

## 2021-04-08 PROCEDURE — 88313 SPECIAL STAINS GROUP 2: CPT

## 2021-04-08 PROCEDURE — 3609010300 HC COLONOSCOPY W/BIOPSY SINGLE/MULTIPLE: Performed by: INTERNAL MEDICINE

## 2021-04-08 PROCEDURE — 2709999900 HC NON-CHARGEABLE SUPPLY: Performed by: INTERNAL MEDICINE

## 2021-04-08 PROCEDURE — 2580000003 HC RX 258: Performed by: ANESTHESIOLOGY

## 2021-04-08 PROCEDURE — 6360000002 HC RX W HCPCS: Performed by: NURSE ANESTHETIST, CERTIFIED REGISTERED

## 2021-04-08 PROCEDURE — 43239 EGD BIOPSY SINGLE/MULTIPLE: CPT | Performed by: INTERNAL MEDICINE

## 2021-04-08 PROCEDURE — 3609012400 HC EGD TRANSORAL BIOPSY SINGLE/MULTIPLE: Performed by: INTERNAL MEDICINE

## 2021-04-08 PROCEDURE — 2500000003 HC RX 250 WO HCPCS: Performed by: NURSE ANESTHETIST, CERTIFIED REGISTERED

## 2021-04-08 PROCEDURE — 3700000000 HC ANESTHESIA ATTENDED CARE: Performed by: INTERNAL MEDICINE

## 2021-04-08 PROCEDURE — 3700000001 HC ADD 15 MINUTES (ANESTHESIA): Performed by: INTERNAL MEDICINE

## 2021-04-08 PROCEDURE — 7100000010 HC PHASE II RECOVERY - FIRST 15 MIN: Performed by: INTERNAL MEDICINE

## 2021-04-08 PROCEDURE — 81025 URINE PREGNANCY TEST: CPT

## 2021-04-08 PROCEDURE — 88342 IMHCHEM/IMCYTCHM 1ST ANTB: CPT

## 2021-04-08 PROCEDURE — 45380 COLONOSCOPY AND BIOPSY: CPT | Performed by: INTERNAL MEDICINE

## 2021-04-08 PROCEDURE — 88305 TISSUE EXAM BY PATHOLOGIST: CPT

## 2021-04-08 PROCEDURE — 6370000000 HC RX 637 (ALT 250 FOR IP): Performed by: NURSE ANESTHETIST, CERTIFIED REGISTERED

## 2021-04-08 PROCEDURE — 7100000011 HC PHASE II RECOVERY - ADDTL 15 MIN: Performed by: INTERNAL MEDICINE

## 2021-04-08 RX ORDER — DIPHENHYDRAMINE HYDROCHLORIDE 50 MG/ML
12.5 INJECTION INTRAMUSCULAR; INTRAVENOUS
Status: DISCONTINUED | OUTPATIENT
Start: 2021-04-08 | End: 2021-04-08 | Stop reason: HOSPADM

## 2021-04-08 RX ORDER — GLYCOPYRROLATE 1 MG/5 ML
SYRINGE (ML) INTRAVENOUS PRN
Status: DISCONTINUED | OUTPATIENT
Start: 2021-04-08 | End: 2021-04-08 | Stop reason: SDUPTHER

## 2021-04-08 RX ORDER — PROPOFOL 10 MG/ML
INJECTION, EMULSION INTRAVENOUS PRN
Status: DISCONTINUED | OUTPATIENT
Start: 2021-04-08 | End: 2021-04-08 | Stop reason: SDUPTHER

## 2021-04-08 RX ORDER — SODIUM CHLORIDE 0.9 % (FLUSH) 0.9 %
10 SYRINGE (ML) INJECTION EVERY 12 HOURS SCHEDULED
Status: DISCONTINUED | OUTPATIENT
Start: 2021-04-08 | End: 2021-04-08 | Stop reason: HOSPADM

## 2021-04-08 RX ORDER — HYDRALAZINE HYDROCHLORIDE 20 MG/ML
5 INJECTION INTRAMUSCULAR; INTRAVENOUS EVERY 10 MIN PRN
Status: DISCONTINUED | OUTPATIENT
Start: 2021-04-08 | End: 2021-04-08 | Stop reason: HOSPADM

## 2021-04-08 RX ORDER — HYDROCODONE BITARTRATE AND ACETAMINOPHEN 5; 325 MG/1; MG/1
2 TABLET ORAL PRN
Status: DISCONTINUED | OUTPATIENT
Start: 2021-04-08 | End: 2021-04-08 | Stop reason: HOSPADM

## 2021-04-08 RX ORDER — MEPERIDINE HYDROCHLORIDE 50 MG/ML
12.5 INJECTION INTRAMUSCULAR; INTRAVENOUS; SUBCUTANEOUS EVERY 5 MIN PRN
Status: DISCONTINUED | OUTPATIENT
Start: 2021-04-08 | End: 2021-04-08 | Stop reason: HOSPADM

## 2021-04-08 RX ORDER — SODIUM CHLORIDE 0.9 % (FLUSH) 0.9 %
10 SYRINGE (ML) INJECTION PRN
Status: DISCONTINUED | OUTPATIENT
Start: 2021-04-08 | End: 2021-04-08 | Stop reason: HOSPADM

## 2021-04-08 RX ORDER — SODIUM CHLORIDE, SODIUM LACTATE, POTASSIUM CHLORIDE, CALCIUM CHLORIDE 600; 310; 30; 20 MG/100ML; MG/100ML; MG/100ML; MG/100ML
INJECTION, SOLUTION INTRAVENOUS CONTINUOUS
Status: DISCONTINUED | OUTPATIENT
Start: 2021-04-08 | End: 2021-04-08 | Stop reason: HOSPADM

## 2021-04-08 RX ORDER — MORPHINE SULFATE 1 MG/ML
1 INJECTION, SOLUTION EPIDURAL; INTRATHECAL; INTRAVENOUS EVERY 5 MIN PRN
Status: DISCONTINUED | OUTPATIENT
Start: 2021-04-08 | End: 2021-04-08 | Stop reason: HOSPADM

## 2021-04-08 RX ORDER — PROPOFOL 10 MG/ML
INJECTION, EMULSION INTRAVENOUS CONTINUOUS PRN
Status: DISCONTINUED | OUTPATIENT
Start: 2021-04-08 | End: 2021-04-08 | Stop reason: SDUPTHER

## 2021-04-08 RX ORDER — SODIUM CHLORIDE 9 MG/ML
INJECTION, SOLUTION INTRAVENOUS CONTINUOUS
Status: DISCONTINUED | OUTPATIENT
Start: 2021-04-08 | End: 2021-04-08 | Stop reason: HOSPADM

## 2021-04-08 RX ORDER — HYDROCODONE BITARTRATE AND ACETAMINOPHEN 5; 325 MG/1; MG/1
1 TABLET ORAL PRN
Status: DISCONTINUED | OUTPATIENT
Start: 2021-04-08 | End: 2021-04-08 | Stop reason: HOSPADM

## 2021-04-08 RX ORDER — FENTANYL CITRATE 50 UG/ML
25 INJECTION, SOLUTION INTRAMUSCULAR; INTRAVENOUS EVERY 5 MIN PRN
Status: DISCONTINUED | OUTPATIENT
Start: 2021-04-08 | End: 2021-04-08 | Stop reason: HOSPADM

## 2021-04-08 RX ORDER — PROMETHAZINE HYDROCHLORIDE 25 MG/ML
6.25 INJECTION, SOLUTION INTRAMUSCULAR; INTRAVENOUS
Status: DISCONTINUED | OUTPATIENT
Start: 2021-04-08 | End: 2021-04-08 | Stop reason: HOSPADM

## 2021-04-08 RX ORDER — LIDOCAINE HYDROCHLORIDE 20 MG/ML
INJECTION, SOLUTION INFILTRATION; PERINEURAL PRN
Status: DISCONTINUED | OUTPATIENT
Start: 2021-04-08 | End: 2021-04-08 | Stop reason: SDUPTHER

## 2021-04-08 RX ORDER — ONDANSETRON 2 MG/ML
4 INJECTION INTRAMUSCULAR; INTRAVENOUS
Status: DISCONTINUED | OUTPATIENT
Start: 2021-04-08 | End: 2021-04-08 | Stop reason: HOSPADM

## 2021-04-08 RX ADMIN — PROPOFOL 70 MG: 10 INJECTION, EMULSION INTRAVENOUS at 09:14

## 2021-04-08 RX ADMIN — PROPOFOL 30 MG: 10 INJECTION, EMULSION INTRAVENOUS at 09:16

## 2021-04-08 RX ADMIN — BENZOCAINE, BUTAMBEN, AND TETRACAINE HYDROCHLORIDE 1 SPRAY: .028; .004; .004 AEROSOL, SPRAY TOPICAL at 09:08

## 2021-04-08 RX ADMIN — Medication 0.2 MG: at 09:14

## 2021-04-08 RX ADMIN — PROPOFOL 50 MCG/KG/MIN: 10 INJECTION, EMULSION INTRAVENOUS at 09:14

## 2021-04-08 RX ADMIN — LIDOCAINE HYDROCHLORIDE 100 MG: 20 INJECTION, SOLUTION INFILTRATION; PERINEURAL at 09:14

## 2021-04-08 RX ADMIN — SODIUM CHLORIDE, POTASSIUM CHLORIDE, SODIUM LACTATE AND CALCIUM CHLORIDE: 600; 310; 30; 20 INJECTION, SOLUTION INTRAVENOUS at 08:58

## 2021-04-08 ASSESSMENT — PAIN - FUNCTIONAL ASSESSMENT: PAIN_FUNCTIONAL_ASSESSMENT: 0-10

## 2021-04-08 ASSESSMENT — PULMONARY FUNCTION TESTS
PIF_VALUE: 1
PIF_VALUE: 0
PIF_VALUE: 0
PIF_VALUE: 1
PIF_VALUE: 1
PIF_VALUE: 0
PIF_VALUE: 1
PIF_VALUE: 1
PIF_VALUE: 0
PIF_VALUE: 0
PIF_VALUE: 1
PIF_VALUE: 0

## 2021-04-08 ASSESSMENT — PAIN SCALES - GENERAL
PAINLEVEL_OUTOF10: 0

## 2021-04-08 NOTE — ANESTHESIA PRE PROCEDURE
Department of Anesthesiology  Preprocedure Note       Name:  Wood Tang   Age:  52 y.o.  :  1970                                          MRN:  [id number]         Date:  2021      Surgeon: Kurt Olivas):  Maris Rao MD    Procedure: Procedure(s):  EGD BIOPSY  COLONOSCOPY DIAGNOSTIC    Medications prior to admission:   Prior to Admission medications    Medication Sig Start Date End Date Taking?  Authorizing Provider   ibuprofen (ADVIL;MOTRIN) 800 MG tablet Take 1 tablet by mouth every 6 hours as needed for Pain 8/3/46   Gretel Guthrie MD   triamcinolone (KENALOG) 0.025 % cream Apply to rash on face and ears daily 21   Castro Valle MD   Sulfacetamide Sodium, Acne, 10 % LOTN Apply to face daily 21   Castro Valle MD   ketoconazole (NIZORAL) 2 % shampoo Apply 3-4 times weekly to scalp, leave on for five minutes prior to washing off 21   Arty Severs L Heuring, MD   fluocinonide (LIDEX) 0.05 % external solution Apply to scalp daily for rash 21   Castro Valle MD   omeprazole (PRILOSEC) 20 MG delayed release capsule Take 1 capsule by mouth daily 2/10/21   Maris Rao MD   magnesium citrate solution Take 296 mLs by mouth once for 1 dose 2/10/21 2/10/21  Maris Rao MD   ondansetron (ZOFRAN) 4 MG tablet Take 1 tablet by mouth 3 times daily as needed for Nausea or Vomiting 12/15/20   Susan Young DO   dicyclomine (BENTYL) 20 MG tablet Take 1 tablet by mouth every 8 hours as needed 20   Historical Provider, MD   acetaminophen (TYLENOL) 500 MG tablet Take 1,000 mg by mouth every 6 hours as needed for Pain    Historical Provider, MD   buPROPion HBr (APLENZIN) 174 MG TB24 Take 1 tablet by mouth daily     Historical Provider, MD   escitalopram (LEXAPRO) 20 MG tablet Take 20 mg by mouth daily    Historical Provider, MD   Compression Stockings MISC 30- 40 mm/hg 18   Ruth Kilpatrick DPM       Current medications:    Current Facility-Administered Medications   Medication Dose Route Frequency Provider Last Rate Last Admin    0.9 % sodium chloride infusion   Intravenous Continuous Shana Whitman MD        lactated ringers infusion   Intravenous Continuous Shana Whitman MD        sodium chloride flush 0.9 % injection 10 mL  10 mL Intravenous 2 times per day Shana Whitman MD        sodium chloride flush 0.9 % injection 10 mL  10 mL Intravenous PRN Shana Whitman MD           Allergies:     Allergies   Allergen Reactions    No Known Allergies        Problem List:    Patient Active Problem List   Diagnosis Code    Headache R51.9    Overactive bladder N32.81    Osteoarthritis M19.90    Chronic rhinitis J31.0    OAB (overactive bladder) N32.81    Asthma J45.909    Primary osteoarthritis of right knee M17.11    Psoriasis L40.9    Seborrheic dermatitis L21.9    Essential hypertension I10    DENISHA (obstructive sleep apnea) G47.33    Anxiety and depression F41.9, F32.9    Chronic back pain M54.9, G89.29    Morbid obesity with BMI of 60.0-69.9, adult (HCC) E66.01, Z68.44    Bilateral chronic knee pain M25.561, M25.562, G89.29    Diverticulitis of large intestine without perforation or abscess without bleeding K57.32       Past Medical History:        Diagnosis Date    Allergic rhinitis     Anxiety     Anxiety and depression     Arthritis     Asthma     no longer using inhaler or nebulizer    Diverticulitis     Headache(784.0)     HTN (hypertension)     Obesity     Overactive bladder     Sleep apnea     uses machine nightly       Past Surgical History:        Procedure Laterality Date    CATARACT REMOVAL      CHOLECYSTECTOMY, LAPAROSCOPIC N/A 12/15/2020    CHOLECYSTECTOMY LAPAROSCOPIC ROBOTIC XI performed by Juan Muñiz MD at [facility] COLONOSCOPY N/A 8/5/2020    COLONOSCOPY POLYPECTOMY SNARE/COLD BIOPSY performed by Dania Metz MD at [address] [place] [address] Bilateral     cataract removed with lens implants    KNEE SURGERY Right 05/16/2017    IN KNEE SCOPE,DIAGNOSTIC Right 2017    RIGHT KNEE ARTHROSCOPY WITH MEDIAL MENISECTOMY AND CHONDROPLASTY performed by Claire Triplett DO at [facility] History:    Social History     Tobacco Use    Smoking status: Former Smoker     Packs/day: 0.60     Types: Cigarettes     Quit date:      Years since quittin.2    Smokeless tobacco: Never Used   Substance Use Topics    Alcohol use: Yes     Comment: rarely                                Counseling given: Not Answered      Vital Signs (Current):   Vitals:    21 0828   Weight: (!) 395 lb (179.2 kg)   Height: 5' 4.5\" (1.638 m)                                              BP Readings from Last 3 Encounters:   21 128/72   21 130/82   21 (!) 169/93       NPO Status: Time of last liquid consumption:                         Time of last solid consumption:                         Date of last liquid consumption: 21                        Date of last solid food consumption: 21    BMI:   Wt Readings from Last 3 Encounters:   21 (!) 395 lb (179.2 kg)   21 (!) 401 lb (181.9 kg)   21 (!) 400 lb (181.4 kg)     Body mass index is 66.75 kg/m². CBC:   Lab Results   Component Value Date    WBC 7.4 2021    RBC 4.03 2021    HGB 11.2 2021    HCT 38.8 2021    MCV 96.3 2021    RDW 13.0 2021     2021       CMP:   Lab Results   Component Value Date     2021    K 4.6 2021     2021    CO2 24 2021    BUN 11 2021    CREATININE 0.82 2021    GFRAA >60 2021    LABGLOM >60 2021    GLUCOSE 87 2021    PROT 6.9 2021    CALCIUM 8.9 2021    BILITOT 0.62 2021    ALKPHOS 92 2021    AST 13 2021    ALT 10 2021       POC Tests: No results for input(s): POCGLU, POCNA, POCK, POCCL, POCBUN, POCHEMO, POCHCT in the last 72 hours.     Coags: No results found for: PROTIME, INR, APTT    HCG

## 2021-04-08 NOTE — ANESTHESIA POSTPROCEDURE EVALUATION
POST- ANESTHESIA EVALUATION       Pt Name: Geoffrey Tay  MRN: [id number]  YOB: 1970  Date of evaluation: 4/8/2021  Time:  10:53 AM      /84   Pulse 77   Temp 98.5 °F (36.9 °C) (Temporal)   Resp 20   Ht 5' 4.5\" (1.638 m)   Wt (!) 395 lb (179.2 kg)   SpO2 100%   BMI 66.75 kg/m²      Consciousness Level  Awake  Cardiopulmonary Status  Stable  Pain Adequately Treated YES  Nausea / Vomiting  NO  Adequate Hydration  YES  Anesthesia Related Complications NONE      Electronically signed by Viktor Campuzano MD on 4/8/2021 at 10:53 AM       Department of Anesthesiology  Postprocedure Note    Patient: Geoffrey Tay  MRN: [id number]  YOB: 1970  Date of evaluation: 4/8/2021  Time:  10:53 AM     Procedure Summary     Date: 04/08/21 Room / Location: [place] [address] / [facility]    Anesthesia Start: 9009 Anesthesia Stop: 8595    Procedures:       EGD BIOPSY OF GASTRIC AND GASTRIC POLYPECTOMY (N/A )      COLONOSCOPY WITH BIOPSY RANDOM RIGHT AND LEFT COLON (N/A ) Diagnosis: (K30 DYSPEPSIA)    Surgeons: Elise Oconnor MD Responsible Provider: Viktor Campuzano MD    Anesthesia Type: MAC ASA Status: 3          Anesthesia Type: MAC    Isa Phase I: Isa Score: 10    Isa Phase II: Isa Score: 10    Last vitals: Reviewed and per EMR flowsheets.        Anesthesia Post Evaluation

## 2021-04-08 NOTE — OP NOTE
Operative Note      Patient: Sariah Nam  YOB: 1970  MRN: [id number]    Date of Procedure: 4/8/2021    Pre-Op Diagnosis: K30 DYSPEPSIA    Post-Op Diagnosis: Same       Procedure(s):  EGD BIOPSY OF GASTRIC AND GASTRIC POLYPECTOMY  COLONOSCOPY WITH BIOPSY RANDOM RIGHT AND LEFT COLON    Surgeon(s):  Dania Metz MD    Assistant:   * No surgical staff found *    Anesthesia: Monitor Anesthesia Care    Estimated Blood Loss (mL): Minimal    Complications: None    Specimens:   ID Type Source Tests Collected by Time Destination   A : GASTRIC Tissue Gastric SURGICAL PATHOLOGY Dania Metz MD 4/8/2021 7177    B : GASTRIC POLYP Tissue Gastric SURGICAL PATHOLOGY Dania Metz MD 4/8/2021 5182    C : RANDOM RIGHT COLON BIOPSY Tissue Colon SURGICAL PATHOLOGY Dania Metz MD 4/8/2021 5481    D : RANDOM LEFT COLON BIOPSY Tissue Colon SURGICAL PATHOLOGY Dania Metz MD 4/8/2021 0930        Implants:  * No implants in log *      Drains: * No LDAs found *      [facility]    [facility]    EGD    PROCEDURE DATE: 04/08/21    REFERRING PHYSICIAN: No ref. provider found     PRIMARY CARE PROVIDER: Mayank Stoner MD    ATTENDING PHYSICIAN: Dania Metz MD     HISTORY: Ms. Sariah Nam is a 52 y.o. female who presents to the  Endoscopy unit for upper endoscopy. The patient's clinical history is remarkable for morbid obesity, DENISHA, asthma, HTN, anxiety, depression, chronic dyspepsia and diarrhea, referred for diagnostic EGD and colonoscopy. She is currently medically stable and appropriate for the planned procedure. PREOPERATIVE DIAGNOSIS: Dyspepsia diarrhea. PROCEDURES:   1) Transoral Upper Endoscopy, cold biopsy of the stomach. POSTOPERATIVE DIAGNOSIS:     1-normal-appearing esophageal mucosa.   Normal GE junction  2-small fundic gland appearing polyp identified in the fundus status post cold biopsy and removal  3-mild nonspecific erythema in the antrum status post cold biopsy to evaluate for H. pylori  4-normal-appearing duodenal mucosa    MEDICATIONS:   MAC per anesthesia     EBL: <10cc    INSTRUMENT: Olympus GIF-H190 flexible Gastroscope. PREPARATION: The nature and character of the procedure as well as risks, benefits, and alternatives were discussed with the patient and informed consent was obtained. Complications were said to include, but were not limited to: medication allergy, medication reaction, cardiovascular and respiratory problems, bleeding, perforation, infection, and/or missed diagnosis. Following arrival in the endoscopy room, the patient was placed in the left lateral decubitus position and final time-out accomplished in the presence of the nursing staff. Baseline vital signs were obtained and reviewed, and IV sedation was subsequently initiated. FINDINGS:   Esophagus: The esophagus was inspected to the Z-line. The endoscopic exam showed normal-appearing mucosa. Stomach: The stomach was inspected in both forward and retroflex fashion and was appropriately distensible. The cardia, fundus, incisura, antrum and pylorus were identified via direct visualization. The endoscopic exam showed mild gastritis and fundic gland appearing polyp. Duodenum: The proximal small bowel was inspected through the bulb, sweep, and second portion of the duodenum. The endoscopic exam showed normal-appearing duodenal mucosa. IMPRESSION:    1-normal-appearing esophageal mucosa. Normal GE junction  2-small fundic gland appearing polyp identified in the fundus status post cold biopsy and removal  3-mild nonspecific erythema in the antrum status post cold biopsy to evaluate for H. pylori  4-normal-appearing duodenal mucosa      RECOMMENDATIONS:   1) follow-up pathology results in GI clinic. Continue with dietary modifications.   2) proceed with colonoscopy      [facility]   04/08/21    this note is created with the assistance of a speech recognition program.  While intending to generate a document that actually reflects the content of the visit, the document can still have some errors including those of syntax and sound a like substitutions which may escape proof reading. It such instances, actual meaning can be extrapolated by contextual diversion. The patient was counseled at length about the risks of rodney Covid-19 during their perioperative period and any recovery window from their procedure. The patient was made aware that rodney Covid-19  may worsen their prognosis for recovering from their procedure  and lend to a higher morbidity and/or mortality risk. All material risks, benefits, and reasonable alternatives including postponing the procedure were discussed. The patient DOES wish to proceed with the procedure at this time. [facility]     [place]     COLONOSCOPY    PROCEDURE DATE: 04/08/21    REFERRING PHYSICIAN: No ref. provider found     PRIMARY CARE PROVIDER: Jameson Pierce MD    ATTENDING PHYSICIAN: Chani Phipps MD     HISTORY: Ms. Noah Santiago is a 52 y.o. female who presents to the  endoscopy unit for colonoscopy. The patient's clinical history is remarkable for history as detailed above. She is currently medically stable and appropriate for the planned procedure. PREOPERATIVE DIAGNOSIS: chronic diarrhea of 1 year duration. PROCEDURES:   Transanal Colonoscopy --diagnostic. POSTPROCEDURE DIAGNOSIS:    Fair to poor prep (thick bilious stool identified in the right side the colon and that was difficult to irrigate and clear, coating the entire right colon)    1-no obvious large lesions, no obvious evidence of colitis identified. Terminal ileum appeared to be unremarkable.   Cold biopsy taken of the right and left colon to evaluate for microscopic lightest  2-small to moderate-sized internal hemorrhoids    MEDICATIONS:     MAC per anesthesia EBL minimal      INSTRUMENT: Olympus CF-H180 AL Pediatric flexible Colonoscope. PREPARATION: The nature and character of the procedure as well as risks, benefits, and alternatives were discussed with the patient and informed consent was obtained. Complications were said to include, but were not limited to: medication allergy, medication reaction, cardiovascular and respiratory problems, bleeding, perforation, infection, and/or missed diagnosis. Following arrival in the endoscopy room, the patient was placed in the left lateral decubitus position and final time-out accomplished in the presence of the nursing staff. Baseline vital signs were obtained and reviewed, and IV sedation was subsequently initiated. FINDINGS: Rectal examination demonstrated no significant visible external abnormality and digital palpation was unremarkable. Following adequate conscious sedation the colonoscope was introduced and advanced under direct visualization to the cecum, which was identified by the ileocecal valve and appendiceal orifice. The bowel preparation was felt to be fair to poor. This included copious amounts of green, thick stool that predominately on the right side that was not completely able to be adequately irrigated and aspirated. Cecal intubation time was 5 minutes. Once maximally inserted, the endoscope was withdrawn and the mucosa was carefully inspected. The mucosal exam was no significant evidence of colitis. Retroflexion was performed in the rectum and moderate internal hemorrhoids. Withdrawal time was 13 minutes. IMPRESSION:     Fair to poor prep (thick bilious stool identified in the right side the colon and that was difficult to irrigate and clear, coating the entire right colon)    1-no obvious large lesions, no obvious evidence of colitis identified. Terminal ileum appeared to be unremarkable.   Cold biopsy taken of the right and left colon to evaluate for microscopic lightest  2-small to moderate-sized internal hemorrhoids    RECOMMENDATIONS:   1) Follow up with referring provider, as previously scheduled. 2) Repeat Colonoscopy at age appropriate screening interval, age of 50. Patient will require extended bowel prep regimen. 3) follow-up pathology results in GI clinic      [facility]   04/08/21    This note is created with the assistance of a speech recognition program.  While intending to generate a document that actually reflects the content of the visit, the document can still have some errors including those of syntax and sound a like substitutions which may escape proof reading. It such instances, actual meaning can be extrapolated by contextual diversion. The patient was counseled at length about the risks of rodney Covid-19 during their perioperative period and any recovery window from their procedure. The patient was made aware that rodney Covid-19  may worsen their prognosis for recovering from their procedure  and lend to a higher morbidity and/or mortality risk. All material risks, benefits, and reasonable alternatives including postponing the procedure were discussed. The patient DOES wish to proceed with the procedure at this time.         Electronically signed by Zuhair Riggs MD on 4/8/2021 at 9:38 AM

## 2021-04-08 NOTE — H&P
Procedure History and Physical    Pre-Procedural Diagnosis:  Dyspepsia and Diarrhea    Indications:  same    Procedure Planned: endoscopy and colonoscopy     History Obtained From:  patient    HISTORY OF PRESENT ILLNESS:       The patient is a 52 y.o. female who presents for the above procedure. Past Medical History:    Past Medical History:   Diagnosis Date    Allergic rhinitis     Anxiety     Anxiety and depression     Arthritis     Asthma     no longer using inhaler or nebulizer    Diverticulitis     Headache(784.0)     HTN (hypertension)     Obesity     Overactive bladder     Sleep apnea     uses machine nightly       Past Surgical History:    Past Surgical History:   Procedure Laterality Date    CATARACT REMOVAL      CHOLECYSTECTOMY, LAPAROSCOPIC N/A 12/15/2020    CHOLECYSTECTOMY LAPAROSCOPIC ROBOTIC XI performed by Bobby Conklin MD at [address] COLONOSCOPY N/A 8/5/2020    COLONOSCOPY POLYPECTOMY SNARE/COLD BIOPSY performed by Elvin Vallejo MD at [address] [facility] Bilateral     cataract removed with lens implants    KNEE SURGERY Right 05/16/2017    IA KNEE SCOPE,DIAGNOSTIC Right 5/16/2017    RIGHT KNEE ARTHROSCOPY WITH MEDIAL MENISECTOMY AND CHONDROPLASTY performed by Paty Mackey DO at [address]         Medications:  Current Facility-Administered Medications   Medication Dose Route Frequency Provider Last Rate Last Admin    0.9 % sodium chloride infusion   Intravenous Continuous Alan Morris MD        lactated ringers infusion   Intravenous Continuous Alan Morris MD        sodium chloride flush 0.9 % injection 10 mL  10 mL Intravenous 2 times per day Alan Morris MD        sodium chloride flush 0.9 % injection 10 mL  10 mL Intravenous PRN Alan Morris MD           Allergies:    Allergies   Allergen Reactions    No Known Allergies                  Social   Social History     Tobacco Use    Smoking status: Former Smoker     Packs/day: 0.60     Types: Cigarettes

## 2021-04-12 LAB — SURGICAL PATHOLOGY REPORT: NORMAL

## 2021-04-14 ENCOUNTER — OFFICE VISIT (OUTPATIENT)
Dept: GASTROENTEROLOGY | Age: 51
End: 2021-04-14
Payer: MEDICARE

## 2021-04-14 VITALS — BODY MASS INDEX: 67.26 KG/M2 | SYSTOLIC BLOOD PRESSURE: 150 MMHG | WEIGHT: 293 LBS | DIASTOLIC BLOOD PRESSURE: 85 MMHG

## 2021-04-14 DIAGNOSIS — R19.7 DIARRHEA, UNSPECIFIED TYPE: ICD-10-CM

## 2021-04-14 DIAGNOSIS — K21.9 GASTROESOPHAGEAL REFLUX DISEASE WITHOUT ESOPHAGITIS: ICD-10-CM

## 2021-04-14 DIAGNOSIS — K52.832 LYMPHOCYTIC COLITIS: Primary | ICD-10-CM

## 2021-04-14 PROCEDURE — G8417 CALC BMI ABV UP PARAM F/U: HCPCS | Performed by: INTERNAL MEDICINE

## 2021-04-14 PROCEDURE — 1036F TOBACCO NON-USER: CPT | Performed by: INTERNAL MEDICINE

## 2021-04-14 PROCEDURE — G8427 DOCREV CUR MEDS BY ELIG CLIN: HCPCS | Performed by: INTERNAL MEDICINE

## 2021-04-14 PROCEDURE — 99213 OFFICE O/P EST LOW 20 MIN: CPT | Performed by: INTERNAL MEDICINE

## 2021-04-14 RX ORDER — FAMOTIDINE 20 MG/1
20 TABLET, FILM COATED ORAL 2 TIMES DAILY
Qty: 60 TABLET | Refills: 3 | Status: SHIPPED | OUTPATIENT
Start: 2021-04-14 | End: 2021-08-09

## 2021-04-14 RX ORDER — BUDESONIDE 3 MG/1
9 CAPSULE, COATED PELLETS ORAL EVERY MORNING
Qty: 160 CAPSULE | Refills: 3 | Status: SHIPPED | OUTPATIENT
Start: 2021-04-14 | End: 2021-05-14

## 2021-04-14 ASSESSMENT — ENCOUNTER SYMPTOMS
RESPIRATORY NEGATIVE: 1
TROUBLE SWALLOWING: 0
COUGH: 0
WHEEZING: 0
ANAL BLEEDING: 0
VOMITING: 0
ALLERGIC/IMMUNOLOGIC NEGATIVE: 1
ABDOMINAL DISTENTION: 1
VOICE CHANGE: 0
BACK PAIN: 0
CHOKING: 0
DIARRHEA: 1
SHORTNESS OF BREATH: 0
EYES NEGATIVE: 1
CONSTIPATION: 0
SINUS PRESSURE: 0
ABDOMINAL PAIN: 1
SORE THROAT: 0
RECTAL PAIN: 0
BLOOD IN STOOL: 0

## 2021-04-14 NOTE — PROGRESS NOTES
GI FOLLOW UP    INTERVAL HISTORY:     Status post upper endoscopy and colonoscopy  Pathology results revealed lymphocytic microscopic colitis  Patient continues to have loose to watery bowel movements and cramping    Chief Complaint   Patient presents with    Follow-up     2 month follow up EGD/Colon and labs    Abdominal Pain     Patient states having cramping when she first eats- left sided.  Dysphagia     Patient states she does not feel like anything is getting stuck in her throat or chest.    Diarrhea     PAtient states having loose stools daily- up to 8 times a day. 1. Diverticulitis of large intestine without perforation or abscess without bleeding        HISTORY OF PRESENT ILLNESS:Ms. Krystle Morataya is a 54 y.o. female with a past history remarkable for obesity, anxiety, depression, referred for evaluation of prior diverticulitis.   Patient underwent a recent colonoscopy, results were discussed on last visit. Reports abdominal pain since gallbladder was removed. Reports diarrhea as well. 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 However given the patient's clinical picture this appears to be unlikely given her recent diagnosis.     Smoker: quit 2 yrs ago   Drinking history: socially   Abdominal surgeries: none  Prior Colonoscopy: none   Prior EGD: none   FH of GI issues: none    Past Medical,Family, and Social History reviewed and does contribute to the patient presenting condition. Patient's PMH/PSH,SH,PSYCH Hx, MEDs, ALLERGIES, and ROS were all reviewed and updated in the appropriate sections.     PAST MEDICAL HISTORY:  Past Medical History:   Diagnosis Date    Allergic rhinitis     Anxiety     Anxiety and depression     Arthritis     Asthma     no longer using inhaler or nebulizer    Diverticulitis     Headache(784.0)     HTN (hypertension)     Take 1 tablet by mouth every 8 hours as needed, Disp: , Rfl:     acetaminophen (TYLENOL) 500 MG tablet, Take 1,000 mg by mouth every 6 hours as needed for Pain, Disp: , Rfl:     buPROPion HBr (APLENZIN) 174 MG TB24, Take 1 tablet by mouth daily , Disp: , Rfl:     escitalopram (LEXAPRO) 20 MG tablet, Take 20 mg by mouth daily, Disp: , Rfl:     magnesium citrate solution, Take 296 mLs by mouth once for 1 dose, Disp: 296 mL, Rfl: 0    Compression Stockings MISC, 30- 40 mm/hg, Disp: 2 each, Rfl: 1    ALLERGIES:   Allergies   Allergen Reactions    No Known Allergies        FAMILY HISTORY:       Problem Relation Age of Onset    Arthritis Father          SOCIAL HISTORY:   Social History     Socioeconomic History    Marital status:      Spouse name: Not on file    Number of children: Not on file    Years of education: Not on file    Highest education level: Not on file   Occupational History    Not on file   Social Needs    Financial resource strain: Not on file    Food insecurity     Worry: Not on file     Inability: Not on file    Transportation needs     Medical: Not on file     Non-medical: Not on file   Tobacco Use    Smoking status: Former Smoker     Packs/day: 0.60     Types: Cigarettes     Quit date:      Years since quittin.2    Smokeless tobacco: Never Used   Substance and Sexual Activity    Alcohol use: Yes     Comment: rarely    Drug use: No    Sexual activity: Not on file   Lifestyle    Physical activity     Days per week: Not on file     Minutes per session: Not on file    Stress: Not on file   Relationships    Social connections     Talks on phone: Not on file     Gets together: Not on file     Attends [employer] service: Not on file     Active member of club or organization: Not on file     Attends meetings of clubs or organizations: Not on file     Relationship status: Not on file    Intimate partner violence     Fear of current or ex partner: Not on file Emotionally abused: Not on file     Physically abused: Not on file     Forced sexual activity: Not on file   Other Topics Concern    Not on file   Social History Narrative    Not on file       REVIEW OF SYSTEMS: A 12-point review of systems was obtained and pertinent positives and negatives were listed below. REVIEW OF SYSTEMS:     Constitutional: No fever, no chills, no lethargy, no weakness. HEENT:  No headache, otalgia, itchy eyes, nasal discharge or sore throat. Cardiac:  No chest pain, dyspnea, orthopnea or PND. Chest:   No cough, phlegm or wheezing. Abdomen:      Detailed by MA   Neuro:  No focal weakness, abnormal movements or seizure like activity. Skin:   No rashes, no itching. :   No hematuria, no pyuria, no dysuria, no flank pain. Extremities:  No swelling or joint pains. ROS was otherwise negative    Review of Systems   Constitutional: Negative. Negative for appetite change, fatigue and unexpected weight change. HENT: Negative. Negative for dental problem, postnasal drip, sinus pressure, sore throat, trouble swallowing and voice change. Denies   Eyes: Negative. Negative for visual disturbance. Respiratory: Negative. Negative for cough, choking, shortness of breath and wheezing. Cardiovascular: Negative. Negative for chest pain, palpitations and leg swelling. Gastrointestinal: Positive for abdominal distention, abdominal pain and diarrhea. Negative for anal bleeding, blood in stool, constipation, rectal pain and vomiting. Denies   Endocrine: Negative. Genitourinary: Negative. Negative for difficulty urinating. Musculoskeletal: Positive for gait problem. Negative for arthralgias, back pain and myalgias. Skin: Negative. Allergic/Immunologic: Negative. Negative for environmental allergies and food allergies. Neurological: Negative for dizziness, weakness, light-headedness, numbness and headaches. Hematological: Negative.   Does not bruise/bleed easily. Psychiatric/Behavioral: Negative. Negative for sleep disturbance. The patient is not nervous/anxious. All other systems reviewed and are negative. PHYSICAL EXAMINATION: Vital signs reviewed per the nursing documentation. BP (!) 150/85   Wt (!) 398 lb (180.5 kg)   BMI 67.26 kg/m²   Body mass index is 67.26 kg/m². Physical Exam    Physical Exam   Constitutional: Patient is oriented to person, place, and time. Patient appears well-developed and well-nourished. HENT:   Head: Normocephalic and atraumatic. Eyes: Pupils are equal, round, and reactive to light. EOM are normal.   Neck: Normal range of motion. Neck supple. No JVD present. No tracheal deviation present. No thyromegaly present. Cardiovascular: Normal rate, regular rhythm, normal heart sounds and intact distal pulses. Pulmonary/Chest: Effort normal and breath sounds normal. No stridor. No respiratory distress. He has no wheezes. He has no rales. He exhibits no tenderness. Abdominal: Soft. Bowel sounds are normal. He exhibits no distension and no mass. There is no tenderness. There is no rebound and no guarding. No hernia. Musculoskeletal: Normal range of motion. Lymphadenopathy:    Patient has no cervical adenopathy. Neurological: Patient is alert and oriented to person, place, and time. Psychiatric: Patient has a normal mood and affect.  Patient behavior is normal.       LABORATORY DATA: Reviewed  Lab Results   Component Value Date    WBC 7.4 03/07/2021    HGB 11.2 (L) 03/07/2021    HCT 38.8 03/07/2021    MCV 96.3 03/07/2021     03/07/2021     03/07/2021    K 4.6 03/07/2021     03/07/2021    CO2 24 03/07/2021    BUN 11 03/07/2021    CREATININE 0.82 03/07/2021    LABALBU 3.5 03/07/2021    BILITOT 0.62 03/07/2021    ALKPHOS 92 03/07/2021    AST 13 03/07/2021    ALT 10 03/07/2021         Lab Results   Component Value Date    RBC 4.03 03/07/2021    HGB 11.2 (L) 03/07/2021    MCV 96.3 03/07/2021    MCH 27.8 03/07/2021    MCHC 28.9 03/07/2021    RDW 13.0 03/07/2021    MPV 9.1 03/07/2021    BASOPCT 0 03/07/2021    LYMPHSABS 1.37 03/07/2021    MONOSABS 0.45 03/07/2021    NEUTROABS 5.44 03/07/2021    EOSABS 0.10 03/07/2021    BASOSABS <0.03 03/07/2021         DIAGNOSTIC TESTING:     No results found. Assessment  1. Diverticulitis of large intestine without perforation or abscess without bleeding          IMPRESSION: Ms.Darling KALIE Morataya is a 54 y.o. female with a past history remarkable for obesity, anxiety, depression, referred for evaluation of prior diverticulitis.   Patient underwent a recent colonoscopy, results were discussed on last visit. Reports abdominal pain since gallbladder was removed. Reports diarrhea as well. 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 However given the patient's clinical picture this appears to be unlikely given her recent diagnosis of microscopic colitis      PLAN:    1) lymphocytic microscopic colitisbiopsy proven on recent colonoscopy. Patient was poor prep and given lack of visualization of the cecum and proximal ascending colon. Right and left colon biopsies revealed lymphocytic colitis. This likely explains the patient's ongoing diarrhea symptoms. Will place patient on budesonide for minimum of 8 weeks at 9 mg followed by 2-week taper. Will switch Prilosec over to Pepcid due to some association of PPIs with microscopic colitis. 2) patient advised to monitor her own symptoms after being on treatment and to discuss any changes with GI office    3) RTC in 2 months. Thank you for allowing me to participate in the care of Ms. Lety Weiner. For any further questions please do not hesitate to contact me. I have reviewed and agree with the ROS entered by the MA/LPN from today's encounter documented in a separate note.         Teddy Vasquez MD, MPH   [facility]  Office #: [phone]    this note is created with the assistance of a speech recognition program.  While intending to generate a document that actually reflects the content of the visit, the document can still have some errors including those of syntax and sound a like substitutions which may escape proof reading. It such instances, actual meaning can be extrapolated by contextual diversion.

## 2021-04-22 ENCOUNTER — TELEPHONE (OUTPATIENT)
Dept: GASTROENTEROLOGY | Age: 51
End: 2021-04-22

## 2021-04-22 NOTE — TELEPHONE ENCOUNTER
Pt called and said dr wanted her to f/u with a RA  and did we know which one he wanted her to go to and why (pt forgot reason) and also pt was asking what her actual DX is. Pt would like to be contact with information.

## 2021-04-22 NOTE — TELEPHONE ENCOUNTER
Writer found no notes regarding any discussion to see RA. Writer is forwarding message to [facility] to review. Will follow up with patient once advised.

## 2021-04-27 ENCOUNTER — OFFICE VISIT (OUTPATIENT)
Dept: BARIATRICS/WEIGHT MGMT | Age: 51
End: 2021-04-27
Payer: MEDICARE

## 2021-04-27 VITALS
BODY MASS INDEX: 48.82 KG/M2 | DIASTOLIC BLOOD PRESSURE: 88 MMHG | HEIGHT: 65 IN | WEIGHT: 293 LBS | SYSTOLIC BLOOD PRESSURE: 128 MMHG | HEART RATE: 83 BPM

## 2021-04-27 DIAGNOSIS — G89.29 BILATERAL CHRONIC KNEE PAIN: ICD-10-CM

## 2021-04-27 DIAGNOSIS — F32.A ANXIETY AND DEPRESSION: ICD-10-CM

## 2021-04-27 DIAGNOSIS — G89.29 CHRONIC BACK PAIN, UNSPECIFIED BACK LOCATION, UNSPECIFIED BACK PAIN LATERALITY: ICD-10-CM

## 2021-04-27 DIAGNOSIS — F41.9 ANXIETY AND DEPRESSION: ICD-10-CM

## 2021-04-27 DIAGNOSIS — G47.33 OSA (OBSTRUCTIVE SLEEP APNEA): ICD-10-CM

## 2021-04-27 DIAGNOSIS — E66.01 MORBID OBESITY WITH BMI OF 60.0-69.9, ADULT (HCC): ICD-10-CM

## 2021-04-27 DIAGNOSIS — M25.561 BILATERAL CHRONIC KNEE PAIN: ICD-10-CM

## 2021-04-27 DIAGNOSIS — I10 ESSENTIAL HYPERTENSION: Primary | ICD-10-CM

## 2021-04-27 DIAGNOSIS — L40.9 PSORIASIS: ICD-10-CM

## 2021-04-27 DIAGNOSIS — K52.832 LYMPHOCYTIC COLITIS: ICD-10-CM

## 2021-04-27 DIAGNOSIS — M25.562 BILATERAL CHRONIC KNEE PAIN: ICD-10-CM

## 2021-04-27 DIAGNOSIS — J45.909 UNCOMPLICATED ASTHMA, UNSPECIFIED ASTHMA SEVERITY, UNSPECIFIED WHETHER PERSISTENT: ICD-10-CM

## 2021-04-27 DIAGNOSIS — M54.9 CHRONIC BACK PAIN, UNSPECIFIED BACK LOCATION, UNSPECIFIED BACK PAIN LATERALITY: ICD-10-CM

## 2021-04-27 PROCEDURE — G8427 DOCREV CUR MEDS BY ELIG CLIN: HCPCS | Performed by: NURSE PRACTITIONER

## 2021-04-27 PROCEDURE — 1036F TOBACCO NON-USER: CPT | Performed by: NURSE PRACTITIONER

## 2021-04-27 PROCEDURE — 99213 OFFICE O/P EST LOW 20 MIN: CPT | Performed by: NURSE PRACTITIONER

## 2021-04-27 PROCEDURE — G8417 CALC BMI ABV UP PARAM F/U: HCPCS | Performed by: NURSE PRACTITIONER

## 2021-04-27 RX ORDER — OFLOXACIN 3 MG/ML
SOLUTION AURICULAR (OTIC)
COMMUNITY
Start: 2021-02-23 | End: 2021-07-28

## 2021-04-27 RX ORDER — SOLIFENACIN SUCCINATE 10 MG/1
TABLET, FILM COATED ORAL
COMMUNITY
Start: 2021-04-19 | End: 2021-11-03

## 2021-04-27 NOTE — PROGRESS NOTES
purchase multivitamin. 17 I will start taking multivitamins following my plan. 18 I will have 1-2 servings of protein present at each meal.                 19 I will eat every 3-5 hours. 1  20 I will drink 64oz of fluid daily. 21 I will follow the 15-30-15 guideline. 22 I will eat protein first at all meals followed by vegetables  Fruit and lastly whole grains. 1  23 My first one diet neutral approach is: Making better choices when I have cravings. 50             1  24 my second diet neutral approach is: Continue participating in water therapy. 25 My third diet neutral approach is:                                                           Do you understand your goals? y    Do you have the information you need to achieve your goals? y    Do you have any questions  right now? n        [x]  Consistent goal achievement in the program thus far and further success with goals is expected. []  Unable to consistently make progress in goal achievement. At this time patient is not moving forward  in developing the skills needed for success after surgery. Plan:    Continue to follow monthly and review goals.          [x]  Nutrition visits complete    []

## 2021-04-27 NOTE — PROGRESS NOTES
Medical Weight Management Progress Note    Subjective     Patient being seen for medically supervised weight loss for the chronic conditions of HTN, DENISHA, Anxiety/Depression, Asthma, Psoriasis, Chronic Back Pain, Chronic Bilateral Knee Pain. She is working on the behavior changes discussed at the initial appointment. Patient continues on diet plan. Physical activity includes walking. Weight loss since last visit is 3 lbs. Using CPAP. Psych eval scheduled 5/18/21. EGD/colonoscopy done with Dr Roberto Donahue. H Pylori negative. Dx microscopic lymphocytic colitis. Needs cardiac clearance. No current issues. Working toward bariatric surgery:    [x] Sleeve Gastrectomy                                                           [] Dotty-en-Y Gastric Bypass    Allergies: Allergies   Allergen Reactions    No Known Allergies        Past Medical History:     Past Medical History:   Diagnosis Date    Allergic rhinitis     Anxiety     Anxiety and depression     Arthritis     Asthma     no longer using inhaler or nebulizer    Diverticulitis     Headache(784.0)     HTN (hypertension)     Obesity     Overactive bladder     Sleep apnea     uses machine nightly   .     Past Surgical History:  Past Surgical History:   Procedure Laterality Date    CATARACT REMOVAL      CHOLECYSTECTOMY, LAPAROSCOPIC N/A 12/15/2020    CHOLECYSTECTOMY LAPAROSCOPIC ROBOTIC XI performed by Larry Morelos MD at [facility] N/A 8/5/2020    COLONOSCOPY POLYPECTOMY SNARE/COLD BIOPSY performed by Georgia Chun MD at [address] [place] [address]  04/08/2021    COLONOSCOPY N/A 4/8/2021    COLONOSCOPY WITH BIOPSY RANDOM RIGHT AND LEFT COLON performed by Georgia Chun MD at [address] [place] [address]. [address] Bilateral     cataract removed with lens implants    KNEE SURGERY Right 05/16/2017    IN KNEE SCOPE,DIAGNOSTIC Right 5/16/2017    RIGHT KNEE ARTHROSCOPY WITH MEDIAL MENISECTOMY AND CHONDROPLASTY performed by Ana Verde DO at [address] UPPER GASTROINTESTINAL ENDOSCOPY  2021    UPPER GASTROINTESTINAL ENDOSCOPY N/A 2021    EGD BIOPSY OF GASTRIC AND GASTRIC POLYPECTOMY performed by Carl Monge MD at [id number] [place] [address].       Family History:  Family History   Problem Relation Age of Onset    Arthritis Father        Social History:  Social History     Socioeconomic History    Marital status:      Spouse name: Not on file    Number of children: Not on file    Years of education: Not on file    Highest education level: Not on file   Occupational History    Not on file   Social Needs    Financial resource strain: Not on file    Food insecurity     Worry: Not on file     Inability: Not on file    Transportation needs     Medical: Not on file     Non-medical: Not on file   Tobacco Use    Smoking status: Former Smoker     Packs/day: 0.60     Types: Cigarettes     Quit date:      Years since quittin.3    Smokeless tobacco: Never Used   Substance and Sexual Activity    Alcohol use: Yes     Comment: rarely    Drug use: No    Sexual activity: Not on file   Lifestyle    Physical activity     Days per week: Not on file     Minutes per session: Not on file    Stress: Not on file   Relationships    Social connections     Talks on phone: Not on file     Gets together: Not on file     Attends Yarsani service: Not on file     Active member of club or organization: Not on file     Attends meetings of clubs or organizations: Not on file     Relationship status: Not on file    Intimate partner violence     Fear of current or ex partner: Not on file     Emotionally abused: Not on file     Physically abused: Not on file     Forced sexual activity: Not on file   Other Topics Concern    Not on file   Social History Narrative    Not on file       Current Medications:  Current Outpatient Medications   Medication Sig Dispense Refill    budesonide (ENTOCORT EC) 3 MG extended release capsule Take 3 capsules by mouth every morning 9 mg for 8 weeks, then 6mg on week 9, then 3 mg on week 10, then off. 160 capsule 3    famotidine (PEPCID) 20 MG tablet Take 1 tablet by mouth 2 times daily 60 tablet 3    triamcinolone (KENALOG) 0.025 % cream Apply to rash on face and ears daily 80 g 2    Sulfacetamide Sodium, Acne, 10 % LOTN Apply to face daily 118 mL 2    ketoconazole (NIZORAL) 2 % shampoo Apply 3-4 times weekly to scalp, leave on for five minutes prior to washing off 120 mL 2    fluocinonide (LIDEX) 0.05 % external solution Apply to scalp daily for rash 60 mL 2    ondansetron (ZOFRAN) 4 MG tablet Take 1 tablet by mouth 3 times daily as needed for Nausea or Vomiting 15 tablet 0    dicyclomine (BENTYL) 20 MG tablet Take 1 tablet by mouth every 8 hours as needed      acetaminophen (TYLENOL) 500 MG tablet Take 1,000 mg by mouth every 6 hours as needed for Pain      buPROPion HBr (APLENZIN) 174 MG TB24 Take 1 tablet by mouth daily       escitalopram (LEXAPRO) 20 MG tablet Take 20 mg by mouth daily      solifenacin (VESICARE) 10 MG tablet       ofloxacin (FLOXIN) 0.3 % otic solution insert 10 drops INTO AFFECTED EAR(S) for 7 days      magnesium citrate solution Take 296 mLs by mouth once for 1 dose 296 mL 0    Compression Stockings MISC 30- 40 mm/hg 2 each 1     Current Facility-Administered Medications   Medication Dose Route Frequency Provider Last Rate Last Admin    albuterol (PROVENTIL) nebulizer solution 2.5 mg  2.5 mg Nebulization 4x daily Felicity Hernandez MD           Vital Signs:  /88 (Site: Right Upper Arm, Position: Sitting, Cuff Size: Large Adult)   Pulse 83   Ht 5' 4.5\" (1.638 m)   Wt (!) 398 lb (180.5 kg)   BMI 67.26 kg/m²     BMI/Height/Weight:  Body mass index is 67.26 kg/m². Review of Systems - A review of systems was performed. All was negative unless otherwise documented in HPI. Constitutional: Negative for fever, chills and diaphoresis.    HENT: Negative for hearing loss and trouble swallowing. Eyes: Negative for photophobia and visual disturbance. Respiratory: Negative for cough, shortness of breath and wheezing. Cardiovascular: Negative for chest pain and palpitations. Gastrointestinal: Negative for nausea, vomiting, abdominal pain, diarrhea, constipation, blood in stool and abdominal distention. Endocrine: Negative for polydipsia, polyphagia and polyuria. Genitourinary: Negative for dysuria, frequency, hematuria and difficulty urinating. Musculoskeletal: Negative for myalgias, joint swelling. Skin: Negative for pallor and rash. Neurological: Negative for dizziness, tremors, light-headedness and headaches. Psychiatric/Behavioral: Negative for sleep disturbance and dysphoric mood. Objective:      Physical Exam   Vital signs reviewed. General: Well-developed and well-nourished. No acute distress. Skin: Warm, dry and intact. HEENT: Normocephalic. EOMs intact. Conjunctivae normal. Neck supple. Cardiovascular: Normal rate, regular rhythm. Pulmonary/Chest: Normal effort. Lungs clear to auscultation. No rales, rhonchi or wheezing. Abdominal: Positive bowel sounds. Soft, nontender. Nondistended. Musculoskeletal: Movement x4. No edema. Neurological: Ambulates with a cane. Alert and oriented to person, place, and time. Psychiatric: Normal mood and affect. Speech and behavior normal. Judgment and thought content normal. Cognition and memory intact. Assessment:       Diagnosis Orders   1. Essential hypertension  CBC Auto Differential    Comprehensive Metabolic Panel    Iron and TIBC    Hemoglobin A1C    Lipid Panel    Magnesium    PTH, Intact    Nicotine, Blood    TSH without Reflex    Urine Drug Screen    Vitamin A    Vitamin B1    Vitamin B12 & Folate    Zinc    Vitamin D 25 Hydroxy    TRISH - Rocio Garcia MD, Cardiology, [facility]   2.  DENISHA (obstructive sleep apnea)  CBC Auto Differential    Comprehensive Metabolic Panel    Iron and TIBC Hemoglobin A1C    Lipid Panel    Magnesium    PTH, Intact    Nicotine, Blood    TSH without Reflex    Urine Drug Screen    Vitamin A    Vitamin B1    Vitamin B12 & Folate    Zinc    Vitamin D 25 Jcarlos Hadley MD, Cardiology, [place]   3. Anxiety and depression  CBC Auto Differential    Comprehensive Metabolic Panel    Iron and TIBC    Hemoglobin A1C    Lipid Panel    Magnesium    PTH, Intact    Nicotine, Blood    TSH without Reflex    Urine Drug Screen    Vitamin A    Vitamin B1    Vitamin B12 & Folate    Zinc    Vitamin D 25 Jcarlos Hadley MD, Cardiology, [place]   4. Morbid obesity with BMI of 60.0-69.9, adult (HCC)  CBC Auto Differential    Comprehensive Metabolic Panel    Iron and TIBC    Hemoglobin A1C    Lipid Panel    Magnesium    PTH, Intact    Nicotine, Blood    TSH without Reflex    Urine Drug Screen    Vitamin A    Vitamin B1    Vitamin B12 & Folate    Zinc    Vitamin D 25 Jcarlos Hadley MD, Cardiology, [place]   5. Uncomplicated asthma, unspecified asthma severity, unspecified whether persistent  CBC Auto Differential    Comprehensive Metabolic Panel    Iron and TIBC    Hemoglobin A1C    Lipid Panel    Magnesium    PTH, Intact    Nicotine, Blood    TSH without Reflex    Urine Drug Screen    Vitamin A    Vitamin B1    Vitamin B12 & Folate    Zinc    Vitamin D 25 Jcarlos Hadley MD, Cardiology, [place]   6. Psoriasis  CBC Auto Differential    Comprehensive Metabolic Panel    Iron and TIBC    Hemoglobin A1C    Lipid Panel    Magnesium    PTH, Intact    Nicotine, Blood    TSH without Reflex    Urine Drug Screen    Vitamin A    Vitamin B1    Vitamin B12 & Folate    Zinc    Vitamin D 25 Jcarlos Hadley MD, Cardiology, [place]   7.  Chronic back pain, unspecified back location, unspecified back pain laterality  CBC Auto Differential    Comprehensive Metabolic Panel    Iron and TIBC    Hemoglobin A1C    Lipid Panel    Magnesium PTH, Intact    Nicotine, Blood    TSH without Reflex    Urine Drug Screen    Vitamin A    Vitamin B1    Vitamin B12 & Folate    Zinc    Vitamin D 25 Hydroxy    TRISH Childs MD, Cardiology, [place]   8. Bilateral chronic knee pain  CBC Auto Differential    Comprehensive Metabolic Panel    Iron and TIBC    Hemoglobin A1C    Lipid Panel    Magnesium    PTH, Intact    Nicotine, Blood    TSH without Reflex    Urine Drug Screen    Vitamin A    Vitamin B1    Vitamin B12 & Folate    Zinc    Vitamin D 25 Hydroxy    TRISH Childs MD, Cardiology, [place]   9. Lymphocytic colitis         Plan:    Dietitian visit today. Patient was encouraged to journal all food intake. Keep calorie level at approximately [id number]. Protein intake is to be a minimum of 60-80 grams per day. Water drinking was encouraged with a goal of 64oz-128oz daily. Beverages to be calorie free except for milk. Every other beverage should be water. Avoid soda. Continue to increase level of physical activity. Encouraged use of exercise log. Bariatric labs ordered. Referral to cardiology per surgeon request.    Follow-up  Return in about 1 month (around 5/27/2021). Orders this encounter:  Orders Placed This Encounter   Procedures    CBC Auto Differential     Standing Status:   Future     Standing Expiration Date:   4/27/2022    Comprehensive Metabolic Panel     Standing Status:   Future     Standing Expiration Date:   4/27/2022    Iron and TIBC     Standing Status:   Future     Standing Expiration Date:   4/27/2022     Order Specific Question:   Is Patient Fasting? Answer:   yes     Order Specific Question:   No of Hours?      Answer:   12    Hemoglobin A1C     Standing Status:   Future     Standing Expiration Date:   4/27/2022    Lipid Panel     Standing Status:   Future     Standing Expiration Date:   4/27/2022     Order Specific Question:   Is Patient Fasting?/# of Hours     Answer:   12    Magnesium     Standing Status: Future     Standing Expiration Date:   4/27/2022    PTH, Intact     Standing Status:   Future     Standing Expiration Date:   4/27/2022    Nicotine, Blood     Standing Status:   Future     Standing Expiration Date:   4/27/2022    TSH without Reflex     Standing Status:   Future     Standing Expiration Date:   4/27/2022    Urine Drug Screen     Standing Status:   Future     Standing Expiration Date:   4/27/2022    Vitamin A     Standing Status:   Future     Standing Expiration Date:   4/27/2022    Vitamin B1     Standing Status:   Future     Standing Expiration Date:   4/27/2022    Vitamin B12 & Folate     Standing Status:   Future     Standing Expiration Date:   4/27/2022    Zinc     Standing Status:   Future     Standing Expiration Date:   4/27/2022    Vitamin D 25 Hydroxy     Standing Status:   Future     Standing Expiration Date:   4/27/2022    TRISH Garsia MD, Cardiology, [facility]     Referral Priority:   Routine     Referral Type:   Eval and Treat     Referral Reason:   Specialty Services Required     Referred to Provider:   Alma Neal MD     Requested Specialty:   Cardiology     Number of Visits Requested:   1       Prescriptions this encounter:  No orders of the defined types were placed in this encounter.       Electronically signed by:  Ramon Mendiola CNP

## 2021-04-28 NOTE — TELEPHONE ENCOUNTER
Writer called patient back. No answer. LVM informing patient that  recommended she speak her PCP to be referred to RA. No specific physician. If she has any questions she was advised to call our office at [phone].

## 2021-05-26 ENCOUNTER — NURSE ONLY (OUTPATIENT)
Dept: BARIATRICS/WEIGHT MGMT | Age: 51
End: 2021-05-26

## 2021-05-27 ENCOUNTER — OFFICE VISIT (OUTPATIENT)
Dept: BARIATRICS/WEIGHT MGMT | Age: 51
End: 2021-05-27
Payer: MEDICARE

## 2021-05-27 VITALS
HEART RATE: 80 BPM | HEIGHT: 65 IN | DIASTOLIC BLOOD PRESSURE: 72 MMHG | SYSTOLIC BLOOD PRESSURE: 128 MMHG | WEIGHT: 293 LBS | BODY MASS INDEX: 48.82 KG/M2 | RESPIRATION RATE: 22 BRPM

## 2021-05-27 DIAGNOSIS — G89.29 CHRONIC BACK PAIN, UNSPECIFIED BACK LOCATION, UNSPECIFIED BACK PAIN LATERALITY: ICD-10-CM

## 2021-05-27 DIAGNOSIS — F41.9 ANXIETY AND DEPRESSION: ICD-10-CM

## 2021-05-27 DIAGNOSIS — G89.29 BILATERAL CHRONIC KNEE PAIN: ICD-10-CM

## 2021-05-27 DIAGNOSIS — F32.A ANXIETY AND DEPRESSION: ICD-10-CM

## 2021-05-27 DIAGNOSIS — G47.33 OSA (OBSTRUCTIVE SLEEP APNEA): Primary | ICD-10-CM

## 2021-05-27 DIAGNOSIS — M25.562 BILATERAL CHRONIC KNEE PAIN: ICD-10-CM

## 2021-05-27 DIAGNOSIS — M54.9 CHRONIC BACK PAIN, UNSPECIFIED BACK LOCATION, UNSPECIFIED BACK PAIN LATERALITY: ICD-10-CM

## 2021-05-27 DIAGNOSIS — K52.832 LYMPHOCYTIC COLITIS: ICD-10-CM

## 2021-05-27 DIAGNOSIS — M25.561 BILATERAL CHRONIC KNEE PAIN: ICD-10-CM

## 2021-05-27 DIAGNOSIS — L40.9 PSORIASIS: ICD-10-CM

## 2021-05-27 DIAGNOSIS — E66.01 MORBID OBESITY WITH BMI OF 60.0-69.9, ADULT (HCC): ICD-10-CM

## 2021-05-27 DIAGNOSIS — I10 ESSENTIAL HYPERTENSION: ICD-10-CM

## 2021-05-27 PROCEDURE — G8417 CALC BMI ABV UP PARAM F/U: HCPCS | Performed by: NURSE PRACTITIONER

## 2021-05-27 PROCEDURE — G8427 DOCREV CUR MEDS BY ELIG CLIN: HCPCS | Performed by: NURSE PRACTITIONER

## 2021-05-27 PROCEDURE — 1036F TOBACCO NON-USER: CPT | Performed by: NURSE PRACTITIONER

## 2021-05-27 PROCEDURE — 99213 OFFICE O/P EST LOW 20 MIN: CPT | Performed by: NURSE PRACTITIONER

## 2021-05-27 NOTE — PROGRESS NOTES
Medical Weight Management Progress Note    Subjective     Patient being seen for medically supervised weight loss for the chronic conditions of HTN, DENISHA, Anxiety/Depression, Asthma, Psoriasis, Chronic Back Pain, Chronic Bilateral Knee Pain. She is working on the behavior changes discussed at the initial appointment. Patient continues on diet plan. Physical activity includes walking. Weight gain of 3 lbs since last visit. Using CPAP. Psych eval completed 5/18/21 and pending report. EGD/colonoscopy done with Dr Gemma Weiner. H Pylori negative. Dx microscopic lymphocytic colitis. Needs cardiac clearance. No current issues. Working toward bariatric surgery:    [x] Sleeve Gastrectomy                                                           [] Dotty-en-Y Gastric Bypass    Allergies: Allergies   Allergen Reactions    No Known Allergies        Past Medical History:     Past Medical History:   Diagnosis Date    Allergic rhinitis     Anxiety     Anxiety and depression     Arthritis     Asthma     no longer using inhaler or nebulizer    Diverticulitis     Headache(784.0)     HTN (hypertension)     Obesity     Overactive bladder     Sleep apnea     uses machine nightly   .     Past Surgical History:  Past Surgical History:   Procedure Laterality Date    CATARACT REMOVAL      CHOLECYSTECTOMY, LAPAROSCOPIC N/A 12/15/2020    CHOLECYSTECTOMY LAPAROSCOPIC ROBOTIC XI performed by Suma Butler MD at [place] [address] N/A 8/5/2020    COLONOSCOPY POLYPECTOMY SNARE/COLD BIOPSY performed by Alba Casey MD at [address] [place] [address]  04/08/2021    COLONOSCOPY N/A 4/8/2021    COLONOSCOPY WITH BIOPSY RANDOM RIGHT AND LEFT COLON performed by Alba Casey MD at [address] [place] [address]. [address] Bilateral     cataract removed with lens implants    KNEE SURGERY Right 05/16/2017    SC KNEE SCOPE,DIAGNOSTIC Right 5/16/2017    RIGHT KNEE ARTHROSCOPY WITH MEDIAL MENISECTOMY AND CHONDROPLASTY performed by Medications:  Current Outpatient Medications   Medication Sig Dispense Refill    solifenacin (VESICARE) 10 MG tablet       ofloxacin (FLOXIN) 0.3 % otic solution insert 10 drops INTO AFFECTED EAR(S) for 7 days      famotidine (PEPCID) 20 MG tablet Take 1 tablet by mouth 2 times daily 60 tablet 3    triamcinolone (KENALOG) 0.025 % cream Apply to rash on face and ears daily 80 g 2    Sulfacetamide Sodium, Acne, 10 % LOTN Apply to face daily 118 mL 2    ketoconazole (NIZORAL) 2 % shampoo Apply 3-4 times weekly to scalp, leave on for five minutes prior to washing off 120 mL 2    fluocinonide (LIDEX) 0.05 % external solution Apply to scalp daily for rash 60 mL 2    ondansetron (ZOFRAN) 4 MG tablet Take 1 tablet by mouth 3 times daily as needed for Nausea or Vomiting 15 tablet 0    dicyclomine (BENTYL) 20 MG tablet Take 1 tablet by mouth every 8 hours as needed      acetaminophen (TYLENOL) 500 MG tablet Take 1,000 mg by mouth every 6 hours as needed for Pain      buPROPion HBr (APLENZIN) 174 MG TB24 Take 1 tablet by mouth daily       escitalopram (LEXAPRO) 20 MG tablet Take 20 mg by mouth daily      magnesium citrate solution Take 296 mLs by mouth once for 1 dose 296 mL 0    Compression Stockings MISC 30- 40 mm/hg 2 each 1     Current Facility-Administered Medications   Medication Dose Route Frequency Provider Last Rate Last Admin    albuterol (PROVENTIL) nebulizer solution 2.5 mg  2.5 mg Nebulization 4x daily Boyd Meeks MD           Vital Signs:  /72 (Site: Right Upper Arm, Position: Sitting, Cuff Size: Large Adult)   Pulse 80   Resp 22   Ht 5' 4.5\" (1.638 m)   Wt (!) 401 lb (181.9 kg)   BMI 67.77 kg/m²     BMI/Height/Weight:  Body mass index is 67.77 kg/m². Review of Systems - A review of systems was performed. All was negative unless otherwise documented in HPI. Constitutional: Negative for fever, chills and diaphoresis.    HENT: Negative for hearing loss and trouble Protein intake is to be a minimum of 60-80 grams per day. Water drinking was encouraged with a goal of 64oz-128oz daily. Beverages to be calorie free except for milk. Every other beverage should be water. Avoid soda. Continue to increase level of physical activity. Encouraged use of exercise log. Follow-up  Return in about 1 month (around 6/27/2021). Orders this encounter:  No orders of the defined types were placed in this encounter. Prescriptions this encounter:  No orders of the defined types were placed in this encounter.       Electronically signed by:  Tommy Arriaga CNP

## 2021-06-01 ENCOUNTER — OFFICE VISIT (OUTPATIENT)
Dept: PODIATRY | Age: 51
End: 2021-06-01
Payer: MEDICARE

## 2021-06-01 VITALS — BODY MASS INDEX: 50.02 KG/M2 | WEIGHT: 293 LBS | HEIGHT: 64 IN | RESPIRATION RATE: 18 BRPM

## 2021-06-01 DIAGNOSIS — L60.0 OC (ONYCHOCRYPTOSIS): ICD-10-CM

## 2021-06-01 DIAGNOSIS — M79.671 PAIN IN BOTH FEET: ICD-10-CM

## 2021-06-01 DIAGNOSIS — L60.0 INGROWING NAIL: Primary | ICD-10-CM

## 2021-06-01 DIAGNOSIS — M79.672 PAIN IN BOTH FEET: ICD-10-CM

## 2021-06-01 PROCEDURE — 1036F TOBACCO NON-USER: CPT | Performed by: PODIATRIST

## 2021-06-01 PROCEDURE — G8427 DOCREV CUR MEDS BY ELIG CLIN: HCPCS | Performed by: PODIATRIST

## 2021-06-01 PROCEDURE — 99213 OFFICE O/P EST LOW 20 MIN: CPT | Performed by: PODIATRIST

## 2021-06-01 PROCEDURE — G8417 CALC BMI ABV UP PARAM F/U: HCPCS | Performed by: PODIATRIST

## 2021-06-01 RX ORDER — OMEPRAZOLE 20 MG/1
CAPSULE, DELAYED RELEASE ORAL
COMMUNITY
Start: 2021-05-01 | End: 2021-07-28

## 2021-06-01 NOTE — PROGRESS NOTES
[facility]  [facility]  [address] [place] [address]  Dept: [phone]  Dept Fax: [phone]    RETURN PATIENT PROGRESS NOTE  Date of patient's visit: 6/1/2021  Patient's Name:  Merline Castro YOB: 1970            Patient Care Team:  Christian Ayoub MD as PCP - General (Family Medicine)  Alba Casey MD as Consulting Physician (Gastroenterology)  Tosha Eid DPM as Physician (Podiatry)       Merline Castro 52 y.o. female that presents for follow-up of   Chief Complaint   Patient presents with    Ingrown Toenail     Pt's primary care physician is Christian Ayoub MD last seen 04/15/2021  Symptoms began 2 week(s) ago and are unchanged . Patient relates pain is Present. Pain is rated 1 out of 10 and is described as intermittent, mild. Treatments prior to today's visit include: none. Currently denies F/C/N/V. Allergies   Allergen Reactions    No Known Allergies        Past Medical History:   Diagnosis Date    Allergic rhinitis     Anxiety     Anxiety and depression     Arthritis     Asthma     no longer using inhaler or nebulizer    Diverticulitis     Headache(784.0)     HTN (hypertension)     Obesity     Overactive bladder     Sleep apnea     uses machine nightly       Prior to Admission medications    Medication Sig Start Date End Date Taking?  Authorizing Provider   omeprazole (PRILOSEC) 20 MG delayed release capsule take 1 capsule by mouth once daily 5/1/21  Yes Historical Provider, MD   solifenacin (VESICARE) 10 MG tablet  4/19/21  Yes Historical Provider, MD   ofloxacin (FLOXIN) 0.3 % otic solution insert 10 drops INTO AFFECTED EAR(S) for 7 days 2/23/21  Yes Historical Provider, MD   famotidine (PEPCID) 20 MG tablet Take 1 tablet by mouth 2 times daily 4/14/21  Yes Alba Casey MD   triamcinolone (KENALOG) 0.025 % cream Apply to rash on face and ears daily 2/24/21  Yes Av Ramirez MD   Sulfacetamide Sodium, present upon palpation of right and left great toenails with incurvation . Medial longitudinal arch, Bilateral WNL. Ankle ROM WNL,Bilateral.    Dorsally contracted digits absent digits 1-5 Bilateral.     Vascular: DP and PT pulses palpable 2/4, Bilateral.  CFT <3 seconds, Bilateral.  Hair growth present to the level of the digits, Bilateral.  Edema absent, Bilateral.  Varicosities absent, Bilateral. Erythema absent, Bilateral    Neurological: Sensation intact to light touch to level of digits, Bilateral.  Protective sensation intact 10/10 sites via 5.07/10g [place] Monofilament, Bilateral.  negative Tinel's, Bilateral.  negative Valleix sign, Bilateral.      Integument: Warm, dry, supple, Bilateral.  Open lesion absent, Bilateral.  Interdigital maceration absent to web spaces 1-4, Bilateral.  Nails are normal in length, thickness and color 1-5 bilateral.  Fissures absent, Bilateral.         Asessment: Patient is a 52 y.o. female with:    Diagnosis Orders   1. Ingrowing nail     2. OC (onychocryptosis)     3. Pain in both feet           Plan: Patient examined and evaluated. Current condition and treatment options discussed in detail. Advised pt to her conditon. We removed a portion of the toenail under the skin an dpatient had 100% relief. No purulcnes seen . Verbal and written instructions given to patient. Contact office with any questions/problems/concerns. No orders of the defined types were placed in this encounter. No orders of the defined types were placed in this encounter. RTC in 4week(s).     6/1/2021      Electronically signed by Peter Snow DPM on 6/1/2021 at 10:23 AM  6/1/2021

## 2021-06-20 ENCOUNTER — HOSPITAL ENCOUNTER (EMERGENCY)
Age: 51
Discharge: HOME | End: 2021-06-20
Attending: EMERGENCY MEDICINE
Payer: MEDICARE

## 2021-06-20 ENCOUNTER — APPOINTMENT (OUTPATIENT)
Dept: CT IMAGING | Age: 51
End: 2021-06-20
Payer: MEDICARE

## 2021-06-20 VITALS
WEIGHT: 293 LBS | HEART RATE: 86 BPM | TEMPERATURE: 98.4 F | HEIGHT: 65 IN | DIASTOLIC BLOOD PRESSURE: 76 MMHG | RESPIRATION RATE: 16 BRPM | BODY MASS INDEX: 48.82 KG/M2 | OXYGEN SATURATION: 97 % | SYSTOLIC BLOOD PRESSURE: 135 MMHG

## 2021-06-20 DIAGNOSIS — N39.0 URINARY TRACT INFECTION WITHOUT HEMATURIA, SITE UNSPECIFIED: Primary | ICD-10-CM

## 2021-06-20 DIAGNOSIS — K57.32 DIVERTICULITIS OF COLON: ICD-10-CM

## 2021-06-20 LAB
-: ABNORMAL
ABSOLUTE EOS #: 0.13 K/UL (ref 0–0.44)
ABSOLUTE IMMATURE GRANULOCYTE: 0.02 K/UL (ref 0–0.3)
ABSOLUTE LYMPH #: 1.48 K/UL (ref 1.1–3.7)
ABSOLUTE MONO #: 0.57 K/UL (ref 0.1–1.2)
ALBUMIN SERPL-MCNC: 3.4 G/DL (ref 3.5–5.2)
ALBUMIN/GLOBULIN RATIO: ABNORMAL (ref 1–2.5)
ALP BLD-CCNC: 87 U/L (ref 35–104)
ALT SERPL-CCNC: 8 U/L (ref 5–33)
AMORPHOUS: ABNORMAL
AMYLASE: 38 U/L (ref 28–100)
ANION GAP SERPL CALCULATED.3IONS-SCNC: 12 MMOL/L (ref 9–17)
AST SERPL-CCNC: 11 U/L
BACTERIA: ABNORMAL
BASOPHILS # BLD: 0 % (ref 0–2)
BASOPHILS ABSOLUTE: <0.03 K/UL (ref 0–0.2)
BILIRUB SERPL-MCNC: 0.73 MG/DL (ref 0.3–1.2)
BILIRUBIN DIRECT: 0.15 MG/DL
BILIRUBIN URINE: NEGATIVE
BILIRUBIN, INDIRECT: 0.58 MG/DL (ref 0–1)
BUN BLDV-MCNC: 9 MG/DL (ref 6–20)
BUN/CREAT BLD: 10 (ref 9–20)
CALCIUM SERPL-MCNC: 8.6 MG/DL (ref 8.6–10.4)
CASTS UA: ABNORMAL /LPF
CHLORIDE BLD-SCNC: 99 MMOL/L (ref 98–107)
CO2: 27 MMOL/L (ref 20–31)
COLOR: ABNORMAL
COMMENT UA: ABNORMAL
CREAT SERPL-MCNC: 0.92 MG/DL (ref 0.5–0.9)
CRYSTALS, UA: ABNORMAL /HPF
DIFFERENTIAL TYPE: ABNORMAL
EOSINOPHILS RELATIVE PERCENT: 2 % (ref 1–4)
EPITHELIAL CELLS UA: ABNORMAL /HPF (ref 0–5)
GFR AFRICAN AMERICAN: >60 ML/MIN
GFR NON-AFRICAN AMERICAN: >60 ML/MIN
GFR SERPL CREATININE-BSD FRML MDRD: ABNORMAL ML/MIN/{1.73_M2}
GFR SERPL CREATININE-BSD FRML MDRD: ABNORMAL ML/MIN/{1.73_M2}
GLOBULIN: ABNORMAL G/DL (ref 1.5–3.8)
GLUCOSE BLD-MCNC: 104 MG/DL (ref 70–99)
GLUCOSE URINE: NEGATIVE
HCG(URINE) PREGNANCY TEST: NEGATIVE
HCT VFR BLD CALC: 35.4 % (ref 36.3–47.1)
HEMOGLOBIN: 10.7 G/DL (ref 11.9–15.1)
IMMATURE GRANULOCYTES: 0 %
KETONES, URINE: NEGATIVE
LEUKOCYTE ESTERASE, URINE: ABNORMAL
LIPASE: 24 U/L (ref 13–60)
LYMPHOCYTES # BLD: 25 % (ref 24–43)
MCH RBC QN AUTO: 27.7 PG (ref 25.2–33.5)
MCHC RBC AUTO-ENTMCNC: 30.2 G/DL (ref 28.4–34.8)
MCV RBC AUTO: 91.7 FL (ref 82.6–102.9)
MONOCYTES # BLD: 10 % (ref 3–12)
MUCUS: ABNORMAL
NITRITE, URINE: POSITIVE
NRBC AUTOMATED: 0 PER 100 WBC
OTHER OBSERVATIONS UA: ABNORMAL
PDW BLD-RTO: 13.3 % (ref 11.8–14.4)
PH UA: 6.5 (ref 5–8)
PLATELET # BLD: 205 K/UL (ref 138–453)
PLATELET ESTIMATE: ABNORMAL
PMV BLD AUTO: 9 FL (ref 8.1–13.5)
POTASSIUM SERPL-SCNC: 4 MMOL/L (ref 3.7–5.3)
PROTEIN UA: ABNORMAL
RBC # BLD: 3.86 M/UL (ref 3.95–5.11)
RBC # BLD: ABNORMAL 10*6/UL
RBC UA: ABNORMAL /HPF (ref 0–2)
RENAL EPITHELIAL, UA: ABNORMAL /HPF
SEG NEUTROPHILS: 63 % (ref 36–65)
SEGMENTED NEUTROPHILS ABSOLUTE COUNT: 3.81 K/UL (ref 1.5–8.1)
SODIUM BLD-SCNC: 138 MMOL/L (ref 135–144)
SPECIFIC GRAVITY UA: 1.02 (ref 1–1.03)
TOTAL PROTEIN: 6.9 G/DL (ref 6.4–8.3)
TRICHOMONAS: ABNORMAL
TURBIDITY: ABNORMAL
URINE HGB: ABNORMAL
UROBILINOGEN, URINE: ABNORMAL
WBC # BLD: 6 K/UL (ref 3.5–11.3)
WBC # BLD: ABNORMAL 10*3/UL
WBC UA: ABNORMAL /HPF (ref 0–5)
YEAST: ABNORMAL

## 2021-06-20 PROCEDURE — 80076 HEPATIC FUNCTION PANEL: CPT

## 2021-06-20 PROCEDURE — 81001 URINALYSIS AUTO W/SCOPE: CPT

## 2021-06-20 PROCEDURE — 83690 ASSAY OF LIPASE: CPT

## 2021-06-20 PROCEDURE — 93005 ELECTROCARDIOGRAM TRACING: CPT

## 2021-06-20 PROCEDURE — 80048 BASIC METABOLIC PNL TOTAL CA: CPT

## 2021-06-20 PROCEDURE — 85025 COMPLETE CBC W/AUTO DIFF WBC: CPT

## 2021-06-20 PROCEDURE — 87186 SC STD MICRODIL/AGAR DIL: CPT

## 2021-06-20 PROCEDURE — 87077 CULTURE AEROBIC IDENTIFY: CPT

## 2021-06-20 PROCEDURE — 81025 URINE PREGNANCY TEST: CPT

## 2021-06-20 PROCEDURE — 74177 CT ABD & PELVIS W/CONTRAST: CPT

## 2021-06-20 PROCEDURE — 6360000004 HC RX CONTRAST MEDICATION: Performed by: NURSE PRACTITIONER

## 2021-06-20 PROCEDURE — 82150 ASSAY OF AMYLASE: CPT

## 2021-06-20 PROCEDURE — 87086 URINE CULTURE/COLONY COUNT: CPT

## 2021-06-20 PROCEDURE — 2580000003 HC RX 258: Performed by: NURSE PRACTITIONER

## 2021-06-20 PROCEDURE — 99283 EMERGENCY DEPT VISIT LOW MDM: CPT

## 2021-06-20 RX ORDER — METRONIDAZOLE 500 MG/1
500 TABLET ORAL 3 TIMES DAILY
Qty: 30 TABLET | Refills: 0 | Status: SHIPPED | OUTPATIENT
Start: 2021-06-20 | End: 2021-06-30

## 2021-06-20 RX ORDER — 0.9 % SODIUM CHLORIDE 0.9 %
80 INTRAVENOUS SOLUTION INTRAVENOUS ONCE
Status: COMPLETED | OUTPATIENT
Start: 2021-06-20 | End: 2021-06-20

## 2021-06-20 RX ORDER — CIPROFLOXACIN 500 MG/1
500 TABLET, FILM COATED ORAL 2 TIMES DAILY
Qty: 20 TABLET | Refills: 0 | Status: SHIPPED | OUTPATIENT
Start: 2021-06-20 | End: 2021-06-30

## 2021-06-20 RX ORDER — SODIUM CHLORIDE 0.9 % (FLUSH) 0.9 %
10 SYRINGE (ML) INJECTION PRN
Status: DISCONTINUED | OUTPATIENT
Start: 2021-06-20 | End: 2021-06-20 | Stop reason: HOSPADM

## 2021-06-20 RX ADMIN — SODIUM CHLORIDE, PRESERVATIVE FREE 10 ML: 5 INJECTION INTRAVENOUS at 19:34

## 2021-06-20 RX ADMIN — SODIUM CHLORIDE 80 ML: 9 INJECTION, SOLUTION INTRAVENOUS at 19:34

## 2021-06-20 RX ADMIN — IOPAMIDOL 75 ML: 755 INJECTION, SOLUTION INTRAVENOUS at 19:34

## 2021-06-20 ASSESSMENT — ENCOUNTER SYMPTOMS
COUGH: 0
SHORTNESS OF BREATH: 0
WHEEZING: 0
DIARRHEA: 0
COLOR CHANGE: 0
NAUSEA: 0
RHINORRHEA: 0
VOMITING: 0
SINUS PRESSURE: 0
SORE THROAT: 0
ABDOMINAL PAIN: 1
CONSTIPATION: 0

## 2021-06-20 ASSESSMENT — PAIN DESCRIPTION - FREQUENCY: FREQUENCY: INTERMITTENT

## 2021-06-20 ASSESSMENT — PAIN DESCRIPTION - PAIN TYPE: TYPE: ACUTE PAIN

## 2021-06-20 ASSESSMENT — PAIN SCALES - GENERAL: PAINLEVEL_OUTOF10: 8

## 2021-06-20 ASSESSMENT — PAIN DESCRIPTION - ORIENTATION: ORIENTATION: RIGHT

## 2021-06-20 ASSESSMENT — PAIN DESCRIPTION - LOCATION: LOCATION: FLANK

## 2021-06-21 LAB
EKG ATRIAL RATE: 77 BPM
EKG P AXIS: 11 DEGREES
EKG P-R INTERVAL: 176 MS
EKG Q-T INTERVAL: 372 MS
EKG QRS DURATION: 86 MS
EKG QTC CALCULATION (BAZETT): 420 MS
EKG R AXIS: 53 DEGREES
EKG T AXIS: 32 DEGREES
EKG VENTRICULAR RATE: 77 BPM

## 2021-06-21 NOTE — ED PROVIDER NOTES
eMERGENCY dEPARTMENT eNCOUnter   Independent Attestation     Pt Name: Iza Reeves  MRN: [id number]  [place] 1970  Date of evaluation: 6/20/21     Iza Reeves is a 52 y.o. female with CC: Abdominal Pain and Shortness of Breath      Based on the medical record the care appears appropriate. I was personally available for consultation in the Emergency Department.     Luis Jhaveri MD  Attending Emergency Physician                   Luis Jhaveri MD  06/20/21 1077

## 2021-06-21 NOTE — ED PROVIDER NOTES
DELAYED RELEASE CAPSULE    take 1 capsule by mouth once daily    ONDANSETRON (ZOFRAN) 4 MG TABLET    Take 1 tablet by mouth 3 times daily as needed for Nausea or Vomiting    SOLIFENACIN (VESICARE) 10 MG TABLET        SULFACETAMIDE SODIUM, ACNE, 10 % LOTN    Apply to face daily    TRIAMCINOLONE (KENALOG) 0.025 % CREAM    Apply to rash on face and ears daily       PAST MEDICAL HISTORY         Diagnosis Date    Allergic rhinitis     Anxiety     Anxiety and depression     Arthritis     Asthma     no longer using inhaler or nebulizer    Diverticulitis     Headache(784.0)     HTN (hypertension)     Obesity     Overactive bladder     Sleep apnea     uses machine nightly       SURGICAL HISTORY           Procedure Laterality Date    CATARACT REMOVAL      CHOLECYSTECTOMY, LAPAROSCOPIC N/A 12/15/2020    CHOLECYSTECTOMY LAPAROSCOPIC ROBOTIC XI performed by Coleen Collins MD at [address] [facility] N/A 8/5/2020    COLONOSCOPY POLYPECTOMY SNARE/COLD BIOPSY performed by Domingo Knox MD at [address] [facility]  04/08/2021    COLONOSCOPY N/A 4/8/2021    COLONOSCOPY WITH BIOPSY RANDOM RIGHT AND LEFT COLON [facility]     cataract removed with lens implants    KNEE SURGERY Right 05/16/2017    NE KNEE SCOPE,DIAGNOSTIC Right 5/16/2017    RIGHT KNEE ARTHROSCOPY WITH MEDIAL MENISECTOMY AND CHONDROPLASTY performed by Sadia German DO at [address] ENDOSCOPY  04/08/2021    UPPER GASTROINTESTINAL ENDOSCOPY N/A 4/8/2021    EGD BIOPSY OF GASTRIC AND GASTRIC POLYPECTOMY performed by Domingo Knox MD at [address]           Problem Relation Age of Onset    Arthritis Father      Family Status   Relation Name Status    Mother  Alive    Father  Alive        SOCIAL HISTORY      reports that she quit smoking about 4 years ago. Her smoking use included cigarettes.  She smoked 0.60 packs per day. She has never used smokeless tobacco. She reports current alcohol use. She reports that she does not use drugs. REVIEW OF SYSTEMS    (2-9 systems for level 4, 10 or more for level 5)     Review of Systems   Constitutional: Negative for chills, fever and unexpected weight change. HENT: Negative for congestion, rhinorrhea, sinus pressure and sore throat. Respiratory: Negative for cough, shortness of breath and wheezing. Cardiovascular: Negative for chest pain and palpitations. Gastrointestinal: Positive for abdominal pain. Negative for constipation, diarrhea, nausea and vomiting. Genitourinary: Negative for dysuria and hematuria. Musculoskeletal: Negative for arthralgias and myalgias. Skin: Negative for color change and rash. Neurological: Negative for dizziness, weakness and headaches. Hematological: Negative for adenopathy. All other systems reviewed and are negative. Except as noted above the remainder of the review of systems was reviewed and negative. PHYSICAL EXAM    (up to 7 for level 4, 8 or more for level 5)     ED Triage Vitals [06/20/21 1729]   BP Temp Temp Source Pulse Resp SpO2 Height Weight   135/76 98.4 °F (36.9 °C) Oral 86 16 97 % 5' 4.5\" (1.638 m) (!) 394 lb (178.7 kg)       Physical Exam  Vitals reviewed. Constitutional:       Appearance: She is well-developed. HENT:      Head: Normocephalic and atraumatic. Eyes:      Conjunctiva/sclera: Conjunctivae normal.      Pupils: Pupils are equal, round, and reactive to light. Cardiovascular:      Rate and Rhythm: Normal rate and regular rhythm. Pulmonary:      Effort: Pulmonary effort is normal. No respiratory distress. Breath sounds: Normal breath sounds. No stridor. Abdominal:      General: Bowel sounds are normal.      Palpations: Abdomen is soft. Tenderness: There is generalized abdominal tenderness. Negative signs include Alejandro's sign and McBurney's sign.    Musculoskeletal: General: Normal range of motion. Cervical back: Normal range of motion and neck supple. Lymphadenopathy:      Cervical: No cervical adenopathy. Skin:     General: Skin is warm and dry. Findings: No rash. Neurological:      Mental Status: She is alert and oriented to person, place, and time. RADIOLOGY:   Non-plain film images such as CT, Ultrasound and MRI are read by the radiologist. Plain radiographic images are visualized and preliminarily interpreted by the emergency physician with the below findings:  CT ABDOMEN PELVIS W IV CONTRAST Additional Contrast? None    Result Date: 6/20/2021  EXAMINATION: CT OF THE ABDOMEN AND PELVIS WITH CONTRAST 6/20/2021 4:25 pm TECHNIQUE: CT of the abdomen and pelvis was performed with the administration of intravenous contrast. Multiplanar reformatted images are provided for review. Dose modulation, iterative reconstruction, and/or weight based adjustment of the mA/kV was utilized to reduce the radiation dose to as low as reasonably achievable. COMPARISON: 03/07/2021 HISTORY: ORDERING SYSTEM PROVIDED HISTORY: abd pain TECHNOLOGIST PROVIDED HISTORY: [facility] pain Decision Support Exception - unselect if not a suspected or confirmed emergency medical condition->Emergency Medical Condition (MA) Reason for Exam: Abdominal pain, sob, Hx: cholecystectomy, overactive bladder, colonoscopy, htn Acuity: Acute Type of Exam: Initial FINDINGS: Lower Chest: Lung bases are clear. Organs: Liver is enlarged in size with normal density. No focal masses identified. No evidence of intrahepatic ductal dilatation. Spleen is mildly enlarged. The gallbladder is surgically absent. Both adrenal glands are normal.  Pancreas is normal in appearance. . The kidneys are  normal in size and attenuation without evidence of hydronephrosis or renal calculi. GI/Bowel: The visualized bowel is show no mass lesions. Mild colonic diverticulosis.   Mild haziness of the surrounding mesentery in the pelvis Pelvis: No intrapelvic mass is identified. Bladder and rectum are intact. Peritoneum/Retroperitoneum: No free fluid. No lymphadenopathy. No evidence of pneumoperitoneum. Bones/Soft Tissues: . The abdominal and pelvic walls are unremarkable. Degenerative changes seen in the visualized spine . No acute bony abnormalities. Diverticulosis with suggestion of mild diverticulitis. Hepatosplenomegaly     Interpretation per the Radiologist below, if available at the time of this note:    CT ABDOMEN PELVIS W IV CONTRAST Additional Contrast? None   Final Result   Diverticulosis with suggestion of mild diverticulitis. Hepatosplenomegaly                 LABS:  Labs Reviewed   CBC WITH AUTO DIFFERENTIAL - Abnormal; Notable for the following components:       Result Value    RBC 3.86 (*)     Hemoglobin 10.7 (*)     Hematocrit 35.4 (*)     All other components within normal limits   BASIC METABOLIC PANEL - Abnormal; Notable for the following components:    Glucose 104 (*)     CREATININE 0.92 (*)     All other components within normal limits   HEPATIC FUNCTION PANEL - Abnormal; Notable for the following components:    Albumin 3.4 (*)     All other components within normal limits   URINE RT REFLEX TO CULTURE - Abnormal; Notable for the following components:    Color, UA LANDY (*)     Turbidity UA CLOUDY (*)     Urine Hgb 3+ (*)     Protein, UA 1+ (*)     Urobilinogen, Urine ELEVATED (*)     Nitrite, Urine POSITIVE (*)     Leukocyte Esterase, Urine SMALL (*)     All other components within normal limits   MICROSCOPIC URINALYSIS - Abnormal; Notable for the following components:    Bacteria, UA MANY (*)     All other components within normal limits   CULTURE, URINE   LIPASE   AMYLASE   PREGNANCY, URINE       All other labs were within normal range or not returned as of this dictation.     EMERGENCY DEPARTMENT COURSE and DIFFERENTIAL DIAGNOSIS/MDM:   Vitals:    Vitals:    06/20/21 1729   BP: 135/76   Pulse: 86   Resp: 16 Temp: 98.4 °F (36.9 °C)   TempSrc: Oral   SpO2: 97%   Weight: (!) 394 lb (178.7 kg)   Height: 5' 4.5\" (1.638 m)       Medical Decision Making: Patient was found to have acute diverticulitis in addition to urinary tract infection. We will place the patient on Cipro and Flagyl. This will cover urine in addition to her diverticulitis. Follow-up with her primary care physician for recheck reevaluation. Return for any worsening symptoms or concerns  FINAL IMPRESSION      1. Urinary tract infection without hematuria, site unspecified    2.  Diverticulitis of colon          DISPOSITION/PLAN   DISPOSITION Decision To Discharge 06/20/2021 08:48:30 PM      PATIENT REFERRED TO:   Rodolfo Martínez MD  [address], [address] [place] [address]  [phone]    Schedule an appointment as soon as possible for a visit       [facility]  [address] [facility]  [phone]    If symptoms worsen      DISCHARGE MEDICATIONS:     New Prescriptions    CIPROFLOXACIN (CIPRO) 500 MG TABLET    Take 1 tablet by mouth 2 times daily for 10 days    METRONIDAZOLE (FLAGYL) 500 MG TABLET    Take 1 tablet by mouth 3 times daily for 10 days           (Please note that portions of this note were completed with a voice recognition program.  Efforts were made to edit the dictations but occasionally words are mis-transcribed.)    [facility] NP, APRN - [address] [facility]  Certified Nurse Practitioner            Francis Sprague, JONATHAN - TANIYA  06/20/21 2051

## 2021-06-22 LAB
CULTURE: ABNORMAL
Lab: ABNORMAL
SPECIMEN DESCRIPTION: ABNORMAL

## 2021-06-29 ENCOUNTER — OFFICE VISIT (OUTPATIENT)
Dept: BARIATRICS/WEIGHT MGMT | Age: 51
End: 2021-06-29
Payer: MEDICARE

## 2021-06-29 VITALS
HEART RATE: 60 BPM | HEIGHT: 65 IN | DIASTOLIC BLOOD PRESSURE: 70 MMHG | BODY MASS INDEX: 48.82 KG/M2 | WEIGHT: 293 LBS | SYSTOLIC BLOOD PRESSURE: 120 MMHG

## 2021-06-29 DIAGNOSIS — J45.909 UNCOMPLICATED ASTHMA, UNSPECIFIED ASTHMA SEVERITY, UNSPECIFIED WHETHER PERSISTENT: ICD-10-CM

## 2021-06-29 DIAGNOSIS — F32.A ANXIETY AND DEPRESSION: ICD-10-CM

## 2021-06-29 DIAGNOSIS — M25.562 BILATERAL CHRONIC KNEE PAIN: ICD-10-CM

## 2021-06-29 DIAGNOSIS — M54.9 CHRONIC BACK PAIN, UNSPECIFIED BACK LOCATION, UNSPECIFIED BACK PAIN LATERALITY: ICD-10-CM

## 2021-06-29 DIAGNOSIS — G89.29 CHRONIC BACK PAIN, UNSPECIFIED BACK LOCATION, UNSPECIFIED BACK PAIN LATERALITY: ICD-10-CM

## 2021-06-29 DIAGNOSIS — F41.9 ANXIETY AND DEPRESSION: ICD-10-CM

## 2021-06-29 DIAGNOSIS — G89.29 BILATERAL CHRONIC KNEE PAIN: ICD-10-CM

## 2021-06-29 DIAGNOSIS — M25.561 BILATERAL CHRONIC KNEE PAIN: ICD-10-CM

## 2021-06-29 DIAGNOSIS — G47.33 OSA (OBSTRUCTIVE SLEEP APNEA): ICD-10-CM

## 2021-06-29 DIAGNOSIS — I10 ESSENTIAL HYPERTENSION: Primary | ICD-10-CM

## 2021-06-29 DIAGNOSIS — E66.01 MORBID OBESITY WITH BMI OF 60.0-69.9, ADULT (HCC): ICD-10-CM

## 2021-06-29 PROCEDURE — 99213 OFFICE O/P EST LOW 20 MIN: CPT | Performed by: NURSE PRACTITIONER

## 2021-06-29 RX ORDER — TRIAMCINOLONE ACETONIDE 5 MG/G
CREAM TOPICAL
COMMUNITY
Start: 2021-06-03 | End: 2022-05-23

## 2021-06-29 RX ORDER — FLUTICASONE PROPIONATE 50 MCG
SPRAY, SUSPENSION (ML) NASAL
COMMUNITY
Start: 2021-06-03 | End: 2022-05-23 | Stop reason: ALTCHOICE

## 2021-06-29 RX ORDER — LORATADINE/PSEUDOEPHEDRINE 10MG-240MG
TABLET, EXTENDED RELEASE 24 HR ORAL
COMMUNITY
Start: 2021-06-17 | End: 2021-11-11

## 2021-06-29 RX ORDER — BUPROPION HYDROBROMIDE 348 MG/1
174 TABLET, EXTENDED RELEASE ORAL
COMMUNITY
Start: 2021-06-04 | End: 2022-02-22 | Stop reason: DRUGHIGH

## 2021-06-29 NOTE — PROGRESS NOTES
Medical Weight Management Progress Note    Subjective     Patient being seen for medically supervised weight loss for the chronic conditions of HTN, DENISHA, Anxiety/Depression, Asthma, Psoriasis, Chronic Back Pain, Chronic Bilateral Knee Pain. She is working on the behavior changes discussed at the initial appointment. Patient continues on diet plan. Physical activity includes walking, stretches, and pool aerobics. Weight gain of 5 lbs since last visit. Using CPAP. Psych eval completed and clearance received. EGD/colonoscopy done with Dr Marce Zepeda. H Pylori negative. Dx microscopic lymphocytic colitis. Needs cardiac clearance. No current issues. Working toward bariatric surgery:    [x] Sleeve Gastrectomy                                                           [] Dotty-en-Y Gastric Bypass    Allergies: Allergies   Allergen Reactions    No Known Allergies        Past Medical History:     Past Medical History:   Diagnosis Date    Allergic rhinitis     Anxiety     Anxiety and depression     Arthritis     Asthma     no longer using inhaler or nebulizer    Diverticulitis     Headache(784.0)     HTN (hypertension)     Obesity     Overactive bladder     Sleep apnea     uses machine nightly   .     Past Surgical History:  Past Surgical History:   Procedure Laterality Date    CATARACT REMOVAL      CHOLECYSTECTOMY, LAPAROSCOPIC N/A 12/15/2020    CHOLECYSTECTOMY LAPAROSCOPIC ROBOTIC XI performed by Bandar Alamo MD at [place] [address] 8/5/2020    COLONOSCOPY POLYPECTOMY SNARE/COLD BIOPSY performed by Valdo Steele MD at [facility]  04/08/2021    COLONOSCOPY N/A 4/8/2021    COLONOSCOPY WITH BIOPSY RANDOM RIGHT AND LEFT COLON performed by Valdo Steele MD at [address] [place] [address]. [address] Bilateral     cataract removed with lens implants    KNEE SURGERY Right 05/16/2017    [place] KNEE SCOPE,DIAGNOSTIC Right 5/16/2017    RIGHT KNEE ARTHROSCOPY WITH MEDIAL MENISECTOMY AND Current Medications:  Current Outpatient Medications   Medication Sig Dispense Refill    fluticasone (FLONASE) 50 MCG/ACT nasal spray place 1 spray into each nostril once daily      LORATADINE-D 24HR  MG per extended release tablet take 1 tablet by mouth once daily      triamcinolone (ARISTOCORT) 0.5 % cream apply A THIN FILM topically to affected area 2 TO 3 TIMES DAILY      APLENZIN 348 MG TB24 take 1 tablet by mouth every morning      ciprofloxacin (CIPRO) 500 MG tablet Take 1 tablet by mouth 2 times daily for 10 days 20 tablet 0    metroNIDAZOLE (FLAGYL) 500 MG tablet Take 1 tablet by mouth 3 times daily for 10 days 30 tablet 0    omeprazole (PRILOSEC) 20 MG delayed release capsule take 1 capsule by mouth once daily      solifenacin (VESICARE) 10 MG tablet       ofloxacin (FLOXIN) 0.3 % otic solution insert 10 drops INTO AFFECTED EAR(S) for 7 days      famotidine (PEPCID) 20 MG tablet Take 1 tablet by mouth 2 times daily 60 tablet 3    triamcinolone (KENALOG) 0.025 % cream Apply to rash on face and ears daily 80 g 2    Sulfacetamide Sodium, Acne, 10 % LOTN Apply to face daily 118 mL 2    ketoconazole (NIZORAL) 2 % shampoo Apply 3-4 times weekly to scalp, leave on for five minutes prior to washing off 120 mL 2    ondansetron (ZOFRAN) 4 MG tablet Take 1 tablet by mouth 3 times daily as needed for Nausea or Vomiting 15 tablet 0    dicyclomine (BENTYL) 20 MG tablet Take 1 tablet by mouth every 8 hours as needed      acetaminophen (TYLENOL) 500 MG tablet Take 1,000 mg by mouth every 6 hours as needed for Pain      escitalopram (LEXAPRO) 20 MG tablet Take 20 mg by mouth daily      magnesium citrate solution Take 296 mLs by mouth once for 1 dose 296 mL 0    Compression Stockings MISC 30- 40 mm/hg (Patient not taking: Reported on 6/29/2021) 2 each 1     Current Facility-Administered Medications   Medication Dose Route Frequency Provider Last Rate Last Admin    albuterol (PROVENTIL) nebulizer solution 2.5 mg  2.5 mg Nebulization 4x daily Gerard Ruffin MD           Vital Signs:  /70 (Site: Right Upper Arm, Position: Sitting, Cuff Size: Large Adult)   Pulse 60   Ht 5' 4.5\" (1.638 m)   Wt (!) 406 lb (184.2 kg)   LMP 06/20/2021   BMI 68.61 kg/m²     BMI/Height/Weight:  Body mass index is 68.61 kg/m². Review of Systems - A review of systems was performed. All was negative unless otherwise documented in HPI. Constitutional: Negative for fever, chills and diaphoresis. HENT: Negative for hearing loss and trouble swallowing. Eyes: Negative for photophobia and visual disturbance. Respiratory: Negative for cough, shortness of breath and wheezing. Cardiovascular: Negative for chest pain and palpitations. Gastrointestinal: Negative for nausea, vomiting, abdominal pain, diarrhea, constipation, blood in stool and abdominal distention. Endocrine: Negative for polydipsia, polyphagia and polyuria. Genitourinary: Negative for dysuria, frequency, hematuria and difficulty urinating. Musculoskeletal: Negative for myalgias, joint swelling. Skin: Negative for pallor and rash. Neurological: Negative for dizziness, tremors, light-headedness and headaches. Psychiatric/Behavioral: Negative for sleep disturbance and dysphoric mood. Objective:      Physical Exam   Vital signs reviewed. General: Well-developed and well-nourished. No acute distress. Skin: Warm, dry and intact. HEENT: Normocephalic. EOMs intact. Conjunctivae normal. Neck supple. Cardiovascular: Normal rate, regular rhythm. Pulmonary/Chest: Normal effort. Lungs clear to auscultation. No rales, rhonchi or wheezing. Abdominal: Positive bowel sounds. Soft, nontender. Nondistended. Musculoskeletal: Movement x4. Bilateral lower extremity edema. Neurological: Ambulates with a cane. Alert and oriented to person, place, and time. Psychiatric: Normal mood and affect.  Speech and behavior normal. Judgment and thought content normal. Cognition and memory intact. Assessment:       Diagnosis Orders   1. Essential hypertension     2. DENISHA (obstructive sleep apnea)     3. Anxiety and depression     4. Morbid obesity with BMI of 60.0-69.9, adult ([facility] [address]     5. Uncomplicated asthma, unspecified asthma severity, unspecified whether persistent     6. Chronic back pain, unspecified back location, unspecified back pain laterality     7. Bilateral chronic knee pain         Plan:    Dietitian visit today. Patient was encouraged to journal all food intake. Keep calorie level at approximately 7165-9405. Protein intake is to be a minimum of 60-80 grams per day. Water drinking was encouraged with a goal of 64oz-128oz daily. Beverages to be calorie free except for milk. Every other beverage should be water. Avoid soda. Continue to increase level of physical activity. Encouraged use of exercise log. Follow-up  Return in about 1 month (around 7/29/2021). Orders this encounter:  No orders of the defined types were placed in this encounter. Prescriptions this encounter:  No orders of the defined types were placed in this encounter.       Electronically signed by:  Cristopher Matthews CNP

## 2021-06-29 NOTE — PROGRESS NOTES
Medical Nutrition Therapy   Metabolic and Bariatric Surgery         Supervised diet and exercise preparation  Visit 3 out of 3  Pt reports:      Changes in eating patterns to promote health are noted below on the goals number 22-25    Vitals: Wt Readings from Last 3 Encounters:   06/29/21 (!) 406 lb (184.2 kg)   06/20/21 (!) 394 lb (178.7 kg)   06/01/21 (!) 401 lb (181.9 kg)         Nutrition Assessment:   PES: Knowledge deficit related to healthy behaviors that support weight management post weight loss surgery as evidenced by Body mass index is 68.61 kg/m². Nutrition Assessment of Goal Attainment:  TREATMENT GOALS:    1. Pt  Completed 5 out of 5 goals. 2.TREATMENT GOALS FOR UPCOMING WEEK: continue all previous goals and add: # 0    All goals were planned with and agreed on by the patient. I want to improve my health because I want to be healthier and more mobile. appt # NA G What is your next step? C 1 2 3 4 5 6 7 8 9     0  one I will read the education binder provided to me and the   x 100             0  2 I will make my pschological evaluation appoinment. x 100             3  3 I will bring this goal card to every appointment. 100             X 4 I will eliminate all tobacco/nicotine. X 5 I will limit alcoholic beverages to 4-1DT per week. X 6 I will limit dining out to 3 times per week or less. X 7 I will eliminate sugary beverages. X 8 I will eliminate carbonated beverages. X 9 I will eliminate drinking with a straw. X 10 I will limit caffeinated beverages to 16oz daily. X 11 I will limit cold cereals prepared with milk. 12 I will do a 5 minute reflection. 13 I will food journal daily. 3  14 I will log my exercise daily. 0  100           3  15 I will determine my  calcium and multivitamin plan.                  16 I will purchase multivitamin. 17 I will start taking multivitamins following my plan. 18 I will have 1-2 servings of protein present at each meal. x              3  19 I will eat every 3-5 hours. 1  20 I will drink 64oz of fluid daily. x   100           3  21 I will follow the 15-30-15 guideline. 3  22 I will eat protein first at all meals followed by vegetables  Fruit and lastly whole grains. 3  23 My first one diet neutral approach is: Making better choices when I have cravings. 50  100           3  24 my second diet neutral approach is: Continue participating in water therapy. 100             25 My third diet neutral approach is:                                                                                                                  Do you understand your goals? y    Do you have the information you need to achieve your goals? y    Do you have any questions  right now? n        [x]  Consistent goal achievement in the program thus far and further success with goals is expected. []  Unable to consistently make progress in goal achievement. At this time patient is not moving forward  in developing the skills needed for success after surgery. Plan:    Continue to follow monthly and review goals.          [x]  Nutrition visits complete    []

## 2021-07-07 ENCOUNTER — OFFICE VISIT (OUTPATIENT)
Dept: GASTROENTEROLOGY | Age: 51
End: 2021-07-07
Payer: MEDICARE

## 2021-07-07 VITALS — BODY MASS INDEX: 67.77 KG/M2 | WEIGHT: 293 LBS | DIASTOLIC BLOOD PRESSURE: 77 MMHG | SYSTOLIC BLOOD PRESSURE: 139 MMHG

## 2021-07-07 DIAGNOSIS — K52.832 LYMPHOCYTIC COLITIS: Primary | ICD-10-CM

## 2021-07-07 PROCEDURE — 1036F TOBACCO NON-USER: CPT | Performed by: INTERNAL MEDICINE

## 2021-07-07 PROCEDURE — 99213 OFFICE O/P EST LOW 20 MIN: CPT | Performed by: INTERNAL MEDICINE

## 2021-07-07 PROCEDURE — G8427 DOCREV CUR MEDS BY ELIG CLIN: HCPCS | Performed by: INTERNAL MEDICINE

## 2021-07-07 PROCEDURE — G8417 CALC BMI ABV UP PARAM F/U: HCPCS | Performed by: INTERNAL MEDICINE

## 2021-07-07 RX ORDER — BUDESONIDE 3 MG/1
9 CAPSULE, COATED PELLETS ORAL EVERY MORNING
Qty: 60 CAPSULE | Refills: 0 | Status: SHIPPED | OUTPATIENT
Start: 2021-07-07 | End: 2021-08-06

## 2021-07-07 ASSESSMENT — ENCOUNTER SYMPTOMS
SINUS PRESSURE: 0
RESPIRATORY NEGATIVE: 1
CHOKING: 0
VOMITING: 0
ABDOMINAL PAIN: 1
DIARRHEA: 1
ABDOMINAL DISTENTION: 1
EYES NEGATIVE: 1
WHEEZING: 0
ANAL BLEEDING: 0
BACK PAIN: 0
COUGH: 0
CONSTIPATION: 0
ALLERGIC/IMMUNOLOGIC NEGATIVE: 1
RECTAL PAIN: 0
BLOOD IN STOOL: 0
TROUBLE SWALLOWING: 0
SORE THROAT: 0
VOICE CHANGE: 0
SHORTNESS OF BREATH: 0

## 2021-07-07 NOTE — PROGRESS NOTES
GI FOLLOW UP    INTERVAL HISTORY:     Recent emergency department for left-sided diverticulitis  Prior history of lymphocytic microscopic colitis  Needs budesonide    Chief Complaint   Patient presents with    Follow-up     2 month f/u microscopic colitis    Diarrhea     Patient states still having loose bowel movements as soon as she eats food. STates having atleast 4 BM a day. She never received her budesonide        1. Lymphocytic colitis          HISTORY OF PRESENT ILLNESS: Ms.Darling KALIE Morataya is a 54 y.o. female with a past history remarkable for obesity, anxiety, depression, referred for evaluation of prior diverticulitis.   Patient underwent a recent colonoscopy, results were discussed on last visit.  Reports abdominal pain since gallbladder was removed.  Reports diarrhea as well.  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 However given the patient's clinical picture this appears to be unlikely given her recent diagnosis.     Smoker: quit 2 yrs ago   Drinking history: socially   Abdominal surgeries: none  Prior Colonoscopy: none   Prior EGD: none   FH of GI issues: none    Past Medical,Family, and Social History reviewed and does contribute to the patient presenting condition. Patient's PMH/PSH,SH,PSYCH Hx, MEDs, ALLERGIES, and ROS were all reviewed and updated in the appropriate sections.     PAST MEDICAL HISTORY:  Past Medical History:   Diagnosis Date    Allergic rhinitis     Anxiety     Anxiety and depression     Arthritis     Asthma     no longer using inhaler or nebulizer    Diverticulitis     Headache(784.0)     HTN (hypertension)     Obesity     Overactive bladder     Sleep apnea     uses machine nightly       Past Surgical History:   Procedure Laterality Date    CATARACT REMOVAL      CHOLECYSTECTOMY, LAPAROSCOPIC N/A 12/15/2020    CHOLECYSTECTOMY LAPAROSCOPIC ROBOTIC XI performed by Cameron Archuleta MD at [facility] N/A 8/5/2020    COLONOSCOPY POLYPECTOMY SNARE/COLD BIOPSY performed by Ricky Peter MD at [facility]  04/08/2021    COLONOSCOPY N/A 4/8/2021    COLONOSCOPY WITH BIOPSY RANDOM RIGHT AND LEFT COLON performed by Ricky Peter MD at [address] [place] [address]. [address] Bilateral     cataract removed with lens implants    KNEE SURGERY Right 05/16/2017    [place] KNEE SCOPE,DIAGNOSTIC Right 5/16/2017    RIGHT KNEE ARTHROSCOPY WITH MEDIAL MENISECTOMY AND CHONDROPLASTY performed by Yokasta Zimmerman DO at [facility]  04/08/2021    UPPER GASTROINTESTINAL ENDOSCOPY N/A 4/8/2021    EGD BIOPSY OF GASTRIC AND GASTRIC POLYPECTOMY performed by Ricky Peter MD at [address] [place] [phone]:    Current Outpatient Medications:     fluticasone (FLONASE) 50 MCG/ACT nasal spray, place 1 spray into each nostril once daily, Disp: , Rfl:     LORATADINE-D 24HR  MG per extended release tablet, take 1 tablet by mouth once daily, Disp: , Rfl:     triamcinolone (ARISTOCORT) 0.5 % cream, apply A THIN FILM topically to affected area 2 TO 3 TIMES DAILY, Disp: , Rfl:     APLENZIN 348 MG TB24, take 1 tablet by mouth every morning, Disp: , Rfl:     omeprazole (PRILOSEC) 20 MG delayed release capsule, take 1 capsule by mouth once daily, Disp: , Rfl:     solifenacin (VESICARE) 10 MG tablet, , Disp: , Rfl:     ofloxacin (FLOXIN) 0.3 % otic solution, insert 10 drops INTO AFFECTED EAR(S) for 7 days, Disp: , Rfl:     famotidine (PEPCID) 20 MG tablet, Take 1 tablet by mouth 2 times daily, Disp: 60 tablet, Rfl: 3    triamcinolone (KENALOG) 0.025 % cream, Apply to rash on face and ears daily, Disp: 80 g, Rfl: 2    Sulfacetamide Sodium, Acne, 10 % LOTN, Apply to face daily, Disp: 118 mL, Rfl: 2    ketoconazole (NIZORAL) 2 % shampoo, Apply 3-4 times weekly to scalp, leave on for five minutes prior to washing off, Disp: 120 mL, Rfl: 2    ondansetron (ZOFRAN) 4 MG tablet, Take 1 tablet by mouth 3 times daily as needed for Nausea or Vomiting, Disp: 15 tablet, Rfl: 0    dicyclomine (BENTYL) 20 MG tablet, Take 1 tablet by mouth every 8 hours as needed, Disp: , Rfl:     acetaminophen (TYLENOL) 500 MG tablet, Take 1,000 mg by mouth every 6 hours as needed for Pain, Disp: , Rfl:     escitalopram (LEXAPRO) 20 MG tablet, Take 20 mg by mouth daily, Disp: , Rfl:     Compression Stockings MISC, 30- 40 mm/hg, Disp: 2 each, Rfl: 1    magnesium citrate solution, Take 296 mLs by mouth once for 1 dose, Disp: 296 mL, Rfl: 0    ALLERGIES:   Allergies   Allergen Reactions    No Known Allergies        FAMILY HISTORY:       Problem Relation Age of Onset    Arthritis Father          SOCIAL HISTORY:   Social History     Socioeconomic History    Marital status:      Spouse name: Not on file    Number of children: Not on file    Years of education: Not on file    Highest education level: Not on file   Occupational History    Not on file   Tobacco Use    Smoking status: Former Smoker     Packs/day: 0.60     Types: Cigarettes     Quit date:      Years since quittin.5    Smokeless tobacco: Never Used   Vaping Use    Vaping Use: Never used   Substance and Sexual Activity    Alcohol use: Yes     Comment: rarely    Drug use: No    Sexual activity: Not on file   Other Topics Concern    Not on file   Social History Narrative    Not on file     Social Determinants of Health     Financial Resource Strain:     Difficulty of Paying Living Expenses:    Food Insecurity:     Worried About Running Out of Food in the Last Year:     Ran Out of Food in the Last Year:    Transportation Needs:     Lack of Transportation (Medical):      Lack of Transportation (Non-Medical):    Physical Activity:     Days of Exercise per Week:     Minutes of Exercise per Session:    Stress:     Feeling of Stress :    Social Connections:     Frequency of Communication with Friends and Family:     Frequency of Social Gatherings with Friends and Family:     Attends Congregational Services:     Active Member of Clubs or Organizations:     Attends Club or Organization Meetings:     Marital Status:    Intimate Partner Violence:     Fear of Current or Ex-Partner:     Emotionally Abused:     Physically Abused:     Sexually Abused:        REVIEW OF SYSTEMS: A 12-point review of systems was obtained and pertinent positives and negatives were listed below. REVIEW OF SYSTEMS:     Constitutional: No fever, no chills, no lethargy, no weakness. HEENT:  No headache, otalgia, itchy eyes, nasal discharge or sore throat. Cardiac:  No chest pain, dyspnea, orthopnea or PND. Chest:   No cough, phlegm or wheezing. Abdomen:      Detailed by MA   Neuro:  No focal weakness, abnormal movements or seizure like activity. Skin:   No rashes, no itching. :   No hematuria, no pyuria, no dysuria, no flank pain. Extremities:  No swelling or joint pains. ROS was otherwise negative    Review of Systems   Constitutional: Negative. Negative for appetite change, fatigue and unexpected weight change. HENT: Negative. Negative for dental problem, postnasal drip, sinus pressure, sore throat, trouble swallowing and voice change. Denies   Eyes: Negative. Negative for visual disturbance. Respiratory: Negative. Negative for cough, choking, shortness of breath and wheezing. Cardiovascular: Negative. Negative for chest pain, palpitations and leg swelling. Gastrointestinal: Positive for abdominal distention, abdominal pain and diarrhea. Negative for anal bleeding, blood in stool, constipation, rectal pain and vomiting. Denies   Endocrine: Negative. Genitourinary: Negative. Negative for difficulty urinating. Musculoskeletal: Positive for gait problem. Negative for arthralgias, back pain and myalgias. Skin: Negative.

## 2021-07-21 ENCOUNTER — HOSPITAL ENCOUNTER (OUTPATIENT)
Age: 51
Discharge: HOME | End: 2021-07-21
Payer: MEDICARE

## 2021-07-21 DIAGNOSIS — G47.33 OSA (OBSTRUCTIVE SLEEP APNEA): ICD-10-CM

## 2021-07-21 DIAGNOSIS — G89.29 CHRONIC BACK PAIN, UNSPECIFIED BACK LOCATION, UNSPECIFIED BACK PAIN LATERALITY: ICD-10-CM

## 2021-07-21 DIAGNOSIS — M25.562 BILATERAL CHRONIC KNEE PAIN: ICD-10-CM

## 2021-07-21 DIAGNOSIS — E66.01 MORBID OBESITY WITH BMI OF 60.0-69.9, ADULT (HCC): ICD-10-CM

## 2021-07-21 DIAGNOSIS — G89.29 BILATERAL CHRONIC KNEE PAIN: ICD-10-CM

## 2021-07-21 DIAGNOSIS — F41.9 ANXIETY AND DEPRESSION: ICD-10-CM

## 2021-07-21 DIAGNOSIS — M54.9 CHRONIC BACK PAIN, UNSPECIFIED BACK LOCATION, UNSPECIFIED BACK PAIN LATERALITY: ICD-10-CM

## 2021-07-21 DIAGNOSIS — M25.561 BILATERAL CHRONIC KNEE PAIN: ICD-10-CM

## 2021-07-21 DIAGNOSIS — L40.9 PSORIASIS: ICD-10-CM

## 2021-07-21 DIAGNOSIS — I10 ESSENTIAL HYPERTENSION: ICD-10-CM

## 2021-07-21 DIAGNOSIS — F32.A ANXIETY AND DEPRESSION: ICD-10-CM

## 2021-07-21 DIAGNOSIS — J45.909 UNCOMPLICATED ASTHMA, UNSPECIFIED ASTHMA SEVERITY, UNSPECIFIED WHETHER PERSISTENT: ICD-10-CM

## 2021-07-21 LAB
ABSOLUTE EOS #: 0.11 K/UL (ref 0–0.44)
ABSOLUTE IMMATURE GRANULOCYTE: 0.03 K/UL (ref 0–0.3)
ABSOLUTE LYMPH #: 1.52 K/UL (ref 1.1–3.7)
ABSOLUTE MONO #: 0.42 K/UL (ref 0.1–1.2)
ALBUMIN SERPL-MCNC: 3.5 G/DL (ref 3.5–5.2)
ALBUMIN/GLOBULIN RATIO: 1 (ref 1–2.5)
ALP BLD-CCNC: 92 U/L (ref 35–104)
ALT SERPL-CCNC: 8 U/L (ref 5–33)
AMPHETAMINE SCREEN URINE: NEGATIVE
ANION GAP SERPL CALCULATED.3IONS-SCNC: 12 MMOL/L (ref 9–17)
AST SERPL-CCNC: 10 U/L
BARBITURATE SCREEN URINE: NEGATIVE
BASOPHILS # BLD: 1 % (ref 0–2)
BASOPHILS ABSOLUTE: 0.03 K/UL (ref 0–0.2)
BENZODIAZEPINE SCREEN, URINE: NEGATIVE
BILIRUB SERPL-MCNC: 0.53 MG/DL (ref 0.3–1.2)
BUN BLDV-MCNC: 10 MG/DL (ref 6–20)
BUN/CREAT BLD: ABNORMAL (ref 9–20)
BUPRENORPHINE URINE: NORMAL
CALCIUM SERPL-MCNC: 8.5 MG/DL (ref 8.6–10.4)
CANNABINOID SCREEN URINE: NEGATIVE
CHLORIDE BLD-SCNC: 102 MMOL/L (ref 98–107)
CHOLESTEROL/HDL RATIO: 3.6
CHOLESTEROL: 136 MG/DL
CO2: 23 MMOL/L (ref 20–31)
COCAINE METABOLITE, URINE: NEGATIVE
CREAT SERPL-MCNC: 0.95 MG/DL (ref 0.5–0.9)
DIFFERENTIAL TYPE: ABNORMAL
EOSINOPHILS RELATIVE PERCENT: 2 % (ref 1–4)
ESTIMATED AVERAGE GLUCOSE: 108 MG/DL
FOLATE: 7.3 NG/ML
GFR AFRICAN AMERICAN: >60 ML/MIN
GFR NON-AFRICAN AMERICAN: >60 ML/MIN
GFR SERPL CREATININE-BSD FRML MDRD: ABNORMAL ML/MIN/{1.73_M2}
GFR SERPL CREATININE-BSD FRML MDRD: ABNORMAL ML/MIN/{1.73_M2}
GLUCOSE BLD-MCNC: 93 MG/DL (ref 70–99)
HBA1C MFR BLD: 5.4 % (ref 4–6)
HCT VFR BLD CALC: 35.2 % (ref 36.3–47.1)
HDLC SERPL-MCNC: 38 MG/DL
HEMOGLOBIN: 10.7 G/DL (ref 11.9–15.1)
IMMATURE GRANULOCYTES: 1 %
IRON SATURATION: 19 % (ref 20–55)
IRON: 53 UG/DL (ref 37–145)
LDL CHOLESTEROL: 79 MG/DL (ref 0–130)
LYMPHOCYTES # BLD: 29 % (ref 24–43)
MAGNESIUM: 2.1 MG/DL (ref 1.6–2.6)
MCH RBC QN AUTO: 27.3 PG (ref 25.2–33.5)
MCHC RBC AUTO-ENTMCNC: 30.4 G/DL (ref 28.4–34.8)
MCV RBC AUTO: 89.8 FL (ref 82.6–102.9)
MDMA URINE: NORMAL
METHADONE SCREEN, URINE: NEGATIVE
METHAMPHETAMINE, URINE: NORMAL
MONOCYTES # BLD: 8 % (ref 3–12)
NRBC AUTOMATED: 0 PER 100 WBC
OPIATES, URINE: NEGATIVE
OXYCODONE SCREEN URINE: NEGATIVE
PDW BLD-RTO: 13.2 % (ref 11.8–14.4)
PHENCYCLIDINE, URINE: NEGATIVE
PLATELET # BLD: 216 K/UL (ref 138–453)
PLATELET ESTIMATE: ABNORMAL
PMV BLD AUTO: 9.4 FL (ref 8.1–13.5)
POTASSIUM SERPL-SCNC: 4.2 MMOL/L (ref 3.7–5.3)
PROPOXYPHENE, URINE: NORMAL
PTH INTACT: 58.59 PG/ML (ref 15–65)
RBC # BLD: 3.92 M/UL (ref 3.95–5.11)
RBC # BLD: ABNORMAL 10*6/UL
SEG NEUTROPHILS: 59 % (ref 36–65)
SEGMENTED NEUTROPHILS ABSOLUTE COUNT: 3.21 K/UL (ref 1.5–8.1)
SODIUM BLD-SCNC: 137 MMOL/L (ref 135–144)
TEST INFORMATION: NORMAL
TOTAL IRON BINDING CAPACITY: 277 UG/DL (ref 250–450)
TOTAL PROTEIN: 6.9 G/DL (ref 6.4–8.3)
TRICYCLIC ANTIDEPRESSANTS, UR: NORMAL
TRIGL SERPL-MCNC: 93 MG/DL
TSH SERPL DL<=0.05 MIU/L-ACNC: 0.38 MIU/L (ref 0.3–5)
UNSATURATED IRON BINDING CAPACITY: 224 UG/DL (ref 112–347)
VITAMIN B-12: 270 PG/ML (ref 232–1245)
VITAMIN D 25-HYDROXY: 20.3 NG/ML (ref 30–100)
VLDLC SERPL CALC-MCNC: ABNORMAL MG/DL (ref 1–30)
WBC # BLD: 5.3 K/UL (ref 3.5–11.3)
WBC # BLD: ABNORMAL 10*3/UL

## 2021-07-21 PROCEDURE — 84443 ASSAY THYROID STIM HORMONE: CPT

## 2021-07-21 PROCEDURE — 82607 VITAMIN B-12: CPT

## 2021-07-21 PROCEDURE — 80053 COMPREHEN METABOLIC PANEL: CPT

## 2021-07-21 PROCEDURE — 83540 ASSAY OF IRON: CPT

## 2021-07-21 PROCEDURE — 82746 ASSAY OF FOLIC ACID SERUM: CPT

## 2021-07-21 PROCEDURE — 80307 DRUG TEST PRSMV CHEM ANLYZR: CPT

## 2021-07-21 PROCEDURE — 80061 LIPID PANEL: CPT

## 2021-07-21 PROCEDURE — 83036 HEMOGLOBIN GLYCOSYLATED A1C: CPT

## 2021-07-21 PROCEDURE — 83970 ASSAY OF PARATHORMONE: CPT

## 2021-07-21 PROCEDURE — 84630 ASSAY OF ZINC: CPT

## 2021-07-21 PROCEDURE — 36415 COLL VENOUS BLD VENIPUNCTURE: CPT

## 2021-07-21 PROCEDURE — G0480 DRUG TEST DEF 1-7 CLASSES: HCPCS

## 2021-07-21 PROCEDURE — 84425 ASSAY OF VITAMIN B-1: CPT

## 2021-07-21 PROCEDURE — 84590 ASSAY OF VITAMIN A: CPT

## 2021-07-21 PROCEDURE — 83735 ASSAY OF MAGNESIUM: CPT

## 2021-07-21 PROCEDURE — 83550 IRON BINDING TEST: CPT

## 2021-07-21 PROCEDURE — 82306 VITAMIN D 25 HYDROXY: CPT

## 2021-07-21 PROCEDURE — 85025 COMPLETE CBC W/AUTO DIFF WBC: CPT

## 2021-07-22 DIAGNOSIS — E55.9 VITAMIN D DEFICIENCY: Primary | ICD-10-CM

## 2021-07-22 RX ORDER — ERGOCALCIFEROL 1.25 MG/1
50000 CAPSULE ORAL WEEKLY
Qty: 8 CAPSULE | Refills: 0 | Status: SHIPPED | OUTPATIENT
Start: 2021-07-22 | End: 2021-11-03

## 2021-07-24 LAB
3-OH-COTININE: <2 NG/ML
COTININE: <2 NG/ML
NICOTINE: <2 NG/ML
RETINYL PALMITATE: <0.02 MG/L (ref 0–0.1)
VITAMIN A LEVEL: 0.42 MG/L (ref 0.3–1.2)
VITAMIN A, INTERP: NORMAL
ZINC: 68.4 UG/DL (ref 60–120)

## 2021-07-27 LAB — VITAMIN B1 WHOLE BLOOD: 81 NMOL/L (ref 70–180)

## 2021-07-28 ENCOUNTER — OFFICE VISIT (OUTPATIENT)
Dept: BARIATRICS/WEIGHT MGMT | Age: 51
End: 2021-07-28
Payer: MEDICARE

## 2021-07-28 VITALS
WEIGHT: 293 LBS | HEIGHT: 65 IN | BODY MASS INDEX: 48.82 KG/M2 | DIASTOLIC BLOOD PRESSURE: 78 MMHG | SYSTOLIC BLOOD PRESSURE: 126 MMHG

## 2021-07-28 DIAGNOSIS — F41.9 ANXIETY AND DEPRESSION: ICD-10-CM

## 2021-07-28 DIAGNOSIS — L40.9 PSORIASIS: ICD-10-CM

## 2021-07-28 DIAGNOSIS — J45.909 UNCOMPLICATED ASTHMA, UNSPECIFIED ASTHMA SEVERITY, UNSPECIFIED WHETHER PERSISTENT: ICD-10-CM

## 2021-07-28 DIAGNOSIS — M25.561 BILATERAL CHRONIC KNEE PAIN: ICD-10-CM

## 2021-07-28 DIAGNOSIS — G89.29 BILATERAL CHRONIC KNEE PAIN: ICD-10-CM

## 2021-07-28 DIAGNOSIS — M54.9 CHRONIC BACK PAIN, UNSPECIFIED BACK LOCATION, UNSPECIFIED BACK PAIN LATERALITY: ICD-10-CM

## 2021-07-28 DIAGNOSIS — G47.33 OSA (OBSTRUCTIVE SLEEP APNEA): ICD-10-CM

## 2021-07-28 DIAGNOSIS — G89.29 CHRONIC BACK PAIN, UNSPECIFIED BACK LOCATION, UNSPECIFIED BACK PAIN LATERALITY: ICD-10-CM

## 2021-07-28 DIAGNOSIS — F32.A ANXIETY AND DEPRESSION: ICD-10-CM

## 2021-07-28 DIAGNOSIS — M25.562 BILATERAL CHRONIC KNEE PAIN: ICD-10-CM

## 2021-07-28 DIAGNOSIS — E66.01 MORBID OBESITY WITH BMI OF 60.0-69.9, ADULT (HCC): ICD-10-CM

## 2021-07-28 DIAGNOSIS — I10 ESSENTIAL HYPERTENSION: Primary | ICD-10-CM

## 2021-07-28 PROCEDURE — G8417 CALC BMI ABV UP PARAM F/U: HCPCS | Performed by: NURSE PRACTITIONER

## 2021-07-28 PROCEDURE — G8427 DOCREV CUR MEDS BY ELIG CLIN: HCPCS | Performed by: NURSE PRACTITIONER

## 2021-07-28 PROCEDURE — 99213 OFFICE O/P EST LOW 20 MIN: CPT | Performed by: NURSE PRACTITIONER

## 2021-07-28 PROCEDURE — 1036F TOBACCO NON-USER: CPT | Performed by: NURSE PRACTITIONER

## 2021-07-28 NOTE — PROGRESS NOTES
Medical Weight Management Progress Note    Subjective     Patient being seen for medically supervised weight loss for the chronic conditions of HTN, DENISHA, Anxiety/Depression, Asthma, Psoriasis, Chronic Back Pain, Chronic Bilateral Knee Pain. She is working on the behavior changes discussed at the initial appointment. Patient continues on diet plan. Physical activity includes walking, stretches, and pool aerobics. Weight loss of 7 lbs since last visit. Using CPAP. Psych eval completed and clearance received. EGD/colonoscopy done with Dr Karthikeyan Perez. H Pylori negative. Dx microscopic lymphocytic colitis. Needs cardiac clearance. No current issues. Working toward bariatric surgery:    [x] Sleeve Gastrectomy                                                           [] Dotty-en-Y Gastric Bypass    Allergies: Allergies   Allergen Reactions    No Known Allergies        Past Medical History:     Past Medical History:   Diagnosis Date    Allergic rhinitis     Anxiety     Anxiety and depression     Arthritis     Asthma     no longer using inhaler or nebulizer    Diverticulitis     Headache(784.0)     HTN (hypertension)     Obesity     Overactive bladder     Sleep apnea     uses machine nightly   .     Past Surgical History:  Past Surgical History:   Procedure Laterality Date    CATARACT REMOVAL      CHOLECYSTECTOMY, LAPAROSCOPIC N/A 12/15/2020    CHOLECYSTECTOMY LAPAROSCOPIC ROBOTIC XI performed by Alisha Bell MD at [facility] 8/5/2020    COLONOSCOPY POLYPECTOMY SNARE/COLD BIOPSY performed by Cameron Roldan MD at [address] [place] [address]  04/08/2021    COLONOSCOPY N/A 4/8/2021    COLONOSCOPY WITH BIOPSY RANDOM RIGHT AND LEFT COLON performed by Cameron Roldan MD at [address] [place] [address]. [address]     cataract removed with lens implants    KNEE SURGERY Right 05/16/2017    [place] KNEE SCOPE,DIAGNOSTIC Right 5/16/2017    RIGHT KNEE ARTHROSCOPY WITH MEDIAL MENISECTOMY AND CHONDROPLASTY performed by Esme Rm DO at [facility] ENDOSCOPY  2021    UPPER GASTROINTESTINAL ENDOSCOPY N/A 2021    EGD BIOPSY OF GASTRIC AND GASTRIC POLYPECTOMY performed by Delfin Peacock MD at [id number] [facility].       Family History:  Family History   Problem Relation Age of Onset    Arthritis Father        Social History:  Social History     Socioeconomic History    Marital status:      Spouse name: Not on file    Number of children: Not on file    Years of education: Not on file    Highest education level: Not on file   Occupational History    Not on file   Tobacco Use    Smoking status: Former Smoker     Packs/day: 0.60     Types: Cigarettes     Quit date:      Years since quittin.5    Smokeless tobacco: Never Used   Vaping Use    Vaping Use: Never used   Substance and Sexual Activity    Alcohol use: Yes     Comment: rarely    Drug use: No    Sexual activity: Not on file   Other Topics Concern    Not on file   Social History Narrative    Not on file     Social Determinants of Health     Financial Resource Strain:     Difficulty of Paying Living Expenses:    Food Insecurity:     Worried About [address] in the Last Year:     [facility] in the Last Year:    Transportation Needs:     Lack of Transportation (Medical):      Lack of Transportation (Non-Medical):    Physical Activity:     Days of Exercise per Week:     Minutes of Exercise per Session:    Stress:     Feeling of Stress :    Social Connections:     Frequency of Communication with Friends and Family:     Frequency of Social Gatherings with Friends and Family:     Attends Jew Services:     Active Member of Clubs or Organizations:     Attends Club or Organization Meetings:     Marital Status:    Intimate Partner Violence:     Fear of Current or Ex-Partner:     Emotionally Abused:     Physically Abused:     Sexually Abused: Current Medications:  Current Outpatient Medications   Medication Sig Dispense Refill    vitamin D (ERGOCALCIFEROL) 1.25 MG (98315 UT) CAPS capsule Take 1 capsule by mouth once a week for 8 doses 8 capsule 0    budesonide (ENTOCORT EC) 3 MG extended release capsule Take 3 capsules by mouth every morning 9 mg  X 6 weeks, last 2 weeks taper. 7th week for 6 mg. Last week 3 mg, then off.  60 capsule 0    fluticasone (FLONASE) 50 MCG/ACT nasal spray place 1 spray into each nostril once daily      LORATADINE-D 24HR  MG per extended release tablet take 1 tablet by mouth once daily      triamcinolone (ARISTOCORT) 0.5 % cream apply A THIN FILM topically to affected area 2 TO 3 TIMES DAILY      APLENZIN 348 MG TB24 take 1 tablet by mouth every morning      solifenacin (VESICARE) 10 MG tablet       famotidine (PEPCID) 20 MG tablet Take 1 tablet by mouth 2 times daily 60 tablet 3    triamcinolone (KENALOG) 0.025 % cream Apply to rash on face and ears daily 80 g 2    Sulfacetamide Sodium, Acne, 10 % LOTN Apply to face daily 118 mL 2    ketoconazole (NIZORAL) 2 % shampoo Apply 3-4 times weekly to scalp, leave on for five minutes prior to washing off 120 mL 2    ondansetron (ZOFRAN) 4 MG tablet Take 1 tablet by mouth 3 times daily as needed for Nausea or Vomiting 15 tablet 0    dicyclomine (BENTYL) 20 MG tablet Take 1 tablet by mouth every 8 hours as needed      acetaminophen (TYLENOL) 500 MG tablet Take 1,000 mg by mouth every 6 hours as needed for Pain      escitalopram (LEXAPRO) 20 MG tablet Take 20 mg by mouth daily      magnesium citrate solution Take 296 mLs by mouth once for 1 dose (Patient not taking: Reported on 7/28/2021) 296 mL 0     Current Facility-Administered Medications   Medication Dose Route Frequency Provider Last Rate Last Admin    albuterol (PROVENTIL) nebulizer solution 2.5 mg  2.5 mg Nebulization 4x daily Stewart Chisholm MD           Vital Signs:  /78 (Site: Right Lower Arm, Position: Sitting, Cuff Size: Large Adult)   Ht 5' 4.5\" (1.638 m)   Wt (!) 399 lb (181 kg)   BMI 67.43 kg/m²     BMI/Height/Weight:  Body mass index is 67.43 kg/m². Review of Systems - A review of systems was performed. All was negative unless otherwise documented in HPI. Constitutional: Negative for fever, chills and diaphoresis. HENT: Negative for hearing loss and trouble swallowing. Eyes: Negative for photophobia and visual disturbance. Respiratory: Negative for cough, shortness of breath and wheezing. Cardiovascular: Negative for chest pain and palpitations. Gastrointestinal: Negative for nausea, vomiting, abdominal pain, diarrhea, constipation, blood in stool and abdominal distention. Endocrine: Negative for polydipsia, polyphagia and polyuria. Genitourinary: Negative for dysuria, frequency, hematuria and difficulty urinating. Musculoskeletal: Negative for myalgias, joint swelling. Skin: Negative for pallor and rash. Neurological: Negative for dizziness, tremors, light-headedness and headaches. Psychiatric/Behavioral: Negative for sleep disturbance and dysphoric mood. Objective:      Physical Exam   Vital signs reviewed. General: Well-developed and well-nourished. No acute distress. Skin: Warm, dry and intact. HEENT: Normocephalic. EOMs intact. Conjunctivae normal. Neck supple. Cardiovascular: Normal rate, regular rhythm. Pulmonary/Chest: Normal effort. Lungs clear to auscultation. No rales, rhonchi or wheezing. Abdominal: Positive bowel sounds. Soft, nontender. Nondistended. Musculoskeletal: Movement x4. Bilateral lower extremity edema. Neurological: Ambulates with a cane. Alert and oriented to person, place, and time. Psychiatric: Normal mood and affect. Speech and behavior normal. Judgment and thought content normal. Cognition and memory intact. Assessment:       Diagnosis Orders   1. Essential hypertension     2.  DENISHA (obstructive sleep apnea)     3. Anxiety and depression     4. Morbid obesity with BMI of 60.0-69.9, adult ([facility] [address]     5. Uncomplicated asthma, unspecified asthma severity, unspecified whether persistent     6. Psoriasis     7. Chronic back pain, unspecified back location, unspecified back pain laterality     8. Bilateral chronic knee pain         Plan:    Dietitian visit today. Patient was encouraged to journal all food intake. Keep calorie level at approximately 6664-1961. Protein intake is to be a minimum of 60-80 grams per day. Water drinking was encouraged with a goal of 64oz-128oz daily. Beverages to be calorie free except for milk. Every other beverage should be water. Avoid soda. Continue to increase level of physical activity. Encouraged use of exercise log. Labs reviewed and discussed with patient. Vitamin D low and already prescribed. Follow-up  Return in about 1 month (around 8/28/2021). Orders this encounter:  No orders of the defined types were placed in this encounter. Prescriptions this encounter:  No orders of the defined types were placed in this encounter.       Electronically signed by:  Rosa M Mejia CNP

## 2021-07-28 NOTE — PROGRESS NOTES
Medical Nutrition Therapy   Metabolic and Bariatric Surgery         Supervised diet and exercise preparation  Visit 4 out of 3  Pt reports:  No concerns     Changes in eating patterns to promote health are noted below on the goals number 22-25    Vitals: Wt Readings from Last 3 Encounters:   07/28/21 (!) 399 lb (181 kg)   07/07/21 (!) 401 lb (181.9 kg)   06/29/21 (!) 406 lb (184.2 kg)         Nutrition Assessment:   PES: Knowledge deficit related to healthy behaviors that support weight management post weight loss surgery as evidenced by Body mass index is 67.43 kg/m². Nutrition Assessment of Goal Attainment:  TREATMENT GOALS:    Nutrition goals complete. Do you understand your goals? y    Do you have the information you need to achieve your goals? y    Do you have any questions  right now? n        [x]  Consistent goal achievement in the program thus far and further success with goals is expected. []  Unable to consistently make progress in goal achievement. At this time patient is not moving forward  in developing the skills needed for success after surgery. Plan:    Continue to follow monthly and review goals.          []  Nutrition visits complete    [x]

## 2021-08-23 ENCOUNTER — TELEPHONE (OUTPATIENT)
Dept: BARIATRICS/WEIGHT MGMT | Age: 51
End: 2021-08-23

## 2021-08-23 DIAGNOSIS — M25.561 BILATERAL CHRONIC KNEE PAIN: ICD-10-CM

## 2021-08-23 DIAGNOSIS — G47.33 OSA (OBSTRUCTIVE SLEEP APNEA): ICD-10-CM

## 2021-08-23 DIAGNOSIS — G89.29 BILATERAL CHRONIC KNEE PAIN: ICD-10-CM

## 2021-08-23 DIAGNOSIS — M25.562 BILATERAL CHRONIC KNEE PAIN: ICD-10-CM

## 2021-08-23 DIAGNOSIS — M15.9 OSTEOARTHRITIS OF MULTIPLE JOINTS, UNSPECIFIED OSTEOARTHRITIS TYPE: ICD-10-CM

## 2021-08-23 DIAGNOSIS — M54.9 CHRONIC BACK PAIN, UNSPECIFIED BACK LOCATION, UNSPECIFIED BACK PAIN LATERALITY: ICD-10-CM

## 2021-08-23 DIAGNOSIS — E66.01 MORBID OBESITY WITH BMI OF 60.0-69.9, ADULT (HCC): ICD-10-CM

## 2021-08-23 DIAGNOSIS — I10 ESSENTIAL HYPERTENSION: Primary | ICD-10-CM

## 2021-08-23 DIAGNOSIS — G89.29 CHRONIC BACK PAIN, UNSPECIFIED BACK LOCATION, UNSPECIFIED BACK PAIN LATERALITY: ICD-10-CM

## 2021-08-23 NOTE — TELEPHONE ENCOUNTER
Patient is asking for a script for [employer] so she can participate in water aerobics. This programs requires and script . It is geared toward pre and post bariatric patients.

## 2021-08-31 ENCOUNTER — TELEPHONE (OUTPATIENT)
Dept: BARIATRICS/WEIGHT MGMT | Age: 51
End: 2021-08-31

## 2021-08-31 NOTE — TELEPHONE ENCOUNTER
Patient's record has been submitted to the insurance company on 8/31/21 for authorization to schedule surgery. Instructions to patient: if patient calls for status on authorization to schedule surgery please instruct patient that it could take up to 30 days for an authorization. Once authorization is received the insurance specialists will contact the patient to schedule their procedure.       Program fee paid in full yes

## 2021-10-29 RX ORDER — SODIUM CHLORIDE, SODIUM LACTATE, POTASSIUM CHLORIDE, CALCIUM CHLORIDE 600; 310; 30; 20 MG/100ML; MG/100ML; MG/100ML; MG/100ML
1000 INJECTION, SOLUTION INTRAVENOUS CONTINUOUS
Status: CANCELLED | OUTPATIENT
Start: 2021-10-29

## 2021-11-01 ENCOUNTER — NURSE ONLY (OUTPATIENT)
Dept: BARIATRICS/WEIGHT MGMT | Age: 51
End: 2021-11-01

## 2021-11-03 ENCOUNTER — HOSPITAL ENCOUNTER (OUTPATIENT)
Dept: GENERAL RADIOLOGY | Age: 51
Discharge: HOME | End: 2021-11-05
Payer: MEDICARE

## 2021-11-03 ENCOUNTER — HOSPITAL ENCOUNTER (OUTPATIENT)
Dept: PREADMISSION TESTING | Age: 51
Discharge: HOME | End: 2021-11-07
Payer: MEDICARE

## 2021-11-03 VITALS
OXYGEN SATURATION: 97 % | RESPIRATION RATE: 18 BRPM | WEIGHT: 293 LBS | DIASTOLIC BLOOD PRESSURE: 84 MMHG | BODY MASS INDEX: 48.82 KG/M2 | SYSTOLIC BLOOD PRESSURE: 131 MMHG | TEMPERATURE: 98.2 F | HEIGHT: 65 IN | HEART RATE: 87 BPM

## 2021-11-03 LAB
ANION GAP SERPL CALCULATED.3IONS-SCNC: 18 MMOL/L (ref 9–17)
BUN BLDV-MCNC: 10 MG/DL (ref 6–20)
BUN/CREAT BLD: ABNORMAL (ref 9–20)
CALCIUM SERPL-MCNC: 9 MG/DL (ref 8.6–10.4)
CHLORIDE BLD-SCNC: 102 MMOL/L (ref 98–107)
CO2: 21 MMOL/L (ref 20–31)
CREAT SERPL-MCNC: 1 MG/DL (ref 0.5–0.9)
GFR AFRICAN AMERICAN: >60 ML/MIN
GFR NON-AFRICAN AMERICAN: 59 ML/MIN
GFR SERPL CREATININE-BSD FRML MDRD: ABNORMAL ML/MIN/{1.73_M2}
GFR SERPL CREATININE-BSD FRML MDRD: ABNORMAL ML/MIN/{1.73_M2}
GLUCOSE BLD-MCNC: 67 MG/DL (ref 70–99)
HCT VFR BLD CALC: 41.1 % (ref 36.3–47.1)
HEMOGLOBIN: 12.5 G/DL (ref 11.9–15.1)
INR BLD: 0.9
MCH RBC QN AUTO: 28 PG (ref 25.2–33.5)
MCHC RBC AUTO-ENTMCNC: 30.4 G/DL (ref 28.4–34.8)
MCV RBC AUTO: 91.9 FL (ref 82.6–102.9)
NRBC AUTOMATED: 0 PER 100 WBC
PARTIAL THROMBOPLASTIN TIME: 24.3 SEC (ref 20.5–30.5)
PDW BLD-RTO: 13.3 % (ref 11.8–14.4)
PLATELET # BLD: 277 K/UL (ref 138–453)
PMV BLD AUTO: 9.5 FL (ref 8.1–13.5)
POTASSIUM SERPL-SCNC: 4.1 MMOL/L (ref 3.7–5.3)
PROTHROMBIN TIME: 10 SEC (ref 9.1–12.3)
RBC # BLD: 4.47 M/UL (ref 3.95–5.11)
SODIUM BLD-SCNC: 141 MMOL/L (ref 135–144)
WBC # BLD: 7.2 K/UL (ref 3.5–11.3)

## 2021-11-03 PROCEDURE — 80048 BASIC METABOLIC PNL TOTAL CA: CPT

## 2021-11-03 PROCEDURE — 85027 COMPLETE CBC AUTOMATED: CPT

## 2021-11-03 PROCEDURE — 85730 THROMBOPLASTIN TIME PARTIAL: CPT

## 2021-11-03 PROCEDURE — 85610 PROTHROMBIN TIME: CPT

## 2021-11-03 PROCEDURE — 71046 X-RAY EXAM CHEST 2 VIEWS: CPT

## 2021-11-03 PROCEDURE — G0480 DRUG TEST DEF 1-7 CLASSES: HCPCS

## 2021-11-03 PROCEDURE — 36415 COLL VENOUS BLD VENIPUNCTURE: CPT

## 2021-11-03 NOTE — H&P
History and Physical    Pt Name: Maxx Solorio  MRN: [id number]  YOB: 1970  Date of evaluation: 11/3/2021  Primary Care Physician: Adriano Holley MD    SUBJECTIVE:   History of Chief Complaint:    Maxx Solorio is a 52 y.o. female who presents for PAT appointment. Patient states she has been trying to lose weight for years. She has tried many diet plans, including weight watchers, which did help her lose some weight but she reports she would often gain the weight back. Patient reports she also has tried water aerobics and this was more therapeutic but did not help with weight loss. She has opted for surgical intervention to help with weight loss and other comorbidities. Patient has been scheduled for XI ROBOTIC LAPAROSCOPIC GASTRECTOMY SLEEVE WITH LIVER BIOPSY, EGD- GI UNIT SCHEDULED  Allergies  is allergic to no known allergies. Medications  Prior to Admission medications    Medication Sig Start Date End Date Taking?  Authorizing Provider   famotidine (PEPCID) 20 MG tablet take 1 tablet by mouth twice a day 8/9/21  Yes Curtis Novak MD   triamcinolone (ARISTOCORT) 0.5 % cream apply A THIN FILM topically to affected area 2 TO 3 TIMES DAILY 6/3/21  Yes Historical Provider, MD   APLENZIN 348 MG TB24 take 1 tablet by mouth every morning 6/4/21  Yes Historical Provider, MD   triamcinolone (KENALOG) 0.025 % cream Apply to rash on face and ears daily 2/24/21  Yes Archana Taylor MD   Sulfacetamide Sodium, Acne, 10 % LOTN Apply to face daily 2/24/21  Yes Archana Taylor MD   ketoconazole (NIZORAL) 2 % shampoo Apply 3-4 times weekly to scalp, leave on for five minutes prior to washing off 2/24/21  Yes Archana Taylor MD   dicyclomine (BENTYL) 20 MG tablet Take 1 tablet by mouth every 8 hours as needed 11/20/20  Yes Historical Provider, MD   escitalopram (LEXAPRO) 20 MG tablet Take 20 mg by mouth daily   Yes Historical Provider, MD   fluticasone (FLONASE) 50 MCG/ACT nasal spray place 1 spray into each nostril once daily 6/3/21   Historical Provider, MD   LORATADINE-D 24HR  MG per extended release tablet take 1 tablet by mouth once daily 21   Historical Provider, MD   ondansetron (ZOFRAN) 4 MG tablet Take 1 tablet by mouth 3 times daily as needed for Nausea or Vomiting 12/15/20   Regi Verde DO   acetaminophen (TYLENOL) 500 MG tablet Take 1,000 mg by mouth every 6 hours as needed for Pain    Historical Provider, MD     Past Medical History    has a past medical history of Allergic rhinitis, Anxiety, Anxiety and depression, Arthritis, Asthma, Diverticulitis, Headache(784.0), HTN (hypertension), Obesity, Overactive bladder, Sleep apnea, Use of cane as ambulatory aid, and Wellness examination. Past Surgical History   has a past surgical history that includes Tonsillectomy; knee surgery (Right, 2017); pr knee scope,diagnostic (Right, 2017); Colonoscopy (N/A, 2020); Cataract removal; Colonoscopy (2021); Upper gastrointestinal endoscopy (2021); Upper gastrointestinal endoscopy (N/A, 2021); Colonoscopy (N/A, 2021); Cholecystectomy, laparoscopic (N/A, 12/15/2020); and eye surgery (Bilateral). Social History   reports that she quit smoking about 4 years ago. Her smoking use included cigarettes. She smoked 0.60 packs per day. She has never used smokeless tobacco.    reports current alcohol use. reports no history of drug use. Marital Status   Children 5  Occupation online marketing  Family History  Family Status   Relation Name Status    Mother  Alive    Father       family history includes Arthritis in her father; Asthma in her mother; Cancer in her father; Parkinsonism in her mother.     Review of Systems:  CONSTITUTIONAL:   negative for fevers, chills, fatigue and malaise    EYES:   negative for double vision, blurred vision and photophobia    HEENT:   negative for tinnitus, epistaxis and sore throat     RESPIRATORY:   negative for cough, shortness of breath, wheezing     CARDIOVASCULAR:   negative for chest pain, palpitations, syncope, edema     GASTROINTESTINAL:   negative for nausea, vomiting     GENITOURINARY:   negative for incontinence     MUSCULOSKELETAL:   negative for neck or back pain     NEUROLOGICAL:   Negative for weakness and tingling  negative for headaches and dizziness     PSYCHIATRIC:   negative for anxiety       OBJECTIVE:   VITALS:  height is 5' 4.5\" (1.638 m) and weight is 409 lb (185.5 kg) (abnormal). Her infrared temperature is 98.2 °F (36.8 °C). Her blood pressure is 131/84 and her pulse is 87. Her respiration is 18 and oxygen saturation is 97%. CONSTITUTIONAL:alert & oriented x 3, no acute distress. Calm and pleasant. SKIN:  Warm and dry, no rashes to exposed areas of skin. HEAD:  Normocephalic, atraumatic. EYES: PERRL. EOMs intact. Wearing glasses. EARS:  Intact and equal bilaterally. No edema or thickening, without lumps, lesions, or discharge. Hearing grossly WNL. NOSE:  Nares patent. No rhinorrhea   MOUTH/THROAT:  Mucous membranes pink and moist, uvula midline, teeth appear to be intact. NECK:supple, no lymphadenopathy  LUNGS: Respirations even and non-labored. Clear to auscultation bilaterally, no wheezes, rales, or rhonchi. CARDIOVASCULAR: Regular rate and rhythm, limited exam due to body habitus. ABDOMEN: soft, non-tender, non-distended, bowel sounds active x 4   EXTREMITIES: No edema to bilateral lower extremities. No varicosities to bilateral lower extremities. NEUROLOGIC: CN II-XII are grossly intact. Gait is antalgic, uses cane. Testing:   EKG: on file from June 2021  Labs pending: drawn 11/3/2021   Chest XRay:  11/3/2021  IMPRESSIONS:   Obesity.   PLANS:   XI ROBOTIC LAPAROSCOPIC GASTRECTOMY SLEEVE WITH LIVER BIOPSY, EGD- GI UNIT SCHEDULED    JONATHAN Greenwood CNP  Electronically signed 11/3/2021 at 4:14 PM

## 2021-11-03 NOTE — PROGRESS NOTES
.  Anesthesia Focused Assessment    Hx of anesthesia complications:  no  Family hx of anesthesia complications:  no      Prior + Covid-19 test? no        STOP-BANG Sleep Apnea Questionnaire    SNORE loudly (heard through closed doors)? Yes  TIRED, fatigued, sleepy during daytime? No  OBSERVED stopping breathing during sleep? Yes  High blood PRESSURE or being treated? Yes    BMI over 35? Yes  AGE over 48? No  NECK circumference over 16\"? Yes  GENDER (male)? No             Total 5  High risk 5-8  Intermediate risk 3-4  Low risk 0-2    ----------------------------------------------------------------------------------------------------------------------  DENISHA                              Yes  If yes, machine? Yes    DM1                                            No  DM2                   No    Coronary Artery Disease      No  HTN         Yes  Defib/AICD/Pacemaker               No             Renal Failure                   No  If yes, on dialysis           Active smoker? No  Drinks alcohol? Yes, rarely  Illicit drugs? No  Dentition?        benign      Past Medical History:   Diagnosis Date    Allergic rhinitis     Anxiety     Anxiety and depression     Arthritis     Asthma     no longer using inhaler or nebulizer    Diverticulitis     Headache(784.0)     HTN (hypertension)     pt denies  not on meds    Obesity     Overactive bladder     Sleep apnea     uses machine nightly  cpap    Use of cane as ambulatory aid     Wellness examination 08/2021    Dr. Milka Alberto, his CNP's          Patient was evaluated in PAT & anesthesia guidelines were applied. NPO guidelines, medication instructions and scheduled arrival time were reviewed with patient.                                                                                                                        Anesthesia contacted:   no    Medical or cardiac clearance ordered: no, cardiac clearance on

## 2021-11-03 NOTE — H&P (VIEW-ONLY)
nostril once daily 6/3/21   Historical Provider, MD   LORATADINE-D 24HR  MG per extended release tablet take 1 tablet by mouth once daily 21   Historical Provider, MD   ondansetron (ZOFRAN) 4 MG tablet Take 1 tablet by mouth 3 times daily as needed for Nausea or Vomiting 12/15/20   Ana Taylor DO   acetaminophen (TYLENOL) 500 MG tablet Take 1,000 mg by mouth every 6 hours as needed for Pain    Historical Provider, MD     Past Medical History    has a past medical history of Allergic rhinitis, Anxiety, Anxiety and depression, Arthritis, Asthma, Diverticulitis, Headache(784.0), HTN (hypertension), Obesity, Overactive bladder, Sleep apnea, Use of cane as ambulatory aid, and Wellness examination. Past Surgical History   has a past surgical history that includes Tonsillectomy; knee surgery (Right, 2017); pr knee scope,diagnostic (Right, 2017); Colonoscopy (N/A, 2020); Cataract removal; Colonoscopy (2021); Upper gastrointestinal endoscopy (2021); Upper gastrointestinal endoscopy (N/A, 2021); Colonoscopy (N/A, 2021); Cholecystectomy, laparoscopic (N/A, 12/15/2020); and eye surgery (Bilateral). Social History   reports that she quit smoking about 4 years ago. Her smoking use included cigarettes. She smoked 0.60 packs per day. She has never used smokeless tobacco.    reports current alcohol use. reports no history of drug use. Marital Status   Children 5  Occupation online marketing  Family History  Family Status   Relation Name Status    Mother  Alive    Father       family history includes Arthritis in her father; Asthma in her mother; Cancer in her father; Parkinsonism in her mother.     Review of Systems:  CONSTITUTIONAL:   negative for fevers, chills, fatigue and malaise    EYES:   negative for double vision, blurred vision and photophobia    HEENT:   negative for tinnitus, epistaxis and sore throat     RESPIRATORY:   negative for cough, shortness of breath, wheezing     CARDIOVASCULAR:   negative for chest pain, palpitations, syncope, edema     GASTROINTESTINAL:   negative for nausea, vomiting     GENITOURINARY:   negative for incontinence     MUSCULOSKELETAL:   negative for neck or back pain     NEUROLOGICAL:   Negative for weakness and tingling  negative for headaches and dizziness     PSYCHIATRIC:   negative for anxiety       OBJECTIVE:   VITALS:  height is 5' 4.5\" (1.638 m) and weight is 409 lb (185.5 kg) (abnormal). Her infrared temperature is 98.2 °F (36.8 °C). Her blood pressure is 131/84 and her pulse is 87. Her respiration is 18 and oxygen saturation is 97%. CONSTITUTIONAL:alert & oriented x 3, no acute distress. Calm and pleasant. SKIN:  Warm and dry, no rashes to exposed areas of skin. HEAD:  Normocephalic, atraumatic. EYES: PERRL. EOMs intact. Wearing glasses. EARS:  Intact and equal bilaterally. No edema or thickening, without lumps, lesions, or discharge. Hearing grossly WNL. NOSE:  Nares patent. No rhinorrhea   MOUTH/THROAT:  Mucous membranes pink and moist, uvula midline, teeth appear to be intact. NECK:supple, no lymphadenopathy  LUNGS: Respirations even and non-labored. Clear to auscultation bilaterally, no wheezes, rales, or rhonchi. CARDIOVASCULAR: Regular rate and rhythm, limited exam due to body habitus. ABDOMEN: soft, non-tender, non-distended, bowel sounds active x 4   EXTREMITIES: No edema to bilateral lower extremities. No varicosities to bilateral lower extremities. NEUROLOGIC: CN II-XII are grossly intact. Gait is antalgic, uses cane. Testing:   EKG: on file from June 2021  Labs pending: drawn 11/3/2021   Chest XRay:  11/3/2021  IMPRESSIONS:   Obesity.   PLANS:   XI ROBOTIC LAPAROSCOPIC GASTRECTOMY SLEEVE WITH LIVER BIOPSY, EGD- GI UNIT SCHEDULED    JONATHAN Mak CNP  Electronically signed 11/3/2021 at 4:14 PM

## 2021-11-04 ENCOUNTER — OFFICE VISIT (OUTPATIENT)
Dept: GASTROENTEROLOGY | Age: 51
End: 2021-11-04
Payer: MEDICARE

## 2021-11-04 VITALS — SYSTOLIC BLOOD PRESSURE: 132 MMHG | DIASTOLIC BLOOD PRESSURE: 80 MMHG

## 2021-11-04 DIAGNOSIS — R19.7 DIARRHEA, UNSPECIFIED TYPE: Primary | ICD-10-CM

## 2021-11-04 PROCEDURE — G8427 DOCREV CUR MEDS BY ELIG CLIN: HCPCS | Performed by: INTERNAL MEDICINE

## 2021-11-04 PROCEDURE — 1036F TOBACCO NON-USER: CPT | Performed by: INTERNAL MEDICINE

## 2021-11-04 PROCEDURE — 99213 OFFICE O/P EST LOW 20 MIN: CPT | Performed by: INTERNAL MEDICINE

## 2021-11-04 PROCEDURE — G8484 FLU IMMUNIZE NO ADMIN: HCPCS | Performed by: INTERNAL MEDICINE

## 2021-11-04 PROCEDURE — G8417 CALC BMI ABV UP PARAM F/U: HCPCS | Performed by: INTERNAL MEDICINE

## 2021-11-04 RX ORDER — HEPARIN SODIUM 5000 [USP'U]/ML
5000 INJECTION, SOLUTION INTRAVENOUS; SUBCUTANEOUS ONCE
Status: CANCELLED | OUTPATIENT
Start: 2021-11-04 | End: 2021-11-04

## 2021-11-04 RX ORDER — LOPERAMIDE HYDROCHLORIDE 2 MG/1
2 CAPSULE ORAL 4 TIMES DAILY PRN
Qty: 30 CAPSULE | Refills: 2 | Status: SHIPPED | OUTPATIENT
Start: 2021-11-04 | End: 2021-12-04

## 2021-11-04 ASSESSMENT — ENCOUNTER SYMPTOMS
CONSTIPATION: 0
SHORTNESS OF BREATH: 0
VOMITING: 0
RESPIRATORY NEGATIVE: 1
ANAL BLEEDING: 0
SORE THROAT: 0
EYES NEGATIVE: 1
BACK PAIN: 0
WHEEZING: 0
BLOOD IN STOOL: 0
TROUBLE SWALLOWING: 0
VOICE CHANGE: 0
CHOKING: 0
ABDOMINAL DISTENTION: 1
SINUS PRESSURE: 0
ABDOMINAL PAIN: 1
RECTAL PAIN: 0
ALLERGIC/IMMUNOLOGIC NEGATIVE: 1
DIARRHEA: 1
COUGH: 0

## 2021-11-04 NOTE — PROGRESS NOTES
GI FOLLOW UP    INTERVAL HISTORY:     History of lymphocytic colitiswas on budesonide with only modest improvement of the patient's lower GI symptoms  Failed to taper off appropriately  Reports a 2-3 loose bowel movements per day likely related to poor dietary choices  Depending bariatric surgery    Chief Complaint   Patient presents with    Diarrhea     pt states she might have been taking her budesonide incorrectly, pt hasnt takien it in atleast a month, pt states she has 2-3 loose bm daily     Abdominal Pain     pt states she was having pain in her right side and was in ed        1. Diarrhea, unspecified type          HISTORY OF PRESENT ILLNESS: Ellen Morataya is a 54 y.o. female with a past history remarkable for obesity, anxiety, depression, referred for evaluation of prior diverticulitis.   Patient underwent a recent colonoscopy, results were discussed on last visit.  Reports abdominal pain since gallbladder was removed.  Reports diarrhea as well.  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  However given the patient's clinical picture this appears to be unlikely given her recent diagnosis.     Smoker: quit 2 yrs ago   Drinking history: socially   Abdominal surgeries: none  Prior Colonoscopy: none   Prior EGD: none   FH of GI issues: none    Past Medical,Family, and Social History reviewed and does contribute to the patient presenting condition. Patient's PMH/PSH,SH,PSYCH Hx, MEDs, ALLERGIES, and ROS were all reviewed and updated in the appropriate sections.     PAST MEDICAL HISTORY:  Past Medical History:   Diagnosis Date    Allergic rhinitis     Anxiety     Anxiety and depression     Arthritis     Asthma     no longer using inhaler or nebulizer    Diverticulitis     Headache(784.0)     HTN (hypertension)     pt denies  not on meds    Obesity     Overactive (ZOFRAN) 4 MG tablet, Take 1 tablet by mouth 3 times daily as needed for Nausea or Vomiting, Disp: 15 tablet, Rfl: 0    dicyclomine (BENTYL) 20 MG tablet, Take 1 tablet by mouth every 8 hours as needed, Disp: , Rfl:     acetaminophen (TYLENOL) 500 MG tablet, Take 1,000 mg by mouth every 6 hours as needed for Pain, Disp: , Rfl:     escitalopram (LEXAPRO) 20 MG tablet, Take 20 mg by mouth daily, Disp: , Rfl:     LORATADINE-D 24HR  MG per extended release tablet, take 1 tablet by mouth once daily, Disp: , Rfl:     ALLERGIES:   Allergies   Allergen Reactions    No Known Allergies        FAMILY HISTORY:       Problem Relation Age of Onset    Asthma Mother     Parkinsonism Mother     Arthritis Father     Cancer Father          SOCIAL HISTORY:   Social History     Socioeconomic History    Marital status:      Spouse name: Not on file    Number of children: Not on file    Years of education: Not on file    Highest education level: Not on file   Occupational History    Not on file   Tobacco Use    Smoking status: Former Smoker     Packs/day: 0.60     Types: Cigarettes     Quit date:      Years since quittin.8    Smokeless tobacco: Never Used   Vaping Use    Vaping Use: Never used   Substance and Sexual Activity    Alcohol use: Yes     Comment: maybe once a month    Drug use: No    Sexual activity: Not on file   Other Topics Concern    Not on file   Social History Narrative    Not on file     Social Determinants of Health     Financial Resource Strain:     Difficulty of Paying Living Expenses:    Food Insecurity:     Worried About Running Out of Food in the Last Year:     Ran Out of Food in the Last Year:    Transportation Needs:     Lack of Transportation (Medical):      Lack of Transportation (Non-Medical):    Physical Activity:     Days of Exercise per Week:     Minutes of Exercise per Session:    Stress:     Feeling of Stress :    Social Connections:     Frequency of Communication with Friends and Family:     Frequency of Social Gatherings with Friends and Family:     Attends [employer] Services:     Active Member of Clubs or Organizations:     Attends Club or Organization Meetings:     Marital Status:    Intimate Partner Violence:     Fear of Current or Ex-Partner:     Emotionally Abused:     Physically Abused:     Sexually Abused:        REVIEW OF SYSTEMS: A 12-point review of systems was obtained and pertinent positives and negatives were listed below. REVIEW OF SYSTEMS:     Constitutional: No fever, no chills, no lethargy, no weakness. HEENT:  No headache, otalgia, itchy eyes, nasal discharge or sore throat. Cardiac:  No chest pain, dyspnea, orthopnea or PND. Chest:   No cough, phlegm or wheezing. Abdomen:      Detailed by MA   Neuro:  No focal weakness, abnormal movements or seizure like activity. Skin:   No rashes, no itching. :   No hematuria, no pyuria, no dysuria, no flank pain. Extremities:  No swelling or joint pains. ROS was otherwise negative    Review of Systems   Constitutional: Negative. Negative for appetite change, fatigue and unexpected weight change. HENT: Negative. Negative for dental problem, postnasal drip, sinus pressure, sore throat, trouble swallowing and voice change. Denies   Eyes: Negative. Negative for visual disturbance. Respiratory: Negative. Negative for cough, choking, shortness of breath and wheezing. Cardiovascular: Negative. Negative for chest pain, palpitations and leg swelling. Gastrointestinal: Positive for abdominal distention, abdominal pain and diarrhea. Negative for anal bleeding, blood in stool, constipation, rectal pain and vomiting. Denies   Endocrine: Negative. Genitourinary: Negative. Negative for difficulty urinating. Musculoskeletal: Positive for gait problem. Negative for arthralgias, back pain and myalgias. Skin: Negative. Allergic/Immunologic: Negative.   Negative for environmental allergies and food allergies. Neurological: Negative for dizziness, weakness, light-headedness, numbness and headaches. Hematological: Negative. Does not bruise/bleed easily. Psychiatric/Behavioral: Negative. Negative for sleep disturbance. The patient is not nervous/anxious. All other systems reviewed and are negative. PHYSICAL EXAMINATION: Vital signs reviewed per the nursing documentation. /80   LMP 10/10/2021   There is no height or weight on file to calculate BMI. Physical Exam    Physical Exam   Constitutional: Patient is oriented to person, place, and time. Patient appears well-developed and well-nourished. HENT:   Head: Normocephalic and atraumatic. Eyes: Pupils are equal, round, and reactive to light. EOM are normal.   Neck: Normal range of motion. Neck supple. No JVD present. No tracheal deviation present. No thyromegaly present. Cardiovascular: Normal rate, regular rhythm, normal heart sounds and intact distal pulses. Pulmonary/Chest: Effort normal and breath sounds normal. No stridor. No respiratory distress. He has no wheezes. He has no rales. He exhibits no tenderness. Abdominal: Soft. Bowel sounds are normal. He exhibits no distension and no mass. There is no tenderness. There is no rebound and no guarding. No hernia. Musculoskeletal: Normal range of motion. Lymphadenopathy:    Patient has no cervical adenopathy. Neurological: Patient is alert and oriented to person, place, and time. Psychiatric: Patient has a normal mood and affect.  Patient behavior is normal.       LABORATORY DATA: Reviewed  Lab Results   Component Value Date    WBC 7.2 11/03/2021    HGB 12.5 11/03/2021    HCT 41.1 11/03/2021    MCV 91.9 11/03/2021     11/03/2021     11/03/2021    K 4.1 11/03/2021     11/03/2021    CO2 21 11/03/2021    BUN 10 11/03/2021    CREATININE 1.00 (H) 11/03/2021    LABALBU 3.5 07/21/2021    BILITOT 0.53 07/21/2021    ALKPHOS 92 07/21/2021    AST 10 07/21/2021    ALT 8 07/21/2021    INR 0.9 11/03/2021         Lab Results   Component Value Date    RBC 4.47 11/03/2021    HGB 12.5 11/03/2021    MCV 91.9 11/03/2021    MCH 28.0 11/03/2021    MCHC 30.4 11/03/2021    RDW 13.3 11/03/2021    MPV 9.5 11/03/2021    BASOPCT 1 07/21/2021    LYMPHSABS 1.52 07/21/2021    MONOSABS 0.42 07/21/2021    NEUTROABS 3.21 07/21/2021    EOSABS 0.11 07/21/2021    BASOSABS 0.03 07/21/2021         DIAGNOSTIC TESTING:     XR CHEST (2 VW)    Result Date: 11/3/2021  EXAMINATION: TWO XRAY VIEWS OF THE CHEST 11/3/2021 2:54 pm COMPARISON: 04/12/2017 HISTORY: ORDERING SYSTEM PROVIDED HISTORY: preop Reason for Exam: gastric sleeve FINDINGS: The lungs are without acute focal process. No effusion or pneumothorax. The cardiomediastinal silhouette is normal.  Stable mild degenerative change of the thoracic spine     Stable chest without acute process. IMPRESSION: Ms.Darling KALIE Morataya is a 54 y.o. female with a past history remarkable for obesity, anxiety, depression, referred for evaluation of prior diverticulitis.   Patient underwent a recent colonoscopy, results were discussed on last visit.  Reports abdominal pain since gallbladder was removed.  Reports diarrhea as well.  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  However given the patient's clinical picture this appears to be unlikely given her recent diagnosis of microscopic colitis    Patient was identified to have resolving diverticulitis. Was placed on budesonide 9 mg p.o. to complete taper with complete 8 weeks of treatment      Assessment  1. Diarrhea, unspecified type        PLAN:    1) microscopic lymphocytic colitisbudesonide discontinued by patient, will provide Imodium as needed. Minimize bowel frequency after budesonide therapy. Patient advised to make appropriate food choices. She reports mild right lower quadrant discomfort.   Advised to contact office should she have progression of her discomfort  or GI symptoms    2) dysphagiaresolved    3) follow-up with bariatric surgery, pending evaluation    Thank you for allowing me to participate in the care of Ms. Bam Romero. For any further questions please do not hesitate to contact me. I have reviewed and agree with the ROS entered by the MA/LPN from today's encounter documented in a separate note. Lee Goodpasture MD, MPH   [facility]  Office #: [phone]    this note is created with the assistance of a speech recognition program.  While intending to generate a document that actually reflects the content of the visit, the document can still have some errors including those of syntax and sound a like substitutions which may escape proof reading. It such instances, actual meaning can be extrapolated by contextual diversion.

## 2021-11-07 LAB
3-OH-COTININE: <2 NG/ML
COTININE: <2 NG/ML
NICOTINE: <2 NG/ML

## 2021-11-09 ENCOUNTER — TELEPHONE (OUTPATIENT)
Dept: BARIATRICS/WEIGHT MGMT | Age: 51
End: 2021-11-09

## 2021-11-09 NOTE — TELEPHONE ENCOUNTER
Patient called in because she had some questions about her protein shakes, iron and multivitamins that she should be taking after surgery.     Please advise:    Advised 24-48 hrs for call back

## 2021-11-11 ENCOUNTER — OFFICE VISIT (OUTPATIENT)
Dept: BARIATRICS/WEIGHT MGMT | Age: 51
End: 2021-11-11
Payer: MEDICARE

## 2021-11-11 VITALS
HEART RATE: 90 BPM | BODY MASS INDEX: 48.82 KG/M2 | HEIGHT: 65 IN | WEIGHT: 293 LBS | RESPIRATION RATE: 20 BRPM | DIASTOLIC BLOOD PRESSURE: 84 MMHG | SYSTOLIC BLOOD PRESSURE: 128 MMHG

## 2021-11-11 DIAGNOSIS — E66.01 MORBID OBESITY WITH BMI OF 60.0-69.9, ADULT (HCC): ICD-10-CM

## 2021-11-11 DIAGNOSIS — G47.33 OSA (OBSTRUCTIVE SLEEP APNEA): Primary | ICD-10-CM

## 2021-11-11 PROCEDURE — 99213 OFFICE O/P EST LOW 20 MIN: CPT | Performed by: SURGERY

## 2021-11-11 PROCEDURE — G8484 FLU IMMUNIZE NO ADMIN: HCPCS | Performed by: SURGERY

## 2021-11-11 PROCEDURE — G8417 CALC BMI ABV UP PARAM F/U: HCPCS | Performed by: SURGERY

## 2021-11-11 PROCEDURE — 1036F TOBACCO NON-USER: CPT | Performed by: SURGERY

## 2021-11-11 PROCEDURE — G8427 DOCREV CUR MEDS BY ELIG CLIN: HCPCS | Performed by: SURGERY

## 2021-11-14 NOTE — PROGRESS NOTES
[facility] INVASIVE BARIATRIC SURG  [address]  [address]  [facility] [address]  Dept: [phone]    SURGICAL WEIGHT MANAGEMENT PROGRAM   PROGRESS NOTE - SURGICAL EVALUATION    CC: Weight Loss       Patient: Bam Almazan        Service Date: 11/11/2021       Medical Record #: [id number]    Patient History/Assessment Summary:    The patient is a pleasant 52y.o. year old female  with morbid obesity, who stands Height: 5' 4.5\" (163.8 cm) tall with a weight of Weight: (!) 409 lb (185.5 kg) , resulting in a BMI of Body mass index is 69.12 kg/m². .     This patient is alone for the evaluation today. The patient is being evaluated to undergo weight loss surgery to treat the following comorbid conditions caused by her morbid obesity: Asthma, Obstructive Sleep Apnea treated with BiPAP/CPAP, Degenerative Joint Disease (DJD) and Depression    She attended the weight loss surgery seminar, and attended bariatric education    Last Visit Weight:   Wt Readings from Last 3 Encounters:   11/11/21 (!) 409 lb (185.5 kg)   11/03/21 (!) 409 lb (185.5 kg)   07/28/21 (!) 399 lb (181 kg)     Today's weight is decreased from the last visit. Highest Weight: 409    The patient is being evaluated for Laparoscopic Sleeve Gastrectomy. She  is here today to review the details of surgery. The patient acknowledges and understands the risks, benefits, and options we have discussed, as outlined in the Additional Informed Consent for this procedure. Patient also understands the importance of surgical and post-operative recommendations, including the operative diet and regular post-operative follow up care. The importance of ambulation and incentive spirometry was also discussed. All questions of this patient and any family members present have been answered to their satisfaction.     Physical Examination:     /84 (Site: Right Upper Arm, Position: Sitting, Cuff Size: Large Adult)   Pulse 90   Resp 20   Ht 5' 4.5\" (1.638 m)   Wt (!) 409 lb (185.5 kg)   BMI 69.12 kg/m²   General This patient is awake, alert, and oriented, and is in no apparent distress. Cardiac Regular rate and rhythm without evidence of murmur. Respiratory Clear to auscultation bilaterally. Abdomen Obese, soft, non-tender, non-distended without masses/ No  evidence of abdominal hernia / Incisions consistent with previous surgeries. Head and Neck Obese, normocephalic and atraumatic/soft and supple, no  lymphadenopathy or obvious bruits. Extremeties No cyanosis, clubbing or edema/ No calf tenderness/No  restrictions of movement, is ambulatory without assistance. Neurological Intact x 4 extremities, no focal deficits noted   Skin No rashes or lesions noted   Rectal Deferred     RECOMMENDATIONS:       Diagnosis Orders   1. DENISHA (obstructive sleep apnea)     2. Morbid obesity with BMI of 60.0-69.9, adult ([facility] [address]            We spent a great deal of time discussing the risks and benefits of Laparoscopic Sleeve Gastrectomy, including but not limited to injury to intra-abdominal organs, breakdown of the gastric staple line, the need for re-operative therapy,  prolonged hospitalization,  mechanical ventilation,  and death. We discussed the possibility of bleeding, the need for blood transfusions, blood clots, hospital-acquired and intra-abdominal infection, anastomotic stricture, and worsening GERD. And we discussed the need for post-operative visit compliance, behavior modifications and diet changes, protein and vitamin supplementation, as well as routine scheduled and dedicated exercise. We discussed the potential weight loss benefit of approximately 60-70% of her excess body weight at 12-18 months post-op, as well as the possibility of insufficient weight loss or weight gain after 2 years post-operative time.      The following was discussed with the patient:    DVT Prophylaxis    Obstructive Sleep Apnea  Need for CPAP compliance around time of

## 2021-11-16 ENCOUNTER — ANESTHESIA EVENT (OUTPATIENT)
Dept: OPERATING ROOM | Age: 51
End: 2021-11-16
Payer: MEDICARE

## 2021-11-17 ENCOUNTER — ANESTHESIA (OUTPATIENT)
Dept: OPERATING ROOM | Age: 51
End: 2021-11-17
Payer: MEDICARE

## 2021-11-17 ENCOUNTER — HOSPITAL ENCOUNTER (OUTPATIENT)
Age: 51
Setting detail: OBSERVATION
Discharge: HOME | End: 2021-11-18
Attending: SURGERY | Admitting: SURGERY
Payer: MEDICARE

## 2021-11-17 VITALS — OXYGEN SATURATION: 96 % | TEMPERATURE: 97.2 F | DIASTOLIC BLOOD PRESSURE: 123 MMHG | SYSTOLIC BLOOD PRESSURE: 178 MMHG

## 2021-11-17 DIAGNOSIS — Z98.84 S/P LAPAROSCOPIC SLEEVE GASTRECTOMY: Primary | ICD-10-CM

## 2021-11-17 LAB
ANION GAP SERPL CALCULATED.3IONS-SCNC: 13 MMOL/L (ref 9–17)
BUN BLDV-MCNC: 10 MG/DL (ref 6–20)
BUN/CREAT BLD: ABNORMAL (ref 9–20)
CALCIUM SERPL-MCNC: 8.6 MG/DL (ref 8.6–10.4)
CHLORIDE BLD-SCNC: 100 MMOL/L (ref 98–107)
CO2: 22 MMOL/L (ref 20–31)
CREAT SERPL-MCNC: 0.89 MG/DL (ref 0.5–0.9)
GFR AFRICAN AMERICAN: >60 ML/MIN
GFR NON-AFRICAN AMERICAN: >60 ML/MIN
GFR SERPL CREATININE-BSD FRML MDRD: ABNORMAL ML/MIN/{1.73_M2}
GFR SERPL CREATININE-BSD FRML MDRD: ABNORMAL ML/MIN/{1.73_M2}
GLUCOSE BLD-MCNC: 117 MG/DL (ref 70–99)
HCG, PREGNANCY URINE (POC): NEGATIVE
HCT VFR BLD CALC: 37.8 % (ref 36.3–47.1)
HEMOGLOBIN: 11.8 G/DL (ref 11.9–15.1)
MCH RBC QN AUTO: 28 PG (ref 25.2–33.5)
MCHC RBC AUTO-ENTMCNC: 31.2 G/DL (ref 28.4–34.8)
MCV RBC AUTO: 89.8 FL (ref 82.6–102.9)
NRBC AUTOMATED: 0 PER 100 WBC
PDW BLD-RTO: 13.4 % (ref 11.8–14.4)
PLATELET # BLD: 230 K/UL (ref 138–453)
PMV BLD AUTO: 9.4 FL (ref 8.1–13.5)
POTASSIUM SERPL-SCNC: 4.5 MMOL/L (ref 3.7–5.3)
RBC # BLD: 4.21 M/UL (ref 3.95–5.11)
SODIUM BLD-SCNC: 135 MMOL/L (ref 135–144)
WBC # BLD: 7.1 K/UL (ref 3.5–11.3)

## 2021-11-17 PROCEDURE — 2580000003 HC RX 258: Performed by: SURGERY

## 2021-11-17 PROCEDURE — 6360000002 HC RX W HCPCS: Performed by: SURGERY

## 2021-11-17 PROCEDURE — 2500000003 HC RX 250 WO HCPCS: Performed by: ANESTHESIOLOGY

## 2021-11-17 PROCEDURE — 7100000001 HC PACU RECOVERY - ADDTL 15 MIN: Performed by: SURGERY

## 2021-11-17 PROCEDURE — G0378 HOSPITAL OBSERVATION PER HR: HCPCS

## 2021-11-17 PROCEDURE — 6370000000 HC RX 637 (ALT 250 FOR IP): Performed by: SURGERY

## 2021-11-17 PROCEDURE — 3600000019 HC SURGERY ROBOT ADDTL 15MIN: Performed by: SURGERY

## 2021-11-17 PROCEDURE — 3700000001 HC ADD 15 MINUTES (ANESTHESIA): Performed by: SURGERY

## 2021-11-17 PROCEDURE — 88307 TISSUE EXAM BY PATHOLOGIST: CPT

## 2021-11-17 PROCEDURE — 96372 THER/PROPH/DIAG INJ SC/IM: CPT

## 2021-11-17 PROCEDURE — 2580000003 HC RX 258: Performed by: ANESTHESIOLOGY

## 2021-11-17 PROCEDURE — L3650 SO 8 ABD RESTRAINT PRE OTS: HCPCS | Performed by: SURGERY

## 2021-11-17 PROCEDURE — S2900 ROBOTIC SURGICAL SYSTEM: HCPCS | Performed by: SURGERY

## 2021-11-17 PROCEDURE — 3700000000 HC ANESTHESIA ATTENDED CARE: Performed by: SURGERY

## 2021-11-17 PROCEDURE — 7100000000 HC PACU RECOVERY - FIRST 15 MIN: Performed by: SURGERY

## 2021-11-17 PROCEDURE — 2500000003 HC RX 250 WO HCPCS: Performed by: SURGERY

## 2021-11-17 PROCEDURE — 94640 AIRWAY INHALATION TREATMENT: CPT

## 2021-11-17 PROCEDURE — 2720000010 HC SURG SUPPLY STERILE: Performed by: SURGERY

## 2021-11-17 PROCEDURE — 2709999900 HC NON-CHARGEABLE SUPPLY: Performed by: SURGERY

## 2021-11-17 PROCEDURE — 80048 BASIC METABOLIC PNL TOTAL CA: CPT

## 2021-11-17 PROCEDURE — 6370000000 HC RX 637 (ALT 250 FOR IP): Performed by: STUDENT IN AN ORGANIZED HEALTH CARE EDUCATION/TRAINING PROGRAM

## 2021-11-17 PROCEDURE — 6360000002 HC RX W HCPCS: Performed by: ANESTHESIOLOGY

## 2021-11-17 PROCEDURE — 81025 URINE PREGNANCY TEST: CPT

## 2021-11-17 PROCEDURE — 3600000009 HC SURGERY ROBOT BASE: Performed by: SURGERY

## 2021-11-17 PROCEDURE — 43775 LAP SLEEVE GASTRECTOMY: CPT | Performed by: SURGERY

## 2021-11-17 PROCEDURE — 85027 COMPLETE CBC AUTOMATED: CPT

## 2021-11-17 RX ORDER — OXYCODONE HYDROCHLORIDE AND ACETAMINOPHEN 5; 325 MG/1; MG/1
2 TABLET ORAL EVERY 4 HOURS PRN
Status: DISCONTINUED | OUTPATIENT
Start: 2021-11-17 | End: 2021-11-18 | Stop reason: HOSPADM

## 2021-11-17 RX ORDER — ONDANSETRON 2 MG/ML
4 INJECTION INTRAMUSCULAR; INTRAVENOUS
Status: COMPLETED | OUTPATIENT
Start: 2021-11-17 | End: 2021-11-17

## 2021-11-17 RX ORDER — MIDAZOLAM HYDROCHLORIDE 1 MG/ML
INJECTION INTRAMUSCULAR; INTRAVENOUS PRN
Status: DISCONTINUED | OUTPATIENT
Start: 2021-11-17 | End: 2021-11-17 | Stop reason: SDUPTHER

## 2021-11-17 RX ORDER — SODIUM CHLORIDE 9 MG/ML
25 INJECTION, SOLUTION INTRAVENOUS PRN
Status: DISCONTINUED | OUTPATIENT
Start: 2021-11-17 | End: 2021-11-18 | Stop reason: HOSPADM

## 2021-11-17 RX ORDER — MORPHINE SULFATE 2 MG/ML
2 INJECTION, SOLUTION INTRAMUSCULAR; INTRAVENOUS EVERY 5 MIN PRN
Status: COMPLETED | OUTPATIENT
Start: 2021-11-17 | End: 2021-11-17

## 2021-11-17 RX ORDER — GLYCOPYRROLATE 1 MG/5 ML
SYRINGE (ML) INTRAVENOUS PRN
Status: DISCONTINUED | OUTPATIENT
Start: 2021-11-17 | End: 2021-11-17 | Stop reason: SDUPTHER

## 2021-11-17 RX ORDER — PROMETHAZINE HYDROCHLORIDE 25 MG/ML
12.5 INJECTION, SOLUTION INTRAMUSCULAR; INTRAVENOUS EVERY 6 HOURS PRN
Status: DISCONTINUED | OUTPATIENT
Start: 2021-11-17 | End: 2021-11-18 | Stop reason: HOSPADM

## 2021-11-17 RX ORDER — ONDANSETRON 2 MG/ML
4 INJECTION INTRAMUSCULAR; INTRAVENOUS EVERY 6 HOURS PRN
Status: DISCONTINUED | OUTPATIENT
Start: 2021-11-17 | End: 2021-11-18 | Stop reason: HOSPADM

## 2021-11-17 RX ORDER — SODIUM CHLORIDE 0.9 % (FLUSH) 0.9 %
5-40 SYRINGE (ML) INJECTION EVERY 12 HOURS SCHEDULED
Status: DISCONTINUED | OUTPATIENT
Start: 2021-11-17 | End: 2021-11-18 | Stop reason: HOSPADM

## 2021-11-17 RX ORDER — HEPARIN SODIUM 5000 [USP'U]/ML
5000 INJECTION, SOLUTION INTRAVENOUS; SUBCUTANEOUS EVERY 8 HOURS SCHEDULED
Status: DISCONTINUED | OUTPATIENT
Start: 2021-11-17 | End: 2021-11-18 | Stop reason: HOSPADM

## 2021-11-17 RX ORDER — OXYCODONE HYDROCHLORIDE AND ACETAMINOPHEN 5; 325 MG/1; MG/1
1 TABLET ORAL PRN
Status: DISCONTINUED | OUTPATIENT
Start: 2021-11-17 | End: 2021-11-17 | Stop reason: HOSPADM

## 2021-11-17 RX ORDER — PROPOFOL 10 MG/ML
INJECTION, EMULSION INTRAVENOUS PRN
Status: DISCONTINUED | OUTPATIENT
Start: 2021-11-17 | End: 2021-11-17 | Stop reason: SDUPTHER

## 2021-11-17 RX ORDER — LABETALOL HYDROCHLORIDE 5 MG/ML
5 INJECTION, SOLUTION INTRAVENOUS EVERY 10 MIN PRN
Status: DISCONTINUED | OUTPATIENT
Start: 2021-11-17 | End: 2021-11-17 | Stop reason: HOSPADM

## 2021-11-17 RX ORDER — FENTANYL CITRATE 50 UG/ML
25 INJECTION, SOLUTION INTRAMUSCULAR; INTRAVENOUS EVERY 5 MIN PRN
Status: COMPLETED | OUTPATIENT
Start: 2021-11-17 | End: 2021-11-17

## 2021-11-17 RX ORDER — LIDOCAINE HYDROCHLORIDE 10 MG/ML
INJECTION, SOLUTION EPIDURAL; INFILTRATION; INTRACAUDAL; PERINEURAL PRN
Status: DISCONTINUED | OUTPATIENT
Start: 2021-11-17 | End: 2021-11-17 | Stop reason: SDUPTHER

## 2021-11-17 RX ORDER — HEPARIN SODIUM 5000 [USP'U]/ML
5000 INJECTION, SOLUTION INTRAVENOUS; SUBCUTANEOUS ONCE
Status: COMPLETED | OUTPATIENT
Start: 2021-11-17 | End: 2021-11-17

## 2021-11-17 RX ORDER — OXYCODONE HYDROCHLORIDE AND ACETAMINOPHEN 5; 325 MG/1; MG/1
2 TABLET ORAL PRN
Status: DISCONTINUED | OUTPATIENT
Start: 2021-11-17 | End: 2021-11-17 | Stop reason: HOSPADM

## 2021-11-17 RX ORDER — FAMOTIDINE 20 MG/1
20 TABLET, FILM COATED ORAL 2 TIMES DAILY
Status: DISCONTINUED | OUTPATIENT
Start: 2021-11-17 | End: 2021-11-18 | Stop reason: HOSPADM

## 2021-11-17 RX ORDER — DEXAMETHASONE SODIUM PHOSPHATE 10 MG/ML
INJECTION INTRAMUSCULAR; INTRAVENOUS PRN
Status: DISCONTINUED | OUTPATIENT
Start: 2021-11-17 | End: 2021-11-17 | Stop reason: SDUPTHER

## 2021-11-17 RX ORDER — IPRATROPIUM BROMIDE AND ALBUTEROL SULFATE 2.5; .5 MG/3ML; MG/3ML
1 SOLUTION RESPIRATORY (INHALATION)
Status: DISCONTINUED | OUTPATIENT
Start: 2021-11-17 | End: 2021-11-18 | Stop reason: HOSPADM

## 2021-11-17 RX ORDER — SUCCINYLCHOLINE/SOD CL,ISO/PF 100 MG/5ML
SYRINGE (ML) INTRAVENOUS PRN
Status: DISCONTINUED | OUTPATIENT
Start: 2021-11-17 | End: 2021-11-17 | Stop reason: SDUPTHER

## 2021-11-17 RX ORDER — SCOLOPAMINE TRANSDERMAL SYSTEM 1 MG/1
1 PATCH, EXTENDED RELEASE TRANSDERMAL
Status: DISCONTINUED | OUTPATIENT
Start: 2021-11-17 | End: 2021-11-18 | Stop reason: HOSPADM

## 2021-11-17 RX ORDER — BUPIVACAINE HYDROCHLORIDE 5 MG/ML
INJECTION, SOLUTION PERINEURAL PRN
Status: DISCONTINUED | OUTPATIENT
Start: 2021-11-17 | End: 2021-11-17 | Stop reason: HOSPADM

## 2021-11-17 RX ORDER — SODIUM CHLORIDE 0.9 % (FLUSH) 0.9 %
5-40 SYRINGE (ML) INJECTION PRN
Status: DISCONTINUED | OUTPATIENT
Start: 2021-11-17 | End: 2021-11-18 | Stop reason: HOSPADM

## 2021-11-17 RX ORDER — METOPROLOL TARTRATE 5 MG/5ML
5 INJECTION INTRAVENOUS EVERY 6 HOURS PRN
Status: DISCONTINUED | OUTPATIENT
Start: 2021-11-17 | End: 2021-11-18 | Stop reason: HOSPADM

## 2021-11-17 RX ORDER — ONDANSETRON 2 MG/ML
INJECTION INTRAMUSCULAR; INTRAVENOUS PRN
Status: DISCONTINUED | OUTPATIENT
Start: 2021-11-17 | End: 2021-11-17 | Stop reason: SDUPTHER

## 2021-11-17 RX ORDER — OXYCODONE HYDROCHLORIDE AND ACETAMINOPHEN 5; 325 MG/1; MG/1
1 TABLET ORAL EVERY 4 HOURS PRN
Status: DISCONTINUED | OUTPATIENT
Start: 2021-11-17 | End: 2021-11-18 | Stop reason: HOSPADM

## 2021-11-17 RX ORDER — DIPHENHYDRAMINE HYDROCHLORIDE 50 MG/ML
12.5 INJECTION INTRAMUSCULAR; INTRAVENOUS
Status: DISCONTINUED | OUTPATIENT
Start: 2021-11-17 | End: 2021-11-17 | Stop reason: HOSPADM

## 2021-11-17 RX ORDER — ROCURONIUM BROMIDE 10 MG/ML
INJECTION, SOLUTION INTRAVENOUS PRN
Status: DISCONTINUED | OUTPATIENT
Start: 2021-11-17 | End: 2021-11-17 | Stop reason: SDUPTHER

## 2021-11-17 RX ORDER — FENTANYL CITRATE 50 UG/ML
INJECTION, SOLUTION INTRAMUSCULAR; INTRAVENOUS PRN
Status: DISCONTINUED | OUTPATIENT
Start: 2021-11-17 | End: 2021-11-17 | Stop reason: SDUPTHER

## 2021-11-17 RX ORDER — SODIUM CHLORIDE, SODIUM LACTATE, POTASSIUM CHLORIDE, CALCIUM CHLORIDE 600; 310; 30; 20 MG/100ML; MG/100ML; MG/100ML; MG/100ML
1000 INJECTION, SOLUTION INTRAVENOUS CONTINUOUS
Status: DISCONTINUED | OUTPATIENT
Start: 2021-11-17 | End: 2021-11-17

## 2021-11-17 RX ORDER — NEOSTIGMINE METHYLSULFATE 5 MG/5 ML
SYRINGE (ML) INTRAVENOUS PRN
Status: DISCONTINUED | OUTPATIENT
Start: 2021-11-17 | End: 2021-11-17 | Stop reason: SDUPTHER

## 2021-11-17 RX ORDER — MAGNESIUM HYDROXIDE 1200 MG/15ML
LIQUID ORAL CONTINUOUS PRN
Status: COMPLETED | OUTPATIENT
Start: 2021-11-17 | End: 2021-11-17

## 2021-11-17 RX ORDER — SODIUM CHLORIDE, SODIUM LACTATE, POTASSIUM CHLORIDE, CALCIUM CHLORIDE 600; 310; 30; 20 MG/100ML; MG/100ML; MG/100ML; MG/100ML
INJECTION, SOLUTION INTRAVENOUS CONTINUOUS
Status: DISCONTINUED | OUTPATIENT
Start: 2021-11-17 | End: 2021-11-18 | Stop reason: HOSPADM

## 2021-11-17 RX ADMIN — FENTANYL CITRATE 50 MCG: 50 INJECTION, SOLUTION INTRAMUSCULAR; INTRAVENOUS at 13:26

## 2021-11-17 RX ADMIN — PROPOFOL 200 MG: 10 INJECTION, EMULSION INTRAVENOUS at 13:30

## 2021-11-17 RX ADMIN — FENTANYL CITRATE 25 MCG: 50 INJECTION, SOLUTION INTRAMUSCULAR; INTRAVENOUS at 16:22

## 2021-11-17 RX ADMIN — ROCURONIUM BROMIDE 20 MG: 10 INJECTION INTRAVENOUS at 14:25

## 2021-11-17 RX ADMIN — MORPHINE SULFATE 2 MG: 2 INJECTION, SOLUTION INTRAMUSCULAR; INTRAVENOUS at 15:38

## 2021-11-17 RX ADMIN — Medication 5 MG: at 16:18

## 2021-11-17 RX ADMIN — OXYCODONE HYDROCHLORIDE AND ACETAMINOPHEN 2 TABLET: 5; 325 TABLET ORAL at 21:59

## 2021-11-17 RX ADMIN — LIDOCAINE HYDROCHLORIDE 50 MG: 10 INJECTION, SOLUTION EPIDURAL; INFILTRATION; INTRACAUDAL; PERINEURAL at 13:26

## 2021-11-17 RX ADMIN — MORPHINE SULFATE 2 MG: 2 INJECTION, SOLUTION INTRAMUSCULAR; INTRAVENOUS at 16:12

## 2021-11-17 RX ADMIN — HEPARIN SODIUM 5000 UNITS: 5000 INJECTION INTRAVENOUS; SUBCUTANEOUS at 11:22

## 2021-11-17 RX ADMIN — HEPARIN SODIUM 5000 UNITS: 5000 INJECTION INTRAVENOUS; SUBCUTANEOUS at 21:59

## 2021-11-17 RX ADMIN — Medication 5 MG: at 16:13

## 2021-11-17 RX ADMIN — ONDANSETRON 4 MG: 2 INJECTION, SOLUTION INTRAMUSCULAR; INTRAVENOUS at 15:11

## 2021-11-17 RX ADMIN — FENTANYL CITRATE 25 MCG: 50 INJECTION, SOLUTION INTRAMUSCULAR; INTRAVENOUS at 16:17

## 2021-11-17 RX ADMIN — SODIUM CHLORIDE, POTASSIUM CHLORIDE, SODIUM LACTATE AND CALCIUM CHLORIDE: 600; 310; 30; 20 INJECTION, SOLUTION INTRAVENOUS at 14:52

## 2021-11-17 RX ADMIN — Medication 5 MG: at 16:29

## 2021-11-17 RX ADMIN — MORPHINE SULFATE 2 MG: 2 INJECTION, SOLUTION INTRAMUSCULAR; INTRAVENOUS at 16:04

## 2021-11-17 RX ADMIN — FENTANYL CITRATE 25 MCG: 50 INJECTION, SOLUTION INTRAMUSCULAR; INTRAVENOUS at 17:34

## 2021-11-17 RX ADMIN — FENTANYL CITRATE 25 MCG: 50 INJECTION, SOLUTION INTRAMUSCULAR; INTRAVENOUS at 15:27

## 2021-11-17 RX ADMIN — FAMOTIDINE 20 MG: 20 TABLET, FILM COATED ORAL at 21:58

## 2021-11-17 RX ADMIN — Medication 5 MG: at 16:03

## 2021-11-17 RX ADMIN — Medication 180 MG: at 13:30

## 2021-11-17 RX ADMIN — SODIUM CHLORIDE, POTASSIUM CHLORIDE, SODIUM LACTATE AND CALCIUM CHLORIDE 1000 ML: 600; 310; 30; 20 INJECTION, SOLUTION INTRAVENOUS at 11:06

## 2021-11-17 RX ADMIN — IPRATROPIUM BROMIDE AND ALBUTEROL SULFATE 1 AMPULE: .5; 2.5 SOLUTION RESPIRATORY (INHALATION) at 21:45

## 2021-11-17 RX ADMIN — Medication 3000 MG: at 13:38

## 2021-11-17 RX ADMIN — FENTANYL CITRATE 50 MCG: 50 INJECTION, SOLUTION INTRAMUSCULAR; INTRAVENOUS at 13:38

## 2021-11-17 RX ADMIN — MIDAZOLAM HYDROCHLORIDE 2 MG: 1 INJECTION, SOLUTION INTRAMUSCULAR; INTRAVENOUS at 13:26

## 2021-11-17 RX ADMIN — ROCURONIUM BROMIDE 45 MG: 10 INJECTION INTRAVENOUS at 13:38

## 2021-11-17 RX ADMIN — FENTANYL CITRATE 25 MCG: 50 INJECTION, SOLUTION INTRAMUSCULAR; INTRAVENOUS at 14:46

## 2021-11-17 RX ADMIN — ONDANSETRON 4 MG: 2 INJECTION INTRAMUSCULAR; INTRAVENOUS at 16:01

## 2021-11-17 RX ADMIN — Medication 4 MG: at 15:15

## 2021-11-17 RX ADMIN — Medication 0.8 MG: at 15:15

## 2021-11-17 RX ADMIN — HYDROMORPHONE HYDROCHLORIDE 1 MG: 1 INJECTION, SOLUTION INTRAMUSCULAR; INTRAVENOUS; SUBCUTANEOUS at 19:02

## 2021-11-17 RX ADMIN — METOPROLOL TARTRATE 5 MG: 1 INJECTION, SOLUTION INTRAVENOUS at 19:01

## 2021-11-17 RX ADMIN — ROCURONIUM BROMIDE 5 MG: 10 INJECTION INTRAVENOUS at 13:26

## 2021-11-17 RX ADMIN — MORPHINE SULFATE 2 MG: 2 INJECTION, SOLUTION INTRAMUSCULAR; INTRAVENOUS at 15:47

## 2021-11-17 RX ADMIN — FENTANYL CITRATE 25 MCG: 50 INJECTION, SOLUTION INTRAMUSCULAR; INTRAVENOUS at 16:30

## 2021-11-17 RX ADMIN — DEXAMETHASONE SODIUM PHOSPHATE 10 MG: 10 INJECTION INTRAMUSCULAR; INTRAVENOUS at 13:57

## 2021-11-17 ASSESSMENT — LIFESTYLE VARIABLES: SMOKING_STATUS: 0

## 2021-11-17 ASSESSMENT — PULMONARY FUNCTION TESTS
PIF_VALUE: 26
PIF_VALUE: 2
PIF_VALUE: 29
PIF_VALUE: 2
PIF_VALUE: 0
PIF_VALUE: 30
PIF_VALUE: 27
PIF_VALUE: 13
PIF_VALUE: 1
PIF_VALUE: 24
PIF_VALUE: 23
PIF_VALUE: 27
PIF_VALUE: 28
PIF_VALUE: 23
PIF_VALUE: 28
PIF_VALUE: 8
PIF_VALUE: 27
PIF_VALUE: 29
PIF_VALUE: 22
PIF_VALUE: 8
PIF_VALUE: 23
PIF_VALUE: 29
PIF_VALUE: 27
PIF_VALUE: 28
PIF_VALUE: 3
PIF_VALUE: 14
PIF_VALUE: 27
PIF_VALUE: 1
PIF_VALUE: 0
PIF_VALUE: 29
PIF_VALUE: 29
PIF_VALUE: 24
PIF_VALUE: 14
PIF_VALUE: 22
PIF_VALUE: 27
PIF_VALUE: 29
PIF_VALUE: 25
PIF_VALUE: 30
PIF_VALUE: 9
PIF_VALUE: 26
PIF_VALUE: 12
PIF_VALUE: 9
PIF_VALUE: 24
PIF_VALUE: 29
PIF_VALUE: 27
PIF_VALUE: 13
PIF_VALUE: 29
PIF_VALUE: 15
PIF_VALUE: 13
PIF_VALUE: 30
PIF_VALUE: 0
PIF_VALUE: 28
PIF_VALUE: 29
PIF_VALUE: 29
PIF_VALUE: 23
PIF_VALUE: 24
PIF_VALUE: 13
PIF_VALUE: 0
PIF_VALUE: 29
PIF_VALUE: 27
PIF_VALUE: 26
PIF_VALUE: 30
PIF_VALUE: 26
PIF_VALUE: 1
PIF_VALUE: 24
PIF_VALUE: 28
PIF_VALUE: 0
PIF_VALUE: 31
PIF_VALUE: 29
PIF_VALUE: 29
PIF_VALUE: 26
PIF_VALUE: 0
PIF_VALUE: 29
PIF_VALUE: 1
PIF_VALUE: 26
PIF_VALUE: 24
PIF_VALUE: 0
PIF_VALUE: 28
PIF_VALUE: 30
PIF_VALUE: 27
PIF_VALUE: 28
PIF_VALUE: 25
PIF_VALUE: 29
PIF_VALUE: 29
PIF_VALUE: 28
PIF_VALUE: 30
PIF_VALUE: 23
PIF_VALUE: 26
PIF_VALUE: 9
PIF_VALUE: 29
PIF_VALUE: 28
PIF_VALUE: 29
PIF_VALUE: 28
PIF_VALUE: 24
PIF_VALUE: 27
PIF_VALUE: 26
PIF_VALUE: 27
PIF_VALUE: 30
PIF_VALUE: 14
PIF_VALUE: 29
PIF_VALUE: 0
PIF_VALUE: 0
PIF_VALUE: 29
PIF_VALUE: 35
PIF_VALUE: 26
PIF_VALUE: 29
PIF_VALUE: 26
PIF_VALUE: 25
PIF_VALUE: 26
PIF_VALUE: 30
PIF_VALUE: 23
PIF_VALUE: 28
PIF_VALUE: 28
PIF_VALUE: 26
PIF_VALUE: 26
PIF_VALUE: 28
PIF_VALUE: 29
PIF_VALUE: 30
PIF_VALUE: 3
PIF_VALUE: 12
PIF_VALUE: 24
PIF_VALUE: 24
PIF_VALUE: 29
PIF_VALUE: 29
PIF_VALUE: 26

## 2021-11-17 ASSESSMENT — PAIN SCALES - GENERAL
PAINLEVEL_OUTOF10: 6
PAINLEVEL_OUTOF10: 5
PAINLEVEL_OUTOF10: 9
PAINLEVEL_OUTOF10: 5
PAINLEVEL_OUTOF10: 7
PAINLEVEL_OUTOF10: 10

## 2021-11-17 NOTE — ANESTHESIA PRE PROCEDURE
Department of Anesthesiology  Preprocedure Note       Name:  Scott Loza   Age:  52 y.o.  :  1970                                          MRN:  [id number]         Date:  2021      Surgeon: Sharron Miller):  Alena Santiago DO    Procedure: Procedure(s):  XI ROBOTIC LAPAROSCOPIC GASTRECTOMY SLEEVE WITH LIVER BIOPSY, EGD- GI UNIT SCHEDULED    Department of Anesthesiology  Pre-Anesthesia Evaluation/Consultation         Name:  Scott Loza                                         Age:  52 y.o.   MRN:  [id number]             Medications  Current Facility-Administered Medications   Medication Dose Route Frequency Provider Last Rate Last Admin    ceFAZolin (ANCEF) 3000 mg in dextrose 5 % 100 mL IVPB  3,000 mg IntraVENous Once Alena Santiago DO        heparin (porcine) injection 5,000 Units  5,000 Units SubCUTAneous Once Alena Santiago DO        lactated ringers infusion 1,000 mL  1,000 mL IntraVENous Continuous Patrice Cifuentes MD           Allergies   Allergen Reactions    No Known Allergies      Patient Active Problem List   Diagnosis    Headache    Overactive bladder    Osteoarthritis    Chronic rhinitis    OAB (overactive bladder)    Asthma    Primary osteoarthritis of right knee    Psoriasis    Seborrheic dermatitis    Essential hypertension    DENISHA (obstructive sleep apnea)    Anxiety and depression    Chronic back pain    Morbid obesity with BMI of 60.0-69.9, adult (HCC)    Bilateral chronic knee pain    Diverticulitis of large intestine without perforation or abscess without bleeding    Diarrhea    Dyspepsia    Lymphocytic colitis     Past Medical History:   Diagnosis Date    Allergic rhinitis     Anxiety     Anxiety and depression     Arthritis     Asthma     no longer using inhaler or nebulizer    Diverticulitis     Headache(784.0)     HTN (hypertension)     pt denies  not on meds    Obesity     Overactive bladder     Sleep apnea     uses machine nightly cpap    Use of cane as ambulatory aid     Wellness examination 2021    Dr. Leanna Cutler, his CNP's      Past Surgical History:   Procedure Laterality Date    CATARACT REMOVAL      CHOLECYSTECTOMY, LAPAROSCOPIC N/A 12/15/2020    CHOLECYSTECTOMY LAPAROSCOPIC ROBOTIC XI performed by Junior Osuna MD at [address] [facility] N/A 2020    COLONOSCOPY POLYPECTOMY SNARE/COLD BIOPSY performed by Miguelina Warren MD at [address] [facility]  2021    COLONOSCOPY N/A 2021    COLONOSCOPY WITH BIOPSY RANDOM RIGHT AND LEFT COLON performed by Miguelina Warren MD at [id number] [place] [address]. [address]     cataract removed with lens implants    KNEE SURGERY Right 2017    NV KNEE SCOPE,DIAGNOSTIC Right 2017    RIGHT KNEE ARTHROSCOPY WITH MEDIAL MENISECTOMY AND CHONDROPLASTY performed by Ramona Beasley DO at [address] [place] [address]  2021    UPPER GASTROINTESTINAL ENDOSCOPY N/A 2021    EGD BIOPSY OF GASTRIC AND GASTRIC POLYPECTOMY performed by Miguelina Warren MD at [address] [address] History     Tobacco Use    Smoking status: Former Smoker     Packs/day: 0.60     Types: Cigarettes     Quit date:      Years since quittin.8    Smokeless tobacco: Never Used   Vaping Use    Vaping Use: Never used   Substance Use Topics    Alcohol use: Yes     Comment: maybe once a month    Drug use: No         Vital Signs (Current) There were no vitals filed for this visit. Vital Signs Statistics (for past 48 hrs)     No data recorded  BP Readings from Last 3 Encounters:   21 128/84   21 131/84   21 132/80       BMI  There is no height or weight on file to calculate BMI.     CBC   Lab Results   Component Value Date    WBC 7.2 2021    RBC 4.47 2021    HGB 12.5 2021    HCT 41.1 2021    MCV 91.9 2021    RDW 13.3 2021     2021       CMP    Lab Results   Component Value Date  11/03/2021    K 4.1 11/03/2021     11/03/2021    CO2 21 11/03/2021    BUN 10 11/03/2021    CREATININE 1.00 11/03/2021    GFRAA >60 11/03/2021    LABGLOM 59 11/03/2021    GLUCOSE 67 11/03/2021    PROT 6.9 07/21/2021    CALCIUM 9.0 11/03/2021    BILITOT 0.53 07/21/2021    ALKPHOS 92 07/21/2021    AST 10 07/21/2021    ALT 8 07/21/2021       BMP    Lab Results   Component Value Date     11/03/2021    K 4.1 11/03/2021     11/03/2021    CO2 21 11/03/2021    BUN 10 11/03/2021    CREATININE 1.00 11/03/2021    CALCIUM 9.0 11/03/2021    GFRAA >60 11/03/2021    LABGLOM 59 11/03/2021    GLUCOSE 67 11/03/2021       POC Testing  No results for input(s): POCGLU, POCNA, POCK, POCCL, POCBUN, POCHEMO, POCHCT in the last 72 hours. Coags    Lab Results   Component Value Date    PROTIME 10.0 11/03/2021    INR 0.9 11/03/2021    APTT 24.3 11/03/2021       HCG (If Applicable)   Lab Results   Component Value Date    PREGTESTUR NEGATIVE 06/20/2021    HCG NEGATIVE 04/08/2021        ABGs No results found for: PHART, PO2ART, YRM6BUM, CGH5YNR, BEART, T7YQLOGC     Type & Screen (If Applicable)  No results found for: LABABO, [address]    Radiology (If Applicable)    Cardiac Testing (If Applicable) EF 44%    EKG (If Applicable) nl          Medications prior to admission:   Prior to Admission medications    Medication Sig Start Date End Date Taking?  Authorizing Provider   loperamide (RA ANTI-DIARRHEAL) 2 MG capsule Take 1 capsule by mouth 4 times daily as needed for Diarrhea 11/4/21 12/4/21 Yes Kenneth Fuchs MD   famotidine (PEPCID) 20 MG tablet take 1 tablet by mouth twice a day 8/9/21  Yes Kenneth Fuchs MD   triamcinolone (ARISTOCORT) 0.5 % cream apply A THIN FILM topically to affected area 2 TO 3 TIMES DAILY 6/3/21  Yes Historical Provider, MD   APLENZIN 348 MG TB24 take 1 tablet by mouth every morning 6/4/21  Yes Historical Provider, MD   triamcinolone (KENALOG) 0.025 % cream Apply to rash on face and ears daily 2/24/21  Yes Leslee Hedrick MD   Sulfacetamide Sodium, Acne, 10 % LOTN Apply to face daily 2/24/21  Yes Leslee Hedrick MD   ketoconazole (NIZORAL) 2 % shampoo Apply 3-4 times weekly to scalp, leave on for five minutes prior to washing off 2/24/21  Yes Leslee Hedrick MD   ondansetron (ZOFRAN) 4 MG tablet Take 1 tablet by mouth 3 times daily as needed for Nausea or Vomiting 12/15/20  Yes Nirali Bach, DO   dicyclomine (BENTYL) 20 MG tablet Take 1 tablet by mouth every 8 hours as needed 11/20/20  Yes Historical Provider, MD   acetaminophen (TYLENOL) 500 MG tablet Take 1,000 mg by mouth every 6 hours as needed for Pain   Yes Historical Provider, MD   escitalopram (LEXAPRO) 20 MG tablet Take 20 mg by mouth daily   Yes Historical Provider, MD   fluticasone (FLONASE) 50 MCG/ACT nasal spray place 1 spray into each nostril once daily 6/3/21   Historical Provider, MD       Current medications:    Current Facility-Administered Medications   Medication Dose Route Frequency Provider Last Rate Last Admin    ceFAZolin (ANCEF) 3000 mg in dextrose 5 % 100 mL IVPB  3,000 mg IntraVENous Once Ezella Lane, DO        heparin (porcine) injection 5,000 Units  5,000 Units SubCUTAneous Once Ezella Lane, DO        lactated ringers infusion 1,000 mL  1,000 mL IntraVENous Continuous Alecia Willams MD           Allergies: Allergies   Allergen Reactions    No Known Allergies        Problem List:    Patient Active Problem List   Diagnosis Code    Headache R51.9    Overactive bladder N32.81    Osteoarthritis M19.90    Chronic rhinitis J31.0    OAB (overactive bladder) N32.81    Asthma J45.909    Primary osteoarthritis of right knee M17.11    Psoriasis L40.9    Seborrheic dermatitis L21.9    Essential hypertension I10    DENISHA (obstructive sleep apnea) G47.33    Anxiety and depression F41.9, F32. A    Chronic back pain M54.9, G89.29    Morbid obesity with BMI of 60.0-69.9, adult ([facility]) E66.01, Z68.44    Bilateral chronic knee pain M25.561, M25.562, G89.29    Diverticulitis of large intestine without perforation or abscess without bleeding K57.32    Diarrhea R19.7    Dyspepsia R10.13    Lymphocytic colitis K52.832       Past Medical History:        Diagnosis Date    Allergic rhinitis     Anxiety     Anxiety and depression     Arthritis     Asthma     no longer using inhaler or nebulizer    Diverticulitis     Headache(784.0)     HTN (hypertension)     pt denies  not on meds    Obesity     Overactive bladder     Sleep apnea     uses machine nightly  cpap    Use of cane as ambulatory aid     Wellness examination 2021    Dr. Seda Osorio, his CNP's        Past Surgical History:        Procedure Laterality Date    CATARACT REMOVAL      CHOLECYSTECTOMY, LAPAROSCOPIC N/A 12/15/2020    CHOLECYSTECTOMY LAPAROSCOPIC ROBOTIC XI performed by Kris Brown MD at [address] [facility] N/A 2020    COLONOSCOPY POLYPECTOMY SNARE/COLD BIOPSY performed by Minerva Lima MD at [address] [facility]  2021    COLONOSCOPY N/A 2021    COLONOSCOPY WITH BIOPSY RANDOM RIGHT AND LEFT COLON performed by Minerva Lima MD at [address] [place] [address]. [address] Bilateral     cataract removed with lens implants    KNEE SURGERY Right 2017    [place] KNEE SCOPE,DIAGNOSTIC Right 2017    RIGHT KNEE ARTHROSCOPY WITH MEDIAL MENISECTOMY AND CHONDROPLASTY performed by Huel Denver, DO at [address] ENDOSCOPY  2021    UPPER GASTROINTESTINAL ENDOSCOPY N/A 2021    EGD BIOPSY OF GASTRIC AND GASTRIC POLYPECTOMY performed by Minerva Lima MD at [address] [place] [address] History:    Social History     Tobacco Use    Smoking status: Former Smoker     Packs/day: 0.60     Types: Cigarettes     Quit date: 2017     Years since quittin.8    Smokeless tobacco: Never Used   Substance Use Topics    Alcohol use: Yes     Comment: maybe once a month Counseling given: Not Answered      Vital Signs (Current): There were no vitals filed for this visit. BP Readings from Last 3 Encounters:   11/11/21 128/84   11/03/21 131/84   11/04/21 132/80       NPO Status: Time of last liquid consumption: 1800                        Time of last solid consumption: 1800                        Date of last liquid consumption: 11/16/21                        Date of last solid food consumption: 11/16/21    BMI:   Wt Readings from Last 3 Encounters:   11/11/21 (!) 409 lb (185.5 kg)   11/03/21 (!) 409 lb (185.5 kg)   07/28/21 (!) 399 lb (181 kg)     There is no height or weight on file to calculate BMI.    CBC:   Lab Results   Component Value Date    WBC 7.2 11/03/2021    RBC 4.47 11/03/2021    HGB 12.5 11/03/2021    HCT 41.1 11/03/2021    MCV 91.9 11/03/2021    RDW 13.3 11/03/2021     11/03/2021       CMP:   Lab Results   Component Value Date     11/03/2021    K 4.1 11/03/2021     11/03/2021    CO2 21 11/03/2021    BUN 10 11/03/2021    CREATININE 1.00 11/03/2021    GFRAA >60 11/03/2021    LABGLOM 59 11/03/2021    GLUCOSE 67 11/03/2021    PROT 6.9 07/21/2021    CALCIUM 9.0 11/03/2021    BILITOT 0.53 07/21/2021    ALKPHOS 92 07/21/2021    AST 10 07/21/2021    ALT 8 07/21/2021       POC Tests: No results for input(s): POCGLU, POCNA, POCK, POCCL, POCBUN, POCHEMO, POCHCT in the last 72 hours.     Coags:   Lab Results   Component Value Date    PROTIME 10.0 11/03/2021    INR 0.9 11/03/2021    APTT 24.3 11/03/2021       HCG (If Applicable):   Lab Results   Component Value Date    PREGTESTUR NEGATIVE 06/20/2021    HCG NEGATIVE 04/08/2021        ABGs: No results found for: PHART, PO2ART, OPW1WOZ, VGU1WFM, BEART, H8HKIBJQ     Type & Screen (If Applicable):  No results found for: LABABO, LABRH    Drug/Infectious Status (If Applicable):  No results found for: HIV, HEPCAB    COVID-19 Screening (If Applicable): Lab Results   Component Value Date    COVID19 Not Detected 04/03/2021    COVID19 Not Detected 08/01/2020           Anesthesia Evaluation   no history of anesthetic complications:   Airway: Mallampati: III     Neck ROM: full   Dental:          Pulmonary:   (+) sleep apnea: on CPAP,  asthma:     (-) recent URI and not a current smoker                           Cardiovascular:  Exercise tolerance: poor (<4 METS),   (+) hypertension:, TOLENTINO:,                   Neuro/Psych:   (+) neuromuscular disease:, depression/anxiety    (-) seizures and CVA           GI/Hepatic/Renal:   (+) morbid obesity     (-) GERD       Endo/Other:    (+) : arthritis:., .    (-) diabetes mellitus               Abdominal:             Vascular: Other Findings:             Anesthesia Plan      general     ASA 4       Induction: intravenous.                           Jose David Cuba MD   11/17/2021

## 2021-11-17 NOTE — INTERVAL H&P NOTE
Pt Name: Parrish Montano  MRN: [id number]  YOB: 1970  Date of evaluation: 11/17/2021    I have reviewed the patient's history and physical examination completed in pre-admission testing.     Changes to history or on examination, if any, are as follows:  none    Cynthia Dougherty PA-C  11/17/21  11:14 AM

## 2021-11-17 NOTE — OP NOTE
Operative Note      Patient: Jaziel Eubanks  YOB: 1970  MRN: [id number]    Date of Procedure: 11/17/2021    Pre-Op Diagnosis: MORBID OBESITY, BMI 71, ESSENTIAL HYPERTENSION    Post-Op Diagnosis: Same       Procedure(s):  XI ROBOTIC LAPAROSCOPIC GASTRECTOMY SLEEVE, EGD- GI UNIT SCHEDULED    Surgeon(s):  Lynnette Bassett DO    Assistant:   * No surgical staff found *    Anesthesia: General    Estimated Blood Loss (mL): Minimal    Complications: None    Specimens:   ID Type Source Tests Collected by Time Destination   A : GASTRIC REMNANT Tissue Stomach SURGICAL PATHOLOGY Lynnette Bassett DO 11/17/2021 1121        Implants:  * No implants in log *      Drains: * No LDAs found *    Findings:   Hemostatic Staple lines  Negative leak test    Detailed Description of Procedure:   Operative narrative: The patient was taken to the operative suite and administered anesthesia by the anesthetic team.  Next we prepped and draped in normal sterile fashion. Incision was then made at Hines's point for a 12 mm port. The abdomen was surveyed and we entered the abdomen using a optical Visiport trocar of 12 mm in size. This was accomplished at Hines's point. Pneumoperitoneum was then established without complication, the underlying area surveyed. We then placed 4 other ports in standard fashion under direct laparoscopic visualization. Attention was then turned towards placement of the [facility] liver retractor. Small incision made just inferior to the xiphoid process for the liver retractor. The liver retractor was then placed under direct laparoscopic visualization in order to facilitate visualization of the stomach and hiatus. No visible hiatal hernia. We then turned our attention towards formation of the gastric sleeve. Starting at 6 cm proximal to the pylorus bite dissection was undertaken of the greater curvature and the short gastric vessels taken down using the Vessel Sealer.   We mobilized this from our 6 cm starting point to the left laure. Satisfactory mobilization was undertaken and there were noted to be no adhesions posteriorly on the stomach between the stomach and the pancreas or retroperitoneum. I then had anesthesia pass a 36 Hebrew bougie under direct visualization. This was visualized at the tip of the bougie as well as along the lesser curve. I then placed my first staple load a green 60 mm load to start sleeve formation. The bougie was noted be along the lesser curve and was freely mobile before the first green load was fired. A second green load 60 mm stapler was then placed again using the bougie for assistance and guidance along the lesser curvature. I then used blue 60 mm stapler loads to complete sleeve formation. The last staple load was noted to fire in proper orientation to prevent staple line formation along the esophageal fibers. Satisfactory sleeve formation was noted at that time and the staple line was hemostatic. I then had the bougie removed. I then reinforced my staple line with 3-0 Vicryl sutures along the length of the staple line at the staple line confluences. At that time leak test was then started. The patient was placed in a Trendelenburg position and we irrigated the upper abdomen with saline. I then passed an Olympus endoscope into the oropharynx down the esophagus under direct visualization. The scope was passed down through the esophagus down into the lumen of the new gastric sleeve the scope passed easily through the incisura and into the antrum. The scope was then passed into the duodenum through the pylorus. The pylorus and duodenum appeared intact and the scope was slowly withdrawn while visualizing the staple line throughout the course of the staple line. I then clamped distally by applying pressure near the pyloric region to allow for distention of the gastric sleeve.   We then further irrigated and there was no evidence of leak using a bubble test.  The staple line on the inner lumen of the stomach appeared intact and hemostatic throughout. The scope was slowly withdrawn to the GE junction and no evidence of stricture noted. The scope was then withdrawn from the esophagus and no other lesion noted. Attention was then turned back towards the abdomen the abdomen was aspirated of the prior placed saline for leak test.  I then placed an anchoring suture using 3-0 Vicryl suture to prevent torsion of the sleeve and migration of the sleeve superiorly. Specimen was withdrawn from the left upper quadrant incision. We then irrigated this incision thoroughly with saline. Next counts reported to me as correct. The 12 mm port sites were then closed using a suture passer to pass 0 Vicryl suture under direct laparoscopic visualization for proper fascial reapproximation at each port site. All ports were then removed. Pneumoperitoneum was released in entirety. The skin was then closed using 4-0 Vicryl subcuticular stitches in interrupted fashion. The region was cleaned with sterile normal saline followed by placement of TinCoBen and Steri-Strips and sterile bandage. The patient tolerated the procedure well and was transferred to PACU. The patient's family was updated postoperatively.        Electronically signed by Domenick Goldmann, DO on 11/17/2021 at 3:12 PM

## 2021-11-18 ENCOUNTER — TELEPHONE (OUTPATIENT)
Dept: BARIATRICS/WEIGHT MGMT | Age: 51
End: 2021-11-18

## 2021-11-18 VITALS
OXYGEN SATURATION: 96 % | RESPIRATION RATE: 18 BRPM | HEART RATE: 64 BPM | BODY MASS INDEX: 50.02 KG/M2 | DIASTOLIC BLOOD PRESSURE: 83 MMHG | TEMPERATURE: 98 F | WEIGHT: 293 LBS | HEIGHT: 64 IN | SYSTOLIC BLOOD PRESSURE: 177 MMHG

## 2021-11-18 LAB
ANION GAP SERPL CALCULATED.3IONS-SCNC: 13 MMOL/L (ref 9–17)
BUN BLDV-MCNC: 9 MG/DL (ref 6–20)
BUN/CREAT BLD: ABNORMAL (ref 9–20)
CALCIUM SERPL-MCNC: 8.6 MG/DL (ref 8.6–10.4)
CHLORIDE BLD-SCNC: 102 MMOL/L (ref 98–107)
CO2: 21 MMOL/L (ref 20–31)
CREAT SERPL-MCNC: 0.99 MG/DL (ref 0.5–0.9)
GFR AFRICAN AMERICAN: >60 ML/MIN
GFR NON-AFRICAN AMERICAN: 60 ML/MIN
GFR SERPL CREATININE-BSD FRML MDRD: ABNORMAL ML/MIN/{1.73_M2}
GFR SERPL CREATININE-BSD FRML MDRD: ABNORMAL ML/MIN/{1.73_M2}
GLUCOSE BLD-MCNC: 111 MG/DL (ref 70–99)
HCT VFR BLD CALC: 35 % (ref 36.3–47.1)
HEMOGLOBIN: 11 G/DL (ref 11.9–15.1)
MCH RBC QN AUTO: 29 PG (ref 25.2–33.5)
MCHC RBC AUTO-ENTMCNC: 31.4 G/DL (ref 28.4–34.8)
MCV RBC AUTO: 92.3 FL (ref 82.6–102.9)
NRBC AUTOMATED: 0 PER 100 WBC
PDW BLD-RTO: 13.6 % (ref 11.8–14.4)
PLATELET # BLD: 272 K/UL (ref 138–453)
PMV BLD AUTO: 10.3 FL (ref 8.1–13.5)
POTASSIUM SERPL-SCNC: 4.3 MMOL/L (ref 3.7–5.3)
RBC # BLD: 3.79 M/UL (ref 3.95–5.11)
SODIUM BLD-SCNC: 136 MMOL/L (ref 135–144)
WBC # BLD: 9.2 K/UL (ref 3.5–11.3)

## 2021-11-18 PROCEDURE — G0378 HOSPITAL OBSERVATION PER HR: HCPCS

## 2021-11-18 PROCEDURE — 6360000002 HC RX W HCPCS: Performed by: SURGERY

## 2021-11-18 PROCEDURE — 36415 COLL VENOUS BLD VENIPUNCTURE: CPT

## 2021-11-18 PROCEDURE — 6370000000 HC RX 637 (ALT 250 FOR IP): Performed by: STUDENT IN AN ORGANIZED HEALTH CARE EDUCATION/TRAINING PROGRAM

## 2021-11-18 PROCEDURE — 94640 AIRWAY INHALATION TREATMENT: CPT

## 2021-11-18 PROCEDURE — 2500000003 HC RX 250 WO HCPCS: Performed by: SURGERY

## 2021-11-18 PROCEDURE — 2700000000 HC OXYGEN THERAPY PER DAY

## 2021-11-18 PROCEDURE — 96372 THER/PROPH/DIAG INJ SC/IM: CPT

## 2021-11-18 PROCEDURE — 6370000000 HC RX 637 (ALT 250 FOR IP): Performed by: SURGERY

## 2021-11-18 PROCEDURE — 2580000003 HC RX 258: Performed by: SURGERY

## 2021-11-18 PROCEDURE — 85027 COMPLETE CBC AUTOMATED: CPT

## 2021-11-18 PROCEDURE — 96374 THER/PROPH/DIAG INJ IV PUSH: CPT

## 2021-11-18 PROCEDURE — 80048 BASIC METABOLIC PNL TOTAL CA: CPT

## 2021-11-18 PROCEDURE — 94761 N-INVAS EAR/PLS OXIMETRY MLT: CPT

## 2021-11-18 RX ORDER — PROMETHAZINE HYDROCHLORIDE 25 MG/1
25 TABLET ORAL EVERY 6 HOURS PRN
Qty: 28 TABLET | Refills: 0 | Status: SHIPPED | OUTPATIENT
Start: 2021-11-18 | End: 2021-11-25

## 2021-11-18 RX ORDER — OXYCODONE HYDROCHLORIDE AND ACETAMINOPHEN 5; 325 MG/1; MG/1
1 TABLET ORAL EVERY 6 HOURS PRN
Qty: 28 TABLET | Refills: 0 | Status: SHIPPED | OUTPATIENT
Start: 2021-11-18 | End: 2021-11-25

## 2021-11-18 RX ADMIN — OXYCODONE HYDROCHLORIDE AND ACETAMINOPHEN 2 TABLET: 5; 325 TABLET ORAL at 01:58

## 2021-11-18 RX ADMIN — HEPARIN SODIUM 5000 UNITS: 5000 INJECTION INTRAVENOUS; SUBCUTANEOUS at 14:10

## 2021-11-18 RX ADMIN — METOPROLOL TARTRATE 5 MG: 1 INJECTION, SOLUTION INTRAVENOUS at 07:31

## 2021-11-18 RX ADMIN — SODIUM CHLORIDE, POTASSIUM CHLORIDE, SODIUM LACTATE AND CALCIUM CHLORIDE: 600; 310; 30; 20 INJECTION, SOLUTION INTRAVENOUS at 00:23

## 2021-11-18 RX ADMIN — FAMOTIDINE 20 MG: 20 TABLET, FILM COATED ORAL at 08:31

## 2021-11-18 RX ADMIN — OXYCODONE HYDROCHLORIDE AND ACETAMINOPHEN 2 TABLET: 5; 325 TABLET ORAL at 06:33

## 2021-11-18 RX ADMIN — IPRATROPIUM BROMIDE AND ALBUTEROL SULFATE 1 AMPULE: .5; 2.5 SOLUTION RESPIRATORY (INHALATION) at 09:37

## 2021-11-18 RX ADMIN — HEPARIN SODIUM 5000 UNITS: 5000 INJECTION INTRAVENOUS; SUBCUTANEOUS at 06:33

## 2021-11-18 RX ADMIN — SODIUM CHLORIDE, PRESERVATIVE FREE 10 ML: 5 INJECTION INTRAVENOUS at 08:32

## 2021-11-18 ASSESSMENT — PAIN SCALES - GENERAL
PAINLEVEL_OUTOF10: 2
PAINLEVEL_OUTOF10: 7
PAINLEVEL_OUTOF10: 7

## 2021-11-18 NOTE — ANESTHESIA POSTPROCEDURE EVALUATION
Department of Anesthesiology  Postprocedure Note    Patient: Rodolfo Hernadez  MRN: [id number]  YOB: 1970  Date of evaluation: 11/18/2021  Time:  3:41 PM     Procedure Summary     Date: 11/17/21 Room / Location: [address]    Anesthesia Start: 4467 Anesthesia Stop: 8073    Procedure: XI ROBOTIC LAPAROSCOPIC GASTRECTOMY SLEEVE, EGD- GI UNIT SCHEDULED (N/A Abdomen) Diagnosis: (MORBID OBESITY, ESSENTIAL HYPERTENSION)    Surgeons: Eleanor Ty DO Responsible Provider: Mayte Sorto MD    Anesthesia Type: general ASA Status: 4          Anesthesia Type: general    Isa Phase I: Isa Score: 9    Isa Phase II:      Last vitals: Reviewed and per EMR flowsheets.        Anesthesia Post Evaluation    Patient location during evaluation: PACU  Patient participation: complete - patient participated  Level of consciousness: awake and alert  Pain score: 2  Nausea & Vomiting: no nausea  Cardiovascular status: hemodynamically stable  Respiratory status: nasal cannula

## 2021-11-18 NOTE — DISCHARGE INSTR - DIET

## 2021-11-18 NOTE — PROGRESS NOTES
CLINICAL PHARMACY NOTE: MEDS TO BEDS    Total # of Prescriptions Filled: 3   The following medications were delivered to the patient:  · Promethazine 25mg  · Enoxaparin 60mg/0.6ml  · Percocet 5-325mg    Additional Documentation: delivered to patient in room 248 11/18 at 5:12pm. No co-pay.

## 2021-11-18 NOTE — CARE COORDINATION
Case Management Initial Discharge Plan  Bam Almazan,             Met with:patient to discuss discharge plans. Information verified: address, contacts, phone number, , insurance Yes  Insurance Provider: [employer]    Emergency Contact/Next of Kin name & numberBernabe Whelan [phone]  Who are involved in patient's support system? kids    PCP: Michell Michael MD  Date of last visit: 2 weeks ago      Discharge Planning    Living Arrangements:  Family Members     Home has 1 stories  1 stairs to climb to get into front door, 0 stairs to climb to reach second floor  Location of bedroom/bathroom in home main level    Patient able to perform ADL's:Independent    Current Services (outpatient & in home) none  DME equipment: cane, shower chair, w/c  DME provider:      Is patient receiving oral anticoagulation therapy? No    If indicated:   Physician managing anticoagulation treatment:    Where does patient obtain lab work for ATC treatment? Potential Assistance Needed:  N/A    Patient agreeable to home care: No  [facility] of choice provided:  n/a    Prior SNF/Rehab Placement and Facility:    Agreeable to SNF/Rehab: No  [facility] of choice provided: n/a     Evaluation: no    Expected Discharge date:  21    Patient expects to be discharged to: If home: is the family and/or caregiver wiling & able to provide support at home? yes  Who will be providing this support? kids    Follow Up Appointment: Best Day/ Time: Monday AM    Transportation provider: jacqueline  Transportation arrangements needed for discharge: No     Readmission Risk              Risk of Unplanned Readmission:  0             Does patient have a readmission risk score greater than 14?: No  If yes, follow-up appointment must be made within 7 days of discharge.      Goals of Care:       Educated patient on transitional options, provided freedom of choice and are agreeable with plan      Discharge Plan:  Home with family support, has a ride          Electronically signed by Gentry Chino RN on 11/18/21 at 8:43 AM EST

## 2021-11-18 NOTE — DISCHARGE INSTR - COC
Continuity of Care Form    Patient Name: Rajinder Javier   :  1970  MRN:  [id number]    Admit date:  2021  Discharge date:  ***    Code Status Order: Full Code   Advance Directives:   Advance Care Flowsheet Documentation       Date/Time Healthcare Directive Type of Healthcare Directive Copy in [address] Agent's Name Healthcare Agent's Phone Number    21 1056 No, patient does not have an advance directive for healthcare treatment -- -- -- -- --            Admitting Physician:  Analia Fitzpatrick DO  PCP: Lorne Olguin MD    Discharging Nurse: [facility] Unit/Room#: 2507/4673-31  Discharging Unit Phone Number: ***    Emergency Contact:   Extended Emergency Contact Information  Primary Emergency Contact: Nancydemian Gardner, [address] Phone: [phone]  Mobile Phone: [phone]  Relation: Child    Past Surgical History:  Past Surgical History:   Procedure Laterality Date    CATARACT REMOVAL      CHOLECYSTECTOMY, LAPAROSCOPIC N/A 12/15/2020    CHOLECYSTECTOMY LAPAROSCOPIC ROBOTIC XI performed by Leonor Ray MD at [facility] 2020    COLONOSCOPY POLYPECTOMY SNARE/COLD BIOPSY performed by Liliane Church MD [address] 2021    COLONOSCOPY WITH BIOPSY RANDOM RIGHT AND LEFT COLON performed by Liliane Church MD at [address] Bilateral     cataract removed with lens implants    TX KNEE SCOPE,DIAGNOSTIC Right 2017    RIGHT KNEE ARTHROSCOPY WITH MEDIAL MENISECTOMY AND CHONDROPLASTY performed by Brendan Henderson DO at [address]  2021    ROBOTIC [place], EGD-    TONSILLECTOMY      UPPER GASTROINTESTINAL ENDOSCOPY N/A 2021    EGD BIOPSY OF GASTRIC AND GASTRIC POLYPECTOMY performed by Liliane Church MD at [address] [facility].       Immunization History:   Immunization History   Administered Date(s) Administered    COVID-19, Pfizer, PF, 30mcg/0.3mL 05/12/2021, 07/27/2021       Active Problems:  Patient Active Problem List   Diagnosis Code    Headache R51.9    Overactive bladder N32.81    Osteoarthritis M19.90    Chronic rhinitis J31.0    OAB (overactive bladder) N32.81    Asthma J45.909    Primary osteoarthritis of right knee M17.11    Psoriasis L40.9    Seborrheic dermatitis L21.9    Essential hypertension I10    DENISHA (obstructive sleep apnea) G47.33    Anxiety and depression F41.9, F32. A    Chronic back pain M54.9, G89.29    Morbid obesity with BMI of 60.0-69.9, adult ([facility]) E66.01, Z68.44    Bilateral chronic knee pain M25.561, M25.562, G89.29    Diverticulitis of large intestine without perforation or abscess without bleeding K57.32    Diarrhea R19.7    Dyspepsia R10.13    Lymphocytic colitis K52.832    S/P laparoscopic sleeve gastrectomy Z98.84       Isolation/Infection:   Isolation            No Isolation          Patient Infection Status       Infection Onset Added Last Indicated Last Indicated By Review Planned Expiration Resolved Resolved By    None active    Resolved    COVID-19 (Rule Out) 04/02/21 04/02/21 04/03/21 COVID-19 (Ordered)   04/04/21 Rule-Out Test Resulted    COVID-19 (Rule Out) 02/19/21 02/19/21 02/19/21 COVID-19 (Ordered)   02/20/21 Rule-Out Test Resulted    COVID-19 (Rule Out) 12/11/20 12/11/20 12/11/20 COVID-19 (Ordered)   12/12/20 Rule-Out Test Resulted    COVID-19 (Rule Out) 08/01/20 08/01/20 08/01/20 Covid-19 Ambulatory (Ordered)   08/03/20 Rule-Out Test Resulted            Nurse Assessment:  Last Vital Signs: BP (!) 177/83   Pulse 64   Temp 98 °F (36.7 °C) (Oral)   Resp 16   Ht 5' 4\" (1.626 m)   Wt (!) 407 lb (184.6 kg)   SpO2 98%   BMI 69.86 kg/m²     Last documented pain score (0-10 scale): Pain Level: 7  Last Weight:   Wt Readings from Last 1 Encounters:   11/17/21 (!) 407 lb (184.6 kg)     Mental Status:  {IP PT MENTAL STATUS:20030}    IV Access:  {[facility] IV ACCESS:837028098}    Nursing Mobility/ADLs:  Walking Risk              Risk of Unplanned Readmission:  0           Discharging to Facility/ Agency   Name:   Address:  Phone:  Fax:    Dialysis Facility (if applicable)   Name:  Address:  Dialysis Schedule:  Phone:  Fax:    / signature: {Esignature:617087276}    PHYSICIAN SECTION    Prognosis: {Prognosis:1844880031}    Condition at Discharge: Lynsey Pineda Patient Condition:435179757}    Rehab Potential (if transferring to Rehab): {Prognosis:4032628559}    Recommended Labs or Other Treatments After Discharge: ***    Physician Certification: I certify the above information and transfer of Jose Licea  is necessary for the continuing treatment of the diagnosis listed and that she requires {Admit to Appropriate Level of Care:73295} for {GREATER/LESS:014914253} 30 days.      Update Admission H&P: {CHP DME Changes in VNSPE:114994616}    PHYSICIAN SIGNATURE:  {Esignature:393972737}

## 2021-11-18 NOTE — PROGRESS NOTES
Bariatrics surgery:  Daily Progress Note          POD #1 robotic assisted laparoscopic sleeve gastrectomy        PATIENT NAME: Tammie Llanes     TODAY'S DATE: 11/18/2021, 8:23 AM  CC: Abdominal pain    SUBJECTIVE:     Pt seen and examined at bedside. No acute events overnight. Patient is doing well postoperatively. Patient is tolerating bariatric clears. Patient voided spontaneously on her own. She has been ambulating. OBJECTIVE:   VITALS:  BP (!) 177/83   Pulse 64   Temp 98 °F (36.7 °C) (Oral)   Resp 16   Ht 5' 4\" (1.626 m)   Wt (!) 407 lb (184.6 kg)   SpO2 98%   BMI 69.86 kg/m²      INTAKE/OUTPUT:      Intake/Output Summary (Last 24 hours) at 11/18/2021 0823  Last data filed at 11/18/2021 0644  Gross per 24 hour   Intake 2683 ml   Output 2925 ml   Net -242 ml       PHYSICAL EXAM:  General Appearance: awake, alert, oriented, in no acute distress  HEENT:  Normocephalic, atraumatic, mucus membranes moist   Heart: Heart regular rate and rhythm  Lungs: Normal expansion. No rales, rhonchi, or wheezing. Abdomen: Soft, mildly distended, appropriately tender to palpation, obese  Incision: Clean dry and intact with surgical glue  Extremities: No cyanosis, pitting edema, rashes noted. Skin: Skin color, texture, turgor normal. No rashes or lesions.     IV Access:  PIV    Data:  CBC with Differential:    Lab Results   Component Value Date    WBC 9.2 11/18/2021    RBC 3.79 11/18/2021    HGB 11.0 11/18/2021    HCT 35.0 11/18/2021     11/18/2021    MCV 92.3 11/18/2021    MCH 29.0 11/18/2021    MCHC 31.4 11/18/2021    RDW 13.6 11/18/2021    LYMPHOPCT 29 07/21/2021    MONOPCT 8 07/21/2021    BASOPCT 1 07/21/2021    MONOSABS 0.42 07/21/2021    LYMPHSABS 1.52 07/21/2021    EOSABS 0.11 07/21/2021    BASOSABS 0.03 07/21/2021    DIFFTYPE NOT REPORTED 07/21/2021     BMP:    Lab Results   Component Value Date     11/18/2021    K 4.3 11/18/2021     11/18/2021    CO2 21 11/18/2021    BUN 9 11/18/2021 LABALBU 3.5 07/21/2021    CREATININE 0.99 11/18/2021    CALCIUM 8.6 11/18/2021    GFRAA >60 11/18/2021    LABGLOM 60 11/18/2021    GLUCOSE 111 11/18/2021       Radiology Review:    No orders to display         ASSESSMENT:  Active Hospital Problems    Diagnosis Date Noted    S/P laparoscopic sleeve gastrectomy [Z98.84] 11/17/2021       1. 52 y.o. female status post robotic assisted laparoscopic sleeve gastrectomy. Plan:  1. Patient doing well postoperatively. Continue bariatric clear diet. 2. Encourage incentive spirometry and ambulation  3. Anticipate discharge following education today.     Electronically signed by Bethany Etienne MD  on 11/18/2021 at 8:23 AM

## 2021-11-18 NOTE — TELEPHONE ENCOUNTER
Pt had surgery on 11/17/21; she would like to return to work on 12/13/21; she is requesting email letter to [address] [place] [address] Marcy. [email or web address]

## 2021-11-18 NOTE — PROGRESS NOTES
Discussed bariatric discharge instructions with patient, had patient demonstrate IS, allowed time for questions, lovenox teaching done and patient voices understanding

## 2021-11-19 ENCOUNTER — TELEPHONE (OUTPATIENT)
Dept: BARIATRICS/WEIGHT MGMT | Age: 51
End: 2021-11-19

## 2021-11-19 LAB — SURGICAL PATHOLOGY REPORT: NORMAL

## 2021-11-19 NOTE — TELEPHONE ENCOUNTER
Post-op outreach call made to pt. Pt reports frequent ambulation around home. Pt wearing abd binder. No complaints of SOB or tachycardia. Laparoscopic incisional sites without problems. Pt has not had a BM since surgery. Passing flatus. Agrees to use Milk of Magnesia or Miriaax and monitor for BM. Pt reports urine output of at least 4 times in a 24 hour period. Intake is described as water, protein. Rates pain as 5 on a 0-10 scale. Pt using pain medication as directed. Allowed pt the opportunity to ask questions. Reminded patient to reference the patient education materials as needed. Reminded pt that they may phone the office or the \"after hours telephone number\"  that is listed in the patient education binder as needed. Pt verbalized understanding. Pt without concerns at this time**. Post op office appt date and time reviewed with pt.     Has Lovenox is going well

## 2021-11-20 ENCOUNTER — HOSPITAL ENCOUNTER (OUTPATIENT)
Dept: ONCOLOGY | Age: 51
Discharge: HOME | End: 2021-11-20
Attending: SURGERY | Admitting: SURGERY
Payer: MEDICARE

## 2021-11-20 VITALS
DIASTOLIC BLOOD PRESSURE: 79 MMHG | SYSTOLIC BLOOD PRESSURE: 146 MMHG | RESPIRATION RATE: 18 BRPM | TEMPERATURE: 98.6 F | OXYGEN SATURATION: 98 % | HEART RATE: 76 BPM

## 2021-11-20 PROBLEM — E86.0 DEHYDRATION: Status: ACTIVE | Noted: 2021-11-20

## 2021-11-20 PROCEDURE — 2580000003 HC RX 258: Performed by: SURGERY

## 2021-11-20 PROCEDURE — 96361 HYDRATE IV INFUSION ADD-ON: CPT

## 2021-11-20 PROCEDURE — 96360 HYDRATION IV INFUSION INIT: CPT

## 2021-11-20 PROCEDURE — 76937 US GUIDE VASCULAR ACCESS: CPT

## 2021-11-20 RX ORDER — 0.9 % SODIUM CHLORIDE 0.9 %
1000 INTRAVENOUS SOLUTION INTRAVENOUS ONCE
Status: COMPLETED | OUTPATIENT
Start: 2021-11-20 | End: 2021-11-20

## 2021-11-20 RX ADMIN — SODIUM CHLORIDE 1000 ML: 9 INJECTION, SOLUTION INTRAVENOUS at 12:20

## 2021-11-20 RX ADMIN — SODIUM CHLORIDE 1000 ML: 9 INJECTION, SOLUTION INTRAVENOUS at 11:15

## 2021-11-26 ENCOUNTER — OFFICE VISIT (OUTPATIENT)
Dept: BARIATRICS/WEIGHT MGMT | Age: 51
End: 2021-11-26

## 2021-11-26 VITALS
HEART RATE: 88 BPM | DIASTOLIC BLOOD PRESSURE: 84 MMHG | HEIGHT: 65 IN | SYSTOLIC BLOOD PRESSURE: 130 MMHG | BODY MASS INDEX: 48.82 KG/M2 | RESPIRATION RATE: 20 BRPM | TEMPERATURE: 97.2 F | WEIGHT: 293 LBS

## 2021-11-26 DIAGNOSIS — Z98.84 S/P LAPAROSCOPIC SLEEVE GASTRECTOMY: Primary | ICD-10-CM

## 2021-11-26 PROCEDURE — 99024 POSTOP FOLLOW-UP VISIT: CPT | Performed by: SURGERY

## 2021-11-26 NOTE — PROGRESS NOTES
Medical Nutrition Therapy  Metabolic and Bariatric surgery  1 week Post operative follow up         Pt reports:          Vitals:   Vitals:    11/26/21 1514   BP: 130/84   Site: Right Upper Arm   Position: Sitting   Cuff Size: Large Adult   Pulse: 88   Resp: 20   Temp: 97.2 °F (36.2 °C)   TempSrc: Temporal   Weight: (!) 383 lb (173.7 kg)   Height: 5' 4.5\" (1.638 m)      Body mass index is 64.73 kg/m². Labs reviewed:              Nutrition Assessment:   PES: Inadequate food and beverage intake r/t WLS as evidenced by loss of excess body weight lost 24 lbs over 1 week.       Goals   60-80gm of protein  48-64oz of fluid       [x] met     []  Not met        Plan:  Pt questions answered re diet advancement;  F/u 5 weeks post-op      Elisha James, MS, RD, LD

## 2021-11-28 NOTE — PROGRESS NOTES
[facility]  [facility]  [address]  [address]  [facility] [address]  Dept: [phone]    11/26/2021    CC: Post Sleeve Gastrectomy    History:  54year old female 1 week post sleeve gastrectomy. Tolerating clears. No nausea, vomiting, fevers/chills. Had a BM. Pain controlled.   No new complaints    Review of Systems:  General  Negative for: [] Weight Change   [x] Fatigue   [x] Fevers & Chills    [] Appetite change [] Other:    Positive for: [x] Weight Change   [] Fatigue   [] Fevers & Chills    [] Appetite change [] Other:   Cardiac  Negative for: [x] Chest Pain   [x] Difficulty Breathing   [] Leg Cramps [x] Edema of Lower Extremeties    [] Left   [] Right      Positive for: [] Chest Pain   [] Difficulty Breathing   [] Leg Cramps [] Edema of Lower Extremeties    [] Left   [] Right   Pulmonary  Negative for: [x] Shortness of Breath [] Wheeze [x] Cough  [] Calf Pain     Positive for: [] Shortness of Breath [] Wheeze [] Cough  [] Calf Pain   Gastro-Intestinal Negative for: [] Heartburn   [] Reflux   [] Dysphagia   [] Melena   [] BRBPR  [x] Vomiting   [x] Abdominal Pain   [] Diarrhea     [] Constipation  [] Other:     Positive for: [] Heartburn   [] Reflux   [] Dysphagia   [] Melena   [] BRBPR  [] Vomiting   [] Abdominal Pain   [] Diarrhea     [] Constipation  [] Other:    Muskuloskeletal Negative for: [] Joint pain   [] Back pain   [] Knee Pain   [x] Muscle weakness [x] Hernia   [] Edema [] Other:     Positive for: [] Joint pain   [] Back pain   [] Knee Pain   [] Muscle weakness [] Hernia   [] Edema [] Other:    Neurologic Negative for: [x] Syncope   [x] Insomnia   [] Being treated for depression  [] Other:     Positive for: [] Syncope   [] Insomnia   [] Being treated for depression  [] Other:    Skin Negative for: [] Wound:   [] Open   [] Draining   [] Incisional     [x] Rash   [x] Hair Loss  [] Other:     Positive for: [] Wound:   [] Open   [] Draining    [] Incisional  [] Rash [] Hair Loss  [] Other:        Physical Exam:  /84 (Site: Right Upper Arm, Position: Sitting, Cuff Size: Large Adult)   Pulse 88   Temp 97.2 °F (36.2 °C) (Temporal)   Resp 20   Ht 5' 4.5\" (1.638 m)   Wt (!) 383 lb (173.7 kg)   BMI 64.73 kg/m²       Constitutional:  Vital signs are normal. The patient appears well-developed and well-nourished. HEENT:   Head: Normocephalic. Atraumatic  Eyes: pupils are equal and reactive. No scleral icterus is present. Neck: No mass and no thyromegaly present. Cardiovascular: Normal rate, regular rhythm, S1 normal and S2 normal.  Radial pulses present   Pulmonary/Chest: Effort normal and breath sounds normal. No retractions  Abdominal: Soft. Normal appearance. There is no organomegaly. No tenderness. There is no rigidity, no rebound, no guarding and no Alejandro's sign. Musculoskeletal:        Right lower leg: Normal. No tenderness and no edema. Left lower leg: Normal. No tenderness and no edema. Neurological: The patient is alert and oriented. Moving all 4 extremities, sensation grossly intact bilateral  Skin: Skin is warm, dry and intact. Psychiatric: The patient has a normal mood and affect.  Speech is normal and behavior is normal. Judgment and thought content normal. Cognition and memory are normal.     Assessment:  1 week post sleeve  Recovering well    Plan:  Fulls  Liver pathology discussed, recommended she follow up with GI  Start vitamins  Lovenox  Ambulation  Overall doing well  Dietician visit

## 2021-11-29 ENCOUNTER — TELEPHONE (OUTPATIENT)
Dept: BARIATRICS/WEIGHT MGMT | Age: 51
End: 2021-11-29

## 2021-11-29 RX ORDER — FAMOTIDINE 20 MG/1
TABLET, FILM COATED ORAL
Qty: 60 TABLET | Refills: 3 | Status: SHIPPED | OUTPATIENT
Start: 2021-11-29 | End: 2022-04-04

## 2021-11-29 NOTE — TELEPHONE ENCOUNTER
Next Visit Date:  Visit date not found    Patient Surgery Date  11/17/21    Type of  Surgery  Sleeve     Patient calls today  of  Wanting to return to work on 12/6/21 instead of returning to work on 12/13/21 .  Please advise                                                                                                                                Is it ok to leave a message if we call back yes

## 2021-12-08 ENCOUNTER — TELEPHONE (OUTPATIENT)
Dept: BARIATRICS/WEIGHT MGMT | Age: 51
End: 2021-12-08

## 2021-12-08 NOTE — TELEPHONE ENCOUNTER
Next Visit Date:  Visit date not found    Patient Surgery Date  11-17-21    Type of  Surgery  sleeve    Patient called , seen dentist today and given RX for amoxacillin 500 mg and motrin 800mg for a bad tooth , causing some pain. Advised patient to take OTC tylenol. Would like to know if she can proceed with amoxacillin and what to take for the pain. Please advise. Patient advised:   If  Symptoms worsen seek treatment at  Emergency Room. You will receive a call back from the office within 24-48 hours.     Is it ok to leave a message if we call back yes

## 2021-12-20 ENCOUNTER — OFFICE VISIT (OUTPATIENT)
Dept: BARIATRICS/WEIGHT MGMT | Age: 51
End: 2021-12-20

## 2021-12-20 VITALS
HEIGHT: 65 IN | RESPIRATION RATE: 20 BRPM | SYSTOLIC BLOOD PRESSURE: 135 MMHG | HEART RATE: 81 BPM | DIASTOLIC BLOOD PRESSURE: 85 MMHG | WEIGHT: 293 LBS | BODY MASS INDEX: 48.82 KG/M2

## 2021-12-20 DIAGNOSIS — M25.561 BILATERAL CHRONIC KNEE PAIN: ICD-10-CM

## 2021-12-20 DIAGNOSIS — G89.29 BILATERAL CHRONIC KNEE PAIN: ICD-10-CM

## 2021-12-20 DIAGNOSIS — Z98.84 S/P LAPAROSCOPIC SLEEVE GASTRECTOMY: ICD-10-CM

## 2021-12-20 DIAGNOSIS — F41.9 ANXIETY AND DEPRESSION: ICD-10-CM

## 2021-12-20 DIAGNOSIS — L40.9 PSORIASIS: ICD-10-CM

## 2021-12-20 DIAGNOSIS — F32.A ANXIETY AND DEPRESSION: ICD-10-CM

## 2021-12-20 DIAGNOSIS — J45.909 UNCOMPLICATED ASTHMA, UNSPECIFIED ASTHMA SEVERITY, UNSPECIFIED WHETHER PERSISTENT: ICD-10-CM

## 2021-12-20 DIAGNOSIS — E66.01 MORBID OBESITY WITH BMI OF 60.0-69.9, ADULT (HCC): ICD-10-CM

## 2021-12-20 DIAGNOSIS — G47.33 OSA (OBSTRUCTIVE SLEEP APNEA): Primary | ICD-10-CM

## 2021-12-20 DIAGNOSIS — M25.562 BILATERAL CHRONIC KNEE PAIN: ICD-10-CM

## 2021-12-20 PROBLEM — E86.0 DEHYDRATION: Status: RESOLVED | Noted: 2021-11-20 | Resolved: 2021-12-20

## 2021-12-20 PROBLEM — I10 ESSENTIAL HYPERTENSION: Status: RESOLVED | Noted: 2018-02-02 | Resolved: 2021-12-20

## 2021-12-20 PROCEDURE — 99024 POSTOP FOLLOW-UP VISIT: CPT | Performed by: NURSE PRACTITIONER

## 2021-12-20 RX ORDER — M-VIT,TX,IRON,MINS/CALC/FOLIC 27MG-0.4MG
1 TABLET ORAL DAILY
COMMUNITY

## 2021-12-20 RX ORDER — CALCIUM CARBONATE 500(1250)
500 TABLET ORAL DAILY
COMMUNITY
End: 2022-05-23 | Stop reason: ALTCHOICE

## 2021-12-20 RX ORDER — ONDANSETRON 4 MG/1
4 TABLET, FILM COATED ORAL EVERY 8 HOURS PRN
Qty: 30 TABLET | Refills: 2 | Status: SHIPPED | OUTPATIENT
Start: 2021-12-20

## 2021-12-20 NOTE — PROGRESS NOTES
Medical Nutrition Therapy  Metabolic and Bariatric surgery  1 month after surgery follow up note         Pt reports:         Vitals:   Vitals:    12/20/21 1110   BP: 135/85   Site: Left Upper Arm   Position: Sitting   Cuff Size: Large Adult   Pulse: 81   Resp: 20   Weight: (!) 373 lb (169.2 kg)   Height: 5' 4.5\" (1.638 m)      Body mass index is 63.04 kg/m². Labs reviewed:     Multivitamin/mineral intake:one daily  Calcium intake:none   Other:            Nutrition Assessment:   PES: Inadequate food and beverage intake r/t WLS as evidenced by loss of excess body weight 34lb.       Goals   60-80gm of protein  48-64oz of fluid     [x] met     []  Not met        Plan:  Add calcium Questions answered re diet advancement and tolerance  F/u 3 months after surgery         Lesly Deluna, RD, LD

## 2021-12-20 NOTE — PROGRESS NOTES
Post-op Bariatric Surgery Note    Subjective     Patient is 1 month s/p laparoscopic sleeve gastrectomy, down 34 lbs. Overall, doing well. Incisions well healed. Consistent use of MVI and calcium. Tolerating po intake. Protein and fluid intake adequate. Bowel function normal.  Physical activity includes walking. Some occasional nausea with eating. No pain or other specific problems or concerns. Allergies: Allergies   Allergen Reactions    No Known Allergies        Past Medical History:     Past Medical History:   Diagnosis Date    Allergic rhinitis     Anxiety     Anxiety and depression     Arthritis     Asthma     no longer using inhaler or nebulizer    Diverticulitis     Headache(784.0)     HTN (hypertension)     pt denies  not on meds    Obesity     Overactive bladder     Sleep apnea     uses machine nightly  cpap    Use of cane as ambulatory aid     Wellness examination 08/2021    Dr. Jen Dixon, his CNP's    .     Past Surgical History:  Past Surgical History:   Procedure Laterality Date    CATARACT REMOVAL      CHOLECYSTECTOMY, LAPAROSCOPIC N/A 12/15/2020    CHOLECYSTECTOMY LAPAROSCOPIC ROBOTIC XI performed by Gaston Bain MD at [facility] N/A 08/05/2020    COLONOSCOPY POLYPECTOMY SNARE/COLD BIOPSY performed by Mohinder Silverman MD at . [address] N/A 04/08/2021    COLONOSCOPY WITH BIOPSY RANDOM RIGHT AND LEFT COLON performed by Mohinder Silverman MD at [address] [place] [address]. [address] Bilateral     cataract removed with lens implants    [place] KNEE SCOPE,DIAGNOSTIC Right 05/16/2017    RIGHT KNEE ARTHROSCOPY WITH MEDIAL MENISECTOMY AND CHONDROPLASTY performed by Prakash Glasgow DO at [address] [place] [address]  11/17/2021    ROBOTIC [place], EGD-    SLEEVE GASTRECTOMY N/A 11/17/2021    XI ROBOTIC LAPAROSCOPIC GASTRECTOMY SLEEVE, EGD- GI UNIT SCHEDULED performed by Gloria Sherman DO at . [facility] [address] GASTROINTESTINAL ENDOSCOPY N/A 2021    EGD BIOPSY OF GASTRIC AND GASTRIC POLYPECTOMY [facility] [address] [facility]       Family History:  Family History   Problem Relation Age of Onset    Asthma Mother     Parkinsonism Mother     Arthritis Father     Cancer Father        Social History:  Social History     Socioeconomic History    Marital status:      Spouse name: Not on file    Number of children: Not on file    Years of education: Not on file    Highest education level: Not on file   Occupational History    Not on file   Tobacco Use    Smoking status: Former Smoker     Packs/day: 0.60     Types: Cigarettes     Quit date:      Years since quittin.9    Smokeless tobacco: Never Used   Vaping Use    Vaping Use: Never used   Substance and Sexual Activity    Alcohol use: Yes     Comment: maybe once a month    Drug use: No    Sexual activity: Not on file   Other Topics Concern    Not on file   Social History Narrative    Not on file     Social Determinants of Health     Financial Resource Strain:     Difficulty of Paying Living Expenses: Not on file   Food Insecurity:     Worried About [address] [employer] in the Last Year: Not on file    Molly of Food in the Last Year: Not on file   Transportation Needs:     Lack of Transportation (Medical): Not on file    Lack of Transportation (Non-Medical):  Not on file   Physical Activity:     Days of Exercise per Week: Not on file    Minutes of Exercise per Session: Not on file   Stress:     Feeling of Stress : Not on file   Social Connections:     Frequency of Communication with Friends and Family: Not on file    Frequency of Social Gatherings with Friends and Family: Not on file    Attends Hinduism Services: Not on file    Active Member of Clubs or Organizations: Not on file    Attends Club or Organization Meetings: Not on file    Marital Status: Not on file   Intimate Partner Violence:     Fear of Current or Ex-Partner: Not on file    Emotionally Abused: Not on file    Physically Abused: Not on file    Sexually Abused: Not on file   Housing Stability:     Unable to Pay for Housing in the Last Year: Not on file    Number of [place] in the Last Year: Not on file    Unstable Housing in the Last Year: Not on file       Current Medications:  Current Outpatient Medications   Medication Sig Dispense Refill    Multiple Vitamins-Minerals (THERAPEUTIC MULTIVITAMIN-MINERALS) tablet Take 1 tablet by mouth daily      calcium carbonate (OSCAL) 500 MG TABS tablet Take 500 mg by mouth daily      ondansetron (ZOFRAN) 4 MG tablet Take 1 tablet by mouth every 8 hours as needed for Nausea or Vomiting 30 tablet 2    famotidine (PEPCID) 20 MG tablet take 1 tablet by mouth twice a day 60 tablet 3    fluticasone (FLONASE) 50 MCG/ACT nasal spray place 1 spray into each nostril once daily      triamcinolone (ARISTOCORT) 0.5 % cream apply A THIN FILM topically to affected area 2 TO 3 TIMES DAILY      APLENZIN 348 MG TB24 take 1 tablet by mouth every morning      triamcinolone (KENALOG) 0.025 % cream Apply to rash on face and ears daily 80 g 2    Sulfacetamide Sodium, Acne, 10 % LOTN Apply to face daily 118 mL 2    ketoconazole (NIZORAL) 2 % shampoo Apply 3-4 times weekly to scalp, leave on for five minutes prior to washing off 120 mL 2    dicyclomine (BENTYL) 20 MG tablet Take 1 tablet by mouth every 8 hours as needed      acetaminophen (TYLENOL) 500 MG tablet Take 1,000 mg by mouth every 6 hours as needed for Pain       escitalopram (LEXAPRO) 20 MG tablet Take 20 mg by mouth daily       Current Facility-Administered Medications   Medication Dose Route Frequency Provider Last Rate Last Admin    albuterol (PROVENTIL) nebulizer solution 2.5 mg  2.5 mg Nebulization 4x daily Usha Saeed MD           Vital Signs:  /85 (Site: Left Upper Arm, Position: Sitting, Cuff Size: Large Adult)   Pulse 81 Resp 20   Ht 5' 4.5\" (1.638 m)   Wt (!) 373 lb (169.2 kg)   LMP 12/12/2021 (Approximate)   BMI 63.04 kg/m²     BMI/Height/Weight:  Body mass index is 63.04 kg/m². Review of Systems - A review of systems was performed. All was negative unless otherwise documented in HPI. Constitutional: Negative for fever, chills and diaphoresis. HENT: Negative for hearing loss and trouble swallowing. Eyes: Negative for photophobia and visual disturbance. Respiratory: Negative for cough, shortness of breath and wheezing. Cardiovascular: Negative for chest pain and palpitations. Gastrointestinal: Negative for vomiting, abdominal pain, diarrhea, constipation, blood in stool and abdominal distention. Positive for nausea. Endocrine: Negative for polydipsia, polyphagia and polyuria. Genitourinary: Negative for dysuria, frequency, hematuria and difficulty urinating. Musculoskeletal: Negative for myalgias, joint swelling. Skin: Negative for pallor and rash. Neurological: Negative for dizziness, tremors, light-headedness and headaches. Psychiatric/Behavioral: Negative for sleep disturbance and dysphoric mood. Objective:      Physical Exam   Vital signs reviewed. General: Well-developed and well-nourished. No acute distress. Skin: Warm, dry and intact. HEENT: Normocephalic. EOMs intact. Conjunctivae normal. Neck supple. Cardiovascular: Normal rate, regular rhythm. Pulmonary/Chest: Normal effort. Lungs clear to auscultation. No rales, rhonchi or wheezing. Abdominal: Positive bowel sounds. Soft, nontender. Nondistended. No rigidity, rebound, or guarding. Incisions well healed. Musculoskeletal: Movement x4. Lymphedema bilaterally. Neurological: Ambulates with a cane. Alert and oriented to person, place, and time. Psychiatric: Normal mood and affect. Speech and behavior normal. Judgment and thought content normal. Cognition and memory intact. Assessment:       Diagnosis Orders   1.  DENISHA (obstructive sleep apnea)  Comprehensive Metabolic Panel    Hemoglobin A1C    Lipid Panel    PTH, Intact    Iron and TIBC    Magnesium    CBC Auto Differential    TSH without Reflex    Vitamin A    Vitamin B1    Vitamin D 25 Hydroxy    Zinc    Vitamin B12 & Folate    ondansetron (ZOFRAN) 4 MG tablet   2. Anxiety and depression  Comprehensive Metabolic Panel    Hemoglobin A1C    Lipid Panel    PTH, Intact    Iron and TIBC    Magnesium    CBC Auto Differential    TSH without Reflex    Vitamin A    Vitamin B1    Vitamin D 25 Hydroxy    Zinc    Vitamin B12 & Folate    ondansetron (ZOFRAN) 4 MG tablet   3. Morbid obesity with BMI of 60.0-69.9, adult (HCC)  Comprehensive Metabolic Panel    Hemoglobin A1C    Lipid Panel    PTH, Intact    Iron and TIBC    Magnesium    CBC Auto Differential    TSH without Reflex    Vitamin A    Vitamin B1    Vitamin D 25 Hydroxy    Zinc    Vitamin B12 & Folate    ondansetron (ZOFRAN) 4 MG tablet   4. Uncomplicated asthma, unspecified asthma severity, unspecified whether persistent  Comprehensive Metabolic Panel    Hemoglobin A1C    Lipid Panel    PTH, Intact    Iron and TIBC    Magnesium    CBC Auto Differential    TSH without Reflex    Vitamin A    Vitamin B1    Vitamin D 25 Hydroxy    Zinc    Vitamin B12 & Folate    ondansetron (ZOFRAN) 4 MG tablet   5. Psoriasis  Comprehensive Metabolic Panel    Hemoglobin A1C    Lipid Panel    PTH, Intact    Iron and TIBC    Magnesium    CBC Auto Differential    TSH without Reflex    Vitamin A    Vitamin B1    Vitamin D 25 Hydroxy    Zinc    Vitamin B12 & Folate    ondansetron (ZOFRAN) 4 MG tablet   6. Bilateral chronic knee pain  Comprehensive Metabolic Panel    Hemoglobin A1C    Lipid Panel    PTH, Intact    Iron and TIBC    Magnesium    CBC Auto Differential    TSH without Reflex    Vitamin A    Vitamin B1    Vitamin D 25 Hydroxy    Zinc    Vitamin B12 & Folate    ondansetron (ZOFRAN) 4 MG tablet   7.  S/P laparoscopic sleeve gastrectomy  Comprehensive Metabolic Panel    Hemoglobin A1C    Lipid Panel    PTH, Intact    Iron and TIBC    Magnesium    CBC Auto Differential    TSH without Reflex    Vitamin A    Vitamin B1    Vitamin D 25 Hydroxy    Zinc    Vitamin B12 & Folate    ondansetron (ZOFRAN) 4 MG tablet       Plan:    Dietitian visit today. Patient to continue to increase protein to obtain 60-80g/day, at least 48-64oz of fluid daily, and gradually increase exercise regimen. Zofran prescribed for occasional nausea. Follow-up  Return in about 2 months (around 2/20/2022). Orders this encounter:  Orders Placed This Encounter   Procedures    Comprehensive Metabolic Panel     Standing Status:   Future     Standing Expiration Date:   2/7/2023    Hemoglobin A1C     Standing Status:   Future     Standing Expiration Date:   2/7/2023    Lipid Panel     Standing Status:   Future     Standing Expiration Date:   2/7/2023     Order Specific Question:   Is Patient Fasting?/# of Hours     Answer:   12    PTH, Intact     Standing Status:   Future     Standing Expiration Date:   2/7/2023    Iron and TIBC     Standing Status:   Future     Standing Expiration Date:   2/7/2023     Order Specific Question:   Is Patient Fasting? Answer:   yes     Order Specific Question:   No of Hours?      Answer:   15    Magnesium     Standing Status:   Future     Standing Expiration Date:   2/7/2023    CBC Auto Differential     Standing Status:   Future     Standing Expiration Date:   2/7/2023    TSH without Reflex     Standing Status:   Future     Standing Expiration Date:   2/7/2023    Vitamin A     Standing Status:   Future     Standing Expiration Date:   2/7/2023    Vitamin B1     Standing Status:   Future     Standing Expiration Date:   2/7/2023    Vitamin D 25 Hydroxy     Standing Status:   Future     Standing Expiration Date:   2/7/2023    Zinc     Standing Status:   Future     Standing Expiration Date:   2/7/2023    Vitamin B12 & Folate     Standing Status: Future     Standing Expiration Date:   2/7/2023       Prescriptions this encounter:  Orders Placed This Encounter   Medications    ondansetron (ZOFRAN) 4 MG tablet     Sig: Take 1 tablet by mouth every 8 hours as needed for Nausea or Vomiting     Dispense:  30 tablet     Refill:  2       Electronically signed by:  Juanpablo Alexander CNP

## 2022-01-05 ENCOUNTER — OFFICE VISIT (OUTPATIENT)
Dept: GASTROENTEROLOGY | Age: 52
End: 2022-01-05
Payer: MEDICARE

## 2022-01-05 VITALS
BODY MASS INDEX: 48.82 KG/M2 | HEART RATE: 74 BPM | OXYGEN SATURATION: 97 % | DIASTOLIC BLOOD PRESSURE: 80 MMHG | SYSTOLIC BLOOD PRESSURE: 126 MMHG | WEIGHT: 293 LBS | HEIGHT: 65 IN

## 2022-01-05 DIAGNOSIS — K52.832 LYMPHOCYTIC COLITIS: Primary | ICD-10-CM

## 2022-01-05 PROCEDURE — 99213 OFFICE O/P EST LOW 20 MIN: CPT | Performed by: INTERNAL MEDICINE

## 2022-01-05 PROCEDURE — 1036F TOBACCO NON-USER: CPT | Performed by: INTERNAL MEDICINE

## 2022-01-05 PROCEDURE — G8417 CALC BMI ABV UP PARAM F/U: HCPCS | Performed by: INTERNAL MEDICINE

## 2022-01-05 PROCEDURE — G8484 FLU IMMUNIZE NO ADMIN: HCPCS | Performed by: INTERNAL MEDICINE

## 2022-01-05 PROCEDURE — G8427 DOCREV CUR MEDS BY ELIG CLIN: HCPCS | Performed by: INTERNAL MEDICINE

## 2022-01-05 RX ORDER — DICYCLOMINE HCL 20 MG
20 TABLET ORAL EVERY 8 HOURS PRN
Qty: 120 TABLET | Refills: 0 | Status: SHIPPED | OUTPATIENT
Start: 2022-01-05

## 2022-01-05 RX ORDER — SIMETHICONE 80 MG
80 TABLET,CHEWABLE ORAL 4 TIMES DAILY PRN
Qty: 180 TABLET | Refills: 3 | Status: SHIPPED | OUTPATIENT
Start: 2022-01-05

## 2022-01-05 NOTE — PROGRESS NOTES
GI FOLLOW UP    INTERVAL HISTORY:     Intermittent diarrhea that appears to be improving  Status post sleeve gastrectomy  Clinically doing well on antiacid therapy    Chief Complaint   Patient presents with    Follow-up     2 month: abd issue           HISTORY OF PRESENT ILLNESS: Mitchel Burnett a 54 y.o. female with a past history remarkable for obesity, anxiety, depression, referred for evaluation of prior diverticulitis.   Patient underwent a recent colonoscopy, results were discussed on last visit.  Reports abdominal pain since gallbladder was removed.  Reports diarrhea as well.  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  However given the patient's clinical picture this appears to be unlikely given her recent diagnosis.     Smoker: quit 2 yrs ago   Drinking history: socially   Abdominal surgeries: none  Prior Colonoscopy: none   Prior EGD: none   FH of GI issues: none    Past Medical,Family, and Social History reviewed and does contribute to the patient presenting condition. Patient's PMH/PSH,SH,PSYCH Hx, MEDs, ALLERGIES, and ROS were all reviewed and updated in the appropriate sections.     PAST MEDICAL HISTORY:  Past Medical History:   Diagnosis Date    Allergic rhinitis     Anxiety     Anxiety and depression     Arthritis     Asthma     no longer using inhaler or nebulizer    Diverticulitis     Headache(784.0)     HTN (hypertension)     pt denies  not on meds    Obesity     Overactive bladder     Sleep apnea     uses machine nightly  cpap    Use of cane as ambulatory aid     Wellness examination 08/2021    Dr. Cheri Moss, his CNP's        Past Surgical History:   Procedure Laterality Date    CATARACT REMOVAL      CHOLECYSTECTOMY, LAPAROSCOPIC N/A 12/15/2020    CHOLECYSTECTOMY LAPAROSCOPIC ROBOTIC XI performed by Dorothy Burkett MD at [facility] N/A 08/05/2020    COLONOSCOPY POLYPECTOMY SNARE/COLD BIOPSY performed by Eugenia Sagastume MD at [address] [facility] N/A 04/08/2021    COLONOSCOPY WITH BIOPSY RANDOM RIGHT AND LEFT COLON performed by Eugenia Sagastume MD at [address] [facility] Bilateral     cataract removed with lens implants    [place] KNEE SCOPE,DIAGNOSTIC Right 05/16/2017    RIGHT KNEE ARTHROSCOPY WITH MEDIAL MENISECTOMY AND CHONDROPLASTY performed by Berkley Peter DO at [address] [facility]  11/17/2021    ROBOTIC [place], EGD-    SLEEVE GASTRECTOMY N/A 11/17/2021    XI ROBOTIC LAPAROSCOPIC GASTRECTOMY SLEEVE, EGD- GI UNIT SCHEDULED performed by Marry Lundborg, DO at [address] [facility] N/A 04/08/2021    EGD BIOPSY OF GASTRIC AND GASTRIC POLYPECTOMY performed by Eugenia Sagastume MD at [address] [id number]:    Current Outpatient Medications:     Multiple Vitamins-Minerals (THERAPEUTIC MULTIVITAMIN-MINERALS) tablet, Take 1 tablet by mouth daily, Disp: , Rfl:     calcium carbonate (OSCAL) 500 MG TABS tablet, Take 500 mg by mouth daily, Disp: , Rfl:     ondansetron (ZOFRAN) 4 MG tablet, Take 1 tablet by mouth every 8 hours as needed for Nausea or Vomiting, Disp: 30 tablet, Rfl: 2    famotidine (PEPCID) 20 MG tablet, take 1 tablet by mouth twice a day, Disp: 60 tablet, Rfl: 3    fluticasone (FLONASE) 50 MCG/ACT nasal spray, place 1 spray into each nostril once daily, Disp: , Rfl:     triamcinolone (ARISTOCORT) 0.5 % cream, apply A THIN FILM topically to affected area 2 TO 3 TIMES DAILY, Disp: , Rfl:     APLENZIN 348 MG TB24, take 1 tablet by mouth every morning, Disp: , Rfl:     triamcinolone (KENALOG) 0.025 % cream, Apply to rash on face and ears daily, Disp: 80 g, Rfl: 2    Sulfacetamide Sodium, Acne, 10 % LOTN, Apply to face daily, Disp: 118 mL, Rfl: 2    ketoconazole (NIZORAL) 2 % shampoo, Apply 3-4 times weekly to scalp, leave on for five minutes prior to washing off, Disp: 120 mL, Rfl: 2    dicyclomine (BENTYL) 20 MG tablet, Take 1 tablet by mouth every 8 hours as needed, Disp: , Rfl:     acetaminophen (TYLENOL) 500 MG tablet, Take 1,000 mg by mouth every 6 hours as needed for Pain , Disp: , Rfl:     escitalopram (LEXAPRO) 20 MG tablet, Take 20 mg by mouth daily, Disp: , Rfl:     ALLERGIES:   Allergies   Allergen Reactions    No Known Allergies        FAMILY HISTORY:       Problem Relation Age of Onset    Asthma Mother     Parkinsonism Mother     Arthritis Father     Cancer Father          SOCIAL HISTORY:   Social History     Socioeconomic History    Marital status:      Spouse name: Not on file    Number of children: Not on file    Years of education: Not on file    Highest education level: Not on file   Occupational History    Not on file   Tobacco Use    Smoking status: Former Smoker     Packs/day: 0.60     Types: Cigarettes     Quit date:      Years since quittin.0    Smokeless tobacco: Never Used   Vaping Use    Vaping Use: Never used   Substance and Sexual Activity    Alcohol use: Yes     Comment: maybe once a month    Drug use: No    Sexual activity: Not on file   Other Topics Concern    Not on file   Social History Narrative    Not on file     Social Determinants of Health     Financial Resource Strain:     Difficulty of Paying Living Expenses: Not on file   Food Insecurity:     Worried About Running Out of Food in the Last Year: Not on file    Molly of Food in the Last Year: Not on file   Transportation Needs:     Lack of Transportation (Medical): Not on file    Lack of Transportation (Non-Medical):  Not on file   Physical Activity:     Days of Exercise per Week: Not on file    Minutes of Exercise per Session: Not on file   Stress:     Feeling of Stress : Not on file   Social Connections:     Frequency of Communication with Friends and Family: Not on file    Frequency of Social Gatherings with Friends and Family: Not on file    Attends Mormonism Services: Not on file    Active Member of Clubs or Organizations: Not on file    Attends Club or Organization Meetings: Not on file    Marital Status: Not on file   Intimate Partner Violence:     Fear of Current or Ex-Partner: Not on file    Emotionally Abused: Not on file    Physically Abused: Not on file    Sexually Abused: Not on file   Housing Stability:     Unable to Pay for Housing in the Last Year: Not on file    Number of [place] in the Last Year: Not on file    Unstable Housing in the Last Year: Not on file       REVIEW OF SYSTEMS: A 12-point review of systems was obtained and pertinent positives and negatives were listed below. REVIEW OF SYSTEMS:     Constitutional: No fever, no chills, no lethargy, no weakness. HEENT:  No headache, otalgia, itchy eyes, nasal discharge or sore throat. Cardiac:  No chest pain, dyspnea, orthopnea or PND. Chest:   No cough, phlegm or wheezing. Abdomen:      Detailed by MA   Neuro:  No focal weakness, abnormal movements or seizure like activity. Skin:   No rashes, no itching. :   No hematuria, no pyuria, no dysuria, no flank pain. Extremities:  No swelling or joint pains. ROS was otherwise negative    Review of Systems    PHYSICAL EXAMINATION: Vital signs reviewed per the nursing documentation. LMP 12/12/2021 (Approximate)   There is no height or weight on file to calculate BMI. Physical Exam    Physical Exam   Constitutional: Patient is oriented to person, place, and time. Patient appears well-developed and well-nourished. HENT:   Head: Normocephalic and atraumatic. Eyes: Pupils are equal, round, and reactive to light. EOM are normal.   Neck: Normal range of motion. Neck supple. No JVD present. No tracheal deviation present. No thyromegaly present. Cardiovascular: Normal rate, regular rhythm, normal heart sounds and intact distal pulses.    Pulmonary/Chest: Effort normal and breath sounds normal. No stridor. No respiratory distress. He has no wheezes. He has no rales. He exhibits no tenderness. Abdominal: Soft. Bowel sounds are normal. He exhibits no distension and no mass. There is no tenderness. There is no rebound and no guarding. No hernia. Musculoskeletal: Normal range of motion. Lymphadenopathy:    Patient has no cervical adenopathy. Neurological: Patient is alert and oriented to person, place, and time. Psychiatric: Patient has a normal mood and affect. Patient behavior is normal.       LABORATORY DATA: Reviewed  Lab Results   Component Value Date    WBC 9.2 11/18/2021    HGB 11.0 (L) 11/18/2021    HCT 35.0 (L) 11/18/2021    MCV 92.3 11/18/2021     11/18/2021     11/18/2021    K 4.3 11/18/2021     11/18/2021    CO2 21 11/18/2021    BUN 9 11/18/2021    CREATININE 0.99 (H) 11/18/2021    LABALBU 3.5 07/21/2021    BILITOT 0.53 07/21/2021    ALKPHOS 92 07/21/2021    AST 10 07/21/2021    ALT 8 07/21/2021    INR 0.9 11/03/2021         Lab Results   Component Value Date    RBC 3.79 (L) 11/18/2021    HGB 11.0 (L) 11/18/2021    MCV 92.3 11/18/2021    MCH 29.0 11/18/2021    MCHC 31.4 11/18/2021    RDW 13.6 11/18/2021    MPV 10.3 11/18/2021    BASOPCT 1 07/21/2021    LYMPHSABS 1.52 07/21/2021    MONOSABS 0.42 07/21/2021    NEUTROABS 3.21 07/21/2021    EOSABS 0.11 07/21/2021    BASOSABS 0.03 07/21/2021         DIAGNOSTIC TESTING:     No results found.            IMPRESSION: Ms.Darling KALIE Morataya is a 54 y.o. female with a past history remarkable for obesity, anxiety, depression, referred for evaluation of prior diverticulitis.   Patient underwent a recent colonoscopy, results were discussed on last visit.  Reports abdominal pain since gallbladder was removed.  Reports diarrhea as well.  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  However given the patient's clinical picture this appears to be unlikely given her recent diagnosis of microscopic colitis      Assessment  Prior history of microscopic colitis  Prior history of diverticulitis  Status post sleeve gastrectomy    PLAN:    1) mild dyspepsia and GERD symptoms with bloating sensationPepcid increase to BID 20 mg. Bentyl prn, refusing budesonide for microscopic colitis. 2) patient was educated on dyspepsia and upper GI dietary triggers. Education provided, list was provided. 3) RTC in 3 months. Thank you for allowing me to participate in the care of Ms. Queen George. For any further questions please do not hesitate to contact me. I have reviewed and agree with the ROS entered by the MA/LPN from today's encounter documented in a separate note. Eugenia Sagastume MD, MPH   [facility]  Office #: [phone]    this note is created with the assistance of a speech recognition program.  While intending to generate a document that actually reflects the content of the visit, the document can still have some errors including those of syntax and sound a like substitutions which may escape proof reading. It such instances, actual meaning can be extrapolated by contextual diversion.

## 2022-01-07 ENCOUNTER — OFFICE VISIT (OUTPATIENT)
Dept: ORTHOPEDIC SURGERY | Age: 52
End: 2022-01-07
Payer: MEDICARE

## 2022-01-07 VITALS — BODY MASS INDEX: 48.82 KG/M2 | WEIGHT: 293 LBS | HEIGHT: 65 IN

## 2022-01-07 DIAGNOSIS — G89.29 CHRONIC PAIN OF BOTH KNEES: Primary | ICD-10-CM

## 2022-01-07 DIAGNOSIS — M25.562 CHRONIC PAIN OF BOTH KNEES: Primary | ICD-10-CM

## 2022-01-07 DIAGNOSIS — M25.561 CHRONIC PAIN OF BOTH KNEES: Primary | ICD-10-CM

## 2022-01-07 DIAGNOSIS — M17.0 BILATERAL PRIMARY OSTEOARTHRITIS OF KNEE: ICD-10-CM

## 2022-01-07 DIAGNOSIS — E66.01 MORBID OBESITY WITH BMI OF 60.0-69.9, ADULT (HCC): ICD-10-CM

## 2022-01-07 PROCEDURE — 20610 DRAIN/INJ JOINT/BURSA W/O US: CPT | Performed by: PHYSICIAN ASSISTANT

## 2022-01-07 PROCEDURE — G8427 DOCREV CUR MEDS BY ELIG CLIN: HCPCS | Performed by: PHYSICIAN ASSISTANT

## 2022-01-07 PROCEDURE — 1036F TOBACCO NON-USER: CPT | Performed by: PHYSICIAN ASSISTANT

## 2022-01-07 PROCEDURE — G8484 FLU IMMUNIZE NO ADMIN: HCPCS | Performed by: PHYSICIAN ASSISTANT

## 2022-01-07 PROCEDURE — G8417 CALC BMI ABV UP PARAM F/U: HCPCS | Performed by: PHYSICIAN ASSISTANT

## 2022-01-07 PROCEDURE — 99204 OFFICE O/P NEW MOD 45 MIN: CPT | Performed by: PHYSICIAN ASSISTANT

## 2022-01-07 RX ORDER — LIDOCAINE HYDROCHLORIDE 10 MG/ML
4 INJECTION, SOLUTION INFILTRATION; PERINEURAL ONCE
Status: COMPLETED | OUTPATIENT
Start: 2022-01-07 | End: 2022-01-07

## 2022-01-07 RX ORDER — BETAMETHASONE SODIUM PHOSPHATE AND BETAMETHASONE ACETATE 3; 3 MG/ML; MG/ML
12 INJECTION, SUSPENSION INTRA-ARTICULAR; INTRALESIONAL; INTRAMUSCULAR; SOFT TISSUE ONCE
Status: COMPLETED | OUTPATIENT
Start: 2022-01-07 | End: 2022-01-07

## 2022-01-07 RX ADMIN — BETAMETHASONE SODIUM PHOSPHATE AND BETAMETHASONE ACETATE 12 MG: 3; 3 INJECTION, SUSPENSION INTRA-ARTICULAR; INTRALESIONAL; INTRAMUSCULAR; SOFT TISSUE at 11:02

## 2022-01-07 RX ADMIN — BETAMETHASONE SODIUM PHOSPHATE AND BETAMETHASONE ACETATE 12 MG: 3; 3 INJECTION, SUSPENSION INTRA-ARTICULAR; INTRALESIONAL; INTRAMUSCULAR; SOFT TISSUE at 11:01

## 2022-01-07 RX ADMIN — LIDOCAINE HYDROCHLORIDE 4 ML: 10 INJECTION, SOLUTION INFILTRATION; PERINEURAL at 11:01

## 2022-01-07 RX ADMIN — LIDOCAINE HYDROCHLORIDE 4 ML: 10 INJECTION, SOLUTION INFILTRATION; PERINEURAL at 11:00

## 2022-02-22 ENCOUNTER — OFFICE VISIT (OUTPATIENT)
Dept: BARIATRICS/WEIGHT MGMT | Age: 52
End: 2022-02-22
Payer: MEDICARE

## 2022-02-22 VITALS — DIASTOLIC BLOOD PRESSURE: 78 MMHG | BODY MASS INDEX: 60.5 KG/M2 | SYSTOLIC BLOOD PRESSURE: 122 MMHG | WEIGHT: 293 LBS

## 2022-02-22 DIAGNOSIS — M25.562 BILATERAL CHRONIC KNEE PAIN: ICD-10-CM

## 2022-02-22 DIAGNOSIS — G47.33 OSA (OBSTRUCTIVE SLEEP APNEA): Primary | ICD-10-CM

## 2022-02-22 DIAGNOSIS — M54.9 CHRONIC BACK PAIN, UNSPECIFIED BACK LOCATION, UNSPECIFIED BACK PAIN LATERALITY: ICD-10-CM

## 2022-02-22 DIAGNOSIS — G89.29 BILATERAL CHRONIC KNEE PAIN: ICD-10-CM

## 2022-02-22 DIAGNOSIS — E66.01 MORBID OBESITY WITH BMI OF 60.0-69.9, ADULT (HCC): ICD-10-CM

## 2022-02-22 DIAGNOSIS — G89.29 CHRONIC BACK PAIN, UNSPECIFIED BACK LOCATION, UNSPECIFIED BACK PAIN LATERALITY: ICD-10-CM

## 2022-02-22 DIAGNOSIS — F41.9 ANXIETY AND DEPRESSION: ICD-10-CM

## 2022-02-22 DIAGNOSIS — Z98.84 S/P LAPAROSCOPIC SLEEVE GASTRECTOMY: ICD-10-CM

## 2022-02-22 DIAGNOSIS — M25.561 BILATERAL CHRONIC KNEE PAIN: ICD-10-CM

## 2022-02-22 DIAGNOSIS — F32.A ANXIETY AND DEPRESSION: ICD-10-CM

## 2022-02-22 PROCEDURE — 1036F TOBACCO NON-USER: CPT | Performed by: NURSE PRACTITIONER

## 2022-02-22 PROCEDURE — G8484 FLU IMMUNIZE NO ADMIN: HCPCS | Performed by: NURSE PRACTITIONER

## 2022-02-22 PROCEDURE — 99213 OFFICE O/P EST LOW 20 MIN: CPT | Performed by: NURSE PRACTITIONER

## 2022-02-22 PROCEDURE — G8427 DOCREV CUR MEDS BY ELIG CLIN: HCPCS | Performed by: NURSE PRACTITIONER

## 2022-02-22 PROCEDURE — G8417 CALC BMI ABV UP PARAM F/U: HCPCS | Performed by: NURSE PRACTITIONER

## 2022-02-22 RX ORDER — BUPROPION HYDROBROMIDE 174 MG/1
TABLET, EXTENDED RELEASE ORAL
COMMUNITY
Start: 2022-01-24

## 2022-02-22 NOTE — PROGRESS NOTES
GASTROINTESTINAL ENDOSCOPY N/A 2021    EGD BIOPSY OF GASTRIC AND GASTRIC POLYPECTOMY [facility]ales [facility] [address]       Family History:  Family History   Problem Relation Age of Onset    Asthma Mother     Parkinsonism Mother     Arthritis Father     Cancer Father        Social History:  Social History     Socioeconomic History    Marital status:      Spouse name: Not on file    Number of children: Not on file    Years of education: Not on file    Highest education level: Not on file   Occupational History    Not on file   Tobacco Use    Smoking status: Former Smoker     Packs/day: 0.60     Types: Cigarettes     Quit date:      Years since quittin.1    Smokeless tobacco: Never Used   Vaping Use    Vaping Use: Never used   Substance and Sexual Activity    Alcohol use: Yes     Comment: maybe once a month    Drug use: No    Sexual activity: Not on file   Other Topics Concern    Not on file   Social History Narrative    Not on file     Social Determinants of Health     Financial Resource Strain:     Difficulty of Paying Living Expenses: Not on file   Food Insecurity:     Worried About [address] in the Last Year: Not on file    Molly of Food in the Last Year: Not on file   Transportation Needs:     Lack of Transportation (Medical): Not on file    Lack of Transportation (Non-Medical):  Not on file   Physical Activity:     Days of Exercise per Week: Not on file    Minutes of Exercise per Session: Not on file   Stress:     Feeling of Stress : Not on file   Social Connections:     Frequency of Communication with Friends and Family: Not on file    Frequency of Social Gatherings with Friends and Family: Not on file    Attends Sabianism Services: Not on file    Active Member of Clubs or Organizations: Not on file    Attends Club or Organization Meetings: Not on file    Marital Status: Not on file   Intimate Partner Violence:     Fear of Current or Ex-Partner: Not on file    Emotionally Abused: Not on file    Physically Abused: Not on file    Sexually Abused: Not on file   Housing Stability:     Unable to Pay for Housing in the Last Year: Not on file    Number of [place] in the Last Year: Not on file    Unstable Housing in the Last Year: Not on file       Current Medications:  Current Outpatient Medications   Medication Sig Dispense Refill    APLENZIN 174 MG TB24 take 1 tablet by mouth every morning      simethicone (MYLICON) 80 MG chewable tablet Take 1 tablet by mouth 4 times daily as needed for Flatulence 180 tablet 3    dicyclomine (BENTYL) 20 MG tablet Take 1 tablet by mouth every 8 hours as needed (diarrhea) 120 tablet 0    Multiple Vitamins-Minerals (THERAPEUTIC MULTIVITAMIN-MINERALS) tablet Take 1 tablet by mouth daily      calcium carbonate (OSCAL) 500 MG TABS tablet Take 500 mg by mouth daily (Patient not taking: Reported on 1/5/2022)      ondansetron (ZOFRAN) 4 MG tablet Take 1 tablet by mouth every 8 hours as needed for Nausea or Vomiting 30 tablet 2    famotidine (PEPCID) 20 MG tablet take 1 tablet by mouth twice a day 60 tablet 3    fluticasone (FLONASE) 50 MCG/ACT nasal spray place 1 spray into each nostril once daily (Patient not taking: Reported on 1/5/2022)      triamcinolone (ARISTOCORT) 0.5 % cream apply A THIN FILM topically to affected area 2 TO 3 TIMES DAILY      triamcinolone (KENALOG) 0.025 % cream Apply to rash on face and ears daily 80 g 2    Sulfacetamide Sodium, Acne, 10 % LOTN Apply to face daily 118 mL 2    ketoconazole (NIZORAL) 2 % shampoo Apply 3-4 times weekly to scalp, leave on for five minutes prior to washing off 120 mL 2    acetaminophen (TYLENOL) 500 MG tablet Take 1,000 mg by mouth every 6 hours as needed for Pain       escitalopram (LEXAPRO) 20 MG tablet Take 20 mg by mouth daily       Current Facility-Administered Medications   Medication Dose Route Frequency Provider Last Rate Last Admin    albuterol (PROVENTIL) nebulizer solution 2.5 mg  2.5 mg Nebulization 4x daily Chanel Nayak MD           Vital Signs:  /78 (Site: Right Lower Arm, Position: Sitting)   Wt (!) 358 lb (162.4 kg)   BMI 60.50 kg/m²     BMI/Height/Weight:  Body mass index is 60.5 kg/m². Review of Systems - A review of systems was performed. All was negative unless otherwise documented in HPI. Constitutional: Negative for fever, chills and diaphoresis. HENT: Negative for hearing loss and trouble swallowing. Eyes: Negative for photophobia and visual disturbance. Respiratory: Negative for cough, shortness of breath and wheezing. Cardiovascular: Negative for chest pain and palpitations. Gastrointestinal: Negative for vomiting, abdominal pain, diarrhea, constipation, blood in stool and abdominal distention. Positive for nausea. Endocrine: Negative for polydipsia, polyphagia and polyuria. Genitourinary: Negative for dysuria, frequency, hematuria and difficulty urinating. Musculoskeletal: Negative for myalgias, joint swelling. Skin: Negative for pallor and rash. Neurological: Negative for dizziness, tremors, light-headedness and headaches. Psychiatric/Behavioral: Negative for sleep disturbance and dysphoric mood. Objective:      Physical Exam   Vital signs reviewed. General: Well-developed and well-nourished. No acute distress. Skin: Warm, dry and intact. HEENT: Normocephalic. EOMs intact. Conjunctivae normal. Neck supple. Cardiovascular: Normal rate, regular rhythm. Pulmonary/Chest: Normal effort. Lungs clear to auscultation. No rales, rhonchi or wheezing. Abdominal: Positive bowel sounds. Soft, nontender. Nondistended. No rigidity, rebound, or guarding. Incisions well healed. Musculoskeletal: Movement x4. Lymphedema bilaterally. Neurological: Ambulates with a cane. Alert and oriented to person, place, and time. Psychiatric: Normal mood and affect.  Speech and behavior normal. Judgment and thought content normal. Cognition and memory intact. Assessment:       Diagnosis Orders   1. DENISHA (obstructive sleep apnea)     2. Anxiety and depression     3. Morbid obesity with BMI of 60.0-69.9, adult ([facility] [address]     4. S/P laparoscopic sleeve gastrectomy     5. Bilateral chronic knee pain     6. Chronic back pain, unspecified back location, unspecified back pain laterality         Plan:    Dietitian visit today. Patient to continue to increase protein to obtain 60-80g/day, at least 48-64oz of fluid daily, and gradually increase exercise regimen. Reminded to have labs drawn as previously ordered. Follow-up  Return in about 3 months (around 5/22/2022). Orders this encounter:  No orders of the defined types were placed in this encounter. Prescriptions this encounter:  No orders of the defined types were placed in this encounter.       Electronically signed by:  Hollie Leo CNP

## 2022-03-23 ENCOUNTER — TELEPHONE (OUTPATIENT)
Dept: GASTROENTEROLOGY | Age: 52
End: 2022-03-23

## 2022-04-04 RX ORDER — FAMOTIDINE 20 MG/1
TABLET, FILM COATED ORAL
Qty: 240 TABLET | Refills: 0 | Status: SHIPPED | OUTPATIENT
Start: 2022-04-04 | End: 2022-08-25

## 2022-04-13 ENCOUNTER — OFFICE VISIT (OUTPATIENT)
Dept: ORTHOPEDIC SURGERY | Age: 52
End: 2022-04-13
Payer: MEDICARE

## 2022-04-13 DIAGNOSIS — M25.561 PAIN IN BOTH KNEES, UNSPECIFIED CHRONICITY: Primary | ICD-10-CM

## 2022-04-13 DIAGNOSIS — M25.562 PAIN IN BOTH KNEES, UNSPECIFIED CHRONICITY: Primary | ICD-10-CM

## 2022-04-13 PROCEDURE — 20610 DRAIN/INJ JOINT/BURSA W/O US: CPT | Performed by: ORTHOPAEDIC SURGERY

## 2022-04-13 RX ORDER — LIDOCAINE HYDROCHLORIDE 10 MG/ML
2 INJECTION, SOLUTION INFILTRATION; PERINEURAL ONCE
Status: COMPLETED | OUTPATIENT
Start: 2022-04-13 | End: 2022-04-13

## 2022-04-13 RX ORDER — BETAMETHASONE SODIUM PHOSPHATE AND BETAMETHASONE ACETATE 3; 3 MG/ML; MG/ML
12 INJECTION, SUSPENSION INTRA-ARTICULAR; INTRALESIONAL; INTRAMUSCULAR; SOFT TISSUE ONCE
Status: COMPLETED | OUTPATIENT
Start: 2022-04-13 | End: 2022-04-13

## 2022-04-13 RX ORDER — BUPIVACAINE HYDROCHLORIDE 5 MG/ML
2 INJECTION, SOLUTION PERINEURAL ONCE
Status: COMPLETED | OUTPATIENT
Start: 2022-04-13 | End: 2022-04-13

## 2022-04-13 RX ADMIN — BUPIVACAINE HYDROCHLORIDE 10 MG: 5 INJECTION, SOLUTION PERINEURAL at 14:08

## 2022-04-13 RX ADMIN — BETAMETHASONE SODIUM PHOSPHATE AND BETAMETHASONE ACETATE 12 MG: 3; 3 INJECTION, SUSPENSION INTRA-ARTICULAR; INTRALESIONAL; INTRAMUSCULAR; SOFT TISSUE at 14:07

## 2022-04-13 RX ADMIN — LIDOCAINE HYDROCHLORIDE 2 ML: 10 INJECTION, SOLUTION INFILTRATION; PERINEURAL at 14:09

## 2022-04-13 NOTE — PROGRESS NOTES
Izabela Bui M.D.            [facility] [address] [facility],[address], [address] [facility]           Dept Phone: [phone]           Dept Fax:  [address]           Jam Hernandez          Dept Phone: [phone]           Dept Fax:  [phone]      Chief Compliant:  Chief Complaint   Patient presents with    Pain     both knees        History of Present Illness: This is a 54 y.o. female who presents to the clinic today for evaluation / follow up of lateral knee pain. Patient is a 30-year-old female who was originally seen by Meche Lopez this past January and diagnosed with fairly significant degenerative joint disease of both knees. Patient had been seen by outside orthopedic surgeons prior to that time and she has had steroids as well as Visco shots in the past.  Patient had not been scheduled for arthroplasty as she had a extremely elevated BMI last which was 60.50 she did have gastric bypass surgery in November 2021 and she notes that she has had a 70 pound weight loss since that time. Patient states that she got some relief with the knee injections most recently this past January which work Celestone injections however this was somewhat short-lived. She does utilize a cane for everyday walking. She still in the weight management program.       Review of Systems   Constitutional: Negative for fever, chills, sweats. Eyes: Negative for changes in vision, or pain. HENT: Negative for ear ache, epistaxis, or sore throat. Respiratory/Cardio: Negative for Chest pain, palpitations, SOB, or cough. Gastrointestinal: Negative for abdominal pain, N/V/D. Genitourinary: Negative for dysuria, frequency, urgency, or hematuria. Neurological: Negative for headache, numbness, or weakness. Integumentary: Negative for rash, itching, laceration, or abrasion. Musculoskeletal: Positive for Pain (both knees)       Physical Exam:  Constitutional: Patient is oriented to person, place, and time. Patient appears well-developed and well nourished. HENT: Negative otherwise noted  Head: Normocephalic and Atraumatic  Nose: Normal  Eyes: Conjunctivae and EOM are normal  Neck: Normal range of motion Neck supple. Respiratory/Cardio: Effort normal. No respiratory distress. Musculoskeletal: Examination of the patient's right knee notes motion is 5 to about 105 degrees which she has difficulty getting flexion greater than. She does have crepitus and grinding no medially. Ligamentously is grossly intact good patellar tracking moderate patellar pain with compression. Examination the patient's left knee notes motion is 0 to 110 degrees with crepitus and grinding medially. She is ligamentously grossly intact. Some moderate patellofemoral pain with compression. Neurological: Patient is alert and oriented to person, place, and time. Normal strenght. No sensory deficit. Skin: Skin is warm and dry  Psychiatric: Behavior is normal. Thought content normal.  Nursing note and vitals reviewed. Labs and Imaging:     XR taken today: No x-rays were taken today however patient did have x-rays taken on 1/7/2022 which shows fairly severe degenerative joint disease both knees with bone-on-bone apposition medial compartment of both knees. No results found. Orders Placed This Encounter   Procedures    20610 - DRAIN/INJECT LARGE JOINT BURSA       Assessment and Plan:  1. Pain in both knees, unspecified chronicity    2. Significant degenerative joint disease both knees  3.       History of morbid obesity however is status post gastric sleeve November 2021 with reported 70+ pound weight loss since that    Administrations This Visit     betamethasone acetate-betamethasone sodium phosphate (CELESTONE) injection 12 mg     Admin Date  04/13/2022  14:07 Action  Given Dose  12 mg daily, Disp: 118 mL, Rfl: 2    ketoconazole (NIZORAL) 2 % shampoo, Apply 3-4 times weekly to scalp, leave on for five minutes prior to washing off, Disp: 120 mL, Rfl: 2    acetaminophen (TYLENOL) 500 MG tablet, Take 1,000 mg by mouth every 6 hours as needed for Pain , Disp: , Rfl:     escitalopram (LEXAPRO) 20 MG tablet, Take 20 mg by mouth daily, Disp: , Rfl:   Allergies   Allergen Reactions    No Known Allergies      Social History     Socioeconomic History    Marital status:      Spouse name: Not on file    Number of children: Not on file    Years of education: Not on file    Highest education level: Not on file   Occupational History    Not on file   Tobacco Use    Smoking status: Former Smoker     Packs/day: 0.60     Types: Cigarettes     Quit date:      Years since quittin.2    Smokeless tobacco: Never Used   Vaping Use    Vaping Use: Never used   Substance and Sexual Activity    Alcohol use: Yes     Comment: maybe once a month    Drug use: No    Sexual activity: Not on file   Other Topics Concern    Not on file   Social History Narrative    Not on file     Social Determinants of Health     Financial Resource Strain:     Difficulty of Paying Living Expenses: Not on file   Food Insecurity:     Worried About Running Out of Food in the Last Year: Not on file    Molly of Food in the Last Year: Not on file   Transportation Needs:     Lack of Transportation (Medical): Not on file    Lack of Transportation (Non-Medical):  Not on file   Physical Activity:     Days of Exercise per Week: Not on file    Minutes of Exercise per Session: Not on file   Stress:     Feeling of Stress : Not on file   Social Connections:     Frequency of Communication with Friends and Family: Not on file    Frequency of Social Gatherings with Friends and Family: Not on file    Attends Latter-day Services: Not on file    Active Member of Clubs or Organizations: Not on file    Attends Club or Organization Meetings: Not on file    Marital Status: Not on file   Intimate Partner Violence:     Fear of Current or Ex-Partner: Not on file    Emotionally Abused: Not on file    Physically Abused: Not on file    Sexually Abused: Not on file   Housing Stability:     Unable to Pay for Housing in the Last Year: Not on file    Number of [place] in the Last Year: Not on file    Unstable Housing in the Last Year: Not on file     Past Medical History:   Diagnosis Date    Allergic rhinitis     Anxiety     Anxiety and depression     Arthritis     Asthma     no longer using inhaler or nebulizer    Diverticulitis     Headache(784.0)     HTN (hypertension)     pt denies  not on meds    Obesity     Overactive bladder     Sleep apnea     uses machine nightly  cpap    Use of cane as ambulatory aid     Wellness examination 08/2021    Dr. Tevin Teague, his CNP's      Past Surgical History:   Procedure Laterality Date    CATARACT REMOVAL      CHOLECYSTECTOMY, LAPAROSCOPIC N/A 12/15/2020    CHOLECYSTECTOMY LAPAROSCOPIC ROBOTIC XI performed by Adelia Mccarthy MD at [address] [place] [address] N/A 08/05/2020    COLONOSCOPY POLYPECTOMY SNARE/COLD BIOPSY performed by Nichelle Larose MD at [address] N/A 04/08/2021    COLONOSCOPY WITH BIOPSY RANDOM RIGHT AND LEFT COLON performed by Nichelle Larose MD at [address] [place] [address]. [address] Bilateral     cataract removed with lens implants    NY KNEE SCOPE,DIAGNOSTIC Right 05/16/2017    RIGHT KNEE ARTHROSCOPY WITH MEDIAL MENISECTOMY AND CHONDROPLASTY performed by Gonsalo Royal DO at [facility]  11/17/2021    ROBOTIC GREGORY Echevarria-   [address] N/A 11/17/2021    XI ROBOTIC LAPAROSCOPIC GASTRECTOMY SLEEVE, EGD- GI UNIT SCHEDULED performed by Abida Herrera DO at [facility] N/A 04/08/2021    EGD BIOPSY OF GASTRIC AND GASTRIC POLYPECTOMY performed by Matthew Gerber MD at [address] [facility] History   Problem Relation Age of Onset    Asthma Mother     Parkinsonism Mother     Arthritis Father     Cancer Father    Plan  An informed verbal consent for the procedure was obtained and risks including, but not limited to: allergy to medications, injection, bleeding, stiffness of joint, recurrence of symptoms, loss of function, swelling, drainage, irrigation, need for surgery and pseudo-septic inflammation, were explained to the patient. Also, discussed was the potential for further injections, irrigation and debridement and surgery. Alternate means of treatment have also been discussed with the patient.       Administrations This Visit     betamethasone acetate-betamethasone sodium phosphate (CELESTONE) injection 12 mg     Admin Date  04/13/2022  14:07 Action  Given Dose  12 mg Route  Intra-artICUlar Site  Knee Right Administered By  Shahida Mccauley LPN    Ordering Provider: Ashley Manning MD    NDC: 6725-0264-34    Lot#: [id number]    : [employer]    Patient Supplied?: No    Admin Date  04/13/2022  14:07 Action  Given Dose  12 mg Route  Intra-artICUlar Site  Knee Left Administered By  Shahida Mccauley LPN    Ordering Provider: Ashley Manning MD    NDC: 6404-7721-38    Lot#: [id number]    : [employer]    Patient Supplied?: No          bupivacaine (MARCAINE) 0.5 % injection 10 mg     Admin Date  04/13/2022  14:08 Action  Given Dose  10 mg Route  Intra-artICUlar Site  Knee Right Administered By  Shahida Mccauley LPN    Ordering Provider: Ashley Manning MD    NDC: 2507-6001-56    Lot#: [id number]    : [address]    Patient Supplied?: No    Admin Date  04/13/2022  14:08 Action  Given Dose  10 mg Route  Intra-artICUlar Site  Knee Left Administered By  Shahida Mccauley LPN    Ordering Provider: Ashley Manning MD    [address]: 8011-7001-45    Lot#: [id number]    : [address]    Patient Supplied?: No lidocaine 1 % injection 2 mL     Admin Date  04/13/2022  14:09 Action  Given Dose  2 mL Route  Intra-artICUlar Site  Knee Right Administered By  Renetta Rm LPN    Ordering Provider: Jorge Petersen MD    NDC: 6160-5067-66    Lot#: [id number]    : Anthony Crabtree    Patient Supplied?: No    Admin Date  04/13/2022  14:09 Action  Given Dose  2 mL Route  Intra-artICUlar Site  Knee Left Administered By  Renetta Rm LPN    Ordering Provider: Jorge Petersen MD    NDC: 7010-7515-69    Lot#: [id number]    : Anthony Crabtree    Patient Supplied?: No            Under sterile conditions the patient's both knees were injected with lidocaine 2 cc bupivacaine 2 cc and Celestone 2 cc. She tolerated procedure well    Patient was reminded that she had be high risk for arthroplasty at this time and we ideally would like to get her BMI below 50 is preferably closer to 45. She does report with weight management program every 3 months and we will await their final determination of adequate weight loss as well as nutritional status. Provider Attestation:  Cecilia Prince, personally performed the services described in this documentation. All medical record entries made by the scribe were at my direction and in my presence. I have reviewed the chart and discharge instructions and agree that the records reflect my personal performance and is accurate and complete. Jorge Petersen MD. 04/13/22      Please note that this chart was generated using voice recognition Dragon dictation software. Although every effort was made to ensure the accuracy of this automated transcription, some errors in transcription may have occurred.

## 2022-04-15 ENCOUNTER — TELEPHONE (OUTPATIENT)
Dept: DERMATOLOGY | Age: 52
End: 2022-04-15

## 2022-04-15 DIAGNOSIS — L40.8 SEBOPSORIASIS: ICD-10-CM

## 2022-04-15 RX ORDER — TRIAMCINOLONE ACETONIDE 0.25 MG/G
CREAM TOPICAL
Qty: 80 G | Refills: 0 | Status: CANCELLED | OUTPATIENT
Start: 2022-04-15

## 2022-04-15 RX ORDER — KETOCONAZOLE 20 MG/ML
SHAMPOO TOPICAL
Qty: 120 ML | Refills: 0 | Status: CANCELLED | OUTPATIENT
Start: 2022-04-15

## 2022-04-28 ENCOUNTER — OFFICE VISIT (OUTPATIENT)
Dept: PODIATRY | Age: 52
End: 2022-04-28
Payer: MEDICARE

## 2022-04-28 VITALS — RESPIRATION RATE: 20 BRPM | WEIGHT: 293 LBS | HEIGHT: 64 IN | BODY MASS INDEX: 50.02 KG/M2

## 2022-04-28 DIAGNOSIS — L60.0 INGROWING NAIL: Primary | ICD-10-CM

## 2022-04-28 DIAGNOSIS — M79.671 PAIN IN BOTH FEET: ICD-10-CM

## 2022-04-28 DIAGNOSIS — M79.672 PAIN IN BOTH FEET: ICD-10-CM

## 2022-04-28 DIAGNOSIS — L60.0 OC (ONYCHOCRYPTOSIS): ICD-10-CM

## 2022-04-28 PROCEDURE — 99213 OFFICE O/P EST LOW 20 MIN: CPT | Performed by: PODIATRIST

## 2022-04-28 PROCEDURE — 11721 DEBRIDE NAIL 6 OR MORE: CPT | Performed by: PODIATRIST

## 2022-04-28 PROCEDURE — G8417 CALC BMI ABV UP PARAM F/U: HCPCS | Performed by: PODIATRIST

## 2022-04-28 PROCEDURE — 1036F TOBACCO NON-USER: CPT | Performed by: PODIATRIST

## 2022-04-28 PROCEDURE — G8427 DOCREV CUR MEDS BY ELIG CLIN: HCPCS | Performed by: PODIATRIST

## 2022-04-28 NOTE — PROGRESS NOTES
[address] [facility] PODIATRY [facility]  [address] [place] [address]  Dept: [phone]  Dept Fax: [phone]    RETURN PATIENT PROGRESS NOTE  Date of patient's visit: 4/28/2022  Patient's Name:  López Bustos YOB: 1970            Patient Care Team:  Usha Cramer MD as PCP - General (Family Medicine)  Aida Jean Baptiste MD as Consulting Physician (Gastroenterology)  Dheeraj Fisher DPM as Physician (Podiatry)       López Bustos 54 y.o. female that presents for follow-up of   Chief Complaint   Patient presents with    Ingrown Toenail     right great toe    Nail Problem     Pt's primary care physician is Usah Cramer MD last seen 2/25/2022  Symptoms began 2 month(s) ago and are increased . Patient relates pain is Present. Pain is rated 3 out of 10 and is described as mild. Treatments prior to today's visit include: trying to cut it back but it has not helped. Currently denies F/C/N/V. Allergies   Allergen Reactions    No Known Allergies        Past Medical History:   Diagnosis Date    Allergic rhinitis     Anxiety     Anxiety and depression     Arthritis     Asthma     no longer using inhaler or nebulizer    Diverticulitis     Headache(784.0)     HTN (hypertension)     pt denies  not on meds    Obesity     Overactive bladder     Sleep apnea     uses machine nightly  cpap    Use of cane as ambulatory aid     Wellness examination 08/2021    Dr. Desiree Suarez, his CNP's        Prior to Admission medications    Medication Sig Start Date End Date Taking?  Authorizing Provider   famotidine (PEPCID) 20 MG tablet take 1 tablet by mouth twice a day 4/4/22  Yes Aida Jean Baptiste MD   APLENZIN 174 MG TB24 take 1 tablet by mouth every morning 1/24/22  Yes Historical Provider, MD   simethicone (MYLICON) 80 MG chewable tablet Take 1 tablet by mouth 4 times daily as needed for Flatulence 1/5/22  Yes Aida Jean Baptiste MD dicyclomine (BENTYL) 20 MG tablet Take 1 tablet by mouth every 8 hours as needed (diarrhea) 1/5/22  Yes Bernabe Cox MD   Multiple Vitamins-Minerals (THERAPEUTIC MULTIVITAMIN-MINERALS) tablet Take 1 tablet by mouth daily   Yes Historical Provider, MD   ondansetron (ZOFRAN) 4 MG tablet Take 1 tablet by mouth every 8 hours as needed for Nausea or Vomiting 12/20/21  Yes JONATHAN Gómez CNP   triamcinolone (ARISTOCORT) 0.5 % cream apply A THIN FILM topically to affected area 2 TO 3 TIMES DAILY 6/3/21  Yes Historical Provider, MD   triamcinolone (KENALOG) 0.025 % cream Apply to rash on face and ears daily 2/24/21  Yes Lakisha Blank MD   Sulfacetamide Sodium, Acne, 10 % LOTN Apply to face daily 2/24/21  Yes Lakisha Blank MD   ketoconazole (NIZORAL) 2 % shampoo Apply 3-4 times weekly to scalp, leave on for five minutes prior to washing off 2/24/21  Yes Lakisha Blank MD   acetaminophen (TYLENOL) 500 MG tablet Take 1,000 mg by mouth every 6 hours as needed for Pain    Yes Historical Provider, MD   escitalopram (LEXAPRO) 20 MG tablet Take 20 mg by mouth daily   Yes Historical Provider, MD   calcium carbonate (OSCAL) 500 MG TABS tablet Take 500 mg by mouth daily  Patient not taking: Reported on 1/5/2022    Historical Provider, MD   fluticasone (FLONASE) 50 MCG/ACT nasal spray place 1 spray into each nostril once daily  Patient not taking: Reported on 1/5/2022 6/3/21   Historical Provider, MD       Review of Systems    Review of Systems:  History obtained from chart review and the patient  General ROS: negative for - chills, fatigue, fever, night sweats or weight gain  Constitutional: Negative for chills, diaphoresis, fatigue, fever and unexpected weight change. Musculoskeletal: Positive for arthralgias, gait problem and joint swelling. Neurological ROS: negative for - behavioral changes, confusion, headaches or seizures. Negative for weakness and numbness.    Dermatological ROS: negative for - mole changes, without apparent complications. Pt to round the edges of her right great toenail where a slant back procedre was done. If this continues to follow the same tract then the patient will need a chemical matriectomy . Verbal and written instructions given to patient. Contact office with any questions/problems/concerns. All labs were reviewed and all imagining including the above findings were reviewed PRIOR to the patients arrival and with the patient today. Previous patient encounter was reviewed. Encounters from the patients other medical providers were reviewed and noted. Time was spent educating the patient and their families/caregivers on proper care of the feet and ankles. All the above diagnosis were addressed at todays visit and all questions were answered. A total of 20 minutes was spent with this patients encounter which included charting after the patients visit    No orders of the defined types were placed in this encounter. No orders of the defined types were placed in this encounter. RTC in 3week(s).     4/28/2022      Electronically signed by Reed Franco DPM on 4/28/2022 at 3:38 PM  4/28/2022

## 2022-05-11 ENCOUNTER — NURSE ONLY (OUTPATIENT)
Dept: BARIATRICS/WEIGHT MGMT | Age: 52
End: 2022-05-11

## 2022-05-23 ENCOUNTER — OFFICE VISIT (OUTPATIENT)
Dept: DERMATOLOGY | Age: 52
End: 2022-05-23
Payer: MEDICARE

## 2022-05-23 VITALS
TEMPERATURE: 97.3 F | HEART RATE: 85 BPM | DIASTOLIC BLOOD PRESSURE: 66 MMHG | BODY MASS INDEX: 50.02 KG/M2 | OXYGEN SATURATION: 97 % | SYSTOLIC BLOOD PRESSURE: 92 MMHG | HEIGHT: 64 IN | WEIGHT: 293 LBS

## 2022-05-23 DIAGNOSIS — L71.9 ROSACEA: ICD-10-CM

## 2022-05-23 DIAGNOSIS — L40.8 SEBOPSORIASIS: Primary | ICD-10-CM

## 2022-05-23 PROCEDURE — 99213 OFFICE O/P EST LOW 20 MIN: CPT | Performed by: PHYSICIAN ASSISTANT

## 2022-05-23 PROCEDURE — G8417 CALC BMI ABV UP PARAM F/U: HCPCS | Performed by: PHYSICIAN ASSISTANT

## 2022-05-23 PROCEDURE — G8427 DOCREV CUR MEDS BY ELIG CLIN: HCPCS | Performed by: PHYSICIAN ASSISTANT

## 2022-05-23 PROCEDURE — 1036F TOBACCO NON-USER: CPT | Performed by: PHYSICIAN ASSISTANT

## 2022-05-23 RX ORDER — FLUOCINONIDE TOPICAL SOLUTION USP, 0.05% 0.5 MG/ML
SOLUTION TOPICAL
Qty: 60 ML | Refills: 2 | Status: SHIPPED | OUTPATIENT
Start: 2022-05-23

## 2022-05-23 RX ORDER — KETOCONAZOLE 20 MG/ML
SHAMPOO TOPICAL
Qty: 120 ML | Refills: 2 | Status: SHIPPED | OUTPATIENT
Start: 2022-05-23

## 2022-05-23 RX ORDER — GABAPENTIN 100 MG/1
CAPSULE ORAL
COMMUNITY
Start: 2022-04-28 | End: 2022-10-18

## 2022-05-23 RX ORDER — TRIAMCINOLONE ACETONIDE 0.25 MG/G
CREAM TOPICAL
Qty: 80 G | Refills: 2 | Status: SHIPPED | OUTPATIENT
Start: 2022-05-23

## 2022-05-23 NOTE — PROGRESS NOTES
(METROCREAM) 0.75 % cream Apply topically 2 times daily. 45 g 3    famotidine (PEPCID) 20 MG tablet take 1 tablet by mouth twice a day 240 tablet 0    APLENZIN 174 MG TB24 take 1 tablet by mouth every morning      simethicone (MYLICON) 80 MG chewable tablet Take 1 tablet by mouth 4 times daily as needed for Flatulence 180 tablet 3    dicyclomine (BENTYL) 20 MG tablet Take 1 tablet by mouth every 8 hours as needed (diarrhea) 120 tablet 0    Multiple Vitamins-Minerals (THERAPEUTIC MULTIVITAMIN-MINERALS) tablet Take 1 tablet by mouth daily      ondansetron (ZOFRAN) 4 MG tablet Take 1 tablet by mouth every 8 hours as needed for Nausea or Vomiting 30 tablet 2    Sulfacetamide Sodium, Acne, 10 % LOTN Apply to face daily 118 mL 2    acetaminophen (TYLENOL) 500 MG tablet Take 1,000 mg by mouth every 6 hours as needed for Pain       escitalopram (LEXAPRO) 20 MG tablet Take 20 mg by mouth daily       Current Facility-Administered Medications   Medication Dose Route Frequency Provider Last Rate Last Admin    albuterol (PROVENTIL) nebulizer solution 2.5 mg  2.5 mg Nebulization 4x daily Yokasta Montana MD           ALLERGIES:   Allergies   Allergen Reactions    No Known Allergies        SOCIAL HISTORY:  Social History     Tobacco Use    Smoking status: Former Smoker     Packs/day: 0.60     Types: Cigarettes     Quit date: 2017     Years since quittin.3    Smokeless tobacco: Never Used   Substance Use Topics    Alcohol use: Yes     Comment: maybe once a month       Pertinent ROS:  Review of Systems  Skin: Denies any new changing, growing or bleeding lesions or rashes except as described in the HPI   Constitutional: Denies fevers, chills, and malaise.     PHYSICAL EXAM:   BP 92/66   Pulse 85   Temp 97.3 °F (36.3 °C)   Ht 5' 4\" (1.626 m)   Wt (!) 334 lb (151.5 kg)   SpO2 97%   BMI 57.33 kg/m²     The patient is generally well appearing, well nourished, alert and conversational. Affect is normal.    Cutaneous Exam:  Physical Exam  Face and neck only was examined. Facial covering was removed during examination. Diagnoses/exam findings/medical history pertinent to this visit are listed below:    Assessment:   Diagnosis Orders   1. Sebopsoriasis  ketoconazole (NIZORAL) 2 % shampoo    triamcinolone (KENALOG) 0.025 % cream    fluocinonide (LIDEX) 0.05 % external solution   2. Rosacea  metroNIDAZOLE (METROCREAM) 0.75 % cream        Plan:  1. Sebopsoriasis  - stable chronic illness  - ketoconazole (NIZORAL) 2 % shampoo; Apply 3-4 times weekly to scalp, leave on for five minutes prior to washing off  Dispense: 120 mL; Refill: 2  - triamcinolone (KENALOG) 0.025 % cream; Apply to rash on face and ears twice daily, as needed, for scaling  Dispense: 80 g; Refill: 2  - fluocinonide (LIDEX) 0.05 % external solution; Apply to scalp twice daily, as needed, for itching  Dispense: 60 mL; Refill: 2    2. Rosacea  - chronic illness, responding to treatment but not yet at goal  - metroNIDAZOLE (METROCREAM) 0.75 % cream; Apply topically 2 times daily. Dispense: 45 g; Refill: 3      RTC 1Y    Future Appointments   Date Time Provider [facility]   5/24/2022 11:30 AM JONATHAN Roe - CNP Weight Mgmt [facility]   6/2/2022  2:00 PM Rhoda Apley, MD [address]   7/28/2022  1:15 PM Bharati Cadet DPM [facility]   5/23/2023 10:00 AM Placido Evangelista PA-C  derm [facility]         There are no Patient Instructions on file for this visit.       Electronically signed by Placido Evangelista PA-C on 5/23/22 at 8:40 AM EDT

## 2022-05-24 ENCOUNTER — OFFICE VISIT (OUTPATIENT)
Dept: BARIATRICS/WEIGHT MGMT | Age: 52
End: 2022-05-24
Payer: MEDICARE

## 2022-05-24 VITALS
HEIGHT: 65 IN | SYSTOLIC BLOOD PRESSURE: 120 MMHG | WEIGHT: 293 LBS | HEART RATE: 67 BPM | BODY MASS INDEX: 48.82 KG/M2 | DIASTOLIC BLOOD PRESSURE: 84 MMHG

## 2022-05-24 DIAGNOSIS — M25.561 BILATERAL CHRONIC KNEE PAIN: ICD-10-CM

## 2022-05-24 DIAGNOSIS — G89.29 BILATERAL CHRONIC KNEE PAIN: ICD-10-CM

## 2022-05-24 DIAGNOSIS — Z98.84 S/P LAPAROSCOPIC SLEEVE GASTRECTOMY: ICD-10-CM

## 2022-05-24 DIAGNOSIS — L40.9 PSORIASIS: ICD-10-CM

## 2022-05-24 DIAGNOSIS — J45.909 UNCOMPLICATED ASTHMA, UNSPECIFIED ASTHMA SEVERITY, UNSPECIFIED WHETHER PERSISTENT: ICD-10-CM

## 2022-05-24 DIAGNOSIS — G47.33 OSA (OBSTRUCTIVE SLEEP APNEA): Primary | ICD-10-CM

## 2022-05-24 DIAGNOSIS — M25.562 BILATERAL CHRONIC KNEE PAIN: ICD-10-CM

## 2022-05-24 DIAGNOSIS — E66.01 MORBID OBESITY WITH BMI OF 50.0-59.9, ADULT (HCC): ICD-10-CM

## 2022-05-24 PROCEDURE — 99213 OFFICE O/P EST LOW 20 MIN: CPT | Performed by: NURSE PRACTITIONER

## 2022-05-24 PROCEDURE — G8417 CALC BMI ABV UP PARAM F/U: HCPCS | Performed by: NURSE PRACTITIONER

## 2022-05-24 PROCEDURE — 1036F TOBACCO NON-USER: CPT | Performed by: NURSE PRACTITIONER

## 2022-05-24 PROCEDURE — G8427 DOCREV CUR MEDS BY ELIG CLIN: HCPCS | Performed by: NURSE PRACTITIONER

## 2022-05-24 NOTE — PROGRESS NOTES
Post-op Bariatric Surgery Note    Subjective     Patient is 6 months s/p laparoscopic sleeve gastrectomy, down 76 lbs. Overall, doing well. Incisions well healed. Consistent use of MVI and calcium. Tolerating po intake. Bowel function normal.  Physical activity includes walking. Lymphedema improving. No pain or other specific problems or concerns. Has not had labs drawn which were ordered previously. Allergies: Allergies   Allergen Reactions    No Known Allergies        Past Medical History:     Past Medical History:   Diagnosis Date    Allergic rhinitis     Anxiety     Anxiety and depression     Arthritis     Asthma     no longer using inhaler or nebulizer    Diverticulitis     Headache(784.0)     HTN (hypertension)     pt denies  not on meds    Obesity     Overactive bladder     Sleep apnea     uses machine nightly  cpap    Use of cane as ambulatory aid     Wellness examination 08/2021    Dr. Arley Farley, his CNP's    .     Past Surgical History:  Past Surgical History:   Procedure Laterality Date    CATARACT REMOVAL      CHOLECYSTECTOMY, LAPAROSCOPIC N/A 12/15/2020    CHOLECYSTECTOMY LAPAROSCOPIC ROBOTIC XI performed by Paramjit Lane MD at [facility] N/A 08/05/2020    COLONOSCOPY POLYPECTOMY SNARE/COLD BIOPSY performed by Filipe Mathews MD at [facility] N/A 04/08/2021    COLONOSCOPY WITH BIOPSY RANDOM RIGHT AND LEFT COLON performed by Filipe Mathews MD at [address] [place] [address]. [address] Bilateral     cataract removed with lens implants    WA KNEE SCOPE,DIAGNOSTIC Right 05/16/2017    RIGHT KNEE ARTHROSCOPY WITH MEDIAL MENISECTOMY AND CHONDROPLASTY performed by Dottie Ayon DO at [facility]  11/17/2021    ROBOTIC [place], EGD-    SLEEVE GASTRECTOMY N/A 11/17/2021    XI ROBOTIC LAPAROSCOPIC GASTRECTOMY SLEEVE, EGD- GI UNIT SCHEDULED performed by Levi Sanchez DO at [facility] UPPER GASTROINTESTINAL ENDOSCOPY N/A 2021    EGD BIOPSY OF GASTRIC AND GASTRIC POLYPECTOMY performed by Unique Puente MD at [address]       Family History:  Family History   Problem Relation Age of Onset    Asthma Mother     Parkinsonism Mother     Arthritis Father     Cancer Father        Social History:  Social History     Socioeconomic History    Marital status:      Spouse name: Not on file    Number of children: Not on file    Years of education: Not on file    Highest education level: Not on file   Occupational History    Not on file   Tobacco Use    Smoking status: Former Smoker     Packs/day: 0.60     Types: Cigarettes     Quit date:      Years since quittin.3    Smokeless tobacco: Never Used   Vaping Use    Vaping Use: Never used   Substance and Sexual Activity    Alcohol use: Yes     Comment: maybe once a month    Drug use: No    Sexual activity: Not on file   Other Topics Concern    Not on file   Social History Narrative    Not on file     Social Determinants of Health     Financial Resource Strain:     Difficulty of Paying Living Expenses: Not on file   Food Insecurity:     Worried About [employer] in the Last Year: Not on file    Molly of Food in the Last Year: Not on file   Transportation Needs:     Lack of Transportation (Medical): Not on file    Lack of Transportation (Non-Medical):  Not on file   Physical Activity:     Days of Exercise per Week: Not on file    Minutes of Exercise per Session: Not on file   Stress:     Feeling of Stress : Not on file   Social Connections:     Frequency of Communication with Friends and Family: Not on file    Frequency of Social Gatherings with Friends and Family: Not on file    Attends Voodoo Services: Not on file    Active Member of Clubs or Organizations: Not on file    Attends Club or Organization Meetings: Not on file    Marital Status: Not on file   Intimate Partner Violence:     Fear of Current or Ex-Partner: Not on file    Emotionally Abused: Not on file    Physically Abused: Not on file    Sexually Abused: Not on file   Housing Stability:     Unable to Pay for Housing in the Last Year: Not on file    Number of Places Lived in the Last Year: Not on file    Unstable Housing in the Last Year: Not on file       Current Medications:  Current Outpatient Medications   Medication Sig Dispense Refill    gabapentin (NEURONTIN) 100 MG capsule take 2 capsules by mouth twice a day      ketoconazole (NIZORAL) 2 % shampoo Apply 3-4 times weekly to scalp, leave on for five minutes prior to washing off 120 mL 2    triamcinolone (KENALOG) 0.025 % cream Apply to rash on face and ears twice daily, as needed, for scaling 80 g 2    fluocinonide (LIDEX) 0.05 % external solution Apply to scalp twice daily, as needed, for itching 60 mL 2    metroNIDAZOLE (METROCREAM) 0.75 % cream Apply topically 2 times daily.  45 g 3    famotidine (PEPCID) 20 MG tablet take 1 tablet by mouth twice a day 240 tablet 0    APLENZIN 174 MG TB24 take 1 tablet by mouth every morning      simethicone (MYLICON) 80 MG chewable tablet Take 1 tablet by mouth 4 times daily as needed for Flatulence 180 tablet 3    dicyclomine (BENTYL) 20 MG tablet Take 1 tablet by mouth every 8 hours as needed (diarrhea) 120 tablet 0    Multiple Vitamins-Minerals (THERAPEUTIC MULTIVITAMIN-MINERALS) tablet Take 1 tablet by mouth daily      ondansetron (ZOFRAN) 4 MG tablet Take 1 tablet by mouth every 8 hours as needed for Nausea or Vomiting 30 tablet 2    Sulfacetamide Sodium, Acne, 10 % LOTN Apply to face daily 118 mL 2    acetaminophen (TYLENOL) 500 MG tablet Take 1,000 mg by mouth every 6 hours as needed for Pain       escitalopram (LEXAPRO) 20 MG tablet Take 20 mg by mouth daily       Current Facility-Administered Medications   Medication Dose Route Frequency Provider Last Rate Last Admin    albuterol (PROVENTIL) nebulizer solution 2.5 mg  2.5 mg Nebulization 4x daily Alecia Prieto MD           Vital Signs:  /84 (Site: Right Upper Arm, Position: Sitting, Cuff Size: Large Adult)   Pulse 67   Ht 5' 4.5\" (1.638 m)   Wt (!) 331 lb (150.1 kg)   BMI 55.94 kg/m²     BMI/Height/Weight:  Body mass index is 55.94 kg/m². Review of Systems - A review of systems was performed. All was negative unless otherwise documented in HPI. Constitutional: Negative for fever, chills and diaphoresis. HENT: Negative for hearing loss and trouble swallowing. Eyes: Negative for photophobia and visual disturbance. Respiratory: Negative for cough, shortness of breath and wheezing. Cardiovascular: Negative for chest pain and palpitations. Gastrointestinal: Negative for nausea, vomiting, abdominal pain, diarrhea, constipation, blood in stool and abdominal distention. Endocrine: Negative for polydipsia, polyphagia and polyuria. Genitourinary: Negative for dysuria, frequency, hematuria and difficulty urinating. Musculoskeletal: Negative for myalgias, joint swelling. Skin: Negative for pallor and rash. Neurological: Negative for dizziness, tremors, light-headedness and headaches. Psychiatric/Behavioral: Negative for sleep disturbance and dysphoric mood. Objective:      Physical Exam   Vital signs reviewed. General: Well-developed and well-nourished. No acute distress. Skin: Warm, dry and intact. HEENT: Normocephalic. EOMs intact. Conjunctivae normal. Neck supple. Cardiovascular: Normal rate, regular rhythm. Pulmonary/Chest: Normal effort. Lungs clear to auscultation. No rales, rhonchi or wheezing. Abdominal: Positive bowel sounds. Soft, nontender. Nondistended. No rigidity, rebound, or guarding. Musculoskeletal: Movement x4. Lymphedema bilaterally. Neurological: Gait steady. Alert and oriented to person, place, and time. Psychiatric: Normal mood and affect.  Speech and behavior normal. Judgment and thought content normal. Cognition and memory intact. Assessment:       Diagnosis Orders   1. DENISHA (obstructive sleep apnea)     2. Uncomplicated asthma, unspecified asthma severity, unspecified whether persistent     3. Psoriasis     4. Bilateral chronic knee pain     5. S/P laparoscopic sleeve gastrectomy     6. Morbid obesity with BMI of 50.0-59.9, adult [facility])         Plan:    Dietitian visit today. Patient to continue to increase protein to obtain 60-80g/day, at least 48-64oz of fluid daily, and gradually increase exercise regimen. Reminded to have labs drawn as previously ordered. Follow-up  Return in about 3 months (around 8/24/2022). Orders this encounter:  No orders of the defined types were placed in this encounter. Prescriptions this encounter:  No orders of the defined types were placed in this encounter.       Electronically signed by:  Day Goel CNP

## 2022-05-24 NOTE — PROGRESS NOTES
Medical Nutrition Therapy  Metabolic and Bariatric surgery  6 month follow up note         Pt reports:         Vitals:   Vitals:    05/24/22 1151   BP: 120/84   Site: Right Upper Arm   Position: Sitting   Cuff Size: Large Adult   Pulse: 67   Weight: (!) 331 lb (150.1 kg)   Height: 5' 4.5\" (1.638 m)      Body mass index is 55.94 kg/m². Labs reviewed:     Multivitamin/mineral intake:one daily  Calcium intake:2 daily              Nutrition Assessment:   PES: Inadequate food and beverage intake r/t WLS as evidenced by loss of excess body weight 76lb. Goals   60-80gm of protein  48-64oz of fluid  Vitamin adherance  Basic adherance to WLS behavious and this document has been scanned into the chart.        [x] met     []  Not met        Plan:   F/u 9 months after surgery         Lorena Lucas RD, LD

## 2022-05-25 ENCOUNTER — HOSPITAL ENCOUNTER (OUTPATIENT)
Age: 52
Discharge: HOME | End: 2022-05-25
Payer: MEDICARE

## 2022-05-25 DIAGNOSIS — M25.561 BILATERAL CHRONIC KNEE PAIN: ICD-10-CM

## 2022-05-25 DIAGNOSIS — E66.01 MORBID OBESITY WITH BMI OF 60.0-69.9, ADULT (HCC): ICD-10-CM

## 2022-05-25 DIAGNOSIS — L40.9 PSORIASIS: ICD-10-CM

## 2022-05-25 DIAGNOSIS — M25.562 BILATERAL CHRONIC KNEE PAIN: ICD-10-CM

## 2022-05-25 DIAGNOSIS — E55.9 VITAMIN D DEFICIENCY: ICD-10-CM

## 2022-05-25 DIAGNOSIS — F41.9 ANXIETY AND DEPRESSION: ICD-10-CM

## 2022-05-25 DIAGNOSIS — E53.8 LOW VITAMIN B12 LEVEL: Primary | ICD-10-CM

## 2022-05-25 DIAGNOSIS — Z98.84 S/P LAPAROSCOPIC SLEEVE GASTRECTOMY: ICD-10-CM

## 2022-05-25 DIAGNOSIS — G89.29 BILATERAL CHRONIC KNEE PAIN: ICD-10-CM

## 2022-05-25 DIAGNOSIS — E53.8 LOW FOLATE: ICD-10-CM

## 2022-05-25 DIAGNOSIS — G47.33 OSA (OBSTRUCTIVE SLEEP APNEA): ICD-10-CM

## 2022-05-25 DIAGNOSIS — J45.909 UNCOMPLICATED ASTHMA, UNSPECIFIED ASTHMA SEVERITY, UNSPECIFIED WHETHER PERSISTENT: ICD-10-CM

## 2022-05-25 DIAGNOSIS — F32.A ANXIETY AND DEPRESSION: ICD-10-CM

## 2022-05-25 LAB
ABSOLUTE EOS #: 0.09 K/UL (ref 0–0.44)
ABSOLUTE IMMATURE GRANULOCYTE: <0.03 K/UL (ref 0–0.3)
ABSOLUTE LYMPH #: 2.04 K/UL (ref 1.1–3.7)
ABSOLUTE MONO #: 0.38 K/UL (ref 0.1–1.2)
ALBUMIN SERPL-MCNC: 3.7 G/DL (ref 3.5–5.2)
ALBUMIN/GLOBULIN RATIO: 1.2 (ref 1–2.5)
ALP BLD-CCNC: 83 U/L (ref 35–104)
ALT SERPL-CCNC: 9 U/L (ref 5–33)
ANION GAP SERPL CALCULATED.3IONS-SCNC: 11 MMOL/L (ref 9–17)
AST SERPL-CCNC: 13 U/L
BASOPHILS # BLD: 1 % (ref 0–2)
BASOPHILS ABSOLUTE: 0.03 K/UL (ref 0–0.2)
BILIRUB SERPL-MCNC: 0.74 MG/DL (ref 0.3–1.2)
BUN BLDV-MCNC: 12 MG/DL (ref 6–20)
CALCIUM SERPL-MCNC: 9.1 MG/DL (ref 8.6–10.4)
CHLORIDE BLD-SCNC: 104 MMOL/L (ref 98–107)
CHOLESTEROL/HDL RATIO: 3.4
CHOLESTEROL: 161 MG/DL
CO2: 25 MMOL/L (ref 20–31)
CREAT SERPL-MCNC: 0.92 MG/DL (ref 0.5–0.9)
EOSINOPHILS RELATIVE PERCENT: 2 % (ref 1–4)
ESTIMATED AVERAGE GLUCOSE: 103 MG/DL
FOLATE: 3.4 NG/ML
GFR AFRICAN AMERICAN: >60 ML/MIN
GFR NON-AFRICAN AMERICAN: >60 ML/MIN
GFR SERPL CREATININE-BSD FRML MDRD: ABNORMAL ML/MIN/{1.73_M2}
GLUCOSE BLD-MCNC: 77 MG/DL (ref 70–99)
HBA1C MFR BLD: 5.2 % (ref 4–6)
HCT VFR BLD CALC: 36.2 % (ref 36.3–47.1)
HDLC SERPL-MCNC: 47 MG/DL
HEMOGLOBIN: 11.7 G/DL (ref 11.9–15.1)
IMMATURE GRANULOCYTES: 0 %
IRON SATURATION: 20 % (ref 20–55)
IRON: 69 UG/DL (ref 37–145)
LDL CHOLESTEROL: 95 MG/DL (ref 0–130)
LYMPHOCYTES # BLD: 41 % (ref 24–43)
MAGNESIUM: 2 MG/DL (ref 1.6–2.6)
MCH RBC QN AUTO: 30.1 PG (ref 25.2–33.5)
MCHC RBC AUTO-ENTMCNC: 32.3 G/DL (ref 28.4–34.8)
MCV RBC AUTO: 93.1 FL (ref 82.6–102.9)
MONOCYTES # BLD: 8 % (ref 3–12)
NRBC AUTOMATED: 0 PER 100 WBC
PDW BLD-RTO: 12.4 % (ref 11.8–14.4)
PLATELET # BLD: 211 K/UL (ref 138–453)
PMV BLD AUTO: 9.9 FL (ref 8.1–13.5)
POTASSIUM SERPL-SCNC: 4 MMOL/L (ref 3.7–5.3)
PTH INTACT: 59.41 PG/ML (ref 15–65)
RBC # BLD: 3.89 M/UL (ref 3.95–5.11)
SEG NEUTROPHILS: 48 % (ref 36–65)
SEGMENTED NEUTROPHILS ABSOLUTE COUNT: 2.44 K/UL (ref 1.5–8.1)
SODIUM BLD-SCNC: 140 MMOL/L (ref 135–144)
TOTAL IRON BINDING CAPACITY: 338 UG/DL (ref 250–450)
TOTAL PROTEIN: 6.7 G/DL (ref 6.4–8.3)
TRIGL SERPL-MCNC: 96 MG/DL
TSH SERPL DL<=0.05 MIU/L-ACNC: 0.59 UIU/ML (ref 0.3–5)
UNSATURATED IRON BINDING CAPACITY: 269 UG/DL (ref 112–347)
VITAMIN B-12: 230 PG/ML (ref 232–1245)
VITAMIN D 25-HYDROXY: 18.6 NG/ML
WBC # BLD: 5 K/UL (ref 3.5–11.3)

## 2022-05-25 PROCEDURE — 83540 ASSAY OF IRON: CPT

## 2022-05-25 PROCEDURE — 84425 ASSAY OF VITAMIN B-1: CPT

## 2022-05-25 PROCEDURE — 84630 ASSAY OF ZINC: CPT

## 2022-05-25 PROCEDURE — 83036 HEMOGLOBIN GLYCOSYLATED A1C: CPT

## 2022-05-25 PROCEDURE — 83550 IRON BINDING TEST: CPT

## 2022-05-25 PROCEDURE — 83970 ASSAY OF PARATHORMONE: CPT

## 2022-05-25 PROCEDURE — 82607 VITAMIN B-12: CPT

## 2022-05-25 PROCEDURE — 84443 ASSAY THYROID STIM HORMONE: CPT

## 2022-05-25 PROCEDURE — 82746 ASSAY OF FOLIC ACID SERUM: CPT

## 2022-05-25 PROCEDURE — 80053 COMPREHEN METABOLIC PANEL: CPT

## 2022-05-25 PROCEDURE — 83735 ASSAY OF MAGNESIUM: CPT

## 2022-05-25 PROCEDURE — 80061 LIPID PANEL: CPT

## 2022-05-25 PROCEDURE — 84590 ASSAY OF VITAMIN A: CPT

## 2022-05-25 PROCEDURE — 36415 COLL VENOUS BLD VENIPUNCTURE: CPT

## 2022-05-25 PROCEDURE — 85025 COMPLETE CBC W/AUTO DIFF WBC: CPT

## 2022-05-25 PROCEDURE — 82306 VITAMIN D 25 HYDROXY: CPT

## 2022-05-25 RX ORDER — ERGOCALCIFEROL 1.25 MG/1
50000 CAPSULE ORAL WEEKLY
Qty: 8 CAPSULE | Refills: 0 | Status: SHIPPED | OUTPATIENT
Start: 2022-05-25 | End: 2022-11-01

## 2022-05-25 RX ORDER — CHOLECALCIFEROL (VITAMIN D3) 125 MCG
500 CAPSULE ORAL DAILY
Qty: 30 TABLET | Refills: 11 | Status: SHIPPED | OUTPATIENT
Start: 2022-05-25 | End: 2023-05-25

## 2022-05-25 RX ORDER — FOLIC ACID 1 MG/1
1 TABLET ORAL DAILY
Qty: 30 TABLET | Refills: 0 | Status: SHIPPED | OUTPATIENT
Start: 2022-05-25

## 2022-05-28 LAB
RETINYL PALMITATE: <0.02 MG/L (ref 0–0.1)
VITAMIN A LEVEL: 0.48 MG/L (ref 0.3–1.2)
VITAMIN A, INTERP: NORMAL

## 2022-05-29 LAB — ZINC: 61.2 UG/DL (ref 60–120)

## 2022-05-30 LAB — VITAMIN B1 WHOLE BLOOD: 69 NMOL/L (ref 70–180)

## 2022-06-17 DIAGNOSIS — E55.9 VITAMIN D DEFICIENCY: ICD-10-CM

## 2022-06-17 DIAGNOSIS — E53.8 LOW VITAMIN B12 LEVEL: ICD-10-CM

## 2022-06-17 DIAGNOSIS — E53.8 LOW FOLATE: ICD-10-CM

## 2022-06-20 RX ORDER — FOLIC ACID 1 MG/1
TABLET ORAL
Qty: 30 TABLET | Refills: 0 | OUTPATIENT
Start: 2022-06-20

## 2022-08-23 ENCOUNTER — OFFICE VISIT (OUTPATIENT)
Dept: PODIATRY | Age: 52
End: 2022-08-23
Payer: MEDICARE

## 2022-08-23 VITALS — HEIGHT: 64 IN | WEIGHT: 293 LBS | BODY MASS INDEX: 50.02 KG/M2

## 2022-08-23 DIAGNOSIS — L60.0 INGROWING NAIL: ICD-10-CM

## 2022-08-23 DIAGNOSIS — M79.672 PAIN IN BOTH FEET: ICD-10-CM

## 2022-08-23 DIAGNOSIS — M79.671 PAIN IN BOTH FEET: ICD-10-CM

## 2022-08-23 DIAGNOSIS — I73.9 PVD (PERIPHERAL VASCULAR DISEASE) (HCC): ICD-10-CM

## 2022-08-23 DIAGNOSIS — L85.3 XEROSIS OF SKIN: ICD-10-CM

## 2022-08-23 DIAGNOSIS — B35.1 DERMATOPHYTOSIS OF NAIL: Primary | ICD-10-CM

## 2022-08-23 PROCEDURE — G8417 CALC BMI ABV UP PARAM F/U: HCPCS | Performed by: PODIATRIST

## 2022-08-23 PROCEDURE — 11721 DEBRIDE NAIL 6 OR MORE: CPT | Performed by: PODIATRIST

## 2022-08-23 PROCEDURE — G8427 DOCREV CUR MEDS BY ELIG CLIN: HCPCS | Performed by: PODIATRIST

## 2022-08-23 PROCEDURE — 99213 OFFICE O/P EST LOW 20 MIN: CPT | Performed by: PODIATRIST

## 2022-08-23 PROCEDURE — 1036F TOBACCO NON-USER: CPT | Performed by: PODIATRIST

## 2022-08-23 NOTE — PROGRESS NOTES
[facility] PODIATRY [facility]  [address] [address] [address]  Dept: [phone]  Dept Fax: [phone]     PAIN PROGRESS NOTE  Date of patient's visit: 8/23/2022  Patient's Name:  Maxx Solorio YOB: 1970            Patient Care Team:  Adriano Holley MD as PCP - General (Family Medicine)  Curtis Novak MD as Consulting Physician (Gastroenterology)  Juan Smart DPM as Physician (Podiatry)      Chief Complaint   Patient presents with    Foot Pain     Bilateral feet    Nail Problem     Toenail trim       Subjective: This Maxx Solorio comes to clinic for foot and nail care. Pt currently has complaint of thickened, painful, elongated nails that he/she cannot manage by themselves. Pt. Relates pain to nails with shoe gear. Pt's primary care physician is Adriano Holley MD last seen 02/25/2022. Pt has a new complaint of dry scaly skin to the bottom of both of the feet. Pt states they have tried OTC cream but it isnt applied regularly.   Pt has tried changing shoes but it has not helped the pain         Past Medical History:   Diagnosis Date    Allergic rhinitis     Anxiety     Anxiety and depression     Arthritis     Asthma     no longer using inhaler or nebulizer    Diverticulitis     Headache(784.0)     HTN (hypertension)     pt denies  not on meds    Obesity     Overactive bladder     Sleep apnea     uses machine nightly  cpap    Use of cane as ambulatory aid     Wellness examination 08/2021    Dr. Bharati Cat, his CNP's        Allergies   Allergen Reactions    No Known Allergies      Current Outpatient Medications on File Prior to Visit   Medication Sig Dispense Refill    vitamin B-12 (CYANOCOBALAMIN) 500 MCG tablet Take 1 tablet by mouth daily 30 tablet 11    folic acid (FOLVITE) 1 MG tablet Take 1 tablet by mouth daily 30 tablet 0    gabapentin (NEURONTIN) 100 MG capsule take 2 capsules by mouth twice a day      ketoconazole (NIZORAL) 2 % shampoo Apply 3-4 times weekly to scalp, leave on for five minutes prior to washing off 120 mL 2    triamcinolone (KENALOG) 0.025 % cream Apply to rash on face and ears twice daily, as needed, for scaling 80 g 2    fluocinonide (LIDEX) 0.05 % external solution Apply to scalp twice daily, as needed, for itching 60 mL 2    metroNIDAZOLE (METROCREAM) 0.75 % cream Apply topically 2 times daily. 45 g 3    famotidine (PEPCID) 20 MG tablet take 1 tablet by mouth twice a day 240 tablet 0    APLENZIN 174 MG TB24 take 1 tablet by mouth every morning      simethicone (MYLICON) 80 MG chewable tablet Take 1 tablet by mouth 4 times daily as needed for Flatulence 180 tablet 3    dicyclomine (BENTYL) 20 MG tablet Take 1 tablet by mouth every 8 hours as needed (diarrhea) 120 tablet 0    Multiple Vitamins-Minerals (THERAPEUTIC MULTIVITAMIN-MINERALS) tablet Take 1 tablet by mouth daily      ondansetron (ZOFRAN) 4 MG tablet Take 1 tablet by mouth every 8 hours as needed for Nausea or Vomiting 30 tablet 2    Sulfacetamide Sodium, Acne, 10 % LOTN Apply to face daily 118 mL 2    acetaminophen (TYLENOL) 500 MG tablet Take 1,000 mg by mouth every 6 hours as needed for Pain       escitalopram (LEXAPRO) 20 MG tablet Take 20 mg by mouth daily      vitamin D (ERGOCALCIFEROL) 1.25 MG (84908 UT) CAPS capsule Take 1 capsule by mouth once a week for 8 doses 8 capsule 0     Current Facility-Administered Medications on File Prior to Visit   Medication Dose Route Frequency Provider Last Rate Last Admin    albuterol (PROVENTIL) nebulizer solution 2.5 mg  2.5 mg Nebulization 4x daily Damaris Plasencia MD         Review of Systems. Review of Systems:   History obtained from chart review and the patient  General ROS: negative for - chills, fatigue, fever, night sweats or weight gain  Constitutional: Negative for chills, diaphoresis, fatigue, fever and unexpected weight change.   Musculoskeletal: Positive for arthralgias, gait problem and joint swelling. Neurological ROS: negative for - behavioral changes, confusion, headaches or seizures. Negative for weakness and numbness. Dermatological ROS: negative for - mole changes, rash  Cardiovascular: Negative for leg swelling. Gastrointestinal: Negative for constipation, diarrhea, nausea and vomiting. Objective:  Dermatologic Exam:     Dry scaly skin noted to the plantar surface of the right and left foot. Small superficial fissuring of the skin noted to the plantar heel bilateral    Skin is thin, with flaky sloughing skin as well as decreased hair growth to the lower leg  Small red hemosiderin deposits seen dorsal foot   Musculoskeletal:     1st MPJ ROM decreased, Bilateral.  Muscle strength 5/5, Bilateral.  Pain present upon palpation of toenails 1-5, Bilateral. decreased medial longitudinal arch, Bilateral.  Ankle ROM decreased,Bilateral.    Dorsally contracted digits present digits 2, Bilateral.     Vascular: DP pulses 1/4 bilateral.  PT pulses 0/4 bilateral.   CFT <5 seconds, Bilateral.  Hair growth absent to the level of the digits, Bilateral.  Edema present, Bilateral.  Varicosities absent, Bilateral. Erythema absent, Bilateral    Neurological: Sensation diminshed to light touch to level of digits, Bilateral.  Protective sensation intact 6/10 sites via 5.07/10g [place] Monofilament, Bilateral.  negative Tinel's, Bilateral.  negative Valleix sign, Bilateral.      Integument: Warm, dry, supple, Bilateral.  Open lesion absent, Bilateral.  Interdigital maceration absent to web spaces 4, Bilateral.  Nails 1-5 left and 1-5 right thickened > 3.0 mm, dystrophic and crumbly, discolored with subungual debris. Fissures absent, Bilateral.   General: AAO x 3 in NAD.     Derm  Toenail Description  Sites of Onychomycosis Involvement (Check affected area)  [x] [x] [x] [x] [x] [x] [x] [x] [x] [x]  5 4 3 2 1 1 2 3 4 5                          Right Left    Thickness  [x] [x] [x] [x] [x] [x] [x] [x] [x] [x]  5 4 3 2 1 1 2 3 4 5                         Right                                        Left    Dystrophic Changes   [x] [x] [x] [x] [x] [x] [x] [x] [x] [x]  5 4 3 2 1 1 2 3 4 5                         Right                                        Left    Color  [x] [x] [x] [x] [x] [x] [x] [x] [x] [x]  5 4 3 2 1 1 2 3 4 5                          Right                                        Left    Incurvation/Ingrowin   [] [] [] [] [] [] [] [] [] []  5 4 3 2 1 1 2 3 4 5                         Right                                        Left    Inflammation/Pain   [x] [x] [x] [x] [x] [x] [x] [x] [x] [x]  5 4 3  2 1 1 2 3 4 5                         Right                                        Left       Nails are painful 1-10 with direct palpation. Q7   []Yes  []No                Q8   [x]Yes  []No                     Q9   []Yes    []No  Assessment:  54 y.o. female with:    Diagnosis Orders   1. Dermatophytosis of nail  14452 - [place] DEBRIDEMENT OF NAILS, 6 OR MORE      2. PVD (peripheral vascular disease) ([facility] [address]  35750 - [place] DEBRIDEMENT OF NAILS, 6 OR MORE      3. Pain in both feet  97933 - [place] DEBRIDEMENT OF NAILS, 6 OR MORE      4. Ingrowing nail        5. Xerosis of skin                Plan:   Pt was evaluated and examined. Patient was given personalized discharge instructions. For patients xerosis of skin pt to apply OTC foot cream or Aquaphor to the area to be applied daily. Pt to use a pummuce stone to the plantar surface of the feet weekly     Nails 1-10 were debrided in length and thickness sharply with a nail nipper and  without incident. Pt will follow up in 9 weeks or sooner if any problems arise. Diagnosis was discussed with the pt and all of their questions were answered in detail. Proper foot hygiene and care was discussed with the pt.  Patient to check feet daily and contact the office with any questions/problems/concerns. Other comorbidity noted and will be managed by PCP. Pain waiver discussed with patient and confirmed. All labs were reviewed and all imagining including the above findings were reviewed PRIOR to the patients arrival and with the patient today. Previous patient encounter was reviewed. Encounters from the patients other medical providers were reviewed and noted. Time was spent educating the patient and their families/caregivers on proper care of the feet and ankles. All the above diagnosis were addressed at todays visit and all questions were answered.   A total of 20 minutes was spent with this patients encounter which included charting after the patients visit    8/23/2022      Electronically signed by Gal Sewell DPM on 8/23/2022 at 9:36 AM  8/23/2022

## 2022-08-25 RX ORDER — FAMOTIDINE 20 MG/1
TABLET, FILM COATED ORAL
Qty: 240 TABLET | Refills: 0 | Status: SHIPPED | OUTPATIENT
Start: 2022-08-25

## 2022-08-26 DIAGNOSIS — E55.9 VITAMIN D DEFICIENCY: ICD-10-CM

## 2022-08-26 DIAGNOSIS — E53.8 LOW FOLATE: ICD-10-CM

## 2022-08-26 DIAGNOSIS — E53.8 LOW VITAMIN B12 LEVEL: ICD-10-CM

## 2022-08-26 RX ORDER — ERGOCALCIFEROL 1.25 MG/1
CAPSULE ORAL
Qty: 8 CAPSULE | Refills: 0 | OUTPATIENT
Start: 2022-08-26

## 2022-09-08 ENCOUNTER — OFFICE VISIT (OUTPATIENT)
Dept: BARIATRICS/WEIGHT MGMT | Age: 52
End: 2022-09-08
Payer: MEDICARE

## 2022-09-08 VITALS
WEIGHT: 293 LBS | HEIGHT: 64 IN | SYSTOLIC BLOOD PRESSURE: 110 MMHG | HEART RATE: 70 BPM | BODY MASS INDEX: 50.02 KG/M2 | DIASTOLIC BLOOD PRESSURE: 75 MMHG

## 2022-09-08 DIAGNOSIS — Z90.3 INTESTINAL MALABSORPTION FOLLOWING GASTRECTOMY: Primary | ICD-10-CM

## 2022-09-08 DIAGNOSIS — E66.01 MORBID OBESITY WITH BMI OF 50.0-59.9, ADULT (HCC): ICD-10-CM

## 2022-09-08 DIAGNOSIS — E55.9 VITAMIN D DEFICIENCY: ICD-10-CM

## 2022-09-08 DIAGNOSIS — Z98.84 S/P LAPAROSCOPIC SLEEVE GASTRECTOMY: ICD-10-CM

## 2022-09-08 DIAGNOSIS — K91.2 INTESTINAL MALABSORPTION FOLLOWING GASTRECTOMY: Primary | ICD-10-CM

## 2022-09-08 DIAGNOSIS — E53.8 LOW VITAMIN B12 LEVEL: Primary | ICD-10-CM

## 2022-09-08 DIAGNOSIS — E53.8 LOW FOLATE: ICD-10-CM

## 2022-09-08 PROCEDURE — 1036F TOBACCO NON-USER: CPT | Performed by: SURGERY

## 2022-09-08 PROCEDURE — G8427 DOCREV CUR MEDS BY ELIG CLIN: HCPCS | Performed by: SURGERY

## 2022-09-08 PROCEDURE — 99213 OFFICE O/P EST LOW 20 MIN: CPT | Performed by: SURGERY

## 2022-09-08 PROCEDURE — G8417 CALC BMI ABV UP PARAM F/U: HCPCS | Performed by: SURGERY

## 2022-09-08 NOTE — PROGRESS NOTES
[address] [facility] MIN INVASIVE BARIATRIC SURG  [address] [place] [address] [address] [facility] [place]  [address] [facility] [address]  Dept: [phone]    SURGICAL WEIGHT LOSS MANAGEMENT PROGRAM  PROGRESS NOTE     CC: Weight Loss    Patient: Leticia Claros      Service Date: 9/8/2022  Visit: 9 month  Medical Record #: [id number]  Date of Surgery: 11/17/2021    History:  54 y.o. female who presents today for a 9 month post sleeve gastrectomy follow up. Patient reports regular bowel movements. She denies nausea, vomiting, fever, and chills. She notes knee pain. She is lactose-intolerant. She is taking vitamins. She would like to have knee surgery. No new complaints      Height: 5' 4\"  Highest Weight: 407 lbs      Current Visit Weight Information  Weight: (!) 320 lb (145.2 kg)   BMI: Body mass index is 54.93 kg/m². Weight loss since surgery:  87 lbs    1 wk - D/C'd home on VTE Prohylaxis? [x] Yes   [] No   On VTE Proph as directed? [x] Yes   [] No    Liver pathology:    [] NA    [] No Gross path    [] Liver Steatosis   [x] Discussed w/ pt   [] Referred to GI    Exercising?    [x] Yes    [] No     Review of Systems:     General  Negative for: [] Weight Change   [x] Fatigue   [x] Fevers & Chills    [] Appetite change [] Other:    Positive for: [x] Weight Change   [] Fatigue   [] Fevers & Chills    [] Appetite change [] Other:   Cardiac  Negative for: [x] Chest Pain   [] Difficulty Breathing   [] Leg Cramps [x] Edema of Lower Extremeties    [] Left   [] Right      Positive for: [] Chest Pain   [] Difficulty Breathing   [] Leg Cramps [] Edema of Lower Extremeties    [] Left   [] Right   Pulmonary  Negative for: [x] Shortness of Breath [] Wheeze [x] Cough  [x] Calf Pain     Positive for: [] Shortness of Breath [] Wheeze [] Cough  [] Calf Pain   Gastro-Intestinal Negative for: [] Heartburn   [] Reflux   [] Dysphagia   [] Melena   [] BRBPR  [x] Vomiting   [x] Abdominal Pain   [] Diarrhea     [] Constipation [] Other:     Positive for: [] Heartburn   [] Reflux   [] Dysphagia   [] Melena   [] BRBPR  [] Vomiting   [] Abdominal Pain   [] Diarrhea     [] Constipation  [] Other:    Muskuloskeletal Negative for: [] Joint pain   [] Back pain   [] Knee Pain   [] Muscle weakness [x] Hernia   [x] Edema [] Other:     Positive for: [] Joint pain   [] Back pain   [] Knee Pain   [] Muscle weakness [] Hernia   [] Edema [] Other:    Neurologic Negative for: [x] Syncope   [] Insomnia   [x] Being treated for depression  [] Other:     Positive for: [] Syncope   [] Insomnia   [] Being treated for depression  [] Other:    Skin Negative for: [x] Wound:   [] Open   [] Draining   [] Incisional     [x] Rash   [] Hair Loss  [] Other:     Positive for: [] Wound:   [] Open   [] Draining    [] Incisional  [] Rash   [] Hair Loss  [] Other:          Physical Assessment:     /75 (Site: Right Upper Arm, Position: Sitting, Cuff Size: Large Adult)   Pulse 70   Ht 5' 4\" (1.626 m)   Wt (!) 320 lb (145.2 kg)   BMI 54.93 kg/m²   General Negative for:  [x] Lapses in memory   [x] Unusual Stress   [x] Diffic Concen [] Unable to sleep   [] Eating in response to stress   [] Other:    Positive for:  [] Lapses in memory   [] Unusual Stress   [] Diffic Concen [] Unable to sleep   [] Eating in response to stress   [] Other:   Cardio-Pulmonary   [x] Heart RRR   [x] No murmurs   [] Pulses nl x4 extrem    [x] Good inspiratory effort   [x] No wheezing     [x] Lungs clear to auscultation bilaterally    [] Other:    Gastro-Intestinal   [x] Abd S/NT/ND/Benign   [x] No abdominal mass/hernia    [x] No Splenomegaly    [] Other:    Muskuloskeletal   [x] Good muscle strength x4 extremities   [x] nl gait and ambul    [x] Nl ROM x4 extremities    [] Other:    Neurologic   [x] Alert and oriented x3    [] Other:   Skin   [x] Intact w/ no open wounds   [x] Incisions C/D/I    [] Steri strips removed   [x] No drainage or Infection    [] Other:      Assessment & Plan:      1. Intestinal malabsorption following gastrectomy    2. S/P laparoscopic sleeve gastrectomy           [x] Advance Diet    [x] Daily protein (65-75gm/day)   [x] Take Vitamins   [x] Attend Support Group    [x] Exercise Regularly   [x] Use contraception    Need for long term compliance and follow up stressed to patient  Dietician consult  Continue taking daily Vitamins for expected malabsorption post gastrectomy  Continue using Pepcid, as prescribed  Ok for orthopedic surgery from bariatric standpoint  Doing well    Follow up: 3 months    Orders placed this encounter:   No orders of the defined types were placed in this encounter. New Prescriptions:   No orders of the defined types were placed in this encounter. Scribe Attestation:  machelle Bellibed on behalf of Brijesh Hraris DO. Electronically signed by Brijesh Harris DO on 9/16/2022 at 11:28 AM    Please note that this chart was generated using voice recognition Dragon dictation software. Although every effort was made to ensure the accuracy of this automated transcription, some errors in transcription may have occurred. Leeanne Storm DO DO, personally performed the services described in this documentation. All medical record entries made by the scribe were at my direction and in my presence. I have reviewed the chart and discharge instructions (if applicable) and agree that the record reflects my personal performance and is accurate and complete.     Electronically Signed: Brijesh Harris DO. 09/16/22. 11:28 AM.

## 2022-10-03 ENCOUNTER — OFFICE VISIT (OUTPATIENT)
Dept: ORTHOPEDIC SURGERY | Age: 52
End: 2022-10-03
Payer: MEDICARE

## 2022-10-03 DIAGNOSIS — M25.562 PAIN IN BOTH KNEES, UNSPECIFIED CHRONICITY: Primary | ICD-10-CM

## 2022-10-03 DIAGNOSIS — M17.0 BILATERAL PRIMARY OSTEOARTHRITIS OF KNEE: ICD-10-CM

## 2022-10-03 DIAGNOSIS — M25.561 PAIN IN BOTH KNEES, UNSPECIFIED CHRONICITY: Primary | ICD-10-CM

## 2022-10-03 PROCEDURE — 1036F TOBACCO NON-USER: CPT | Performed by: ORTHOPAEDIC SURGERY

## 2022-10-03 PROCEDURE — G8417 CALC BMI ABV UP PARAM F/U: HCPCS | Performed by: ORTHOPAEDIC SURGERY

## 2022-10-03 PROCEDURE — G8484 FLU IMMUNIZE NO ADMIN: HCPCS | Performed by: ORTHOPAEDIC SURGERY

## 2022-10-03 PROCEDURE — G8428 CUR MEDS NOT DOCUMENT: HCPCS | Performed by: ORTHOPAEDIC SURGERY

## 2022-10-03 PROCEDURE — 99213 OFFICE O/P EST LOW 20 MIN: CPT | Performed by: ORTHOPAEDIC SURGERY

## 2022-10-03 NOTE — PROGRESS NOTES
Ninfa Quinones M.D.            [address] [facility] [facility],[address], [address] [facility]           Dept Phone: [phone]           Dept Fax:  [address]           Jam Hernandez          Dept Phone: [phone]           Dept Fax:  [phone]      Chief Compliant:  Chief Complaint   Patient presents with    Pain     Both knees        History of Present Illness: This is a 54 y.o. female who presents to the clinic today for evaluation / follow up of severe DJD both knees. Patient is a 82-year-old female who have been following for quite some time. She has had arthroscopy of her right knee in the past she has had both Synvisc injections as well as steroid injections to her knees. She has been putting off knee replacement surgery for some time. She was sent for bariatric surgery and she has over the past less near loss greater than 100 pounds. She has however developed with the point her knee pain is significant especially the left side that she cannot do any further exercise activities. Last injections we gave her this past April she states it worked for several days. .       Review of Systems   Constitutional: Negative for fever, chills, sweats. Eyes: Negative for changes in vision, or pain. HENT: Negative for ear ache, epistaxis, or sore throat. Respiratory/Cardio: Negative for Chest pain, palpitations, SOB, or cough. Gastrointestinal: Negative for abdominal pain, N/V/D. Genitourinary: Negative for dysuria, frequency, urgency, or hematuria. Neurological: Negative for headache, numbness, or weakness. Integumentary: Negative for rash, itching, laceration, or abrasion. Musculoskeletal: Positive for Pain (Both knees)       Physical Exam:  Constitutional: Patient is oriented to person, place, and time.  Patient appears well-developed and well nourished. HENT: Negative otherwise noted  Head: Normocephalic and Atraumatic  Nose: Normal  Eyes: Conjunctivae and EOM are normal  Neck: Normal range of motion Neck supple. Respiratory/Cardio: Effort normal. No respiratory distress. Musculoskeletal: Examination of patient's left knee notes her knee motion is 5 to but 115 degrees she cannot go back any further than this to the size of her legs. She has no obvious effusion although difficult to assess. She has lots of crepitus and grinding medially as well as her patellofemoral joint grossly her she is ligamentously intact again difficult to assess completely however. And no other contributory findings. Neurological: Patient is alert and oriented to person, place, and time. Normal strength. No sensory deficit. Skin: Skin is warm and dry  Psychiatric: Behavior is normal. Thought content normal.  Nursing note and vitals reviewed. Labs and Imaging:     XR taken today:  No results found. Orders Placed This Encounter   Procedures    [address]     Referral Priority:   Routine     Referral Type:   Eval and Treat     Referral Reason:   Specialty Services Required     Requested Specialty:   Physical Therapist     Number of Visits Requested:   1       Assessment and Plan:  Severe DJD both knees left greater than right  History of arthroscopy right knee  History of morbid obesity however the patient is is undergone greater than 100 pound weight loss since gastric sleeve surgery. No history of diabetes. This is a 54 y.o. female who presents to the clinic today for evaluation / follow up of severe knee pain left knee can Familia to severe degenerative joint disease left knee most recent x-rays taken in January 2022 which show severe end-stage disease both knees.      Past History:    Current Outpatient Medications:     famotidine (PEPCID) 20 MG tablet, take 1 tablet by mouth twice a day, Disp: 240 tablet, Rfl: 0    vitamin B-12 (CYANOCOBALAMIN) 500 MCG tablet, Take 1 tablet by mouth daily, Disp: 30 tablet, Rfl: 11    folic acid (FOLVITE) 1 MG tablet, Take 1 tablet by mouth daily, Disp: 30 tablet, Rfl: 0    vitamin D (ERGOCALCIFEROL) 1.25 MG (63914 UT) CAPS capsule, Take 1 capsule by mouth once a week for 8 doses, Disp: 8 capsule, Rfl: 0    gabapentin (NEURONTIN) 100 MG capsule, take 2 capsules by mouth twice a day (Patient not taking: Reported on 9/8/2022), Disp: , Rfl:     ketoconazole (NIZORAL) 2 % shampoo, Apply 3-4 times weekly to scalp, leave on for five minutes prior to washing off, Disp: 120 mL, Rfl: 2    triamcinolone (KENALOG) 0.025 % cream, Apply to rash on face and ears twice daily, as needed, for scaling, Disp: 80 g, Rfl: 2    fluocinonide (LIDEX) 0.05 % external solution, Apply to scalp twice daily, as needed, for itching, Disp: 60 mL, Rfl: 2    metroNIDAZOLE (METROCREAM) 0.75 % cream, Apply topically 2 times daily. , Disp: 45 g, Rfl: 3    APLENZIN 174 MG TB24, take 1 tablet by mouth every morning, Disp: , Rfl:     simethicone (MYLICON) 80 MG chewable tablet, Take 1 tablet by mouth 4 times daily as needed for Flatulence, Disp: 180 tablet, Rfl: 3    dicyclomine (BENTYL) 20 MG tablet, Take 1 tablet by mouth every 8 hours as needed (diarrhea), Disp: 120 tablet, Rfl: 0    Multiple Vitamins-Minerals (THERAPEUTIC MULTIVITAMIN-MINERALS) tablet, Take 1 tablet by mouth daily, Disp: , Rfl:     ondansetron (ZOFRAN) 4 MG tablet, Take 1 tablet by mouth every 8 hours as needed for Nausea or Vomiting, Disp: 30 tablet, Rfl: 2    Sulfacetamide Sodium, Acne, 10 % LOTN, Apply to face daily, Disp: 118 mL, Rfl: 2    acetaminophen (TYLENOL) 500 MG tablet, Take 1,000 mg by mouth every 6 hours as needed for Pain , Disp: , Rfl:     escitalopram (LEXAPRO) 20 MG tablet, Take 20 mg by mouth daily, Disp: , Rfl:   Allergies   Allergen Reactions    No Known Allergies      Social History     Socioeconomic History    Marital status:      Spouse name: Not on file    Number of children: Not on file    Years of education: Not on file    Highest education level: Not on file   Occupational History    Not on file   Tobacco Use    Smoking status: Former     Packs/day: 0.60     Types: Cigarettes     Quit date:      Years since quittin.7    Smokeless tobacco: Never   Vaping Use    Vaping Use: Never used   Substance and Sexual Activity    Alcohol use: Yes     Comment: maybe once a month    Drug use: No    Sexual activity: Not on file   Other Topics Concern    Not on file   Social History Narrative    Not on file     Social Determinants of Health     Financial Resource Strain: Not on file   Food Insecurity: Not on file   Transportation Needs: Not on file   Physical Activity: Not on file   Stress: Not on file   Social Connections: Not on file   Intimate Partner Violence: Not on file   Housing Stability: Not on file     Past Medical History:   Diagnosis Date    Allergic rhinitis     Anxiety     Anxiety and depression     Arthritis     Asthma     no longer using inhaler or nebulizer    Diverticulitis     Headache(784.0)     HTN (hypertension)     pt denies  not on meds    Obesity     Overactive bladder     Sleep apnea     uses machine nightly  cpap    Use of cane as ambulatory aid     Wellness examination 2021    Dr. Jonny Mcgee, his CNP's      Past Surgical History:   Procedure Laterality Date    CATARACT REMOVAL      CHOLECYSTECTOMY, LAPAROSCOPIC N/A 12/15/2020    CHOLECYSTECTOMY LAPAROSCOPIC ROBOTIC XI performed by Saray Boo MD at [address] [place] [address] Rd N/A 2020    COLONOSCOPY POLYPECTOMY SNARE/COLD BIOPSY performed by Claudia Lucas MD at . [facility] N/A 2021    COLONOSCOPY WITH BIOPSY RANDOM RIGHT AND LEFT COLON performed by Claudia Lucas MD at [address] [facility] Bilateral     cataract removed with lens implants    TN KNEE SCOPE,DIAGNOSTIC Right 2017    RIGHT KNEE ARTHROSCOPY WITH MEDIAL MENISECTOMY AND CHONDROPLASTY performed by Frann Kayser, DO at [address] [facility]  11/17/2021    ROBOTIC LAPAROSCOPIC GASTRECTOMY SLEEVE, EGD-    SLEEVE GASTRECTOMY N/A 11/17/2021    XI ROBOTIC LAPAROSCOPIC GASTRECTOMY SLEEVE, EGD- GI UNIT SCHEDULED performed by Aramis Cruz DO at [address] [facility] N/A 04/08/2021    EGD BIOPSY OF GASTRIC AND GASTRIC POLYPECTOMY [facility]     Family History   Problem Relation Age of Onset    Asthma Mother     Parkinsonism Mother     Arthritis Father     Cancer Father    Plan  Patient to be scheduled for left total knee arthroplasty. I did inform her that even she still at a high risk for having perioperative complications wound and wound healing complications and possible infection. She is understanding this but having gone through 100 pound weight loss that she feels that she is in a good position now she would like to do physical therapy to help you with her strengthening prior to the procedure. We will check her hemoglobin A1c 1 last time just to make sure everything is okay in this regard. From a nutritional standpoint she been given the greenlight by her bariatric surgeon at that she is nutritionally she is in good shape. We will get her scheduled accordingly for left total knee arthroplasty. Provider Attestation:  Jacques Larsen, personally performed the services described in this documentation. All medical record entries made by the scribe were at my direction and in my presence. I have reviewed the chart and discharge instructions and agree that the records reflect my personal performance and is accurate and complete. Alicia Chin MD. 10/03/22      Please note that this chart was generated using voice recognition Dragon dictation software.   Although every effort was made to ensure the accuracy of this automated transcription, some errors in transcription may have occurred.

## 2022-10-07 ENCOUNTER — HOSPITAL ENCOUNTER (OUTPATIENT)
Dept: PHYSICAL THERAPY | Facility: CLINIC | Age: 52
Setting detail: THERAPIES SERIES
Discharge: HOME | End: 2022-10-07
Payer: MEDICARE

## 2022-10-07 PROCEDURE — 97161 PT EVAL LOW COMPLEX 20 MIN: CPT

## 2022-10-07 PROCEDURE — 97110 THERAPEUTIC EXERCISES: CPT

## 2022-10-07 NOTE — CONSULTS
[x] [facility]  Outpatient Rehabilitation &  Therapy  [facility] [address]   [address]  P: [phone]  F: [phone]     Physical Therapy Lower Extremity Evaluation    Date:  10/7/2022  Patient: Karla Sanchez  : 1970  MRN: [id number]  Physician:  Sissy Astorga MD     Insurance: [employer] (30 visits, auth after 30)  Medical Diagnosis: M17.0 (ICD-10-CM) - Bilateral primary osteoarthritis of knee    Rehab Codes: M62.81, M62.9, Z73.6, R26.89, M25.60, Z74.0   Onset date: 10/3/22 - referral   Next Dr's appt.: 22 - L knee replacement     Subjective:   CC: Patient is a 54year old female who presents to OP physical therapy for an initial evaluation of bilat knee pain secondary to chronic OA. Patient is going for a L knee replacement on 22. Patient has had ongoing knee pain for years with the R one initially starting to get worse. States about a year ago she had arthroscopic surgery on her R knee but has gotten worse. Uses cane in her L hand to offset R knee pain. States that the left one has starting getting more painful and is getting that knee replaced first due to it being slightly stronger.      PMHx:   Past Surgical History:   Procedure Laterality Date    CATARACT REMOVAL      CHOLECYSTECTOMY, LAPAROSCOPIC N/A 12/15/2020    CHOLECYSTECTOMY LAPAROSCOPIC ROBOTIC XI performed by George Medrano MD at [place] [address] N/A 2020    COLONOSCOPY POLYPECTOMY SNARE/COLD BIOPSY performed by Leena Ramey MD at  [address] 2021    COLONOSCOPY WITH BIOPSY RANDOM RIGHT AND LEFT COLON performed by Leena Ramey MD at [address] [facility] Bilateral     cataract removed with lens implants    PA KNEE SCOPE,DIAGNOSTIC Right 2017    RIGHT KNEE ARTHROSCOPY WITH MEDIAL MENISECTOMY AND CHONDROPLASTY performed by Mehul Bruno DO at [address] [place] [address]  2021    GREGORY Tran- SLEEVE GASTRECTOMY N/A 11/17/2021    XI ROBOTIC LAPAROSCOPIC GASTRECTOMY SLEEVE, EGD- GI UNIT SCHEDULED performed by Srinivas Agudelo DO at [place] N/A 04/08/2021    EGD BIOPSY OF GASTRIC AND GASTRIC POLYPECTOMY performed by Stephen Ontiveros MD at [address] [facility].      Past Medical History:   Diagnosis Date    Allergic rhinitis     Anxiety     Anxiety and depression     Arthritis     Asthma     no longer using inhaler or nebulizer    Diverticulitis     Headache(784.0)     HTN (hypertension)     pt denies  not on meds    Obesity     Overactive bladder     Sleep apnea     uses machine nightly  cpap    Use of cane as ambulatory aid     Wellness examination 08/2021    Dr. Jaspal Gomes, his CNP's      [] Unremarkable [] Diabetes [] HTN  [] Pacemaker   [] MI/Heart Problems [] Cancer [] Arthritis [] Other:              [x] Refer to full medical chart  In EPIC     Comorbidities:   [] Obesity [] Dialysis  [] Other:   [] Asthma/COPD [] Dementia [] Other:   [] Stroke [] Sleep apnea [] Other:   [] Vascular disease [] Rheumatic disease [] Other:     Tests: [x] X-Ray: L knee: 1/7/22  X-rays taken in clinic today and preliminarily reviewed by me 1/7/22:   AP and lateral views of the left knee demonstrate early severe    tricompartmental degenerative changes approaching bone-on-bone apposition    in the medial compartment. Not quite to the point of the right knee as    there is still some space in the medial compartment but much greater than    expected for patient's young age of 54. No evidence of acute fracture    small effusion present. R knee:  X-rays taken in clinic today and preliminarily reviewed by me 1/7/22:   AP and lateral views of the right knee standing demonstrate severe    tricompartmental degenerative changes with bone-on-bone apposition in the    medial compartment and severe patellofemoral spurring and narrowing.   No    evidence of acute fracture small effusion present. [] MRI:  [] Other:     Medications: [x] Refer to full medical record [] None [] Other:  Allergies:      [x] Refer to full medical record [] None [] Other:    Marital Status Has help at home  2 teenagers, adult son with disability    Home type 1 story house, no basement   Stairs from outside 1 step to get in   Employment Not working, on disability    Job status ---   Recreational Activities Bowling       ADL/IADL Previous level of function Current level of function Who currently assists the patient with task   Bathing  [x] Independent  [] Assist [x] Independent  [] Assist    Dress/grooming [x] Independent  [] Assist [x] Independent  [] Assist    Transfer/mobility [x] Independent  [] Assist [x] Independent  [] Assist    Feeding [x] Independent  [] Assist [x] Independent  [] Assist    Toileting [x] Independent  [] Assist [x] Independent  [] Assist    Driving [x] Independent  [] Assist [x] Independent  [] Assist Fiance will help after surgery   Housekeeping [x] Independent  [] Assist [x] Independent  [] Assist Family helps, will sit and do dishes   Grocery shop/meal prep [x] Independent  [] Assist [x] Independent  [] Assist Uses scooter at store, daughters help     Gait Prior level of function Current level of function    [x] Independent  [] Assist [x] Independent  [] Assist   Device: [x] Independent [x] Independent    [x] Straight Cane [] Quad cane [x] Straight Cane [] Quad cane    [] Standard walker [] Rolling walker   [] 4 wheeled walker [] Standard walker [] Rolling walker   [] 4 wheeled walker    [] Wheelchair [] Wheelchair   - uses scooter for community ambulating     Pain present?  yes   Location L knee   R knee   Pain Rating currently L knee: 2/10  R knee: 4/10   Pain at worse L knee: 9/10  R knee: 9/10   Pain at best L knee: 2/10  R knee: 3/10   Description of pain Dull, sharp, stabbing, shooting, warm   Altered Sensation no   What makes it worse Prolonged walking, kneeling, getting on the floor   What makes it better Cold rag, resting, occasionally bending it   Symptom progression Gotten worse    Sleep Waking up with knee pain and discomfort           Objective:    OBSERVATION No Deficit Deficit Not Tested Comments   Posture    Standing posture: patient remains in slightly flexed position with trunk and bilateral knees. Wide CHRISTIANO in standing.    Palpation [] [x] [] R knee: tenderness over medial aspect of patella   Sensation [] [] []    Edema [x] [] [] R knee suprapatellar: 65.5 cm   R knee mid patellar: 60 cm  R knee infrapatellar: 57.5 cm   L knee suprapatellar: 66 cm   L knee mid patellar: 60 cm  L knee infrapatellar: 58 cm   Neurological [x] [] []    Patellar Mobility [] [x] [] Limited mobility in all directions bilaterally   Patellar Orientation [x] [] []    Gait [] [x] [] Analysis: antalgic gait pattern, ambulating with SPC, increased knee flexion throughout all phases       FUNCTION Normal Difficult Unable Comments   Sitting [x] [] []    Standing [] [x] []    Ambulation [] [x] []    Lift/Carry [] [x] []    Stairs [] [x] []    Bending [] [x] []    Squat [] [x] []    Kneel [] [] [x]        BALANCE Left Right Comments   Tandem Stance (Eyes Open) 10 seconds 10 seconds    Tandem Stance (Eyes Closed) NT NT    Single Leg (Eyes Open) 6 seconds 3 seconds pain   Single Leg (Eyes Closed NT NT    Other:          FUNCTIONAL TESTS PAIN NO PAIN COMMENTS   Bilateral Squat [x] [] Decreased ROM      Strength/ROM:      STRENGTH    Left Right   Hip Flex 5 4+   Ext NT NT   ABD 4+ 4+   ADD 5 5   Knee Flex 5 4+   Ext 5 5   Ankle DF 5 5   PF 5 5            ROM  ° AROM    Left Right   Knee Flex 114 109    Ext Lacking 18 Lacking 17             MUSCLE LENGTH TESTING Normal Left Tight Right Tight Comments   Hamstrings []  [x]  [x]     Gastrocnemius []  [x]  [x]     Soleus []  []  []      []  []  []      []  []  []         Assessment: [] Patient is a 54year old female who presents to OP physical therapy for an initial evaluation of bilat knee pain secondary to chronic OA. Patient would continue to benefit from skilled physical therapy services in order to: reduce pain, improve ROM and strength, improve function, normalize balance in order to prepare for surgery and return to PLOF. Problems:    [x] ? Pain: 2-9/10 pain   [x] ? ROM: grossly limited, lacking knee ext    [x] ? Strength: Limited R LE strength, bilat hip ABD   [x] ? Function: 70% functional impairment    [x] ? Balance: decreased SLS balance  [x] Gait Deviations: SPC ambulation   [] Other:        Goals  MET NOT MET ON-  GOING  Details   Date Addressed:        STG: To be met in 6 treatments           1. ? Pain: Patient will self report worst pain no greater than 4/10 in order to better tolerate ADLs/work activities with minimal dysfunction []  []  []      2. ? ROM: Patient will improve AROM in bilat knees to lacking at most 10 degrees of knee ext  in  in order to demonstrate ability to move/reach in all planes unrestricted at PLOF []  []  []      3. Independent with Home Exercise Programs []  []  []     4. Demonstrate knowledge of fall risk prevention  []  []  []     5. ? Balance: Patient to improve R SLS balance to at least 5 seconds in order to demo improved stability and carryover for functional tasks []  []  []                                 Date Addressed:        LTG: To be met in 12 treatments       1.  Improve score on assessment tool LEFS from 70% impairment to less than 50% impairment  []  []  []     2. ? Strength: Pt will demonstrate improved bilat hip abd, R hip flex and R knee flexion to 5/5 in order to demonstrate improved stability/strength necessary for unrestricted ADLs/work activities []  []  []     3. ? Balance: Patient to improve R + L SLS balance to at least 10 seconds in order to demo improved stability and carryover for functional tasks []  []  []     4. ? ROM: Patient will improve AROM in bilat knees to lacking at most 5 degrees of knee ext  in in order to demonstrate ability to move/reach in all planes unrestricted at [facility]                       Patient goals: to build strength for surgery    Functional Test: Lower Extremity functional Scale Score: 70% functionally impaired       Evaluation Complexity:  History (Personal factors, comorbidities) [] 0 [] 1-2 [x] 3+   Exam (limitations, restrictions) [] 1-2 [] 3 [] 4+   Clinical presentation (progression) [x] Stable [] Evolving  [] Unstable   Decision Making [x] Low [] Moderate [] High    [x] Low Complexity [] Moderate Complexity [] High Complexity       Rehab Potential:  [x] Good  [] Fair  [] Poor   Suggested Professional Referral:  [x] No  [] Yes:  Barriers to Goal Achievement[de-identified]  [x] No  [] Yes:  Domestic Concerns:  [x] No  [] Yes:    Pt. Education:  [x] Plans/Goals, Risks/Benefits discussed  [x] Home exercise program   Access Code: [email or web address]  URL: Where's Up/  Date: 10/07/2022  Prepared by: Calli Scheuermann    Exercises  Supine Quad Set - 1 x daily - 7 x weekly - 2 sets - 10 reps - 5 seconds hold  Active Straight Leg Raise with Quad Set - 1 x daily - 7 x weekly - 2 sets - 10 reps - 5 seconds hold  Seated Long Arc Quad - 1 x daily - 7 x weekly - 2 sets - 10 reps - 5 seconds hold  Standing Terminal Knee Extension with Resistance - 1 x daily - 7 x weekly - 2 sets - 10 reps - 5 seconds hold  Seated Hamstring Stretch with Chair - 1 x daily - 7 x weekly - 1 sets - 3 reps - 30 seconds hold      Method of Education: [x] Verbal  [x] Demo  [x] Written  Comprehension of Education:  [x] Verbalizes understanding. [x] Demonstrates understanding. [] Needs Review. [] Demonstrates/verbalizes understanding of HEP/Ed previously given.       Treatment Plan:  [x] Therapeutic Exercise   14962  [] Iontophoresis: 4 mg/mL Dexamethasone Sodium Phosphate  mAmin  37651   [x] Therapeutic Activity  87986 [x] Vasopneumatic cold with compression  97033    [x] Gait Training   74980 [] Ultrasound   08389   [x] Neuromuscular Re-education  [id number] [] Electrical Stimulation Unattended  37570   [x] Manual Therapy  29641 [] Electrical Stimulation Attended  [id number]   [x] Instruction in HEP  [] Lumbar/Cervical Traction  [id number]   [] Aquatic Therapy   [id number] [x] Cold/hotpack    [] Massage   [id number]      [] Dry Needling, 1 or 2 muscles  36364   [] Biofeedback, first 15 minutes   11562  [] Biofeedback, additional 15 minutes   43146 [] Dry Needling, 3 or more muscles  32300     []  Medication allergies reviewed for use of    Dexamethasone Sodium Phosphate 4mg/ml     with iontophoresis treatments. Pt is not allergic. Frequency:  2 x/week for 12 visits      Todays Treatment:  Modalities:   Precautions:  Exercises:  Exercise Reps/ Time Weight/ Level Comments         Supine      Quad sets   HEP   SLR   HEP         Seated       LAQ   HEP   Seated HS stretch with chair   HEP         Standing      TKE w/ band  lime HEP               Other:    Specific Instructions for next treatment: knee extension ROM, strength for surgery prep, 3 way hip, step ups, heel taps, gastroc stretch, heel raises, functional tasks, gait training    Treatment Charges: Mins Units   [x] Evaluation       [x]  Low       []  Moderate       []  High 35 1   []  Modalities     [x]  Ther Exercise 10 1   []  Manual Therapy     []  Ther Activities     []  Aquatics     []  Neuromuscular     []  Gait Training     []  Dry Needling           1-2 muscles     []  Dry Needling           3 or more          muscles     [] Vasocompression     []  Other         TOTAL TREATMENT TIME: 45 minutes    Time in: 7:45 AM   Time Out: 8:30 AM    Electronically signed by: Ilya Madrid PT        Physician Signature:________________________________Date:__________________  By signing above or cosigning this note, I have reviewed this plan of care and certify a need for medically necessary rehabilitation services.      *PLEASE SIGN ABOVE AND FAX BACK ALL PAGES*

## 2022-10-11 ENCOUNTER — HOSPITAL ENCOUNTER (OUTPATIENT)
Dept: PHYSICAL THERAPY | Facility: CLINIC | Age: 52
Setting detail: THERAPIES SERIES
Discharge: HOME | End: 2022-10-11
Payer: MEDICARE

## 2022-10-11 PROCEDURE — 97016 VASOPNEUMATIC DEVICE THERAPY: CPT

## 2022-10-11 PROCEDURE — 97110 THERAPEUTIC EXERCISES: CPT

## 2022-10-11 NOTE — FLOWSHEET NOTE
[] [facility] [address] [facility]  [address]  [phone]  F: [phone] [x] [address]  [facility] [address]   [address]  P: [phone]  F: [phone] [] [address]  [address]  P: [phone]  F: [phone] [] [address]  P: [phone]  F: [phone] [] [address]  [facility]   [address]   [place]: [phone]  F: [phone]      Physical Therapy Daily Treatment Note    Date:  10/11/2022  Patient Name:  Nazia Leal    :  1970  MRN: [id number]  Physician: Satya Sims MD     Insurance: [employer] (30 visits, auth after 30)  Medical Diagnosis: M17.0 (ICD-10-CM) - Bilateral primary osteoarthritis of knee  Rehab Codes: M62.81 (Muscle Weakness), M62.9 (Disorder of Muscle), Z73.6 (Limitation of ADLs), R29.3 (Poor Posture), R26.89 (Difficulty Walking), M25.60 (Stiffness), Z74.0 (reduced mobility)    Onset Date: 10/3/22-referral   Next  [address]: 22-L knee replacement  Visit# / total visits: ; Progress note for Medicare patient due at visit 10     Cancels/No Shows: 0/0    Subjective:    Pain:  [x] Yes  [] No Location: both knees Pain Rating: (0-10 scale) 0/10 \"dull ache\"  Pain altered Tx:  [x] No  [] Yes  Action:  Comments: Pt reports to treatment with complaints of usual \"dull ache\" and burning in bilateral knees, with left slightly worse than R. DId not have any significant increase in pain after eval.     Objective:  Modalities: vasocompression to bilateral knees in supine at end of session x15', 36 degrees  Precautions:  Exercises:  Exercise Reps/ Time Weight/ Level Comments         nustep 6' L3                Standing: calf/toe raises 15     March in place 10 ea     Mini squats 10     3 way hip: ext, abd, flex 10 ea  Painful with weightbearing on R   Lateral step ups 15 4\" L only Unable on R   Standing slantboard stretch            Seated: LAQ 10x 5\" hold          Supine: quad sets 10 5\"    SAQ 10 ea 5\"    SLR 10, 5     Bridges 10, 5     Hip add 10x 5\" Squeeze ball between knees   HS stretch with strap 30\"x2           Side: clamshells 15     Reverse clamshells 15     Hip abd 10  Cues for positioning                                 Other: pt notes clicking, grinding bilateral knees throughout ex-does not limit completion of or increase pain      Treatment Charges: Mins Units   [x]  Modalities: vaso 15 1   [x]  Ther Exercise 40 3   []  Manual Therapy     []  Ther Activities     []  Aquatics     []  Vasocompression     []  Other     Total Treatment time 55 4       Assessment: [x] Progressing toward goals. Began with warm up on nustep followed by initiation of standing and supine, sidelying mat exercises for global strengthening bilateral lower quadrants. Lateral step ups attempted on R LE too painful, thus only completed on L side. Pt demonstrated good completion of mat ex's initiated, with some cues to maintain form with ex. Ended treatment with vasocompression to bilateral knees in supine @ 36 degrees. [] No change. [] Other:  [x] Patient would continue to benefit from skilled physical therapy services in order to: reduce pain, improve ROM and strength, improve function, normalize balance in order to prepare for surgery and return to PLOF.        Goals  MET NOT MET ON-  GOING  Details   Date Addressed:            STG: To be met in 6 treatments            1. ? Pain: Patient will self report worst pain no greater than 4/10 in order to better tolerate ADLs/work activities with minimal dysfunction []  []  []      2. ? ROM: Patient will improve AROM in bilat knees to lacking at most 10 degrees of knee ext  in  in order to demonstrate ability to move/reach in all planes unrestricted at PLOF []  []  []      3. Independent with Home Exercise Programs []  []  []      4. Demonstrate knowledge of fall risk prevention  []  []  []      5. ? Balance: Patient to improve R SLS balance to at least 5 seconds in order to demo improved stability and carryover for functional tasks []  []  []                                                      Date Addressed:            LTG: To be met in 12 treatments           1. Improve score on assessment tool LEFS from 70% impairment to less than 50% impairment  []  []  []      2. ? Strength: Pt will demonstrate improved bilat hip abd, R hip flex and R knee flexion to 5/5 in order to demonstrate improved stability/strength necessary for unrestricted ADLs/work activities []  []  []      3. ? Balance: Patient to improve R + L SLS balance to at least 10 seconds in order to demo improved stability and carryover for functional tasks []  []  []      4. ? ROM: Patient will improve AROM in bilat knees to lacking at most 5 degrees of knee ext  in  in order to demonstrate ability to move/reach in all planes unrestricted at PLOF                                      Patient goals: to build strength for surgery    Pt. Education:  [x] Yes  [] No  [] Reviewed Prior HEP/Ed  Method of Education: [x] Verbal  [x] Demo  [x] Written: Anisa marti below  Comprehension of Education:  [x] Verbalizes understanding. [x] Demonstrates understanding. [x] Needs review. [] Demonstrates/verbalizes HEP/Ed previously given. Access Code: [email or web address]  URL: [email or web address]  Date: 10/11/2022  Prepared by: Franny Todd    Exercises  Supine Bridge - 1 x daily - 7 x weekly - 1 sets - 15 reps  Clamshell - 1 x daily - 7 x weekly - 1 sets - 15 reps  Sidelying Reverse Clamshell - 1 x daily - 7 x weekly - 1 sets - 15 reps  Sidelying Hip Abduction - 1 x daily - 7 x weekly - 1 sets - 10 reps  Supine Active Straight Leg Raise - 1 x daily - 7 x weekly - 1 sets - 20 reps  Supine Quad Set - 1 x daily - 7 x weekly - 1 sets - 10 reps  Supine Hip Adduction Isometric with Ball - 1 x daily - 7 x weekly - 1 sets - 20 reps  Standing Hip Abduction - 1 x daily - 7 x weekly - 1 sets - 15 reps  Standing Hip Extension - 1 x daily - 7 x weekly - 1 sets - 15 reps  Supine Quad Set - 1 x daily - 7 x weekly - 1 sets - 10 reps    Plan: [x] Continue current frequency toward long and short term goals.     [x] Specific Instructions for subsequent treatments: continue with progression of bilateral knee strengthening/flexibility exercises as able      Time In:10:01am            Time Out: 11:15    Electronically signed by:  Joseph Coulter PTA

## 2022-10-14 ENCOUNTER — HOSPITAL ENCOUNTER (OUTPATIENT)
Dept: PHYSICAL THERAPY | Facility: CLINIC | Age: 52
Setting detail: THERAPIES SERIES
Discharge: HOME | End: 2022-10-14
Payer: MEDICARE

## 2022-10-14 PROCEDURE — 97110 THERAPEUTIC EXERCISES: CPT

## 2022-10-14 NOTE — FLOWSHEET NOTE
[] [facility]  [address]  [phone]  F: [phone] [x] [address]  [facility]   [address]  P: [phone]  F: [phone] [] [address]  [address]  P: [phone]  F: [phone] [] [address]  P: [phone]  F: [phone] [] [address]  [facility]   [address]   [place]: [phone]  F: [phone]      Physical Therapy Daily Treatment Note    Date:  10/14/2022  Patient Name:  Nicole Chin    :  1970  MRN: [id number]  Physician: Melva Sims MD     Insurance: [employer] (30 visits, auth after 30)  Medical Diagnosis: M17.0 (ICD-10-CM) - Bilateral primary osteoarthritis of knee  Rehab Codes: M62.81 (Muscle Weakness), M62.9 (Disorder of Muscle), Z73.6 (Limitation of ADLs), R29.3 (Poor Posture), R26.89 (Difficulty Walking), M25.60 (Stiffness), Z74.0 (reduced mobility)    Onset Date: 10/3/22-referral   Next Dr. Niko Aguirre: 22-L knee replacement  Visit# / total visits: 3/12; Progress note for Medicare patient due at visit 10     Cancels/No Shows: 0/0    Subjective:    Pain:  [x] Yes  [] No  Location: both knees  Pain Rating: (0-10 scale) 0/10 L, 3/10 R  Pain altered Tx:  [x] No  [] Yes  Action:    Comments: pt with low pain at this time, stating to be mostly stiff.      Objective:  Modalities: vasocompression to bilateral knees in supine at end of session x15', 36 degrees- not 10/14  Precautions:  Exercises:  Exercise Reps/ Time Weight/ Level Comments         Nustep  7' L3                Standing: calf/toe raises 15     March in place 10 ea     Mini squats 12x  Increased reps 10/14   3 way hip: ext, abd, flex 15 ea  Increased reps 10/14   Lateral step ups 15 4\" L only Unable on R- not 10/14   Standing slantboard stretch            Seated: LAQ 10x2 5\" hold Increased reps 10/14         Supine: quad sets 10x 5\"    SAQ 10x2 ea 5\"    SLR 10x2 ea   A Increased reps 10/14   Bridges 15x  Increased reps 10/14   Hip add 10x 5\" Squeeze ball between knees   HS stretch with strap 30\"x2           Side: clamshells 15x ea     Reverse clamshells 15x ea     Hip abd 10x ea  Cues for positioning                                 Other:       Treatment Charges: Mins Units   []  Modalities: vaso     [x]  Ther Exercise 59 4   []  Manual Therapy     []  Ther Activities     []  Aquatics     []  Vasocompression     []  Other     Total Treatment time 59 4       Assessment: [x] Progressing toward goals. Started session with warm up, followed by exercises for B LE strength. Increased reps with fair tolerance. Pt reports muscular fatigue with higher reps, taking rest breaks as needed. Did not apply vasocompression this date d/t lower pain levels. [] No change. [] Other:  [x] Patient would continue to benefit from skilled physical therapy services in order to: reduce pain, improve ROM and strength, improve function, normalize balance in order to prepare for surgery and return to PLOF. Goals  MET NOT MET ON-  GOING  Details   Date Addressed:            STG: To be met in 6 treatments            1. ? Pain: Patient will self report worst pain no greater than 4/10 in order to better tolerate ADLs/work activities with minimal dysfunction []  []  []      2. ? ROM: Patient will improve AROM in bilat knees to lacking at most 10 degrees of knee ext  in  in order to demonstrate ability to move/reach in all planes unrestricted at PLOF []  []  []      3. Independent with Home Exercise Programs []  []  []      4.  Demonstrate knowledge of fall risk prevention  []  []  []      5. ? Balance: Patient to improve R SLS balance to at least 5 seconds in order to demo improved stability and carryover for functional tasks []  []  []                                                      Date Addressed:            LTG: To be met in 12 treatments           1. Improve score on assessment tool LEFS from 70% impairment to less than 50% impairment  []  []  []      2. ? Strength: Pt will demonstrate improved bilat hip abd, R hip flex and R knee flexion to 5/5 in order to demonstrate improved stability/strength necessary for unrestricted ADLs/work activities []  []  []      3. ? Balance: Patient to improve R + L SLS balance to at least 10 seconds in order to demo improved stability and carryover for functional tasks []  []  []      4. ? ROM: Patient will improve AROM in bilat knees to lacking at most 5 degrees of knee ext  in  in order to demonstrate ability to move/reach in all planes unrestricted at PLOF                                      Patient goals: to build strength for surgery    Pt. Education:  [x] Yes  [] No  [x] Reviewed Prior HEP/Ed  Method of Education: [] Verbal  [] Demo  [] Written: [address] access below  Comprehension of Education:  [] Verbalizes understanding. [] Demonstrates understanding. [] Needs review. [x] Demonstrates/verbalizes HEP/Ed previously given. Access Code: [email or web address]  URL: [email or web address]  Date: 10/11/2022  Prepared by: Chriss Kayser    Exercises  Supine Bridge - 1 x daily - 7 x weekly - 1 sets - 15 reps  Clamshell - 1 x daily - 7 x weekly - 1 sets - 15 reps  Sidelying Reverse Clamshell - 1 x daily - 7 x weekly - 1 sets - 15 reps  Sidelying Hip Abduction - 1 x daily - 7 x weekly - 1 sets - 10 reps  Supine Active Straight Leg Raise - 1 x daily - 7 x weekly - 1 sets - 20 reps  Supine Quad Set - 1 x daily - 7 x weekly - 1 sets - 10 reps  Supine Hip Adduction Isometric with Ball - 1 x daily - 7 x weekly - 1 sets - 20 reps  Standing Hip Abduction - 1 x daily - 7 x weekly - 1 sets - 15 reps  Standing Hip Extension - 1 x daily - 7 x weekly - 1 sets - 15 reps  Supine Quad Set - 1 x daily - 7 x weekly - 1 sets - 10 reps    Plan: [x] Continue current frequency toward long and short term goals.     [x] Specific Instructions for subsequent treatments: continue with progression of bilateral knee strengthening/flexibility exercises as able      Time In:10:00am            Time Out: 11:06am    Electronically signed by:  Arcadio Celis PTA

## 2022-10-17 PROBLEM — Z01.818 PREOP EXAMINATION: Status: ACTIVE | Noted: 2022-10-17

## 2022-10-18 ENCOUNTER — HOSPITAL ENCOUNTER (OUTPATIENT)
Dept: PHYSICAL THERAPY | Facility: CLINIC | Age: 52
Setting detail: THERAPIES SERIES
Discharge: HOME | End: 2022-10-18
Payer: MEDICARE

## 2022-10-18 ENCOUNTER — HOSPITAL ENCOUNTER (OUTPATIENT)
Dept: PREADMISSION TESTING | Age: 52
Discharge: HOME | End: 2022-10-22
Attending: ORTHOPAEDIC SURGERY | Admitting: ORTHOPAEDIC SURGERY
Payer: MEDICARE

## 2022-10-18 VITALS
HEART RATE: 79 BPM | WEIGHT: 280 LBS | TEMPERATURE: 97.8 F | HEIGHT: 65 IN | BODY MASS INDEX: 46.65 KG/M2 | DIASTOLIC BLOOD PRESSURE: 62 MMHG | RESPIRATION RATE: 20 BRPM | SYSTOLIC BLOOD PRESSURE: 120 MMHG | OXYGEN SATURATION: 100 %

## 2022-10-18 DIAGNOSIS — Z01.818 PREOP EXAMINATION: ICD-10-CM

## 2022-10-18 LAB
ABSOLUTE EOS #: 0.1 K/UL (ref 0–0.4)
ABSOLUTE LYMPH #: 1.5 K/UL (ref 1–4.8)
ABSOLUTE MONO #: 0.3 K/UL (ref 0.1–1.3)
ANION GAP SERPL CALCULATED.3IONS-SCNC: 11 MMOL/L (ref 9–17)
BACTERIA: ABNORMAL
BASOPHILS # BLD: 1 % (ref 0–2)
BASOPHILS ABSOLUTE: 0 K/UL (ref 0–0.2)
BILIRUBIN URINE: NEGATIVE
BUN BLDV-MCNC: 10 MG/DL (ref 6–20)
CALCIUM SERPL-MCNC: 8.9 MG/DL (ref 8.6–10.4)
CASTS UA: ABNORMAL /LPF
CHLORIDE BLD-SCNC: 102 MMOL/L (ref 98–107)
CO2: 27 MMOL/L (ref 20–31)
COLOR: YELLOW
CREAT SERPL-MCNC: 1.06 MG/DL (ref 0.5–0.9)
EOSINOPHILS RELATIVE PERCENT: 2 % (ref 0–4)
EPITHELIAL CELLS UA: ABNORMAL /HPF
GFR SERPL CREATININE-BSD FRML MDRD: >60 ML/MIN/1.73M2
GLUCOSE BLD-MCNC: 67 MG/DL (ref 70–99)
GLUCOSE URINE: NEGATIVE
HCT VFR BLD CALC: 35.1 % (ref 36–46)
HEMOGLOBIN: 11.8 G/DL (ref 12–16)
KETONES, URINE: ABNORMAL
LEUKOCYTE ESTERASE, URINE: ABNORMAL
LYMPHOCYTES # BLD: 36 % (ref 24–44)
MCH RBC QN AUTO: 29.3 PG (ref 26–34)
MCHC RBC AUTO-ENTMCNC: 33.6 G/DL (ref 31–37)
MCV RBC AUTO: 87.4 FL (ref 80–100)
MONOCYTES # BLD: 7 % (ref 1–7)
NITRITE, URINE: NEGATIVE
PDW BLD-RTO: 13.2 % (ref 11.5–14.9)
PH UA: 6 (ref 5–8)
PLATELET # BLD: 212 K/UL (ref 150–450)
PMV BLD AUTO: 7.9 FL (ref 6–12)
POTASSIUM SERPL-SCNC: 4.6 MMOL/L (ref 3.7–5.3)
PROTEIN UA: NEGATIVE
RBC # BLD: 4.01 M/UL (ref 4–5.2)
RBC UA: ABNORMAL /HPF
SEG NEUTROPHILS: 54 % (ref 36–66)
SEGMENTED NEUTROPHILS ABSOLUTE COUNT: 2.3 K/UL (ref 1.3–9.1)
SODIUM BLD-SCNC: 140 MMOL/L (ref 135–144)
SPECIFIC GRAVITY UA: 1.02 (ref 1–1.03)
TURBIDITY: CLEAR
URINE HGB: NEGATIVE
UROBILINOGEN, URINE: NORMAL
WBC # BLD: 4.3 K/UL (ref 3.5–11)
WBC UA: ABNORMAL /HPF

## 2022-10-18 PROCEDURE — 81001 URINALYSIS AUTO W/SCOPE: CPT

## 2022-10-18 PROCEDURE — 87641 MR-STAPH DNA AMP PROBE: CPT

## 2022-10-18 PROCEDURE — 36415 COLL VENOUS BLD VENIPUNCTURE: CPT

## 2022-10-18 PROCEDURE — 87077 CULTURE AEROBIC IDENTIFY: CPT

## 2022-10-18 PROCEDURE — 85025 COMPLETE CBC W/AUTO DIFF WBC: CPT

## 2022-10-18 PROCEDURE — 87186 SC STD MICRODIL/AGAR DIL: CPT

## 2022-10-18 PROCEDURE — 97110 THERAPEUTIC EXERCISES: CPT

## 2022-10-18 PROCEDURE — 93005 ELECTROCARDIOGRAM TRACING: CPT | Performed by: NURSE PRACTITIONER

## 2022-10-18 PROCEDURE — 80048 BASIC METABOLIC PNL TOTAL CA: CPT

## 2022-10-18 PROCEDURE — 87086 URINE CULTURE/COLONY COUNT: CPT

## 2022-10-18 PROCEDURE — APPSS45 APP SPLIT SHARED TIME 31-45 MINUTES: Performed by: NURSE PRACTITIONER

## 2022-10-18 ASSESSMENT — ENCOUNTER SYMPTOMS
TROUBLE SWALLOWING: 0
VOMITING: 0
COUGH: 0
SORE THROAT: 0
BLOOD IN STOOL: 0
DIARRHEA: 0
RHINORRHEA: 0
CONSTIPATION: 0
WHEEZING: 0
ANAL BLEEDING: 0
BACK PAIN: 1
ABDOMINAL PAIN: 0
SHORTNESS OF BREATH: 0
NAUSEA: 0

## 2022-10-18 NOTE — FLOWSHEET NOTE
[x] [address] [facility]  Outpatient Rehabilitation &  Therapy  [facility] [address]   [address]  P: [phone]  F: [phone]     Physical Therapy Daily Treatment Note    Date:  10/18/2022  Patient Name:  Alma Tuttle    :  1970  MRN: [id number]  Physician: Rochelle Sims MD     Insurance: [employer] (30 visits, auth after 30)  Medical Diagnosis: M17.0 (ICD-10-CM) - Bilateral primary osteoarthritis of knee    Rehab Codes: M62.81, M62.9, Z73.6, R26.89, M25.60, Z74.0    Onset Date: 10/3/22-referral   Next  [address]: 22-L knee replacement  Visit# / total visits: 3/12; Progress note for Medicare patient due at visit 10     Cancels/No Shows: 0/0    Subjective:    Pain:  [x] Yes  [] No  Location: both knees  Pain Rating: (0-10 scale) 3/10 L, 4/10 R  Pain altered Tx:  [x] No  [] Yes  Action:  Comments: Patient arrives today feeling pretty good. States that her exercises have been helping.      Objective:  Modalities: vasocompression to bilateral knees in supine at end of session x15', 36 degrees- not 10/14  Precautions:  Exercises:  Exercise Reps/ Time Weight/ Level Comments         Nustep  5' L3                Standing: calf/toe raises 2x10  Progressed 10/18   March in place 15 ea  Progressed 10/18   Mini squats 3x5  Increased reps 10/14, Progressed 10/18   3 way hip: ext, abd, flex 15 ea 1# Increased reps 10/14, Progressed 10/18   Lateral step ups 15 4\" L only Unable on R- not 10/14   Standing slantboard stretch 3x30\"  Added 10/18         Seated: LAQ 10x2 5\" hold, 1# Increased reps 10/14, Progressed 10/18         Supine: quad sets 10x 5\"    SAQ 10x2 ea 5\", 1# Progressed 10/18   SLR 15x ea   A, 1# Increased reps 10/14, 20 reps on L 15 on R   Bridges 2x10  Increased reps 10/14, Progressed 10/18   Hip add 10x2 5\" Squeeze ball between knees, Progressed 10/18   HS stretch with strap 30\"x2           Side: clamshells 2x10 ea  Progressed 10/18   Reverse clamshells 2x10 ea  Progressed 10/18 Hip abd 10x ea  Cues for positioning                                 Other:     Treatment Charges: Mins Units   []  Modalities: vaso     [x]  Ther Exercise 42 3   []  Manual Therapy     []  Ther Activities     []  Aquatics     []  Vasocompression     []  Other     Total Treatment time 42 3     Assessment: [x] Progressing toward goals. Initiated session on Nustep for shortened time on this date due to starting a few minutes late. Continued with standing exercises with progressions made to all standing exercises with good tolerance. Patient able to complete 15 reps of mini squats with proper verbal cueing for technique and patient reporting no increase in pain throughout reps. Added SB gastroc stretch in order to improve knee ROM and improve posterior tissue extensibility. Progressed SAQ and SLR to include 1# ankle weight with good tolerance with some fatigue noted at end of reps with SLR. Patient tolerated all progression well with no increase in pain or symptoms. [] No change. [] Other:  [x] Patient would continue to benefit from skilled physical therapy services in order to: reduce pain, improve ROM and strength, improve function, normalize balance in order to prepare for surgery and return to PLOF. Goals  MET NOT MET ON-  GOING  Details   Date Addressed:            STG: To be met in 6 treatments            1. ? Pain: Patient will self report worst pain no greater than 4/10 in order to better tolerate ADLs/work activities with minimal dysfunction []  []  []      2. ? ROM: Patient will improve AROM in bilat knees to lacking at most 10 degrees of knee ext  in  in order to demonstrate ability to move/reach in all planes unrestricted at PLOF []  []  []      3. Independent with Home Exercise Programs []  []  []      4.  Demonstrate knowledge of fall risk prevention  []  []  []      5. ? Balance: Patient to improve R SLS balance to at least 5 seconds in order to demo improved stability and carryover for functional tasks []  []  []                                                      Date Addressed:            LTG: To be met in 12 treatments           1. Improve score on assessment tool LEFS from 70% impairment to less than 50% impairment  []  []  []      2. ? Strength: Pt will demonstrate improved bilat hip abd, R hip flex and R knee flexion to 5/5 in order to demonstrate improved stability/strength necessary for unrestricted ADLs/work activities []  []  []      3. ? Balance: Patient to improve R + L SLS balance to at least 10 seconds in order to demo improved stability and carryover for functional tasks []  []  []      4. ? ROM: Patient will improve AROM in bilat knees to lacking at most 5 degrees of knee ext  in  in order to demonstrate ability to move/reach in all planes unrestricted at PLOF                                      Patient goals: to build strength for surgery    Pt. Education:  [x] Yes  [] No  [x] Reviewed Prior HEP/Ed  Method of Education: [] Verbal  [] Demo  [] Written: [address] access below  Comprehension of Education:  [] Verbalizes understanding. [] Demonstrates understanding. [] Needs review. [x] Demonstrates/verbalizes HEP/Ed previously given. Access Code: [email or web address]  URL: [email or web address]  Date: 10/11/2022  Prepared by: Bennie Souza    Exercises  Supine Bridge - 1 x daily - 7 x weekly - 1 sets - 15 reps  Clamshell - 1 x daily - 7 x weekly - 1 sets - 15 reps  Sidelying Reverse Clamshell - 1 x daily - 7 x weekly - 1 sets - 15 reps  Sidelying Hip Abduction - 1 x daily - 7 x weekly - 1 sets - 10 reps  Supine Active Straight Leg Raise - 1 x daily - 7 x weekly - 1 sets - 20 reps  Supine Quad Set - 1 x daily - 7 x weekly - 1 sets - 10 reps  Supine Hip Adduction Isometric with Ball - 1 x daily - 7 x weekly - 1 sets - 20 reps  Standing Hip Abduction - 1 x daily - 7 x weekly - 1 sets - 15 reps  Standing Hip Extension - 1 x daily - 7 x weekly - 1 sets - 15 reps  Supine Quad Set - 1 x daily - 7 x weekly - 1 sets - 10 reps    Plan: [x] Continue current frequency toward long and short term goals.     [x] Specific Instructions for subsequent treatments: continue with progression of bilateral knee strengthening/flexibility exercises as able      Time In: 2:05 PM          Time Out: 2:52 PM    Electronically signed by:  Geovany Agustin PT

## 2022-10-18 NOTE — H&P (VIEW-ONLY)
HISTORY and [facility] [address]       NAME:  Graciela Darden  MRN: [id number]   YOB: 1970   Date: 10/18/2022   Age: 54 y.o. Gender: female     COMPLAINT AND PRESENT HISTORY:   Graciela Darden is 54 y.o.,  female, presents for pre-anesthesia/admission testing for KNEE TOTAL ARTHROPLASTY per Dr. Annika Ndiaye. Primary dx: Osteoarthritis of left knee, unspecified osteoarthritis type  DJD LEFT KNEE.    HPI: Pt reports left knee has been painful for the past five years. The pain is getting increasingly worse. Has been walking with a cane for the past four years due to the pain. Has pain in her back due to her limp. Testing completed r/t condition:   XR knee left 1/7/22  X-rays taken in clinic today and preliminarily reviewed by me 1/7/22:  AP and lateral views of the left knee demonstrate early severe   tricompartmental degenerative changes approaching bone-on-bone apposition   in the medial compartment. Not quite to the point of the right knee as   there is still some space in the medial compartment but much greater than   expected for patient's young age of 54. No evidence of acute fracture   small effusion present. Review of additional significant medical hx: Htn, DENISHA    Activity level:  Functional Capacity per patient:              1. Patient is not able to walk 2 city blocks on level ground without SOB. 2. Patient is able to climb 2 flights of stairs without SOB. Denies hx of MRSA infection. Denies hx of blood clots. Denies hx of any personal or family hx of complications w/anesthesia.    PAST MEDICAL HISTORY     Past Medical History:   Diagnosis Date    Allergic rhinitis     Anxiety     Anxiety and depression     Arthritis     Asthma     no longer using inhaler or nebulizer    Diverticulitis     Headache(784.0)     HTN (hypertension)     pt denies  not on meds    Obesity     Overactive bladder     Sleep apnea     uses machine nightly  cpap    Use of cane as ambulatory aid     Wellness examination 2021    Dr. Sheryl Homans, his CNP's        SURGICAL HISTORY       Past Surgical History:   Procedure Laterality Date    CATARACT REMOVAL      CHOLECYSTECTOMY, LAPAROSCOPIC N/A 12/15/2020    CHOLECYSTECTOMY LAPAROSCOPIC ROBOTIC XI performed by Yisel Blas MD at [address],[place] [address] N/A 2020    COLONOSCOPY POLYPECTOMY SNARE/COLD BIOPSY performed by Olive Thompson MD at [address] [place] N/A 2021    COLONOSCOPY WITH BIOPSY RANDOM RIGHT AND LEFT COLON performed by Olive Thompson MD at [address] Bilateral     cataract removed with lens implants    NV KNEE SCOPE,DIAGNOSTIC Right 2017    RIGHT KNEE ARTHROSCOPY WITH MEDIAL MENISECTOMY AND CHONDROPLASTY performed by Ara Bello DO at [address] [facility]  2021    ROBOTIC [place], EGD-    SLEEVE GASTRECTOMY N/A 2021    XI ROBOTIC LAPAROSCOPIC GASTRECTOMY SLEEVE, EGD- GI UNIT SCHEDULED performed by Lisa Roy DO at [facility] N/A 2021    EGD BIOPSY OF GASTRIC AND GASTRIC POLYPECTOMY performed by Olive Thompson MD at [address] [facility] History     Socioeconomic History    Marital status:      Spouse name: None    Number of children: None    Years of education: None    Highest education level: None   Tobacco Use    Smoking status: Former     Packs/day: 0.60     Types: Cigarettes     Quit date: 2017     Years since quittin.7    Smokeless tobacco: Never   Vaping Use    Vaping Use: Never used   Substance and Sexual Activity    Alcohol use: Yes     Comment: rare    Drug use: No       REVIEW OF SYSTEMS      Allergies   Allergen Reactions    No Known Allergies        Current Outpatient Medications on File Prior to Encounter   Medication Sig Dispense Refill    famotidine (PEPCID) 20 MG tablet take 1 tablet by mouth twice a for cough, shortness of breath and wheezing. Cardiovascular:  Negative for chest pain, palpitations and leg swelling. Gastrointestinal:  Negative for abdominal pain, anal bleeding, blood in stool, constipation, diarrhea, nausea and vomiting. Genitourinary:  Negative for dysuria and frequency. Musculoskeletal:  Positive for arthralgias, back pain, gait problem and joint swelling. Skin:  Negative for rash. Neurological:  Negative for dizziness and headaches. Hematological:  Does not bruise/bleed easily. GENERAL PHYSICAL EXAM     Vitals: /62   Pulse 79   Temp 97.8 °F (36.6 °C)   Resp 20   Ht 5' 4.5\" (1.638 m)   Wt 280 lb (127 kg)   LMP 10/04/2022   SpO2 100%   BMI 47.32 kg/m²               Physical Exam  Constitutional:       General: She is not in acute distress. Appearance: She is obese. HENT:      Head: Normocephalic. Nose: Nose normal.      Mouth/Throat:      Mouth: Mucous membranes are moist.      Pharynx: Oropharynx is clear. Eyes:      Extraocular Movements: Extraocular movements intact. Pupils: Pupils are equal, round, and reactive to light. Cardiovascular:      Rate and Rhythm: Normal rate and regular rhythm. Pulmonary:      Effort: Pulmonary effort is normal.      Breath sounds: Normal breath sounds. Abdominal:      General: Abdomen is flat. Bowel sounds are normal.   Musculoskeletal:      Cervical back: Normal range of motion. Left knee: Swelling present. Decreased range of motion (limited due to pain). Tenderness present. Skin:     General: Skin is warm and dry. Findings: No bruising. Neurological:      General: No focal deficit present. Mental Status: She is alert and oriented to person, place, and time. Mental status is at baseline.    Psychiatric:         Mood and Affect: Mood normal.       LAB REVIEW     Lab Results   Component Value Date    WBC 4.3 10/18/2022    HGB 11.8 (L) 10/18/2022    HCT 35.1 (L) 10/18/2022    MCV 87.4 10/18/2022     10/18/2022      Lab Results   Component Value Date/Time     10/18/2022 12:00 PM    K 4.6 10/18/2022 12:00 PM     10/18/2022 12:00 PM    CO2 27 10/18/2022 12:00 PM    BUN 10 10/18/2022 12:00 PM    CREATININE 1.06 10/18/2022 12:00 PM    GLUCOSE 67 10/18/2022 12:00 PM    CALCIUM 8.9 10/18/2022 12:00 PM       PRELIMINARY EKG REVIEW, DATE: 10/18/22   Normal sinus rhythm  Normal EKG   SURGERY / PROVISIONAL DIAGNOSES:      KNEE TOTAL ARTHROPLASTY    Osteoarthritis of left knee, unspecified osteoarthritis type  DJD LEFT KNEE    Patient Active Problem List    Diagnosis Date Noted    Preop examination 10/17/2022    Morbid obesity with BMI of 50.0-59.9, adult ([facility] [address] 05/24/2022    S/P laparoscopic sleeve gastrectomy 11/17/2021    Lymphocytic colitis 04/27/2021    Diarrhea     Dyspepsia     Diverticulitis of large intestine without perforation or abscess without bleeding     Bilateral chronic knee pain 03/03/2020    DENISHA (obstructive sleep apnea) 02/02/2018    Anxiety and depression 02/02/2018    Chronic back pain 02/02/2018    Seborrheic dermatitis 03/28/2017    Psoriasis 04/13/2016    Primary osteoarthritis of right knee 10/02/2015    Osteoarthritis 04/18/2014    Chronic rhinitis 04/18/2014    OAB (overactive bladder) 04/18/2014    Asthma 04/18/2014    Headache     Overactive bladder            Total time spent on encounter- PAT provider minutes: 31-40 minutes     [facility] - CNP on 10/18/2022 at 12:09 PM

## 2022-10-18 NOTE — DISCHARGE INSTRUCTIONS
Pre-op Instructions For Out-Patient Surgery    Medication Instructions:  Please stop herbs and any supplements now (includes vitamins and minerals). Please contact your surgeon and prescribing physician for pre-op instructions for any blood thinners. If you have inhalers/aerosol treatments at home, please use them the morning of your surgery and bring the inhalers with you to the hospital.    Please take the following medications the morning of your surgery with a sip of water:    Famotidine    Surgery Instructions:  After midnight before surgery:  Do not eat or drink anything, including water, mints, gum, and hard candy. You may brush your teeth without swallowing. No smoking, chewing tobacco, or street drugs. Please shower or bathe before surgery. If you were given Surgical Scrub Chlorhexidine Gluconate Liquid (CHG), please shower the night before and the morning of your surgery following the detailed instructions you received during your pre-admission visit. Please do not wear any cologne, lotion, powder, deodorant, jewelry, piercings, perfume, makeup, nail polish, hair accessories, or hair spray on the day of surgery. Wear loose comfortable clothing. Leave your valuables at home. Bring a storage case for any glasses/contacts. An adult who is responsible for you MUST drive you home and should be with you for the first 24 hours after surgery. If having out-patient knee and foot surgeries, please arrange for planned crutches, walker, or wheelchair before arriving to the hospital.    The Day of Surgery:  Arrive at [facility] at the time directed by your surgeon and check in at the desk. If you have a living will or healthcare power of , please bring a copy. You will be taken to the pre-op holding area where you will be prepared for surgery.   A physical assessment will be performed by a nurse practitioner or house officer. Your IV will be started and you will meet your anesthesiologist.    When you go to surgery, your family will be directed to the surgical waiting room, where the doctor should speak with them after your surgery. After surgery, you will be taken to the recovery room then when you are awake and stable you will go to the short stay unit for preparation to be discharged. If you use a Bi-PAP or C-PAP machine, please bring it with you and leave it in the car in case it is needed in recovery room.

## 2022-10-18 NOTE — H&P
HISTORY and Aime [facility] [id number]       NAME:  Be Albert  MRN: [id number]   YOB: 1970   Date: 10/18/2022   Age: 54 y.o. Gender: female     COMPLAINT AND PRESENT HISTORY:   Be Albert is 54 y.o.,  female, presents for pre-anesthesia/admission testing for KNEE TOTAL ARTHROPLASTY per Dr. Cassandra Douglas. Primary dx: Osteoarthritis of left knee, unspecified osteoarthritis type  DJD LEFT KNEE.    HPI: Pt reports left knee has been painful for the past five years. The pain is getting increasingly worse. Has been walking with a cane for the past four years due to the pain. Has pain in her back due to her limp. Testing completed r/t condition:   XR knee left 1/7/22  X-rays taken in clinic today and preliminarily reviewed by me 1/7/22:  AP and lateral views of the left knee demonstrate early severe   tricompartmental degenerative changes approaching bone-on-bone apposition   in the medial compartment. Not quite to the point of the right knee as   there is still some space in the medial compartment but much greater than   expected for patient's young age of 54. No evidence of acute fracture   small effusion present. Review of additional significant medical hx: Htn, DENISHA    Activity level:  Functional Capacity per patient:              1. Patient is not able to walk 2 city blocks on level ground without SOB. 2. Patient is able to climb 2 flights of stairs without SOB. Denies hx of MRSA infection. Denies hx of blood clots. Denies hx of any personal or family hx of complications w/anesthesia.    PAST MEDICAL HISTORY     Past Medical History:   Diagnosis Date    Allergic rhinitis     Anxiety     Anxiety and depression     Arthritis     Asthma     no longer using inhaler or nebulizer    Diverticulitis     Headache(784.0)     HTN (hypertension)     pt denies  not on meds    Obesity     Overactive bladder     Sleep apnea     uses machine nightly  cpap    Use of cane as ambulatory aid     Wellness examination 2021    Dr. Bonnie Colon, his CNP's        SURGICAL HISTORY       Past Surgical History:   Procedure Laterality Date    CATARACT REMOVAL      CHOLECYSTECTOMY, LAPAROSCOPIC N/A 12/15/2020    CHOLECYSTECTOMY LAPAROSCOPIC ROBOTIC XI performed by Amy Nelson MD at [facility] 36 N/A 2020    COLONOSCOPY POLYPECTOMY SNARE/COLD BIOPSY performed by Tosha Darden MD at [facility] 53 N/A 2021    COLONOSCOPY WITH BIOPSY RANDOM RIGHT AND LEFT COLON performed by Tosha Darden MD at [address] [facility] Bilateral     cataract removed with lens implants    OH KNEE SCOPE,DIAGNOSTIC Right 2017    RIGHT KNEE ARTHROSCOPY WITH MEDIAL MENISECTOMY AND CHONDROPLASTY performed by Luis Armando Perry DO at [facility]  2021    ROBOTIC [place], EGD-    SLEEVE GASTRECTOMY N/A 2021    XI ROBOTIC LAPAROSCOPIC GASTRECTOMY SLEEVE, EGD- GI UNIT SCHEDULED performed by Dillan Hughes DO at [facility] 103 N/A 2021    EGD BIOPSY OF GASTRIC AND GASTRIC POLYPECTOMY performed by Tosha Darden MD at [address] [facility] History     Socioeconomic History    Marital status:      Spouse name: None    Number of children: None    Years of education: None    Highest education level: None   Tobacco Use    Smoking status: Former     Packs/day: 0.60     Types: Cigarettes     Quit date:      Years since quittin.7    Smokeless tobacco: Never   Vaping Use    Vaping Use: Never used   Substance and Sexual Activity    Alcohol use: Yes     Comment: rare    Drug use: No       REVIEW OF SYSTEMS      Allergies   Allergen Reactions    No Known Allergies        Current Outpatient Medications on File Prior to Encounter   Medication Sig Dispense Refill    famotidine (PEPCID) 20 MG tablet take 1 tablet by mouth twice a day 240 tablet 0    vitamin B-12 (CYANOCOBALAMIN) 500 MCG tablet Take 1 tablet by mouth daily 30 tablet 11    folic acid (FOLVITE) 1 MG tablet Take 1 tablet by mouth daily 30 tablet 0    vitamin D (ERGOCALCIFEROL) 1.25 MG (66374 UT) CAPS capsule Take 1 capsule by mouth once a week for 8 doses 8 capsule 0    ketoconazole (NIZORAL) 2 % shampoo Apply 3-4 times weekly to scalp, leave on for five minutes prior to washing off 120 mL 2    triamcinolone (KENALOG) 0.025 % cream Apply to rash on face and ears twice daily, as needed, for scaling 80 g 2    fluocinonide (LIDEX) 0.05 % external solution Apply to scalp twice daily, as needed, for itching 60 mL 2    metroNIDAZOLE (METROCREAM) 0.75 % cream Apply topically 2 times daily. 45 g 3    APLENZIN 174 MG TB24 take 1 tablet by mouth every morning      simethicone (MYLICON) 80 MG chewable tablet Take 1 tablet by mouth 4 times daily as needed for Flatulence 180 tablet 3    dicyclomine (BENTYL) 20 MG tablet Take 1 tablet by mouth every 8 hours as needed (diarrhea) 120 tablet 0    Multiple Vitamins-Minerals (THERAPEUTIC MULTIVITAMIN-MINERALS) tablet Take 1 tablet by mouth daily      ondansetron (ZOFRAN) 4 MG tablet Take 1 tablet by mouth every 8 hours as needed for Nausea or Vomiting 30 tablet 2    Sulfacetamide Sodium, Acne, 10 % LOTN Apply to face daily 118 mL 2    acetaminophen (TYLENOL) 500 MG tablet Take 1,000 mg by mouth every 6 hours as needed for Pain       escitalopram (LEXAPRO) 20 MG tablet Take 10 mg by mouth daily       Current Facility-Administered Medications on File Prior to Encounter   Medication Dose Route Frequency Provider Last Rate Last Admin    albuterol (PROVENTIL) nebulizer solution 2.5 mg  2.5 mg Nebulization 4x daily Jeremy Alarcon MD           Review of Systems   Constitutional:  Negative for chills and fever. HENT:  Negative for congestion, ear pain, postnasal drip, rhinorrhea, sore throat and trouble swallowing.     Respiratory:  Negative for cough, shortness of breath and wheezing. Cardiovascular:  Negative for chest pain, palpitations and leg swelling. Gastrointestinal:  Negative for abdominal pain, anal bleeding, blood in stool, constipation, diarrhea, nausea and vomiting. Genitourinary:  Negative for dysuria and frequency. Musculoskeletal:  Positive for arthralgias, back pain, gait problem and joint swelling. Skin:  Negative for rash. Neurological:  Negative for dizziness and headaches. Hematological:  Does not bruise/bleed easily. GENERAL PHYSICAL EXAM     Vitals: /62   Pulse 79   Temp 97.8 °F (36.6 °C)   Resp 20   Ht 5' 4.5\" (1.638 m)   Wt 280 lb (127 kg)   LMP 10/04/2022   SpO2 100%   BMI 47.32 kg/m²               Physical Exam  Constitutional:       General: She is not in acute distress. Appearance: She is obese. HENT:      Head: Normocephalic. Nose: Nose normal.      Mouth/Throat:      Mouth: Mucous membranes are moist.      Pharynx: Oropharynx is clear. Eyes:      Extraocular Movements: Extraocular movements intact. Pupils: Pupils are equal, round, and reactive to light. Cardiovascular:      Rate and Rhythm: Normal rate and regular rhythm. Pulmonary:      Effort: Pulmonary effort is normal.      Breath sounds: Normal breath sounds. Abdominal:      General: Abdomen is flat. Bowel sounds are normal.   Musculoskeletal:      Cervical back: Normal range of motion. Left knee: Swelling present. Decreased range of motion (limited due to pain). Tenderness present. Skin:     General: Skin is warm and dry. Findings: No bruising. Neurological:      General: No focal deficit present. Mental Status: She is alert and oriented to person, place, and time. Mental status is at baseline.    Psychiatric:         Mood and Affect: Mood normal.       LAB REVIEW     Lab Results   Component Value Date    WBC 4.3 10/18/2022    HGB 11.8 (L) 10/18/2022    HCT 35.1 (L) 10/18/2022    MCV 87.4 10/18/2022     10/18/2022      Lab Results   Component Value Date/Time     10/18/2022 12:00 PM    K 4.6 10/18/2022 12:00 PM     10/18/2022 12:00 PM    CO2 27 10/18/2022 12:00 PM    BUN 10 10/18/2022 12:00 PM    CREATININE 1.06 10/18/2022 12:00 PM    GLUCOSE 67 10/18/2022 12:00 PM    CALCIUM 8.9 10/18/2022 12:00 PM       PRELIMINARY EKG REVIEW, DATE: 10/18/22   Normal sinus rhythm  Normal EKG   SURGERY / PROVISIONAL DIAGNOSES:      KNEE TOTAL ARTHROPLASTY    Osteoarthritis of left knee, unspecified osteoarthritis type  DJD LEFT KNEE    Patient Active Problem List    Diagnosis Date Noted    Preop examination 10/17/2022    Morbid obesity with BMI of 50.0-59.9, adult ([facility] [address] 05/24/2022    S/P laparoscopic sleeve gastrectomy 11/17/2021    Lymphocytic colitis 04/27/2021    Diarrhea     Dyspepsia     Diverticulitis of large intestine without perforation or abscess without bleeding     Bilateral chronic knee pain 03/03/2020    DENISHA (obstructive sleep apnea) 02/02/2018    Anxiety and depression 02/02/2018    Chronic back pain 02/02/2018    Seborrheic dermatitis 03/28/2017    Psoriasis 04/13/2016    Primary osteoarthritis of right knee 10/02/2015    Osteoarthritis 04/18/2014    Chronic rhinitis 04/18/2014    OAB (overactive bladder) 04/18/2014    Asthma 04/18/2014    Headache     Overactive bladder            Total time spent on encounter- PAT provider minutes: 31-40 minutes     [facility] - CNP on 10/18/2022 at 12:09 PM

## 2022-10-19 LAB
CULTURE: ABNORMAL
EKG ATRIAL RATE: 73 BPM
EKG P AXIS: 68 DEGREES
EKG P-R INTERVAL: 178 MS
EKG Q-T INTERVAL: 396 MS
EKG QRS DURATION: 94 MS
EKG QTC CALCULATION (BAZETT): 436 MS
EKG R AXIS: 64 DEGREES
EKG T AXIS: 31 DEGREES
EKG VENTRICULAR RATE: 73 BPM
MRSA, DNA, NASAL: NEGATIVE
SPECIMEN DESCRIPTION: ABNORMAL
SPECIMEN DESCRIPTION: NORMAL

## 2022-10-19 PROCEDURE — 93010 ELECTROCARDIOGRAM REPORT: CPT | Performed by: INTERNAL MEDICINE

## 2022-10-20 RX ORDER — SULFAMETHOXAZOLE AND TRIMETHOPRIM 800; 160 MG/1; MG/1
1 TABLET ORAL 2 TIMES DAILY
Qty: 14 TABLET | Refills: 0 | Status: SHIPPED | OUTPATIENT
Start: 2022-10-20 | End: 2022-10-27

## 2022-10-21 ENCOUNTER — HOSPITAL ENCOUNTER (OUTPATIENT)
Dept: PHYSICAL THERAPY | Facility: CLINIC | Age: 52
Setting detail: THERAPIES SERIES
Discharge: HOME | End: 2022-10-21
Payer: MEDICARE

## 2022-10-21 NOTE — FLOWSHEET NOTE
[] [facility]) [facility] &  Therapy  [address]    [phone]  F: [phone]   [x] [address]  [facility]   [address]  P: [phone]  F: [phone]  [] [address]  [address]  P: [phone]  F: [phone] [] [address]  P: [phone]  F: [phone]  [] [address]  [facility]   [address]   [place]: [phone]  F: [phone]   [] [place] [address] [facility] [address]  [place]: [phone]   F: [phone]     Physical Therapy Cancel/No Show note    Date: 10/21/2022  Patient: Jasvir Mike  : 1970  MRN: [id number]    Cancels/No Shows to date:     For today's appointment patient:    [x]  Cancelled    [] Rescheduled appointment    [] No-show     Reason given by patient:    []  Patient ill    []  Conflicting appointment    [] No transportation      [] Conflict with work    [x] No reason given    [] Weather related    [] COVID-19    [] Other:      Comments:        [x] Next appointment was confirmed    Electronically signed by: Donna Sanon PTA

## 2022-10-25 ENCOUNTER — HOSPITAL ENCOUNTER (OUTPATIENT)
Dept: PHYSICAL THERAPY | Facility: CLINIC | Age: 52
Setting detail: THERAPIES SERIES
Discharge: HOME | End: 2022-10-25
Payer: MEDICARE

## 2022-10-25 NOTE — FLOWSHEET NOTE
[] [facility]) [facility] &  Therapy  [address]    [phone]  F: [phone]   [x] [address]  [facility]   [address]  P: [phone]  F: [phone]  [] [address]  [address]  P: [phone]  F: [phone] [] [address]  P: [phone]  F: [phone]  [] [address] [facility]   [address]   [place]: [phone]  F: [phone]   [] [place] [address] [facility] [address]  [place]: [phone]   F: [phone]     Physical Therapy Cancel/No Show note    Date: 10/25/2022  Patient: Tosha Cameron  : 1970  MRN: [id number]    Cancels/No Shows to date:     For today's appointment patient:    [x]  Cancelled    [] Rescheduled appointment    [] No-show     Reason given by patient:    []  Patient ill    [x]  Conflicting appointment    [] No transportation      [] Conflict with work    [] No reason given    [] Weather related    [] COVID-19    [] Other:      Comments:        [x] Next appointment was confirmed    Electronically signed by: iDone Aguirre PT

## 2022-10-28 ENCOUNTER — HOSPITAL ENCOUNTER (OUTPATIENT)
Dept: PHYSICAL THERAPY | Facility: CLINIC | Age: 52
Setting detail: THERAPIES SERIES
Discharge: HOME | End: 2022-10-28
Payer: MEDICARE

## 2022-10-28 PROCEDURE — 97110 THERAPEUTIC EXERCISES: CPT

## 2022-10-28 NOTE — FLOWSHEET NOTE
[x] [address] [facility]  Outpatient Rehabilitation &  Therapy  [address]   [address]  P: [phone]  F: [phone]     Physical Therapy Daily Treatment Note    Date:  10/28/2022  Patient Name:  Nicole Chin    :  1970  MRN: [id number]  Physician: Melva Sims MD     Insurance: [employer] (30 visits, auth after 30)  Medical Diagnosis: M17.0 (ICD-10-CM) - Bilateral primary osteoarthritis of knee    Rehab Codes: M62.81, M62.9, Z73.6, R26.89, M25.60, Z74.0    Onset Date: 10/3/22-referral   Next  [address]: 22-L knee replacement  Visit# / total visits: ; Progress note for Medicare patient due at visit 10     Cancels/No Shows: 0/0    Subjective:    Pain:  [x] Yes  [] No  Location: both knees  Pain Rating: (0-10 scale) 2/10 L knee, 4/10 R knee  Pain altered Tx:  [x] No  [] Yes  Action:    Comments: Pt having L knee replaced 22.  At this time her R knee is her greatest complaint    Objective:  Modalities: vasocompression to bilateral knees in supine at end of session x15', 36 degrees- not 10/28  Precautions:  Exercises:  Exercise Reps/ Time Weight/ Level Comments         Nustep  7' L5                Standing: calf/toe raises 2x10  Progressed 10/18   March in place 15 ea  Progressed 10/18   Mini squats 3x5  Increased reps 10/14, Progressed 10/18   3 way hip: ext, abd, flex 15 ea 2# Increased weight 10/28   Lateral step ups 15 4\" L only Unable on R- not 10/14   Standing slantboard stretch 3x30\"  Added 10/18         Seated: LAQ 10x2 5\" hold, 2# Increased weight 10/28         Supine: quad sets 10x 5\"    SAQ 10x2 ea 5\", 2# Increased weight 10/28   SLR 15x ea   2# Increased weight 10/28   Bridges 2x10  Increased reps 10/14, Progressed 10/18   Hip add 10x2 5\" Squeeze ball between knees, Progressed 10/18   HS stretch with strap 30\"x2           Side: clamshells 2x10 ea  Progressed 10/18   Reverse clamshells 2x10 ea  Progressed 10/18   Hip abd 10x ea  Cues for positioning Other:     Treatment Charges: Mins Units   []  Modalities: vaso     [x]  Ther Exercise 43 3   []  Manual Therapy     []  Ther Activities     []  Aquatics     []  Vasocompression     []  Other     Total Treatment time 43 3     Assessment: [x] Progressing toward goals. Started session with warm up on Nustep, with increased time and resistance. Increased PREs to 2# with good tolerance. Pt does report feeling challenged with exercises d/t pain. But pain was not increased with treatment. [] No change. [] Other:  [x] Patient would continue to benefit from skilled physical therapy services in order to: reduce pain, improve ROM and strength, improve function, normalize balance in order to prepare for surgery and return to PLOF. Goals  MET NOT MET ON-  GOING  Details   Date Addressed:            STG: To be met in 6 treatments            1. ? Pain: Patient will self report worst pain no greater than 4/10 in order to better tolerate ADLs/work activities with minimal dysfunction []  []  []      2. ? ROM: Patient will improve AROM in bilat knees to lacking at most 10 degrees of knee ext  in  in order to demonstrate ability to move/reach in all planes unrestricted at PLOF []  []  []      3. Independent with Home Exercise Programs []  []  []      4. Demonstrate knowledge of fall risk prevention  []  []  []      5. ? Balance: Patient to improve R SLS balance to at least 5 seconds in order to demo improved stability and carryover for functional tasks []  []  []                                                      Date Addressed:            LTG: To be met in 12 treatments           1.  Improve score on assessment tool LEFS from 70% impairment to less than 50% impairment  []  []  []      2. ? Strength: Pt will demonstrate improved bilat hip abd, R hip flex and R knee flexion to 5/5 in order to demonstrate improved stability/strength necessary for unrestricted ADLs/work activities []  []  [] 3. ? Balance: Patient to improve R + L SLS balance to at least 10 seconds in order to demo improved stability and carryover for functional tasks []  []  []      4. ? ROM: Patient will improve AROM in bilat knees to lacking at most 5 degrees of knee ext  in  in order to demonstrate ability to move/reach in all planes unrestricted at PLOF                                      Patient goals: to build strength for surgery    Pt. Education:  [x] Yes  [] No  [x] Reviewed Prior HEP/Ed  Method of Education: [] Verbal  [] Demo  [] Written: [address] access below  Comprehension of Education:  [] Verbalizes understanding. [] Demonstrates understanding. [] Needs review. [x] Demonstrates/verbalizes HEP/Ed previously given. Access Code: [email or web address]  URL: "[email or web address]  Date: 10/11/2022  Prepared by: Katherine Fernández    Exercises  Supine Bridge - 1 x daily - 7 x weekly - 1 sets - 15 reps  Clamshell - 1 x daily - 7 x weekly - 1 sets - 15 reps  Sidelying Reverse Clamshell - 1 x daily - 7 x weekly - 1 sets - 15 reps  Sidelying Hip Abduction - 1 x daily - 7 x weekly - 1 sets - 10 reps  Supine Active Straight Leg Raise - 1 x daily - 7 x weekly - 1 sets - 20 reps  Supine Quad Set - 1 x daily - 7 x weekly - 1 sets - 10 reps  Supine Hip Adduction Isometric with Ball - 1 x daily - 7 x weekly - 1 sets - 20 reps  Standing Hip Abduction - 1 x daily - 7 x weekly - 1 sets - 15 reps  Standing Hip Extension - 1 x daily - 7 x weekly - 1 sets - 15 reps  Supine Quad Set - 1 x daily - 7 x weekly - 1 sets - 10 reps    Plan: [x] Continue current frequency toward long and short term goals.     [x] Specific Instructions for subsequent treatments: pt to be evaluated 11/3/22 following TKA      Time In: 1:30pm          Time Out: 2:30pm    Electronically signed by:  Clement Bhatti PTA

## 2022-10-31 ENCOUNTER — ANESTHESIA EVENT (OUTPATIENT)
Dept: OPERATING ROOM | Age: 52
End: 2022-10-31
Payer: MEDICARE

## 2022-11-01 ENCOUNTER — ANESTHESIA (OUTPATIENT)
Dept: OPERATING ROOM | Age: 52
End: 2022-11-01
Payer: MEDICARE

## 2022-11-01 ENCOUNTER — APPOINTMENT (OUTPATIENT)
Dept: GENERAL RADIOLOGY | Age: 52
End: 2022-11-01
Attending: ORTHOPAEDIC SURGERY
Payer: MEDICARE

## 2022-11-01 ENCOUNTER — HOSPITAL ENCOUNTER (OUTPATIENT)
Age: 52
Discharge: HOME HEALTH | End: 2022-11-01
Attending: ORTHOPAEDIC SURGERY | Admitting: ORTHOPAEDIC SURGERY
Payer: MEDICARE

## 2022-11-01 VITALS
OXYGEN SATURATION: 98 % | HEIGHT: 65 IN | HEART RATE: 87 BPM | SYSTOLIC BLOOD PRESSURE: 107 MMHG | DIASTOLIC BLOOD PRESSURE: 66 MMHG | RESPIRATION RATE: 14 BRPM | BODY MASS INDEX: 48.82 KG/M2 | WEIGHT: 293 LBS | TEMPERATURE: 98.7 F

## 2022-11-01 DIAGNOSIS — M17.12 PRIMARY OSTEOARTHRITIS OF LEFT KNEE: Primary | ICD-10-CM

## 2022-11-01 LAB — HCG, PREGNANCY URINE (POC): NEGATIVE

## 2022-11-01 PROCEDURE — 76942 ECHO GUIDE FOR BIOPSY: CPT | Performed by: ANESTHESIOLOGY

## 2022-11-01 PROCEDURE — 3600000003 HC SURGERY LEVEL 3 BASE: Performed by: ORTHOPAEDIC SURGERY

## 2022-11-01 PROCEDURE — 27447 TOTAL KNEE ARTHROPLASTY: CPT | Performed by: ORTHOPAEDIC SURGERY

## 2022-11-01 PROCEDURE — 7100000000 HC PACU RECOVERY - FIRST 15 MIN: Performed by: ORTHOPAEDIC SURGERY

## 2022-11-01 PROCEDURE — 6360000002 HC RX W HCPCS: Performed by: ANESTHESIOLOGY

## 2022-11-01 PROCEDURE — 81025 URINE PREGNANCY TEST: CPT

## 2022-11-01 PROCEDURE — 97530 THERAPEUTIC ACTIVITIES: CPT

## 2022-11-01 PROCEDURE — 73560 X-RAY EXAM OF KNEE 1 OR 2: CPT

## 2022-11-01 PROCEDURE — 6360000002 HC RX W HCPCS: Performed by: ORTHOPAEDIC SURGERY

## 2022-11-01 PROCEDURE — 97535 SELF CARE MNGMENT TRAINING: CPT

## 2022-11-01 PROCEDURE — A4216 STERILE WATER/SALINE, 10 ML: HCPCS | Performed by: ORTHOPAEDIC SURGERY

## 2022-11-01 PROCEDURE — 6370000000 HC RX 637 (ALT 250 FOR IP): Performed by: ORTHOPAEDIC SURGERY

## 2022-11-01 PROCEDURE — 6360000002 HC RX W HCPCS: Performed by: NURSE ANESTHETIST, CERTIFIED REGISTERED

## 2022-11-01 PROCEDURE — 2720000010 HC SURG SUPPLY STERILE: Performed by: ORTHOPAEDIC SURGERY

## 2022-11-01 PROCEDURE — 2500000003 HC RX 250 WO HCPCS: Performed by: ORTHOPAEDIC SURGERY

## 2022-11-01 PROCEDURE — 2580000003 HC RX 258: Performed by: ORTHOPAEDIC SURGERY

## 2022-11-01 PROCEDURE — 2500000003 HC RX 250 WO HCPCS: Performed by: ANESTHESIOLOGY

## 2022-11-01 PROCEDURE — 97166 OT EVAL MOD COMPLEX 45 MIN: CPT

## 2022-11-01 PROCEDURE — 97116 GAIT TRAINING THERAPY: CPT

## 2022-11-01 PROCEDURE — 2709999900 HC NON-CHARGEABLE SUPPLY: Performed by: ORTHOPAEDIC SURGERY

## 2022-11-01 PROCEDURE — 3700000000 HC ANESTHESIA ATTENDED CARE: Performed by: ORTHOPAEDIC SURGERY

## 2022-11-01 PROCEDURE — 6370000000 HC RX 637 (ALT 250 FOR IP): Performed by: ANESTHESIOLOGY

## 2022-11-01 PROCEDURE — 97162 PT EVAL MOD COMPLEX 30 MIN: CPT

## 2022-11-01 PROCEDURE — 97110 THERAPEUTIC EXERCISES: CPT

## 2022-11-01 PROCEDURE — C1776 JOINT DEVICE (IMPLANTABLE): HCPCS | Performed by: ORTHOPAEDIC SURGERY

## 2022-11-01 PROCEDURE — C9290 INJ, BUPIVACAINE LIPOSOME: HCPCS | Performed by: ANESTHESIOLOGY

## 2022-11-01 PROCEDURE — 3700000001 HC ADD 15 MINUTES (ANESTHESIA): Performed by: ORTHOPAEDIC SURGERY

## 2022-11-01 PROCEDURE — 3600000013 HC SURGERY LEVEL 3 ADDTL 15MIN: Performed by: ORTHOPAEDIC SURGERY

## 2022-11-01 PROCEDURE — 7100000001 HC PACU RECOVERY - ADDTL 15 MIN: Performed by: ORTHOPAEDIC SURGERY

## 2022-11-01 PROCEDURE — 2580000003 HC RX 258: Performed by: ANESTHESIOLOGY

## 2022-11-01 PROCEDURE — 2500000003 HC RX 250 WO HCPCS: Performed by: NURSE ANESTHETIST, CERTIFIED REGISTERED

## 2022-11-01 PROCEDURE — C1713 ANCHOR/SCREW BN/BN,TIS/BN: HCPCS | Performed by: ORTHOPAEDIC SURGERY

## 2022-11-01 DEVICE — IMPLANTABLE DEVICE: Type: IMPLANTABLE DEVICE | Site: KNEE | Status: FUNCTIONAL

## 2022-11-01 DEVICE — STEM TIB L80MM DIA12.5MM KNEE PRI FIN VANGUARD COMPLT SYS: Type: IMPLANTABLE DEVICE | Site: KNEE | Status: FUNCTIONAL

## 2022-11-01 DEVICE — CEMENT BNE 40GM HI VISC RADPQ FOR REV SURG: Type: IMPLANTABLE DEVICE | Site: KNEE | Status: FUNCTIONAL

## 2022-11-01 DEVICE — TRAY TIB L71MM KNEE COPOLYESTER SIL INTLOK PRI CEM VANGUARD: Type: IMPLANTABLE DEVICE | Site: KNEE | Status: FUNCTIONAL

## 2022-11-01 DEVICE — COMPONENT PAT DIA34MM THK7.8MM THN KNEE POLY 3 PEG SER A: Type: IMPLANTABLE DEVICE | Site: KNEE | Status: FUNCTIONAL

## 2022-11-01 RX ORDER — DEXAMETHASONE SODIUM PHOSPHATE 4 MG/ML
INJECTION, SOLUTION INTRA-ARTICULAR; INTRALESIONAL; INTRAMUSCULAR; INTRAVENOUS; SOFT TISSUE PRN
Status: DISCONTINUED | OUTPATIENT
Start: 2022-11-01 | End: 2022-11-01 | Stop reason: SDUPTHER

## 2022-11-01 RX ORDER — GABAPENTIN 400 MG/1
800 CAPSULE ORAL ONCE
Status: COMPLETED | OUTPATIENT
Start: 2022-11-01 | End: 2022-11-01

## 2022-11-01 RX ORDER — MIDAZOLAM HYDROCHLORIDE 1 MG/ML
INJECTION INTRAMUSCULAR; INTRAVENOUS PRN
Status: DISCONTINUED | OUTPATIENT
Start: 2022-11-01 | End: 2022-11-01 | Stop reason: SDUPTHER

## 2022-11-01 RX ORDER — METOCLOPRAMIDE HYDROCHLORIDE 5 MG/ML
10 INJECTION INTRAMUSCULAR; INTRAVENOUS
Status: COMPLETED | OUTPATIENT
Start: 2022-11-01 | End: 2022-11-01

## 2022-11-01 RX ORDER — SUCCINYLCHOLINE/SOD CL,ISO/PF 200MG/10ML
SYRINGE (ML) INTRAVENOUS PRN
Status: DISCONTINUED | OUTPATIENT
Start: 2022-11-01 | End: 2022-11-01 | Stop reason: SDUPTHER

## 2022-11-01 RX ORDER — BUPIVACAINE HYDROCHLORIDE 5 MG/ML
INJECTION, SOLUTION EPIDURAL; INTRACAUDAL
Status: DISCONTINUED | OUTPATIENT
Start: 2022-11-01 | End: 2022-11-01 | Stop reason: SDUPTHER

## 2022-11-01 RX ORDER — ASPIRIN 81 MG/1
81 TABLET ORAL 2 TIMES DAILY
Qty: 90 TABLET | Refills: 1 | Status: SHIPPED | OUTPATIENT
Start: 2022-11-01

## 2022-11-01 RX ORDER — DEXAMETHASONE SODIUM PHOSPHATE 10 MG/ML
10 INJECTION, SOLUTION INTRAMUSCULAR; INTRAVENOUS ONCE
Status: COMPLETED | OUTPATIENT
Start: 2022-11-01 | End: 2022-11-01

## 2022-11-01 RX ORDER — SODIUM CHLORIDE 0.9 % (FLUSH) 0.9 %
5-40 SYRINGE (ML) INJECTION EVERY 12 HOURS SCHEDULED
Status: DISCONTINUED | OUTPATIENT
Start: 2022-11-01 | End: 2022-11-01 | Stop reason: HOSPADM

## 2022-11-01 RX ORDER — SODIUM CHLORIDE 0.9 % (FLUSH) 0.9 %
5-40 SYRINGE (ML) INJECTION PRN
Status: DISCONTINUED | OUTPATIENT
Start: 2022-11-01 | End: 2022-11-01 | Stop reason: HOSPADM

## 2022-11-01 RX ORDER — ACETAMINOPHEN 500 MG
1000 TABLET ORAL ONCE
Status: COMPLETED | OUTPATIENT
Start: 2022-11-01 | End: 2022-11-01

## 2022-11-01 RX ORDER — SODIUM CHLORIDE, SODIUM LACTATE, POTASSIUM CHLORIDE, CALCIUM CHLORIDE 600; 310; 30; 20 MG/100ML; MG/100ML; MG/100ML; MG/100ML
INJECTION, SOLUTION INTRAVENOUS CONTINUOUS
Status: DISCONTINUED | OUTPATIENT
Start: 2022-11-01 | End: 2022-11-01 | Stop reason: HOSPADM

## 2022-11-01 RX ORDER — TRANEXAMIC ACID 100 MG/ML
INJECTION, SOLUTION INTRAVENOUS PRN
Status: DISCONTINUED | OUTPATIENT
Start: 2022-11-01 | End: 2022-11-01 | Stop reason: SDUPTHER

## 2022-11-01 RX ORDER — ONDANSETRON 2 MG/ML
4 INJECTION INTRAMUSCULAR; INTRAVENOUS
Status: COMPLETED | OUTPATIENT
Start: 2022-11-01 | End: 2022-11-01

## 2022-11-01 RX ORDER — SODIUM CHLORIDE 9 MG/ML
INJECTION, SOLUTION INTRAVENOUS CONTINUOUS
Status: DISCONTINUED | OUTPATIENT
Start: 2022-11-01 | End: 2022-11-01 | Stop reason: HOSPADM

## 2022-11-01 RX ORDER — SODIUM CHLORIDE 9 MG/ML
INJECTION, SOLUTION INTRAVENOUS PRN
Status: DISCONTINUED | OUTPATIENT
Start: 2022-11-01 | End: 2022-11-01 | Stop reason: HOSPADM

## 2022-11-01 RX ORDER — DIPHENHYDRAMINE HYDROCHLORIDE 50 MG/ML
12.5 INJECTION INTRAMUSCULAR; INTRAVENOUS
Status: COMPLETED | OUTPATIENT
Start: 2022-11-01 | End: 2022-11-01

## 2022-11-01 RX ORDER — ONDANSETRON 2 MG/ML
INJECTION INTRAMUSCULAR; INTRAVENOUS PRN
Status: DISCONTINUED | OUTPATIENT
Start: 2022-11-01 | End: 2022-11-01 | Stop reason: SDUPTHER

## 2022-11-01 RX ORDER — PROPOFOL 10 MG/ML
INJECTION, EMULSION INTRAVENOUS PRN
Status: DISCONTINUED | OUTPATIENT
Start: 2022-11-01 | End: 2022-11-01 | Stop reason: SDUPTHER

## 2022-11-01 RX ORDER — CEFDINIR 300 MG/1
300 CAPSULE ORAL 2 TIMES DAILY
Qty: 20 CAPSULE | Refills: 1 | Status: SHIPPED | OUTPATIENT
Start: 2022-11-01

## 2022-11-01 RX ORDER — KETOROLAC TROMETHAMINE 30 MG/ML
30 INJECTION, SOLUTION INTRAMUSCULAR; INTRAVENOUS ONCE
Status: COMPLETED | OUTPATIENT
Start: 2022-11-01 | End: 2022-11-01

## 2022-11-01 RX ORDER — CALCIUM CHLORIDE 100 MG/ML
INJECTION INTRAVENOUS; INTRAVENTRICULAR PRN
Status: DISCONTINUED | OUTPATIENT
Start: 2022-11-01 | End: 2022-11-01 | Stop reason: ALTCHOICE

## 2022-11-01 RX ORDER — ASPIRIN 81 MG/1
81 TABLET ORAL 2 TIMES DAILY
Status: DISCONTINUED | OUTPATIENT
Start: 2022-11-01 | End: 2022-11-01 | Stop reason: HOSPADM

## 2022-11-01 RX ORDER — OXYCODONE HYDROCHLORIDE 5 MG/1
5 TABLET ORAL EVERY 4 HOURS PRN
Status: DISCONTINUED | OUTPATIENT
Start: 2022-11-01 | End: 2022-11-01 | Stop reason: HOSPADM

## 2022-11-01 RX ORDER — GABAPENTIN 300 MG/1
300 CAPSULE ORAL ONCE
Status: DISCONTINUED | OUTPATIENT
Start: 2022-11-01 | End: 2022-11-01

## 2022-11-01 RX ORDER — FENTANYL CITRATE 50 UG/ML
INJECTION, SOLUTION INTRAMUSCULAR; INTRAVENOUS PRN
Status: DISCONTINUED | OUTPATIENT
Start: 2022-11-01 | End: 2022-11-01 | Stop reason: SDUPTHER

## 2022-11-01 RX ORDER — LIDOCAINE HYDROCHLORIDE 10 MG/ML
1 INJECTION, SOLUTION EPIDURAL; INFILTRATION; INTRACAUDAL; PERINEURAL
Status: DISCONTINUED | OUTPATIENT
Start: 2022-11-01 | End: 2022-11-01 | Stop reason: HOSPADM

## 2022-11-01 RX ORDER — ROCURONIUM BROMIDE 10 MG/ML
INJECTION, SOLUTION INTRAVENOUS PRN
Status: DISCONTINUED | OUTPATIENT
Start: 2022-11-01 | End: 2022-11-01 | Stop reason: SDUPTHER

## 2022-11-01 RX ORDER — SCOLOPAMINE TRANSDERMAL SYSTEM 1 MG/1
1 PATCH, EXTENDED RELEASE TRANSDERMAL ONCE
Status: DISCONTINUED | OUTPATIENT
Start: 2022-11-01 | End: 2022-11-01

## 2022-11-01 RX ORDER — ACETAMINOPHEN 325 MG/1
650 TABLET ORAL EVERY 6 HOURS
Status: DISCONTINUED | OUTPATIENT
Start: 2022-11-01 | End: 2022-11-01 | Stop reason: HOSPADM

## 2022-11-01 RX ORDER — OXYCODONE HYDROCHLORIDE AND ACETAMINOPHEN 5; 325 MG/1; MG/1
1-2 TABLET ORAL EVERY 4 HOURS PRN
Qty: 42 TABLET | Refills: 0 | Status: SHIPPED | OUTPATIENT
Start: 2022-11-01 | End: 2022-11-08 | Stop reason: SDUPTHER

## 2022-11-01 RX ORDER — KETOROLAC TROMETHAMINE 30 MG/ML
30 INJECTION, SOLUTION INTRAMUSCULAR; INTRAVENOUS EVERY 6 HOURS PRN
Status: DISCONTINUED | OUTPATIENT
Start: 2022-11-01 | End: 2022-11-01 | Stop reason: HOSPADM

## 2022-11-01 RX ORDER — OXYCODONE HYDROCHLORIDE 10 MG/1
10 TABLET ORAL EVERY 4 HOURS PRN
Status: DISCONTINUED | OUTPATIENT
Start: 2022-11-01 | End: 2022-11-01 | Stop reason: HOSPADM

## 2022-11-01 RX ORDER — ONDANSETRON 2 MG/ML
4 INJECTION INTRAMUSCULAR; INTRAVENOUS EVERY 6 HOURS PRN
Status: DISCONTINUED | OUTPATIENT
Start: 2022-11-01 | End: 2022-11-01 | Stop reason: HOSPADM

## 2022-11-01 RX ORDER — ACETAMINOPHEN 500 MG
1000 TABLET ORAL ONCE
Status: DISCONTINUED | OUTPATIENT
Start: 2022-11-01 | End: 2022-11-01

## 2022-11-01 RX ORDER — FENTANYL CITRATE 50 UG/ML
25 INJECTION, SOLUTION INTRAMUSCULAR; INTRAVENOUS EVERY 5 MIN PRN
Status: DISCONTINUED | OUTPATIENT
Start: 2022-11-01 | End: 2022-11-01 | Stop reason: HOSPADM

## 2022-11-01 RX ADMIN — TRANEXAMIC ACID 3000 MG: 1 INJECTION, SOLUTION INTRAVENOUS at 13:10

## 2022-11-01 RX ADMIN — ACETAMINOPHEN 1000 MG: 500 TABLET, FILM COATED ORAL at 10:00

## 2022-11-01 RX ADMIN — ONDANSETRON 4 MG: 2 INJECTION INTRAMUSCULAR; INTRAVENOUS at 14:09

## 2022-11-01 RX ADMIN — Medication 120 MG: at 11:44

## 2022-11-01 RX ADMIN — DIPHENHYDRAMINE HYDROCHLORIDE 12.5 MG: 50 INJECTION INTRAMUSCULAR; INTRAVENOUS at 14:13

## 2022-11-01 RX ADMIN — GABAPENTIN 800 MG: 400 CAPSULE ORAL at 09:59

## 2022-11-01 RX ADMIN — METOCLOPRAMIDE 10 MG: 5 INJECTION, SOLUTION INTRAMUSCULAR; INTRAVENOUS at 14:20

## 2022-11-01 RX ADMIN — ROCURONIUM BROMIDE 50 MG: 10 INJECTION, SOLUTION INTRAVENOUS at 11:48

## 2022-11-01 RX ADMIN — SODIUM CHLORIDE, PRESERVATIVE FREE 10 ML: 5 INJECTION INTRAVENOUS at 14:40

## 2022-11-01 RX ADMIN — FENTANYL CITRATE 25 MCG: 50 INJECTION, SOLUTION INTRAMUSCULAR; INTRAVENOUS at 14:07

## 2022-11-01 RX ADMIN — FENTANYL CITRATE 100 MCG: 50 INJECTION, SOLUTION INTRAMUSCULAR; INTRAVENOUS at 12:02

## 2022-11-01 RX ADMIN — TRANEXAMIC ACID 3000 MG: 1 INJECTION, SOLUTION INTRAVENOUS at 11:57

## 2022-11-01 RX ADMIN — SODIUM CHLORIDE, POTASSIUM CHLORIDE, SODIUM LACTATE AND CALCIUM CHLORIDE: 600; 310; 30; 20 INJECTION, SOLUTION INTRAVENOUS at 16:26

## 2022-11-01 RX ADMIN — DEXAMETHASONE SODIUM PHOSPHATE 8 MG: 4 INJECTION, SOLUTION INTRAMUSCULAR; INTRAVENOUS at 12:05

## 2022-11-01 RX ADMIN — CEFAZOLIN 3000 MG: 10 INJECTION, POWDER, FOR SOLUTION INTRAVENOUS at 17:53

## 2022-11-01 RX ADMIN — SUGAMMADEX 200 MG: 100 INJECTION, SOLUTION INTRAVENOUS at 13:25

## 2022-11-01 RX ADMIN — MIDAZOLAM 2 MG: 1 INJECTION INTRAMUSCULAR; INTRAVENOUS at 11:42

## 2022-11-01 RX ADMIN — ACETAMINOPHEN 650 MG: 325 TABLET, FILM COATED ORAL at 17:53

## 2022-11-01 RX ADMIN — FENTANYL CITRATE 100 MCG: 50 INJECTION, SOLUTION INTRAMUSCULAR; INTRAVENOUS at 11:42

## 2022-11-01 RX ADMIN — PROPOFOL 200 MG: 10 INJECTION, EMULSION INTRAVENOUS at 11:44

## 2022-11-01 RX ADMIN — Medication 3000 MG: at 11:42

## 2022-11-01 RX ADMIN — BUPIVACAINE HYDROCHLORIDE 10 ML: 5 INJECTION, SOLUTION EPIDURAL; INTRACAUDAL at 14:05

## 2022-11-01 RX ADMIN — FENTANYL CITRATE 25 MCG: 50 INJECTION, SOLUTION INTRAMUSCULAR; INTRAVENOUS at 13:59

## 2022-11-01 RX ADMIN — BUPIVACAINE 10 ML: 13.3 INJECTION, SUSPENSION, LIPOSOMAL INFILTRATION at 14:05

## 2022-11-01 RX ADMIN — FENTANYL CITRATE 25 MCG: 50 INJECTION, SOLUTION INTRAMUSCULAR; INTRAVENOUS at 14:15

## 2022-11-01 RX ADMIN — ONDANSETRON 4 MG: 2 INJECTION INTRAMUSCULAR; INTRAVENOUS at 11:51

## 2022-11-01 RX ADMIN — DEXAMETHASONE SODIUM PHOSPHATE 10 MG: 10 INJECTION INTRAMUSCULAR; INTRAVENOUS at 10:32

## 2022-11-01 RX ADMIN — KETOROLAC TROMETHAMINE 30 MG: 30 INJECTION, SOLUTION INTRAMUSCULAR; INTRAVENOUS at 14:33

## 2022-11-01 RX ADMIN — SODIUM CHLORIDE: 9 INJECTION, SOLUTION INTRAVENOUS at 10:32

## 2022-11-01 ASSESSMENT — PAIN SCALES - GENERAL
PAINLEVEL_OUTOF10: 4
PAINLEVEL_OUTOF10: 3
PAINLEVEL_OUTOF10: 10
PAINLEVEL_OUTOF10: 7
PAINLEVEL_OUTOF10: 10
PAINLEVEL_OUTOF10: 10
PAINLEVEL_OUTOF10: 0
PAINLEVEL_OUTOF10: 10
PAINLEVEL_OUTOF10: 4

## 2022-11-01 ASSESSMENT — PAIN DESCRIPTION - LOCATION
LOCATION: INCISION;KNEE
LOCATION: KNEE
LOCATION: INCISION;KNEE

## 2022-11-01 ASSESSMENT — PAIN DESCRIPTION - ORIENTATION
ORIENTATION: LEFT

## 2022-11-01 ASSESSMENT — PAIN DESCRIPTION - PAIN TYPE
TYPE: SURGICAL PAIN

## 2022-11-01 ASSESSMENT — PAIN DESCRIPTION - DESCRIPTORS: DESCRIPTORS: THROBBING;BURNING

## 2022-11-01 ASSESSMENT — PAIN - FUNCTIONAL ASSESSMENT: PAIN_FUNCTIONAL_ASSESSMENT: 0-10

## 2022-11-01 NOTE — INTERVAL H&P NOTE
Update History & Physical    The patient's History and Physical of October 18, 2022 was reviewed with the patient and I examined the patient. There was no change. The surgical site was confirmed by the patient and me. Pt undergoing for  KNEE TOTAL ARTHROPLASTY per Dr. Vini Hopper. Pt denies fever/chills,chest pain or SOB. Pt NPO since the past midnight, no am medication today   Physical exam remains unchanged including cardiac and pulmonary assessment   Pt does not wear denture  Denies hx of MRSA infection. Denies taking any blood thinner medication   Denies hx of blood clots. Denies hx of any personal or family hx of complications w/anesthesia. See nursing flow sheet for vital signs     Lab Results   Component Value Date    WBC 4.3 10/18/2022    HGB 11.8 (L) 10/18/2022    HCT 35.1 (L) 10/18/2022    MCV 87.4 10/18/2022     10/18/2022     Lab Results   Component Value Date/Time     10/18/2022 12:00 PM    K 4.6 10/18/2022 12:00 PM     10/18/2022 12:00 PM    CO2 27 10/18/2022 12:00 PM    BUN 10 10/18/2022 12:00 PM    CREATININE 1.06 10/18/2022 12:00 PM    GLUCOSE 67 10/18/2022 12:00 PM    CALCIUM 8.9 10/18/2022 12:00 PM      Lab Results   Component Value Date/Time    COLORU Yellow 10/18/2022 12:34 PM    NITRU NEGATIVE 10/18/2022 12:34 PM    GLUCOSEU NEGATIVE 10/18/2022 12:34 PM    KETUA TRACE 10/18/2022 12:34 PM    UROBILINOGEN Normal 10/18/2022 12:34 PM    BILIRUBINUR NEGATIVE 10/18/2022 12:34 PM       Plan: The risks, benefits, expected outcome, and alternative to the recommended procedure have been discussed with the patient. Patient understands and wants to proceed with the procedure.      Electronically signed by JONATHAN Daigle CNP on 11/1/2022 at 9:23 AM

## 2022-11-01 NOTE — PROGRESS NOTES
CLINICAL PHARMACY NOTE: MEDS TO BEDS    Total # of Prescriptions Filled: 2   The following medications were delivered to the patient:  Oxycodone 5/325  Cefdinir 300mg    Additional Documentation:  Delivered medications to patients room

## 2022-11-01 NOTE — CARE COORDINATION
Writer Faxed [facility]/DC med list to [facility], [address] Informed Chiquita Guillermo, of [place] today. Informed to call writer w/ any questions.

## 2022-11-01 NOTE — DISCHARGE INSTR - COC
Continuity of Care Form    Patient Name: Ariela Muñiz   :  1970  MRN:  [id number]    Admit date:  2022  Discharge date:  22    Code Status Order: Full Code   Advance Directives:   [facility] Documentation       Date/Time Healthcare Directive Type of Healthcare Directive Copy in [address] Agent's Name Healthcare Agent's Phone Number    22 1007 No, patient does not have an advance directive for healthcare treatment  declined info -- -- -- -- --            Admitting Physician:  Theron Tirado MD  PCP: Saul Hunt MD    Discharging Nurse:   [address] Unit/Room#: 43 [facility]  Discharging Unit Phone Number:     Emergency Contact:   Extended Emergency Contact Information  Primary Emergency Contact: [address] Phone: [phone]  Mobile Phone: [phone]  Relation: Child    Past Surgical History:  Past Surgical History:   Procedure Laterality Date    CATARACT REMOVAL Bilateral     CHOLECYSTECTOMY, LAPAROSCOPIC N/A 12/15/2020    CHOLECYSTECTOMY LAPAROSCOPIC ROBOTIC XI performed by Saray Boo MD at [facility] N/A 2020    COLONOSCOPY POLYPECTOMY SNARE/COLD BIOPSY performed by Claudia Lucas MD at [place] [address] N/A 2021    COLONOSCOPY WITH BIOPSY RANDOM RIGHT AND LEFT COLON performed by Claudia Lucas MD at [address] [place] [address] Bilateral     cataract removed with lens implants    OH KNEE SCOPE,DIAGNOSTIC Right 2017    RIGHT KNEE ARTHROSCOPY WITH MEDIAL MENISECTOMY AND CHONDROPLASTY performed by Felix Armendariz DO at [facility]  2021    ROBOTIC [place], EGD-    SLEEVE GASTRECTOMY N/A 2021    XI ROBOTIC LAPAROSCOPIC GASTRECTOMY SLEEVE, EGD- GI UNIT SCHEDULED performed by Ernie Patterson DO at [address] [place] [address] 2021    EGD BIOPSY OF GASTRIC AND GASTRIC POLYPECTOMY performed by Scott Germain MD at [address] [facility].       Immunization History:   Immunization History   Administered Date(s) Administered    COVID-19, PFIZER Bivalent BOOSTER, (age 12y+), IM, 27 mcg/0.3 mL dose 10/20/2022    COVID-19, PFIZER PURPLE top, DILUTE for use, (age 15 y+), 30mcg/0.3mL 05/12/2021, 07/27/2021       Active Problems:  Patient Active Problem List   Diagnosis Code    Headache R51.9    Overactive bladder N32.81    Osteoarthritis M19.90    Chronic rhinitis J31.0    OAB (overactive bladder) N32.81    Asthma J45.909    Primary osteoarthritis of right knee M17.11    Psoriasis L40.9    Seborrheic dermatitis L21.9    DENISHA (obstructive sleep apnea) G47.33    Anxiety and depression F41.9, F32. A    Chronic back pain M54.9, G89.29    Bilateral chronic knee pain M25.561, M25.562, G89.29    Diverticulitis of large intestine without perforation or abscess without bleeding K57.32    Diarrhea R19.7    Dyspepsia R10.13    Lymphocytic colitis K52.832    S/P laparoscopic sleeve gastrectomy Z98.84    Morbid obesity with BMI of 50.0-59.9, adult (HCC) E66.01, Z68.43    Preop examination Z01.818    Primary osteoarthritis of left knee M17.12       Isolation/Infection:   Isolation            No Isolation          Patient Infection Status       Infection Onset Added Last Indicated Last Indicated By Review Planned Expiration Resolved Resolved By    None active    Resolved    COVID-19 (Rule Out) 04/02/21 04/02/21 04/03/21 COVID-19 (Ordered)   04/04/21 Rule-Out Test Resulted    COVID-19 (Rule Out) 02/19/21 02/19/21 02/19/21 COVID-19 (Ordered)   02/20/21 Rule-Out Test Resulted    COVID-19 (Rule Out) 12/11/20 12/11/20 12/11/20 COVID-19 (Ordered)   12/12/20 Rule-Out Test Resulted    COVID-19 (Rule Out) 08/01/20 08/01/20 08/01/20 Covid-19 Ambulatory (Ordered)   08/03/20 Rule-Out Test Resulted            Nurse Assessment:  Last Vital Signs: /82   Pulse 69   Temp 97.5 °F (36.4 °C) (Infrared)   Resp 18   Ht 5' 4.5\" (1.638 m)   Wt (!) 332 lb (150.6 kg)   LMP 10/04/2022 Comment: urine pregnancy test negative  SpO2 98%   BMI 56.11 kg/m²     Last documented pain score (0-10 scale): Pain Level: 0  Last Weight:   Wt Readings from Last 1 Encounters:   11/01/22 (!) 332 lb (150.6 kg)     Mental Status:  oriented and alert    IV Access:  - None    Nursing Mobility/ADLs:  Walking   Assisted  Transfer  Assisted  Bathing  Assisted  Dressing  Assisted  Toileting  Assisted  Feeding  Independent  Med Admin  Independent  Med Delivery   whole    Wound Care Documentation and Therapy:        Elimination:  Continence: Bowel: Yes  Bladder: Yes  Urinary Catheter: None   Colostomy/Ileostomy/Ileal Conduit: No       Date of Last BM:   No intake or output data in the 24 hours ending 11/01/22 1149  No intake/output data recorded. Safety Concerns: At Risk for Falls    Impairments/Disabilities:      None    Nutrition Therapy:  Current Nutrition Therapy:   - Oral Diet:  General    Routes of Feeding: Oral  Liquids: No Restrictions  Daily Fluid Restriction: no  Last Modified Barium Swallow with Video (Video Swallowing Test): not done    Treatments at the Time of Hospital Discharge:   Respiratory Treatments:   Oxygen Therapy:  is not on home oxygen therapy. Ventilator:    - No ventilator support    Rehab Therapies: Physical Therapy and Occupational Therapy  Weight Bearing Status/Restrictions: No weight bearing restrictions  Other Medical Equipment (for information only, NOT a DME order):  walker  Other Treatments: Skilled Nursing assessment and monitoring. Medication education and monitoring per protocol. TOTAL JOINT REPLACEMENT PATIENT HOME HEALTH CARE PROTOCOL  This patient is a post-operative total joint replacement patient. Expectations for this patients care are as follows:      Goals:  DailyTherapy for rehabilitative purposes for two weeks. Increased level of activity and ambulation each day. Well-controlled pain.   Free from infection. Encourage patient to provide self-care when possible. Ambulation with a rolling walker. Activity & Diet:  Therapy performed daily. (Instruct patient to take pain meds. 1 hour prior to therapy.)  Up in chair for all meals and majority of day. Range of motion for TKA patients. Hip precautions for LAITH patients. Incentive Spirometer: 3- 4 inhalations every twenty minutes, during the day, while awake, the first week home after discharge  Increase oral intake of fruits, fiber and water and take a daily stool softener to prevent constipation. Consider stronger bowel protocol if no bowel movement is achieved after three days home. Increase protein intake and reduce sugar intake to promote healing and prevent infection. No pillow under the knee for TKA patients. [facility] go on in the a.m. and come off in the p.m. (Hand wash them every two or three days, roll in towel to remove most of moisture, and hang to dry.)    Incision Care: If patient has ACE bandage it may be removed POD #2  Keep incisional dressing intact until seen and removed by surgeon, unless saturated, in which case, call surgeon and request instructions. If dressing falls off, call surgeon. If using the Prevena dressing, with battery pack, place battery pack in waterproof bag during shower. If using Aquacel dressing then dressing is waterproof and patient may shower. If patient has a Prevena remove on POD #5 and replace with Aquacel dressing (patient was provided with dressing in hospital)  Elevated the leg and ice the affected area four times a day, for twenty minutes each time and after every physical therapy session. Normal Conditions - Will improve with provided comfort measures and time:  Some swelling in the operative leg is normal - this should reduce over time. Some post-operative pain is normal.  This will improve with prescribed medication, provided comfort measures and time.     Constipation related to pain medications & decreased mobility is a common occurrence. (Increase your fiber & water intake and take a stool softener.)   Slight warmth of operative site is normal and will diminish with time. Fatigue and moderate pain after therapy is normal and will improve with time. Numbness near the incision site is normal and will improve with time. NOTE: Ensure/Remind patient to go to their follow-up appointment with their orthopedic surgeon, which is scheduled: 11/16/22 at 10:35 [facility]/ [facility]    Abnormal Conditions - When to call the Surgeon:  Increasing/excessive redness, warmth or swelling at the incisional site not relieved with ice and elevation. Increasing/excessive pain not well-controlled by prescribed medications. Drainage or odor from or around the incision site.  (A little blood showing through the bandage is ok, but active leaking, of any color, coming out of the bandage is NOT normal.)  Temperature above 101 degrees. (A mild temp of 99 - 100 is normal - if temp gets to 101 you may use Tylenol once - if it does not improve, you may use a 2nd dose of Tylenol after 5 hours - if temperature is still at or above 101 degrees then call the surgeon.)  Calf tenderness, swelling, or redness or numbness of the foot/lower leg. Shortness of breath or chest pain. Any other incision or surgical-related concerns. CALL SURGEON with concerns PRIOR TO sending patient to hospital.     Patient's personal belongings (please select all that are sent with patient):  Clothes/shoes, Pink wallet - states \"it is one of her bags\".     RN SIGNATURE:  Electronically signed by Socorro Salter RN on 11/1/22 at 4:27 PM EDT    CASE MANAGEMENT/SOCIAL WORK SECTION    Inpatient Status Date:     Readmission Risk Assessment Score:  Readmission Risk              Risk of Unplanned Readmission:  0           Discharging to Facility/ [facility] [address]: [phone]     Dialysis Facility (if applicable)   Name:  Address:  Dialysis Schedule:  Phone:  Fax:    / signature: Electronically signed by Everett Dallas RN on 11/1/22 at 4:16 PM EDT    PHYSICIAN SECTION    Prognosis: Good    Condition at Discharge: Stable    Rehab Potential (if transferring to Rehab): Good    Recommended Labs or Other Treatments After Discharge:     Physician Certification: I certify the above information and transfer of Tegan Ahn  is necessary for the continuing treatment of the diagnosis listed and that she requires Home Care for less 30 days.      Update Admission H&P: No change in H&P    PHYSICIAN SIGNATURE:  Electronically signed by José Miguel Schneider MD on 11/1/22 at 11:49 AM EDT

## 2022-11-01 NOTE — ANESTHESIA PROCEDURE NOTES
Peripheral Block    Patient location during procedure: post-op  Reason for block: post-op pain management and at surgeon's request  Start time: 11/1/2022 2:05 PM  End time: 11/1/2022 2:15 PM  Staffing  Performed: anesthesiologist   Anesthesiologist: Alejandro Santoro MD  Preanesthetic Checklist  Completed: patient identified, IV checked, site marked, risks and benefits discussed, surgical/procedural consents, equipment checked, pre-op evaluation, timeout performed, anesthesia consent given, oxygen available, monitors applied/VS acknowledged, fire risk safety assessment completed and verbalized and blood product R/B/A discussed and consented  Peripheral Block   Patient position: supine  Prep: ChloraPrep  Provider prep: mask and sterile gloves  Patient monitoring: cardiac monitor, continuous pulse ox, continuous capnometry, frequent blood pressure checks, IV access, oxygen and responsive to questions  Block type: Femoral  Adductor canal  Laterality: left  Injection technique: single-shot  Guidance: ultrasound guided    Needle   Needle type: short-bevel   Needle gauge: 20 G  Needle localization: anatomical landmarks and ultrasound guidance  Needle insertion depth: 5 cm  Test dose: negative  Needle length: 10 cm  Assessment   Injection assessment: negative aspiration for heme, no paresthesia on injection, local visualized surrounding nerve on ultrasound and no intravascular symptoms  Paresthesia pain: none  Slow fractionated injection: yes  Hemodynamics: stable  Real-time US image taken/store: yes  Outcomes: patient tolerated procedure well    Medications Administered  bupivacaine liposome 1.3 % - Perineural   10 mL - 11/1/2022 2:05:00 PM  bupivacaine (PF) 0.5 % - Perineural   10 mL - 11/1/2022 2:05:00 PM

## 2022-11-01 NOTE — CARE COORDINATION
Joint Replacement Discharge Planning Note:    Admission Date:  11/1/2022 Kimberley Kennedy is a 54 y.o.  female    Admitted for : Osteoarthritis of left knee, unspecified osteoarthritis type [M17.12]  Primary osteoarthritis of left knee [M17.12]    Met with:  Patient    PCP:  Akanksha Poole MD              Insurance:   [employer]    Current Residence/ Living Arrangements:  independently at home w [address] Chir[address] 44, 1 is Disabled           Current Services PTA:  No    Is patient agreeable to [address] [employer]: Yes    Is patient agreeable to outpatient physical therapy:  Yes    Freedom of choice provided: Yes         [address] [employer] Agency/Outpatient Therapy chosen:  [facility]- [address] needed: No    Current home DME:  walker,CPAP    [facility] [address]    Does Patient want to use MEDS to BEDS?([facility]) No    Transportation Provider:  Patient                       Discharge Plan:   Patient intends to discharge to Home    Patient does not need a wheeled walker. Anticipated discharge date 11/1/22 [facility] Pt. Lives in 1 story home w/ Fiance, & 3 Kids, 1 is disabled. POD#0, Lt TKA. DME- Keshav Abarca, CPAP- Unsure from where. Wants [facility]- [address] No Meds to Beds.  Darrell is signed/completed//KB      Readmission Risk              Risk of Unplanned Readmission:  0           Electronically signed by: Tremaine Daily RN on 11/1/2022 at 4:05 PM

## 2022-11-01 NOTE — ANESTHESIA POSTPROCEDURE EVALUATION
POST- ANESTHESIA EVALUATION       Pt Name: Sravani Stuart  MRN: [id number]  YOB: 1970  Date of evaluation: 11/1/2022  Time:  2:31 PM      BP (!) 155/86   Pulse 88   Temp 98.2 °F (36.8 °C) (Infrared)   Resp 27   Ht 5' 4.5\" (1.638 m)   Wt (!) 332 lb (150.6 kg)   LMP 10/04/2022 Comment: urine pregnancy test negative  SpO2 100%   BMI 56.11 kg/m²      Consciousness Level  Awake  Cardiopulmonary Status  Stable  Pain Adequately Treated YES  Nausea / Vomiting  NO  Adequate Hydration  YES  Anesthesia Related Complications NONE      Electronically signed by Rosalio Walker MD on 11/1/2022 at 2:31 PM       Department of Anesthesiology  Postprocedure Note    Patient: Sravani Stuart  MRN: [id number]  YOB: 1970  Date of evaluation: 11/1/2022      Procedure Summary     Date: 11/01/22 Room / Location: [address] [place] [address] Venus Marcus 03 / [facility]: [facility]    Anesthesia Start: 2226 Anesthesia Stop: 6639    Procedure: KNEE TOTAL ARTHROPLASTY (Left: Knee) Diagnosis:       Osteoarthritis of left knee, unspecified osteoarthritis type      (Osteoarthritis of left knee, unspecified osteoarthritis type  DJD LEFT KNEE)    Surgeons: Pamela Coffey MD Responsible Provider: Rosalio Walker MD    Anesthesia Type: General ASA Status: 3          Anesthesia Type: General    Isa Phase I: Isa Score: 8    Isa Phase II:        Anesthesia Post Evaluation

## 2022-11-01 NOTE — FLOWSHEET NOTE
Patient admitted to room 2041 per bed from PACU. SO at bedside. VS, assessment, and orientation to the room and call system completed.   Plan for the day and orders reviewed with the patient and her SO.

## 2022-11-01 NOTE — OP NOTE
Operative Note      Patient: Errol Hanna  YOB: 1970  MRN: [id number]    Date of Procedure: 11/1/2022    Pre-Op Diagnosis: Osteoarthritis of left knee, unspecified osteoarthritis type  DJD LEFT KNEE    Post-Op Diagnosis: Same       Procedure(s):  KNEE TOTAL ARTHROPLASTY    Surgeon(s):  Terry Loredo MD    Assistant:   Resident: DO Pilar Kim CST    Anesthesia: General    Estimated Blood Loss (mL): Minimal    Complications: None    Specimens:   * No specimens in log *    Implants:  Implant Name Type Inv. Item Serial No.  Lot No. LRB No. Used Action   CEMENT BNE 40GM HI VISC RADPQ FOR REV SURG - [id number]  CEMENT BNE 40GM HI VISC RADPQ FOR REV SURG  [employer]- [id number] Left 2 Implanted   STEM TIB L80MM DIA12.5MM KNEE JOÃO FIN VANGUARD COMPLT SYS - [id number]  STEM TIB L80MM DIA12.5MM KNEE JOÃO FIN VANGUARD COMPLT SYS  [facility] [id number] Left 1 Implanted   TRAY TIB L71MM KNEE COPOLYESTER VERNA INTLOK JOÃO CANDI VANGUARD - [id number]  TRAY TIB L71MM KNEE COPOLYESTER VERNA INTLOK JOÃO CANDI VANGUARD  [employer]- [id number] Left 1 Implanted   COMPONENT PAT ECK61YY THK7. 8MM THN KNEE POLY 3 PEG SER A - [id number]  COMPONENT PAT NIZ80TF THK7. 8MM THN KNEE POLY 3 PEG SER A  [employer]- [id number] Left 1 Implanted   COMPONENT FEM 65MM L KNEE CO CHROM NP CRUCE RET INTLOK CANDI - [id number]  COMPONENT FEM 65MM L KNEE CO CHROM NP CRUCE RET INTLOK CANDI  [employer]- [id number] Left 1 Implanted   BEARING TIB 71X11 MM KNEE ANTR JOSEY GOULD - [id number]  BEARING TIB 71X11 MM KNEE ANTR [facility] ORTHOPEDICS- [id number] Left 1 Implanted         Drains: * No LDAs found *    Findings: Severe DJD left knee    Detailed Description of Procedure:     Patient is a 54 y.o. female with a long standing history of left DJD of the knee.  Patient has failed all types of conservative treatment included physical therapy, weight-loss counseling, NSAIDS, and injections. The patient has significant restriction of activities of daily living and/or work/job place abilities, and, therefore, has been counseled for TKA. Patient is aware of all the risks and benefits as highlighted in the surgical consent form. The patient was given 3 grams of Ancef in the holding area as well as an adductor canal block. The patient was then taken to the operating suite where general anesthesia was administered. A tourniquet was applied to effected leg and then prepped and draped in the usual sterile fashion. Time out was called to verify laterality. The leg was examined   and the tourniquet inflated to 400 mm of Hg. Midline incision was utilized and taken down to the joint capsule where a mid-vastus approach to the knee was performed. After a complete synovectomy, the patella was elevated, calibrated for thickness, and the above sized, patellar triple pegged drills guide positioned medially and then drilled. Atrial patellar button was positioned in order to re-established the original thickness. This was then replaced with a protective patellar plate. The knee was then flexed and revealed significant  arthrosis. Osteophytes were removed via rongeur. A drill hole was made in the distal femur and the femoral canal was then irrigated and suctioned. A fluted IM padmini was inserted and a distal femoral cut was made at 5 degrees of valgus at the +2 setting. The proximal tibia was then exposed after resection of the ACL and lateral meniscus. An extra-medullary tibial cutting jug was then aligned in reference to the tibia tubercle and ankle mortise and positional with a slight posterior slope. The cut was made with minimal cutting of the lowest depth of the tibial wear and the fragment removed.      The distal femur was then approached and femoral sizing guide with 3 degrees of external rotation was placed flush with the surface and drill holes made and femoral size determined. The appropriate size cutting jig was then placed and anterior, posterior, and chamfer cuts made with an oscillating saw. A laminar  was inserted with care to avoid damaging the MCL. Posterior osteophytes were removed and the capsule stripped from the posterior femoral condyles. The capsule was then injected with the orthopedic cocktail at this time. The tibial cut was then assessed with a baseplate and alignment padmini to assure proper cut. Spacer blocks were then inserted and the knee was assessed to determine the amount of soft tissue release and osteophyte removal necessary  to establish symmetric flexion and extension gaps. The tibia was then elevated, and the above sized tibial plate was positioned for proper external rotation with an alignment padmini down the middle-third of the tibial tubercles. This was pinned in place, the proximal tibialis reamed, the fins were then repacked. The appropriate size femur was positioned and various size of the polyethylene trials were inserted. The knee was assessed for  ROM and patellar tracking. Satisfied with this, this distal femoral logs were drilled and then all the trial components were removed. The proximal tibia was reamed with a 10 mm reamer to accept the modular tibial stem which was elected to be utilized due to the patient's size. The knee was irrigated and dried while the cement was prepared. Cement was applied to to both implant and cut surfaces and the implants impacted and the compression with one size larger poly inserted and excess cement removed. The patella was placed and compressed as well. The knee was placed in full extension and then injected with the remainder  of the 100ml of the orthopedic cocktail. The Irrisept lavage was carried out for the 1 minutes.  This was then irrigated and a permanent polyethylene was insert was injected with platelet rich/poor plasma and the tourniquet was released at 52 minutes. Hemostasis was excellent. The knee was closed with a #1 Strata-Fix and vastus with a double-layer of O-Vicryl suture. The subcutaneous tissue closed with a 2-0 Monocryl Strata-Fix  and then a Zip-line used for the skin. This was covered with adaptic and Aquacel dressing and dressed with a large 6-inch Ace dressing from toes to mid-thigh. The patient was awakened and taken to PACU in good condition.       Electronically signed by Alicia Chin MD on 11/1/2022 at 1:19 PM

## 2022-11-01 NOTE — CARE COORDINATION
Continuity of Care Form    Patient Name: Zenia Poole   :  1970  MRN:  [id number]    Admit date:  2022  Discharge date:  22    Code Status Order: Full Code   Advance Directives:   [facility] Documentation       Date/Time Healthcare Directive Type of Healthcare Directive Copy in [address] Agent's Name Healthcare Agent's Phone Number    22 1007 No, patient does not have an advance directive for healthcare treatment  declined info -- -- -- -- --            Admitting Physician:  Malika Walton MD  PCP: Marin Oviedo MD    Discharging Nurse:   [address] Unit/Room#: 43 [facility]  Discharging Unit Phone Number:     Emergency Contact:   Extended Emergency Contact Information  Primary Emergency Contact: Nolberto Rhoades, [address] Phone: [phone]  Mobile Phone: [phone]  Relation: Child    Past Surgical History:  Past Surgical History:   Procedure Laterality Date    CATARACT REMOVAL Bilateral     CHOLECYSTECTOMY, LAPAROSCOPIC N/A 12/15/2020    CHOLECYSTECTOMY LAPAROSCOPIC ROBOTIC XI performed by Florencio Whyte MD at [facility] N/A 2020    COLONOSCOPY POLYPECTOMY SNARE/COLD BIOPSY performed by Jeovanny Cobos MD at \[facility]"" 53 N/A 2021    COLONOSCOPY WITH BIOPSY RANDOM RIGHT AND LEFT COLON performed by Jeovanny Cobos MD at [address] [place] [address] Bilateral     cataract removed with lens implants    AR KNEE SCOPE,DIAGNOSTIC Right 2017    RIGHT KNEE ARTHROSCOPY WITH MEDIAL MENISECTOMY AND CHONDROPLASTY performed by Adrienne Peterson DO at [facility]  2021    ROBOTIC [place], EGD-    SLEEVE GASTRECTOMY N/A 2021    XI ROBOTIC LAPAROSCOPIC GASTRECTOMY SLEEVE, EGD- GI UNIT SCHEDULED performed by Cherry Davis DO at [address] 2021    EGD BIOPSY OF GASTRIC AND GASTRIC POLYPECTOMY performed by Jennie Hinojosa MD at [address] [facility].       Immunization History:   Immunization History   Administered Date(s) Administered    COVID-19, PFIZER Bivalent BOOSTER, (age 12y+), IM, 27 mcg/0.3 mL dose 10/20/2022    COVID-19, PFIZER PURPLE top, DILUTE for use, (age 15 y+), 30mcg/0.3mL 05/12/2021, 07/27/2021       Active Problems:  Patient Active Problem List   Diagnosis Code    Headache R51.9    Overactive bladder N32.81    Osteoarthritis M19.90    Chronic rhinitis J31.0    OAB (overactive bladder) N32.81    Asthma J45.909    Primary osteoarthritis of right knee M17.11    Psoriasis L40.9    Seborrheic dermatitis L21.9    DENISHA (obstructive sleep apnea) G47.33    Anxiety and depression F41.9, F32. A    Chronic back pain M54.9, G89.29    Bilateral chronic knee pain M25.561, M25.562, G89.29    Diverticulitis of large intestine without perforation or abscess without bleeding K57.32    Diarrhea R19.7    Dyspepsia R10.13    Lymphocytic colitis K52.832    S/P laparoscopic sleeve gastrectomy Z98.84    Morbid obesity with BMI of 50.0-59.9, adult (HCC) E66.01, Z68.43    Preop examination Z01.818    Primary osteoarthritis of left knee M17.12       Isolation/Infection:   Isolation            No Isolation          Patient Infection Status       Infection Onset Added Last Indicated Last Indicated By Review Planned Expiration Resolved Resolved By    None active    Resolved    COVID-19 (Rule Out) 04/02/21 04/02/21 04/03/21 COVID-19 (Ordered)   04/04/21 Rule-Out Test Resulted    COVID-19 (Rule Out) 02/19/21 02/19/21 02/19/21 COVID-19 (Ordered)   02/20/21 Rule-Out Test Resulted    COVID-19 (Rule Out) 12/11/20 12/11/20 12/11/20 COVID-19 (Ordered)   12/12/20 Rule-Out Test Resulted    COVID-19 (Rule Out) 08/01/20 08/01/20 08/01/20 Covid-19 Ambulatory (Ordered)   08/03/20 Rule-Out Test Resulted            Nurse Assessment:  Last Vital Signs: /82   Pulse 69   Temp 97.5 °F (36.4 °C) (Infrared)   Resp 18   Ht 5' 4.5\" (1.638 m)   Wt (!) 332 lb (150.6 kg)   LMP 10/04/2022 Comment: urine pregnancy test negative  SpO2 98%   BMI 56.11 kg/m²     Last documented pain score (0-10 scale): Pain Level: 0  Last Weight:   Wt Readings from Last 1 Encounters:   11/01/22 (!) 332 lb (150.6 kg)     Mental Status:  oriented and alert    IV Access:  - None    Nursing Mobility/ADLs:  Walking   Assisted  Transfer  Assisted  Bathing  Assisted  Dressing  Assisted  Toileting  Assisted  Feeding  Independent  Med Admin  Independent  Med Delivery   whole    Wound Care Documentation and Therapy:        Elimination:  Continence: Bowel: Yes  Bladder: Yes  Urinary Catheter: None   Colostomy/Ileostomy/Ileal Conduit: No       Date of Last BM:   No intake or output data in the 24 hours ending 11/01/22 1149  No intake/output data recorded. Safety Concerns: At Risk for Falls    Impairments/Disabilities:      None    Nutrition Therapy:  Current Nutrition Therapy:   - Oral Diet:  General    Routes of Feeding: Oral  Liquids: No Restrictions  Daily Fluid Restriction: no  Last Modified Barium Swallow with Video (Video Swallowing Test): not done    Treatments at the Time of Hospital Discharge:   Respiratory Treatments:   Oxygen Therapy:  is not on home oxygen therapy. Ventilator:    - No ventilator support    Rehab Therapies: Physical Therapy and Occupational Therapy  Weight Bearing Status/Restrictions: No weight bearing restrictions  Other Medical Equipment (for information only, NOT a DME order):  walker  Other Treatments: Skilled Nursing assessment and monitoring. Medication education and monitoring per protocol. TOTAL JOINT REPLACEMENT PATIENT HOME HEALTH CARE PROTOCOL  This patient is a post-operative total joint replacement patient. Expectations for this patients care are as follows:      Goals:  DailyTherapy for rehabilitative purposes for two weeks. Increased level of activity and ambulation each day. Well-controlled pain.   Free from infection. Encourage patient to provide self-care when possible. Ambulation with a rolling walker. Activity & Diet:  Therapy performed daily. (Instruct patient to take pain meds. 1 hour prior to therapy.)  Up in chair for all meals and majority of day. Range of motion for TKA patients. Hip precautions for LAITH patients. Incentive Spirometer: 3- 4 inhalations every twenty minutes, during the day, while awake, the first week home after discharge  Increase oral intake of fruits, fiber and water and take a daily stool softener to prevent constipation. Consider stronger bowel protocol if no bowel movement is achieved after three days home. Increase protein intake and reduce sugar intake to promote healing and prevent infection. No pillow under the knee for TKA patients. [facility] go on in the a.m. and come off in the p.m. (Hand wash them every two or three days, roll in towel to remove most of moisture, and hang to dry.)    Incision Care: If patient has ACE bandage it may be removed POD #2  Keep incisional dressing intact until seen and removed by surgeon, unless saturated, in which case, call surgeon and request instructions. If dressing falls off, call surgeon. If using the Prevena dressing, with battery pack, place battery pack in waterproof bag during shower. If using Aquacel dressing then dressing is waterproof and patient may shower. If patient has a Prevena remove on POD #5 and replace with Aquacel dressing (patient was provided with dressing in hospital)  Elevated the leg and ice the affected area four times a day, for twenty minutes each time and after every physical therapy session. Normal Conditions - Will improve with provided comfort measures and time:  Some swelling in the operative leg is normal - this should reduce over time. Some post-operative pain is normal.  This will improve with prescribed medication, provided comfort measures and time.     Constipation related to pain medications & decreased mobility is a common occurrence. (Increase your fiber & water intake and take a stool softener.)   Slight warmth of operative site is normal and will diminish with time. Fatigue and moderate pain after therapy is normal and will improve with time. Numbness near the incision site is normal and will improve with time. NOTE: Ensure/Remind patient to go to their follow-up appointment with their orthopedic surgeon, which is scheduled: 11/16/22 at 10:35 w/ Dr. Guzmán Or    Abnormal Conditions - When to call the Surgeon:  Increasing/excessive redness, warmth or swelling at the incisional site not relieved with ice and elevation. Increasing/excessive pain not well-controlled by prescribed medications. Drainage or odor from or around the incision site.  (A little blood showing through the bandage is ok, but active leaking, of any color, coming out of the bandage is NOT normal.)  Temperature above 101 degrees. (A mild temp of 99 - 100 is normal - if temp gets to 101 you may use Tylenol once - if it does not improve, you may use a 2nd dose of Tylenol after 5 hours - if temperature is still at or above 101 degrees then call the surgeon.)  Calf tenderness, swelling, or redness or numbness of the foot/lower leg. Shortness of breath or chest pain. Any other incision or surgical-related concerns. CALL SURGEON with concerns PRIOR TO sending patient to hospital.     Patient's personal belongings (please select all that are sent with patient):  Clothes/shoes, Pink wallet - states \"it is one of her bags\".     RN SIGNATURE:  Electronically signed by Cathryn Gannon RN on 11/1/22 at 4:27 PM EDT    CASE MANAGEMENT/SOCIAL WORK SECTION    Inpatient Status Date:     Readmission Risk Assessment Score:  Readmission Risk              Risk of Unplanned Readmission:  0           Discharging to Facility/ [facility] [address]: [phone]     Dialysis Facility (if applicable)   Name:  Address:  Dialysis Schedule:  Phone:  Fax:    / signature: Electronically signed by Mahi Mcmillan RN on 11/1/22 at 4:16 PM EDT    PHYSICIAN SECTION    Prognosis: Good    Condition at Discharge: Stable    Rehab Potential (if transferring to Rehab): Good    Recommended Labs or Other Treatments After Discharge:     Physician Certification: I certify the above information and transfer of Anton Beverage  is necessary for the continuing treatment of the diagnosis listed and that she requires Home Care for less 30 days. Update Admission H&P: No change in H&P    PHYSICIAN SIGNATURE:  Electronically signed by Homero Bautista MD on 11/1/22 at 11:49 AM EDT  Medication List    START taking these medications    START taking these medications   aspirin EC 81 MG EC tablet  Take 1 tablet by mouth in the morning and 1 tablet in the evening. cefdinir 300 MG capsule  Commonly known as: OMNICEF  Take 1 capsule by mouth 2 times daily   oxyCODONE-acetaminophen 5-325 MG per tablet  Commonly known as: Percocet  Take 1-2 tablets by mouth every 4 hours as needed for Pain for up to 7 days.      CONTINUE taking these medications    CONTINUE taking these medications   Aplenzin 174 MG Tb24  Generic drug: buPROPion HBr  take 1 tablet by mouth every morning   dicyclomine 20 MG tablet  Commonly known as: BENTYL  Take 1 tablet by mouth every 8 hours as needed (diarrhea)   escitalopram 20 MG tablet  Commonly known as: LEXAPRO  Take 10 mg by mouth daily   famotidine 20 MG tablet  Commonly known as: PEPCID  take 1 tablet by mouth twice a day   fluocinonide 0.05 % external solution  Commonly known as: LIDEX  Apply to scalp twice daily, as needed, for itching   folic acid 1 MG tablet  Commonly known as: FOLVITE  Take 1 tablet by mouth daily   ketoconazole 2 % shampoo  Commonly known as: NIZORAL  Apply 3-4 times weekly to scalp, leave on for five minutes prior to washing off   metroNIDAZOLE 0.75 % cream  Commonly known as: METROCREAM  Apply topically 2 times daily.    ondansetron 4 MG tablet  Commonly known as: Zofran  Take 1 tablet by mouth every 8 hours as needed for Nausea or Vomiting   simethicone 80 MG chewable tablet  Commonly known as: MYLICON  Take 1 tablet by mouth 4 times daily as needed for Flatulence   Sulfacetamide Sodium (Acne) 10 % Lotn  Apply to face daily   therapeutic multivitamin-minerals tablet  Take 1 tablet by mouth daily   triamcinolone 0.025 % cream  Commonly known as: KENALOG  Apply to rash on face and ears twice daily, as needed, for scaling   vitamin B-12 500 MCG tablet  Commonly known as: CYANOCOBALAMIN  Take 1 tablet by mouth daily   vitamin D 1.25 MG (31444 UT) Caps capsule  Commonly known as: ERGOCALCIFEROL  Take 1 capsule by mouth once a week for 8 doses     STOP taking these medications    STOP taking these medications   acetaminophen 500 MG tablet  Commonly known as: TYLENOL   Where to Get Your Medications      These medications were sent to [facility], [address]  Phone: [phone]       aspirin EC 81 MG EC tablet      You can get these medications from any pharmacy    Bring a paper prescription for each of these medications      cefdinir 300 MG capsule        oxyCODONE-acetaminophen 5-325 MG per tablet             Printing Report    Report Name Print   Discharge Meds Print

## 2022-11-01 NOTE — FLOWSHEET NOTE
Discharge instructions reviewed with the pt and her SO.all questions answered. Medications filled by meds to beds with the patient at discharge. Patient assisted to car without difficulty.

## 2022-11-01 NOTE — FLOWSHEET NOTE
Patient provided with ace wrap and instructed on how to wrap her legs in place of anti em stockings. Stockings not available in required size.

## 2022-11-01 NOTE — PROGRESS NOTES
[address]    Occupational Therapy Evaluation  Date: 22  Patient Name: Paula Turner       Room: 3193/3793-74  MRN: [id number]  Account: [de-identified]   : 1970  (54 y.o.) Gender: female     Discharge Recommendations:  Further Occupational Therapy is recommended upon facility discharge. OT Equipment Recommendations  Other: TBD    Referring Practitioner: Jennifer Schmidt MD  Diagnosis: s/p L TKA Additional Pertinent Hx: Pt underwent L TKA 2022    Treatment Diagnosis: Impaired self-care status    Past Medical History:  has a past medical history of Allergic rhinitis, Anxiety, Anxiety and depression, Arthritis, Asthma, Diverticulitis, Headache(784.0), HTN (hypertension), Obesity, Overactive bladder, Sleep apnea, Use of cane as ambulatory aid, and Wellness examination. Past Surgical History:   has a past surgical history that includes Tonsillectomy; pr knee scope,diagnostic (Right, 2017); Colonoscopy (N/A, 2020); Cataract removal (Bilateral); Upper gastrointestinal endoscopy (N/A, 2021); Colonoscopy (N/A, 2021); Cholecystectomy, laparoscopic (N/A, 12/15/2020); eye surgery (Bilateral); Sleeve Gastrectomy (2021); and Sleeve Gastrectomy (N/A, 2021). Restrictions  Restrictions/Precautions  Restrictions/Precautions: Fall Risk;Weight Bearing;General Precautions  Required Braces or Orthoses? :  (Pt was using L brace prior to surgery)  Implants present? : Metal implants (L TKA)  Lower Extremity Weight Bearing Restrictions  Left Lower Extremity Weight Bearing: Weight Bearing As Tolerated (FWBAT)      Vitals  Vitals  Heart Rate: 79  Heart Rate Source: Monitor  BP: 139/76  BP Location: Right lower arm  BP Method: Automatic  Patient Position: Supine;Semi fowlers  MAP (Calculated): 97  Resp: 13  SpO2: 99 %  O2 Device: None (Room air)     Subjective  Subjective: Pt is pleasant and agreeable for session  Comments: Okay for OT PT eval and treat per RN  Subjective  Pain: Discomfort\" while at rest      Social/Functional History  Social/Functional History  Lives With: Significant other, Family (Son (29 y.o.), 2 teenagers, mother)  Type of Home: House  Home Layout: One level  Home Access: Stairs to enter with rails  Entrance Stairs - Number of Steps: 1 platform step  Bathroom Shower/Tub: Tub/Shower unit, Curtain, Shower chair without back  Bathroom Toilet: Standard (Support nearby if needed)  Bathroom Equipment: Hand-held shower, Shower chair  Home Equipment: Walker, rolling, [place], Reacher, Sock aid, [employer]  Has the patient had two or more falls in the past year or any fall with injury in the past year?: No  ADL Assistance: Independent  Homemaking Assistance: Needs assistance (Pt's fiancee is primary)  Homemaking Responsibilities: No  Ambulation Assistance: Independent  Transfer Assistance: Independent (No AD inside the home; used w/c in community)  Active : Yes  Mode of Transportation: Collarityer  Occupation: On disability  IADL Comments: sleeps in an adjustable bed  Additional Comments: Pt reported that her fiance will be available 24/7 for assistance. Pt's children are also available to assist. Pt's mother is independent, intermittent help from family with IADLs      Objective  Cognition  Orientation  Overall Orientation Status: Within Functional Limits  Orientation Level: Oriented X4  Cognition  Overall Cognitive Status: WFL   Sensation  Overall Sensation Status: Impaired (Numbness along incision (L knee))    Activities of Daily Living  ADL  Feeding: Modified independent   Grooming: Modified independent   UE Bathing: Supervision  LE Bathing: Contact guard assistance  UE Dressing: Supervision  LE Dressing: Minimal assistance  LE Dressing Skilled Clinical Factors: Assist donning B socks/shoes this date. Pt able to thread BLEs through underwear/pants, pull over hips with CGA for standing.   Toileting: Contact guard assistance  Toileting Skilled Clinical Factors: Voided on commode. Pt able to wipe post void and manage clothing with CGA for standing  Additional Comments: OT educated on use of reacher/sock aid for LB dressing tasks. Pt indicated that she will have family assist with LB dressing/foot level care. Discussed safety with shower transfers, in which pt verbalized understanding. UE Function  LUE AROM (degrees)  LUE AROM : WFL  Left Hand AROM (degrees)  Left Hand AROM: WFL  Tone LUE  LUE Tone: Normotonic  LUE Strength  Gross LUE Strength: WFL  L Hand General: 4/5    RUE AROM (degrees)  RUE AROM : WFL  Right Hand AROM (degrees)  Right Hand AROM: WFL  Tone RUE  RUE Tone: Normotonic  RUE Strength  Gross RUE Strength: WFL  R Hand General: 4/5         Fine Motor Skills/Coordination  Coordination  Movements Are Fluid And Coordinated: Yes                Mobility  Bed Mobility  Bed mobility  Supine to Sit: Stand by assistance  Scooting: Stand by assistance  Bed Mobility Comments: HOB elevated with use of handrails.  Pt endorsed feeling dizzy upon sitting EOB, subsided with time    Balance  Balance  Sitting Balance: Supervision  Standing Balance: Contact guard assistance  Standing Balance  Time: 1-2 minutes, 6-7 minutes  Activity: functional transfers, functional mobility, toileting  Comment: UE support on RW    Transfers  Transfers  Sit to stand: Contact guard assistance  Stand to sit: Contact guard assistance  Transfer Comments: Min cues for hand placement for safety  Toilet Transfers  Toilet - Technique: Ambulating  Equipment Used: Extra wide bedside commode  Toilet Transfer: Contact guard assistance  Toilet Transfers Comments: Cued for hand placement for safety    Functional Mobility  Functional - Mobility Device: Rolling Walker  Activity: To/from bathroom (Throughout hallway)  Assist Level: Contact guard assistance  Functional Mobility Comments: No LOB noted, denied feeling lightheaded/dizzy with ambulation    Assessment  Assessment  Performance deficits / Impairments: Decreased functional mobility , Decreased ADL status, Decreased strength, Decreased endurance, Decreased balance, Decreased sensation, Decreased high-level IADLs  Treatment Diagnosis: Impaired self-care status  Prognosis: Good  Decision Making: Medium Complexity  Discharge Recommendations: Patient would benefit from continued therapy after discharge    Activity Tolerance  Activity Tolerance: Patient Tolerated treatment well    Safety Devices  Type of Devices:  All fall risk precautions in place, Call light within reach, Gait belt, Patient at risk for falls, Left in chair, Nurse notified    Patient Education  Patient Education  Education Given To: Patient  Education Provided: Role of Therapy, Plan of Care  Education Method: Verbal, Demonstration  Barriers to Learning: None  Education Outcome: Verbalized understanding, Demonstrated understanding      Functional Outcome Measures  -PAC Daily Activity Inpatient   How much help for putting on and taking off regular lower body clothing?: A Little  How much help for Bathing?: A Little  How much help for Toileting?: A Little  How much help for putting on and taking off regular upper body clothing?: A Little  How much help for taking care of personal grooming?: A Little  How much help for eating meals?: A Little  [facility] Inpatient Daily Activity Raw Score: 18  AM-PAC Inpatient ADL T-Scale Score : 38.66  ADL Inpatient CMS 0-100% Score: 46.65  ADL Inpatient CMS G-Code Modifier : CK       Goals     Short Term Goals  Time Frame for Short Term Goals: By discharge  Short Term Goal 1: Pt will perform BADLs mod I and Good safety  Short Term Goal 2: Pt will perform functional transfers/mobility mod I, using least restrictive device, and Good safety  Short Term Goal 3: Pt will tolerate standing 10+ minutes, mod I, during functional activity of choice  Short Term Goal 4: Pt will actively participate in 15+ minutes of therapeutic exercise/functional activity to promote safety and independence with self care and mobility    Plan  Occupational Therapy Plan  Times Per Week: 5-7  Current Treatment Recommendations: Strengthening, ROM, Functional mobility training, Balance training, Endurance training, Safety education & training, Equipment evaluation, education, & procurement, Patient/Caregiver education & training, Positioning, Self-Care / ADL      OT Individual Minutes  OT Individual Minutes  Time In: 1648  Time Out: 6309  Minutes: 62  Time Code Minutes   Timed Code Treatment Minutes: 45 Minutes        Electronically signed by Asiya Dowd OT on 11/1/22 at 6:09 PM EDT

## 2022-11-01 NOTE — DISCHARGE INSTRUCTIONS
DISCHARGE INSTRUCTIONS  Caring for yourself after joint replacement surgery (Total Hip and Total Knee Replacement)    Activity and Therapy  Receive physical therapy three times per week. (Pain medication one hour prior to therapy)   Perform PT exercises on own when not receiving home or outpatient PT. Ideally exercises should be at least two times a day. Increase level of activity and ambulation each day. Perform deep breathing exercises daily. Patient provides self-care when possible. Work on Range of motion for Total knee patients. No pillow under the knee for Total knee patients. Elevate the surgical leg when seated. Diet:  Increase oral intake of fruits, fiber and water to prevent constipation. Drink fluids frequently and take stool softeners to aid in bowel motility. Increase protein intake/reduce high-sugar intake to help promote healing and prevent infection. Incision Care:  Keep Aquacel or other dressing intact until seen and removed by surgeon, unless saturated, in which case, call surgeon and request instructions. If dressing falls off, call surgeon. [facility] on in the am and off in the pm to reduce swelling. Ice affected area four times a day, for twenty minutes. Pain Medications and Anticoagulant  You have been place on an anticoagulant to prevent blood clots. Take this medication exactly as prescribed. Be alert for signs of bleeding. Take care not to injure yourself. You have been provided pain medicine to control your pain. Do not take more narcotics than prescribed. You may begin weaning from narcotics as your pain level improves by decreasing the amount or frequency of the narcotics. You may also take plain acetaminophen as an alternate to the narcotics. Never exceed the recommended dosage. Ice, rest and elevating the surgical limb also help with pain control.       When to call the Surgeon:  Increased redness, warmth, drainage, swelling or odor from incision site.  Temperature above 101 degrees. Pain not controlled by prescribed medications. Calf tenderness, swelling, or redness. Shortness of breath or chest pain. If you cannot urinate and have been consuming liquids  Any incision or surgical-related concerns. Call surgeon with concerns PRIOR TO going to hospital.    Normal Conditions:  Swelling in the operative leg: this should reduce over time. Bruising behind the knee and around surgical area. Some post-operative pain. Constipation related to pain medications/decreased mobility. (Increase fiber & water intake.)   Slight warmth of operative leg. Fatigue and moderate pain after therapy. Numbness near the incision site. Nausea - take pain medications with food. Cut back on pain medication. NOTE: Remember to go to follow-up orthopedic appointment with surgeon: 11/16/22 at 10:35, w/ Dr. Oriana Ortega.

## 2022-11-01 NOTE — ANESTHESIA PRE PROCEDURE
Department of Anesthesiology  Preprocedure Note       Name:  Alma Tuttle   Age:  54 y.o.  :  1970                                          MRN:  [id number]         Date:  2022      Surgeon: Sharron Collazo):  Alejandro Stuart MD    Procedure: Procedure(s):  KNEE TOTAL ARTHROPLASTY    Medications prior to admission:   Prior to Admission medications    Medication Sig Start Date End Date Taking? Authorizing Provider   famotidine (PEPCID) 20 MG tablet take 1 tablet by mouth twice a day 22   Steven Zelaya MD   vitamin B-12 (CYANOCOBALAMIN) 500 MCG tablet Take 1 tablet by mouth daily 22  JONATHAN Soria CNP   folic acid (FOLVITE) 1 MG tablet Take 1 tablet by mouth daily 22   JONATHAN Soria CNP   vitamin D (ERGOCALCIFEROL) 1.25 MG (78140 UT) CAPS capsule Take 1 capsule by mouth once a week for 8 doses 22  JONATHAN Soria CNP   ketoconazole (NIZORAL) 2 % shampoo Apply 3-4 times weekly to scalp, leave on for five minutes prior to washing off 22   Liliana Darrion, PA-C   triamcinolone (KENALOG) 0.025 % cream Apply to rash on face and ears twice daily, as needed, for scaling 22   Liliana Darrion, PA-C   fluocinonide (LIDEX) 0.05 % external solution Apply to scalp twice daily, as needed, for itching 22   Liliana Mahnomen, PA-C   metroNIDAZOLE (METROCREAM) 0.75 % cream Apply topically 2 times daily.  22   Liliana Mahnomen, PA-C   APLENZIN 174 MG TB24 take 1 tablet by mouth every morning 22   Historical Provider, MD   simethicone (MYLICON) 80 MG chewable tablet Take 1 tablet by mouth 4 times daily as needed for Flatulence 22   Steven Zelaya MD   dicyclomine (BENTYL) 20 MG tablet Take 1 tablet by mouth every 8 hours as needed (diarrhea) 22   Steven Zelaya MD   Multiple Vitamins-Minerals (THERAPEUTIC MULTIVITAMIN-MINERALS) tablet Take 1 tablet by mouth daily    Historical Provider, MD   ondansetron (ZOFRAN) 4 MG tablet Take 1 tablet by mouth every 8 hours as needed for Nausea or Vomiting 12/20/21   [facility], APRN - CNP   Sulfacetamide Sodium, Acne, 10 % LOTN Apply to face daily 2/24/21   Charlie Warner MD   acetaminophen (TYLENOL) 500 MG tablet Take 1,000 mg by mouth every 6 hours as needed for Pain     Historical Provider, MD   escitalopram (LEXAPRO) 20 MG tablet Take 10 mg by mouth daily    Historical Provider, MD       Current medications:    Current Facility-Administered Medications   Medication Dose Route Frequency Provider Last Rate Last Admin    sodium chloride flush 0.9 % injection 5-40 mL  5-40 mL IntraVENous 2 times per day Marti Mustafa MD        sodium chloride flush 0.9 % injection 5-40 mL  5-40 mL IntraVENous PRN Marti Mustafa MD        0Reid9 % sodium chloride infusion   IntraVENous PRN Marti Mustafa MD        ceFAZolin (ANCEF) 3000 mg in dextrose 5 % 100 mL IVPB  3,000 mg IntraVENous On Call to [facility], MD        dexamethasone (PF) (DECADRON) injection 10 mg  10 mg IntraVENous Once Marti Mustafa MD        gabapentin (NEURONTIN) capsule 800 mg  800 mg Oral Once Marti Mustfaa MD        scopolamine (TRANSDERM-SCOP) transdermal patch 1 patch  1 patch TransDERmal Once Marti Mustafa MD        lidocaine PF 1 % injection 1 mL  1 mL IntraDERmal Once PRN Heidy Villegas MD        acetaminophen (TYLENOL) tablet 1,000 mg  1,000 mg Oral Once MD Landon See.9 % sodium chloride infusion   IntraVENous Continuous Heidy Villegas MD        lactated ringers infusion   IntraVENous Continuous Heidy Villegas MD        sodium chloride flush 0.9 % injection 5-40 mL  5-40 mL IntraVENous 2 times per day Heidy Villegas MD        sodium chloride flush 0.9 % injection 5-40 mL  5-40 mL IntraVENous PRN Heidy Villegas MD        0.9 % sodium chloride infusion   IntraVENous PRN Heidy Villegas MD           Allergies:     Allergies   Allergen Reactions    No Known Allergies        Problem List: Patient Active Problem List   Diagnosis Code    Headache R51.9    Overactive bladder N32.81    Osteoarthritis M19.90    Chronic rhinitis J31.0    OAB (overactive bladder) N32.81    Asthma J45.909    Primary osteoarthritis of right knee M17.11    Psoriasis L40.9    Seborrheic dermatitis L21.9    DENISHA (obstructive sleep apnea) G47.33    Anxiety and depression F41.9, F32. A    Chronic back pain M54.9, G89.29    Bilateral chronic knee pain M25.561, M25.562, G89.29    Diverticulitis of large intestine without perforation or abscess without bleeding K57.32    Diarrhea R19.7    Dyspepsia R10.13    Lymphocytic colitis K52.832    S/P laparoscopic sleeve gastrectomy Z98.84    Morbid obesity with BMI of 50.0-59.9, adult (HCC) E66.01, Z68.43    Preop examination Z01.818       Past Medical History:        Diagnosis Date    Allergic rhinitis     Anxiety     Anxiety and depression     Arthritis     Asthma     no longer using inhaler or nebulizer    Diverticulitis     Headache(784.0)     HTN (hypertension)     pt denies  not on meds    Obesity     Overactive bladder     Sleep apnea     uses machine nightly  cpap    Use of cane as ambulatory aid     Wellness examination 08/2021    Dr. Brenda Gamble, his CNP's        Past Surgical History:        Procedure Laterality Date    CATARACT REMOVAL      CHOLECYSTECTOMY, LAPAROSCOPIC N/A 12/15/2020    CHOLECYSTECTOMY LAPAROSCOPIC ROBOTIC XI performed by Arden White MD at [facility] N/A 08/05/2020    COLONOSCOPY POLYPECTOMY SNARE/COLD BIOPSY performed by Shravan Moore MD at [address] COLONOSCOPY N/A 04/08/2021    COLONOSCOPY WITH BIOPSY RANDOM RIGHT AND LEFT COLON performed by Shravan Moore MD at [id number] [place] [address]. [address] Bilateral     cataract removed with lens implants    HI KNEE SCOPE,DIAGNOSTIC Right 05/16/2017    RIGHT KNEE ARTHROSCOPY WITH MEDIAL MENISECTOMY AND CHONDROPLASTY performed by Lonita Cooks, DO at [address] [facility]  SLEEVE GASTRECTOMY  2021    ROBOTIC LAPAROSCOPIC GASTRECTOMY SLEEVE, EGD-    SLEEVE GASTRECTOMY N/A 2021    XI ROBOTIC LAPAROSCOPIC GASTRECTOMY SLEEVE, EGD- GI UNIT SCHEDULED performed by Malina Aiken DO at [address] [facility] N/A 2021    EGD BIOPSY OF GASTRIC AND GASTRIC POLYPECTOMY performed by Leena Ramey MD at [facility] History:    Social History     Tobacco Use    Smoking status: Former     Packs/day: 0.60     Types: Cigarettes     Quit date:      Years since quittin.8    Smokeless tobacco: Never   Substance Use Topics    Alcohol use: Yes     Comment: rare                                Counseling given: Not Answered      Vital Signs (Current): There were no vitals filed for this visit.                                            BP Readings from Last 3 Encounters:   10/18/22 120/62   22 110/75   22 120/84       NPO Status:                                                                                 BMI:   Wt Readings from Last 3 Encounters:   10/18/22 280 lb (127 kg)   22 (!) 320 lb (145.2 kg)   22 (!) 331 lb (150.1 kg)     There is no height or weight on file to calculate BMI.    CBC:   Lab Results   Component Value Date/Time    WBC 4.3 10/18/2022 12:00 PM    RBC 4.01 10/18/2022 12:00 PM    HGB 11.8 10/18/2022 12:00 PM    HCT 35.1 10/18/2022 12:00 PM    MCV 87.4 10/18/2022 12:00 PM    RDW 13.2 10/18/2022 12:00 PM     10/18/2022 12:00 PM       CMP:   Lab Results   Component Value Date/Time     10/18/2022 12:00 PM    K 4.6 10/18/2022 12:00 PM     10/18/2022 12:00 PM    CO2 27 10/18/2022 12:00 PM    BUN 10 10/18/2022 12:00 PM    CREATININE 1.06 10/18/2022 12:00 PM    GFRAA >60 2022 07:36 AM    LABGLOM >60 10/18/2022 12:00 PM    GLUCOSE 67 10/18/2022 12:00 PM    PROT 6.7 2022 07:36 AM    CALCIUM 8.9 10/18/2022 12:00 PM    BILITOT 0.74 2022 07:36 AM    ALKPHOS 83 05/25/2022 07:36 AM    AST 13 05/25/2022 07:36 AM    ALT 9 05/25/2022 07:36 AM       POC Tests: No results for input(s): POCGLU, POCNA, POCK, POCCL, POCBUN, POCHEMO, POCHCT in the last 72 hours. Coags:   Lab Results   Component Value Date/Time    PROTIME 10.0 11/03/2021 03:00 PM    INR 0.9 11/03/2021 03:00 PM    APTT 24.3 11/03/2021 03:00 PM       HCG (If Applicable):   Lab Results   Component Value Date    PREGTESTUR NEGATIVE 06/20/2021    HCG NEGATIVE 11/17/2021        ABGs: No results found for: PHART, PO2ART, XCE3REW, HKU7VEU, BEART, W2ATTXTE     Type & Screen (If Applicable):  No results found for: LABABO, LABRH    Drug/Infectious Status (If Applicable):  No results found for: HIV, HEPCAB    COVID-19 Screening (If Applicable):   Lab Results   Component Value Date/Time    COVID19 Not Detected 04/03/2021 07:50 AM    COVID19 Not Detected 08/01/2020 05:07 PM           Anesthesia Evaluation  Patient summary reviewed and Nursing notes reviewed no history of anesthetic complications:   Airway: Mallampati: III  TM distance: >3 FB   Neck ROM: full  Mouth opening: > = 3 FB   Dental: normal exam         Pulmonary: breath sounds clear to auscultation  (+) sleep apnea: on CPAP,  asthma:                            Cardiovascular:    (+) hypertension:,         Rhythm: regular  Rate: normal                    Neuro/Psych:   (+) headaches:, psychiatric history:            GI/Hepatic/Renal:   (+) morbid obesity          Endo/Other:    (+) : arthritis: OA., .                 Abdominal:   (+) obese,           Vascular: negative vascular ROS. Other Findings:           Anesthesia Plan      general and regional     ASA 3     (GA/ Left Adductor canal block post op)  Induction: intravenous. MIPS: Postoperative opioids intended and Prophylactic antiemetics administered. Anesthetic plan and risks discussed with patient. Plan discussed with CRNA.           Post-op pain plan if not by surgeon: single peripheral nerve block            Vel Campbell MD   11/1/2022

## 2022-11-01 NOTE — PROGRESS NOTES
Physical Therapy  Facility/Department: [facility] MED SURG  Physical Therapy Initial Assessment    Name: Be Albert  : 1970  MRN: [id number]  Date of Service: 2022    Discharge Recommendations:  Home with Home health PT, Therapy recommended at discharge, Patient would benefit from continued therapy after discharge          Patient Diagnosis(es): The encounter diagnosis was Primary osteoarthritis of left knee. Past Medical History:  has a past medical history of Allergic rhinitis, Anxiety, Anxiety and depression, Arthritis, Asthma, Diverticulitis, Headache(784.0), HTN (hypertension), Obesity, Overactive bladder, Sleep apnea, Use of cane as ambulatory aid, and Wellness examination. Past Surgical History:  has a past surgical history that includes Tonsillectomy; pr knee scope,diagnostic (Right, 2017); Colonoscopy (N/A, 2020); Cataract removal (Bilateral); Upper gastrointestinal endoscopy (N/A, 2021); Colonoscopy (N/A, 2021); Cholecystectomy, laparoscopic (N/A, 12/15/2020); eye surgery (Bilateral); Sleeve Gastrectomy (2021); and Sleeve Gastrectomy (N/A, 2021). Assessment   Assessment: Pt demonstrates SBA bed mobility, CGA for transfers and ambulation withrolling walker, Pt evelio to demonstrate going up and down 4\" and 6\" platform step with a rolling walker at CGAx 2. Pt's jorge Warren educated and practised assisting going up and down platform steps. Educated pt/family to have assist for mobility for atleast 24 hours upon return to home for safety.  Per pt she is going to start with Home therapy first.  Treatment Diagnosis: Difficulty walking  Therapy Prognosis: Good  Requires PT Follow-Up: Yes     Plan   Physcial Therapy Plan  Current Treatment Recommendations: Strengthening, ROM, Balance training, Functional mobility training, Transfer training, Endurance training, Gait training, Stair training, Home exercise program, Safety education & training, Patient/Caregiver education & training, Equipment evaluation, education, & procurement, Positioning, Therapeutic activities  Safety Devices  Type of Devices: All fall risk precautions in place, Call light within reach, Gait belt, Patient at risk for falls, Left in chair, Nurse notified     Restrictions  Restrictions/Precautions  Restrictions/Precautions: Fall Risk, Weight Bearing, General Precautions  Required Braces or Orthoses? :  (Pt was using L brace prior to surgery)  Implants present? : Metal implants (L TKA)  Lower Extremity Weight Bearing Restrictions  Left Lower Extremity Weight Bearing: Weight Bearing As Tolerated (FWBAT)     Subjective   Pain: Discomfort\" while at rest  General  Patient assessed for rehabilitation services?: Yes  Additional Pertinent Hx: L TKA 11/1/22 Dr Megan Abrams  Family / Caregiver Present:  [place] Notice)  Referring Practitioner: Dr Megan Abrams  Referral Date : 11/01/22  Diagnosis: Left Knee OA, S/P L TKA  Follows Commands: Within Functional Limits         Social/Functional History  Social/Functional History  Lives With: Significant other, Family (Son (29 y.o.), 2 teenagers, mother)  Type of Home: House  Home Layout: One level  Home Access: Stairs to enter with rails  Entrance Stairs - Number of Steps: 1 platform step  Bathroom Shower/Tub: Tub/Shower unit, Curtain, Shower chair without back  Bathroom Toilet: Standard (Support nearby if needed)  Bathroom Equipment: Hand-held shower, Shower chair  Home Equipment: Walker, rolling, [address], [place], [place], Sock aid, [employer]  Has the patient had two or more falls in the past year or any fall with injury in the past year?: No  ADL Assistance: Independent  Homemaking Assistance: Needs assistance (Pt's fiancee is primary)  Homemaking Responsibilities: No  Ambulation Assistance: Independent  Transfer Assistance: Independent (No AD inside the home; used w/c in community)  Active : Yes  Mode of Transportation: [employer]  Occupation: On disability  IADL Comments: sleeps in an adjustable bed  Additional Comments: Pt reported that her mother has parkinson's and pt's oldest son is able to assist.  Vision/Hearing  Vision  Vision: Impaired  Vision Exceptions: Wears glasses for reading (Glasses for night time driving)  Hearing  Hearing: Within functional limits    Cognition   Orientation  Overall Orientation Status: Within Functional Limits  Orientation Level: Oriented X4  Cognition  Overall Cognitive Status: WFL     Objective   Heart Rate: 79  Heart Rate Source: Monitor  BP: 139/76  BP Location: Right lower arm  BP Method: Automatic  Patient Position: Supine;Semi fowlers  MAP (Calculated): 97  Resp: 13  SpO2: 99 %  O2 Device: None (Room air)              AROM RLE (degrees)  RLE AROM: WFL  RLE General AROM: Crepitus felt with ROM  AROM LLE (degrees)  LLE General AROM: AAROM L Knee flexion 4 to 82 degrees. Strength RLE  Comment: 4/5  Strength LLE  Comment: Knee extesnion 3-/5, knee flexion 3+/5           Bed mobility  Supine to Sit: Stand by assistance  Sit to Supine: Stand by assistance  Scooting: Stand by assistance  Transfers  Sit to Stand: Contact guard assistance  Stand to Sit: Contact guard assistance  Bed to Chair: Contact guard assistance  Comment: BSC/Toilet transfers with RW CGA  Ambulation  WB Status: WBAT L LE  Ambulation  Surface: Level tile  Device: Rolling Walker  Assistance: Contact guard assistance  Quality of Gait: Initially step to pattern, but progressed to step through pattern. No LOB.   Distance: 10 ft to the bathroom, 80 ft  Stairs/Curb  Stairs?: Yes  Stairs  # Steps : 3  Stairs Height: 4\" (4\"x1, 6\" x 2)  Device: Rolling walker  Assistance: Contact guard assistance;2 Person assistance     Balance  Posture: Good  Sitting - Static: Good  Sitting - Dynamic: Good  Standing - Static: Good;- (RW)  Standing - Dynamic: Fair;+ (RW)  Exercise Treatment: Supine and seated TKA ex's reviewed withdemonstration, education in \"No pillow under Left knee\", use of cryocuff, elevation for edema controll. OutComes Score                                                  AM-PAC Score  AM-PAC Inpatient Mobility Raw Score : 17 (11/01/22 1811)  AM-PAC Inpatient T-Scale Score : 42.13 (11/01/22 1811)  Mobility Inpatient CMS 0-100% Score: 50.57 (11/01/22 1811)  Mobility Inpatient CMS G-Code Modifier : CK (11/01/22 1811)          Tinneti Score       Goals  Short Term Goals  Time Frame for Short Term Goals: 2 visits  Short Term Goal 1: ROM L knee 2 to 90 degrees  Short Term Goal 2: SBA transfers  Short Term Goal 3: Gait with RW dist of 200 ft, supervsion. Patient Goals   Patient Goals : Decreased pain L knee       Education  Patient Education  Education Given To: Patient; Family  Education Provided: Role of Therapy;Plan of Care;Home Exercise Program;Precautions;Transfer Training; Fall Prevention Strategies  Education Method: Demonstration;Verbal;Teach Back;Printed Information/Hand-outs  Education Outcome: Verbalized understanding;Demonstrated understanding;Continued education needed (Continued Home therapy.)      Therapy Time   Individual Concurrent Group Co-treatment   Time In 1648         Time Out 1746         Minutes 58         Timed Code Treatment Minutes: [facility] Nasreen Zamora, PT

## 2022-11-02 ENCOUNTER — TELEPHONE (OUTPATIENT)
Dept: ORTHOPEDIC SURGERY | Age: 52
End: 2022-11-02

## 2022-11-02 NOTE — TELEPHONE ENCOUNTER
Ritu Cordova from [facility] called to inform Dr. Darin Gracia that the patient will have physical therapy in her home 3 times per week for 3 weeks. He may be reached at [phone] if needed. Thank you.

## 2022-11-03 ENCOUNTER — HOSPITAL ENCOUNTER (OUTPATIENT)
Dept: PHYSICAL THERAPY | Facility: CLINIC | Age: 52
Setting detail: THERAPIES SERIES
End: 2022-11-03
Payer: MEDICARE

## 2022-11-04 ENCOUNTER — APPOINTMENT (OUTPATIENT)
Dept: PHYSICAL THERAPY | Facility: CLINIC | Age: 52
End: 2022-11-04
Payer: MEDICARE

## 2022-11-07 ENCOUNTER — APPOINTMENT (OUTPATIENT)
Dept: PHYSICAL THERAPY | Facility: CLINIC | Age: 52
End: 2022-11-07
Payer: MEDICARE

## 2022-11-07 ENCOUNTER — TELEPHONE (OUTPATIENT)
Dept: ORTHOPEDIC SURGERY | Age: 52
End: 2022-11-07

## 2022-11-07 DIAGNOSIS — M17.12 PRIMARY OSTEOARTHRITIS OF LEFT KNEE: ICD-10-CM

## 2022-11-07 NOTE — TELEPHONE ENCOUNTER
Patient stated she is having some issues not being able to move her bowels and wondering what else she can do, also she has not hear anything more about [facility] coming to her home and pt would like refill on pain medication please advise and reach out to pt @ [phone], thank you

## 2022-11-07 NOTE — TELEPHONE ENCOUNTER
Patient advised that unfortunately constipation is a side effect of pain medication. Patient advised to purchase a stool softener and drink warm prune juice to help. She states that she has been in contact with some one from [facility].  Will place med refill in script request. Advised it may not be filled until tomorrow morning

## 2022-11-08 RX ORDER — OXYCODONE HYDROCHLORIDE AND ACETAMINOPHEN 5; 325 MG/1; MG/1
1-2 TABLET ORAL EVERY 4 HOURS PRN
Qty: 42 TABLET | Refills: 0 | Status: SHIPPED | OUTPATIENT
Start: 2022-11-08 | End: 2022-11-15

## 2022-11-09 ENCOUNTER — TELEPHONE (OUTPATIENT)
Dept: ORTHOPEDIC SURGERY | Age: 52
End: 2022-11-09

## 2022-11-09 ENCOUNTER — APPOINTMENT (OUTPATIENT)
Dept: PHYSICAL THERAPY | Facility: CLINIC | Age: 52
End: 2022-11-09
Payer: MEDICARE

## 2022-11-09 NOTE — TELEPHONE ENCOUNTER
Saida Engel had a Lt TKA with Dr Francisco Eli on 11/2/22. She is asking if sitting in a hot tub around 12/4/22 could be a possibility and is asking for a return call to discuss. Thank you.

## 2022-11-11 ENCOUNTER — APPOINTMENT (OUTPATIENT)
Dept: PHYSICAL THERAPY | Facility: CLINIC | Age: 52
End: 2022-11-11
Payer: MEDICARE

## 2022-11-11 ENCOUNTER — TELEPHONE (OUTPATIENT)
Dept: ORTHOPEDIC SURGERY | Age: 52
End: 2022-11-11

## 2022-11-11 NOTE — TELEPHONE ENCOUNTER
Veronica 19 calling in to see if the order for raised toilet seat with rails can be changed to raised toilet seat with no rails. They do not carry a raised toilet seat that holds over 300 lbs with rails. Ok to change to raised toilet seat without rails?

## 2022-11-16 ENCOUNTER — OFFICE VISIT (OUTPATIENT)
Dept: ORTHOPEDIC SURGERY | Age: 52
End: 2022-11-16

## 2022-11-16 DIAGNOSIS — Z96.652 STATUS POST TOTAL LEFT KNEE REPLACEMENT: Primary | ICD-10-CM

## 2022-11-16 PROBLEM — Z01.818 PREOP EXAMINATION: Status: RESOLVED | Noted: 2022-10-17 | Resolved: 2022-11-16

## 2022-11-16 PROCEDURE — 99024 POSTOP FOLLOW-UP VISIT: CPT | Performed by: ORTHOPAEDIC SURGERY

## 2022-11-16 NOTE — PROGRESS NOTES
Diana Ryder M.D.            [address] [place] [address], [address] [facility], [id number] [place] [address]           Dept Phone: [phone]           Dept Fax:  [phone]           Jam Hernandez          Dept Phone: [phone]           Dept Fax:  [phone]      Chief Compliant:  Chief Complaint   Patient presents with    Post-Op Check     Lt TKA 11/1/22        History of Present Illness:  Patient returns today status post left TKA times 2 weeks. Patient has no major complaints     Review of Systems   Constitutional: Negative for fever, chills, sweats, recent injury, recent illness  Neurological: Negative for Headaches, numbness, or weakness. Integumentary: Negative for rash, itching, ecchymosis, or wounds. Musculoskeletal: Positive for Post-Op Check (Lt TKA 11/1/22)       Physical Exam:  Constitutional: Patient is oriented to person, place, and time. Patient appears well-developed and well nourished. Musculoskeletal: Normal gait. Motion 0-100 degrees with expected pain with ROM. No Calf tenderness, Negative Hebert's sign. Neurovascular intact. Neurological: Patient is alert and oriented to person, place, and time. Normal strenght. No sensory deficit. Skin: Skin is warm and dry. Incision is healing well without signs of redness or drainage  Nursing note and vitals reviewed. Labs and Imaging:     XR taken today:  XR KNEE LEFT (1-2 VIEWS)    Result Date: 11/16/2022  X-rays taken today reviewed by me show standing AP of both knees and lateral left knee. Patient is status post left total knee arthroplasty and components. Be in good alignment position on AP and lateral views with appropriate patellar height patient has a stemmed tibial component due to size considerations. Patient is noted to have marked varus arthrosis of her right knee.          Orders Placed This Encounter   Procedures    XR KNEE LEFT (1-2 VIEWS)     Standing Status:   Future     Number of Occurrences:   1     Standing Expiration Date:   11/15/2023    External Referral To Physical Therapy     Referral Priority:   Routine     Referral Type:   Eval and Treat     Referral Reason:   Specialty Services Required     Requested Specialty:   Physical Therapist     Number of Visits Requested:   1       Assessment and Plan:  1. Status post total left knee replacement        2 weeks status post left TKA        This is a 54 y.o. female who presents to the clinic today status post left TKA. Continue anticoagulation. Transition to outpatient Pt. Restrictions given. RTO 5-6 weeks. Call if any problems/issues prior to that       Provider Attestation:  Jacques Larsen, personally performed the services described in this documentation. All medical record entries made by the scribe were at my direction and in my presence. I have reviewed the chart and discharge instructions and agree that the records reflect my personal performance and is accurate and complete. Noelle Tee MD. 11/16/22        Please note that this chart was generated using voice recognition Dragon dictation software. Although every effort was made to ensure the accuracy of this automated transcription, some errors in transcription may have occurred.

## 2022-11-23 ENCOUNTER — HOSPITAL ENCOUNTER (OUTPATIENT)
Dept: PHYSICAL THERAPY | Facility: CLINIC | Age: 52
Setting detail: THERAPIES SERIES
Discharge: HOME | End: 2022-11-23
Payer: MEDICARE

## 2022-11-23 PROCEDURE — 97161 PT EVAL LOW COMPLEX 20 MIN: CPT

## 2022-11-23 PROCEDURE — 97016 VASOPNEUMATIC DEVICE THERAPY: CPT

## 2022-11-23 NOTE — CONSULTS
[x] [address] [facility]  Outpatient Rehabilitation &  Therapy  [facility]   [address]  P: [phone]  F: [phone]     Physical Therapy Lower Extremity Evaluation    Date:  2022  Patient: Sravani Stuart  : 1970  MRN: [id number]  Physician:  Pamela Coffey MD                              Insurance: [employer] (30 visits, auth after 30, 23 remaining)           Rehab Codes: M62.81, M62.9, Z73.6, R26.89, M25.60, Z74.0   Medical Diagnosis: C23.992 (ICD-10-CM) - Status post total left knee replacement  Onset date: 22 - DOS   Next Dr's appt. : 6 weeks      Subjective:   CC: Patient is a 54year old female who presents to OP physical therapy for an initial evaluation of her L knee status post L knee replacement on 22. Patient arrives today ambulating with standard walker. Patient was completing home care until the . Reports that she has an elevated toilet seat and shower chair now. Patient reports overall things have been going well. Patient reports she is still having stiffness and soreness in the L knee but reports  that the R knee now has been locking up and sharp pain.        PMHx:  Past Medical History:   Diagnosis Date    Allergic rhinitis     Anxiety     Anxiety and depression     Arthritis     Asthma     no longer using inhaler or nebulizer    Diverticulitis     Headache(784.0)     HTN (hypertension)     pt denies  not on meds    Obesity     Overactive bladder     Sleep apnea     uses machine nightly  cpap    Use of cane as ambulatory aid     Wellness examination 2021    Dr. Cee Ni, his CNP's      Past Surgical History:   Procedure Laterality Date    CATARACT REMOVAL Bilateral     CHOLECYSTECTOMY, LAPAROSCOPIC N/A 12/15/2020    CHOLECYSTECTOMY LAPAROSCOPIC ROBOTIC XI performed by Justyn Andrea MD at [facility] 2020    COLONOSCOPY POLYPECTOMY SNARE/COLD BIOPSY performed by Mauro Crowell MD at [place] [address] COLONOSCOPY N/A 04/08/2021    COLONOSCOPY WITH BIOPSY RANDOM RIGHT AND LEFT COLON performed by Amy Powell MD at [address] [facility] Bilateral     cataract removed with lens implants    DE KNEE SCOPE,DIAGNOSTIC Right 05/16/2017    RIGHT KNEE ARTHROSCOPY WITH MEDIAL MENISECTOMY AND CHONDROPLASTY performed by Zaina Reilly DO at [address] [facility]  11/17/2021    ROBOTIC [place], EGD-    SLEEVE GASTRECTOMY N/A 11/17/2021    XI ROBOTIC LAPAROSCOPIC GASTRECTOMY SLEEVE, EGD- GI UNIT SCHEDULED performed by Rebecca Martinez DO at [address] Left 11/1/2022    KNEE TOTAL ARTHROPLASTY performed by Rose Nicholson MD at [address] [facility] N/A 04/08/2021    EGD BIOPSY OF GASTRIC AND GASTRIC POLYPECTOMY performed by Amy Powell MD at [address] [facility].       [] Unremarkable [] Diabetes [] HTN  [] Pacemaker   [] MI/Heart Problems [] Cancer [] Arthritis [] Other:              [x] Refer to full medical chart  In EPIC     Comorbidities:   [x] Obesity [] Dialysis  [] Other:   [] Asthma/COPD [] Dementia [] Other:   [] Stroke [] Sleep apnea [] Other:   [] Vascular disease [] Rheumatic disease [] Other:     Tests: [x] X-Ray: Result Date: 11/16/2022  X-rays taken today reviewed by me show standing AP of both knees and lateral left knee. Patient is status post left total knee arthroplasty and components. Be in good alignment position on AP and lateral views with appropriate patellar height patient has a stemmed tibial component due to size considerations. Patient is noted to have marked varus arthrosis of her right knee.  [] MRI:  [] Other:     Medications: [x] Refer to full medical record [] None [] Other:  Allergies:      [x] Refer to full medical record [] None [] Other:     Marital Status Has help at home  2 teenagers, adult son with disability    Home type 1 story house, no basement   Stairs from outside 1 step to get in   Employment Not working, on disability    Job status ---   Recreational Activities Bowling        ADL/IADL Previous level of function Current level of function Who currently assists the patient with task   Bathing  [x] Independent  [] Assist [x] Independent  [] Assist    Dress/grooming [x] Independent  [] Assist [x] Independent  [x] Assist L LE dressing for compression stocking and shoe    Transfer/mobility [x] Independent  [] Assist [x] Independent  [] Assist    Feeding [x] Independent  [] Assist [x] Independent  [] Assist    Toileting [x] Independent  [] Assist [x] Independent  [] Assist    Driving [x] Independent  [] Assist [] Independent  [x] Assist    Housekeeping [x] Independent  [] Assist [] Independent  [x] Assist Cleaning, cooking - sons and     Grocery shop/meal prep [x] Independent  [] Assist [] Independent  [] Assist      Gait Prior level of function Current level of function    [x] Independent  [] Assist [x] Independent  [] Assist   Device: [x] Independent [x] Independent    [] Straight Cane [] Quad cane [] Straight Cane [] Quad cane    [] Standard walker [] Rolling walker   [] 4 wheeled walker [x] Standard walker [] Rolling walker   [] 4 wheeled walker    [] Wheelchair [] Wheelchair       Pain present?  yes   Location L knee   Pain Rating currently 6/10   Pain at worst 8/10   Pain at best 0/10   Description of pain Aching, tingling   Altered Sensation Still some numbness from nerve block    What makes it worse Getting too hot under blankets, prolonged positions, inactivity   What makes it better Tylenol, inactivity, ice   Symptom progression Slowly getting better   Sleep Patient is able to get comfortable sleeping           Objective:    OBSERVATION No Deficit Deficit Not Tested Comments   Posture       Palpation [] [x] [] + diffuse tenderness around L knee   Edema [] [x] [] L infrapatella: 63.3 cm  L midpatella: 64 cm  L Suprapatella: 68 cm     R infrapatella: 58 cm   R midpatella: 60 cm   R Suprapatella: 64 cm    Neurological [x] [] []    Patellar Mobility [x] [] []    Patellar Orientation [x] [] []    Gait [] [x] [] Analysis: ambulating with standard walker, R LE circumbducrtion       FUNCTION Normal Difficult Unable Comments   Sitting [] [x] [] Prolonged difficult    Standing [] [x] [] Prolonged difficult   Ambulation [] [x] [] Ambulating with  standard walker    Groom/Dress [] [x] [] L LE dressing difficulty    Stairs [] [x] []            FUNCTIONAL TESTS PAIN NO PAIN COMMENTS   5TSTS [] [x] 19.5 seconds with UE support    TUG [] []    2MWT [] [x] 215 feet, no increase in pain       Strength/ROM:      STRENGTH    Left Right   Hip Flex 4+ * 5   Ext NT NT   ABD 4- 4   ADD 4+ 4+   Knee Flex 4+ * 5   Ext 4 * 5   Ankle DF 5 5   PF 5 5            ROM  ° AROM ROM  ° PROM    Left Right Left Right   Knee Flex 88 degrees   101 degrees    Ext Lacking 6 degrees   NT but pain with overpressure                MUSCLE LENGTH TESTING Normal Left Tight Right Tight Comments   Quads []  [x]  []     Hamstrings []  [x]  []     Gastrocnemius []  [x]  []         Assessment: [x] Patient is a 54year old female who presents to OP physical therapy for an initial evaluation of her L knee status post L knee replacement on 11/1/22. Patient presents with significant L LE edema, reduced A/PROM, reduce L LE strength, along with increased levels of pain. Patient is currently ambulating with standard walker due to having R knee OA along with being status post L knee replacement. Patient would continue to benefit from skilled physical therapy services in order to: reduce pain, improve passive and active ROM, improve L LE strength, improve function, normalize gait, reduce edema, and improve LE power and gait speed. Problems:    [x] ? Pain: 0-8/10 pain   [x] ? ROM: decreased L knee flexion/extension A/PROM   [x] ? Strength: Decreased L knee flexion strength   [x] ?  Function:   [x] Edema: see patellar measurements  [x] Gait Deviations: see above  [x] Other: 5TSTS, 19.5 seconds// 2mWT 215 feet, increased edema      Goals  MET NOT MET ON-  GOING  Details   Date Addressed:        STG: To be met in 10 treatments           1. ? Pain: Patient will self report worst L knee pain no greater than 3/10 in order to better tolerate ADLs/work activities with minimal dysfunction []  []  []      2. ? ROM: Patient will improve knee PROM from 0-120 degrees in order to demonstrate ability to move/reach in all planes unrestricted at PLOF []  []  []      3. Independent with Home Exercise Programs []  []  []     4. Demonstrate knowledge of fall risk prevention  []  []  []     5. ?Gait speed and activity tolerance: Patient to improve 2mWT to at least 315 ft with least restrictive assistive device and improved gait mechanics in order to reduce risk of  falls and return to PLOF []  []  []     6. ? Edema: patient to have L patellar measurements less than 2cm from initial eval in order to demonstrate edema reduction and allow for unrestricted knee motion []  []  []                   Date Addressed:        LTG: To be met in 16 treatments       1. Improve score on assessment tool KOOS from 65%% impairment to less than 40% impairment  []  []  []     2.  Strength: Pt will demonstrate improved L LE strength to 5/5  in order to demonstrate improved stability/strength necessary for unrestricted ADLs/work activities []  []  []     3. ?Gait speed and activity tolerance: Patient to improve 2mWT to at least 415 ft with least restrictive assistive device and improved gait mechanics in order to reduce risk of  falls and return to PLOF []  []  []     4. ? ROM: Patient will improve AROM from 0-120 degrees in order to demonstrate ability to move/reach in all planes unrestricted at PLOF []  []  []     5. ? Edema: patient to have L patellar measurements less than 3cm from initial eval in order to demonstrate edema reduction and allow for unrestricted knee motion []  []  []     6. ? Transfers and LE power: patient to improve 5TSTS score to less than 13 seconds in order to reduce fall risk and improve transfer abilities  []  []  []         Patient goals: to get strong and flexible       Functional Test: KOOS Score: 65% functionally impaired       Evaluation Complexity:  History (Personal factors, comorbidities) [] 0 [] 1-2 [x] 3+   Exam (limitations, restrictions) [] 1-2 [] 3 [x] 4+   Clinical presentation (progression) [x] Stable [] Evolving  [] Unstable   Decision Making [x] Low [] Moderate [] High    [x] Low Complexity [] Moderate Complexity [] High Complexity       Rehab Potential:  [x] Good  [] Fair  [] Poor   Suggested Professional Referral:  [x] No  [] Yes:  Barriers to Goal Achievement[de-identified]  [x] No  [] Yes:  Domestic Concerns:  [x] No  [] Yes:    Pt. Education:  [x] Plans/Goals, Risks/Benefits discussed  [x] Home exercise program    Access Code: [email or web address]  URL: [employer]/  Date: 11/23/2022  Prepared by: Calli Scheuermann    Exercises  Supine Bridge - 1 x daily - 7 x weekly - 2 sets - 10 reps  Long Sitting Calf Stretch with Strap - 1 x daily - 7 x weekly - 1 sets - 3 reps - 30 seconds hold  Supine Hamstring Stretch with Strap - 1 x daily - 7 x weekly - 1 sets - 3 reps - 30 seconds hold  Supine Heel Slide with Strap - 1 x daily - 7 x weekly - 2 sets - 10 reps - 5 seconds hold  Supine Quad Set - 1 x daily - 7 x weekly - 2 sets - 10 reps - 5 seconds hold  Supine Isometric Hamstring Set - 1 x daily - 7 x weekly - 2 sets - 10 reps - 5 seconds hold  Supine Gluteal Sets - 1 x daily - 7 x weekly - 2 sets - 10 reps - 5 seconds hold      Method of Education: [x] Verbal  [x] Demo  [x] Written  Comprehension of Education:  [x] Verbalizes understanding. [] Demonstrates understanding. [] Needs Review. [] Demonstrates/verbalizes understanding of HEP/Ed previously given.       Treatment Plan:  [x] Therapeutic Exercise   80275  [] Iontophoresis: 4 mg/mL Dexamethasone Sodium Phosphate  mAmin  67027   [x] Therapeutic Activity  88330 [x] Vasopneumatic cold with compression  79659    [] Gait Training   19695 [] Ultrasound   02748   [x] Neuromuscular Re-education  31565 [] Electrical Stimulation Unattended  83562   [x] Manual Therapy  84845 [] Electrical Stimulation Attended  58970   [x] Instruction in HEP  [] Lumbar/Cervical Traction  56173   [] Aquatic Therapy   56855 [x] Cold/hotpack    [] Massage   70833      [] Dry Needling, 1 or 2 muscles  82575   [] Biofeedback, first 15 minutes   52008  [] Biofeedback, additional 15 minutes   86019 [] Dry Needling, 3 or more muscles  26039     []  Medication allergies reviewed for use of    Dexamethasone Sodium Phosphate 4mg/ml     with iontophoresis treatments. Pt is not allergic.     Frequency:  2 x/week for 16 visits      Todays Treatment:  Modalities: VASO in supine, 34 degrees, min compression, 15 minutes   Precautions:  Exercises: Verbal HEP given on this date due to limited time  Exercise Reps/ Time Weight/ Level Comments                                                               Other:    Specific Instructions for next treatment: improve quad strength, improve knee flex/ext ROM, improve standing tolerance, gait training with and without AD    Treatment Charges: Mins Units   [x] Evaluation       [x]  Low       []  Moderate       []  High 40 1   []  Modalities     []  Ther Exercise     []  Manual Therapy     []  Ther Activities     []  Aquatics     []  Neuromuscular     []  Gait Training     []  Dry Needling           1-2 muscles     []  Dry Needling           3 or more          muscles     [x] Vasocompression 15 1   []  Other         TOTAL TREATMENT TIME: 55 minutes    Time in: 2:06 PM  Time Out: 3:01 PM    Electronically signed by: Kumar Simmons PT        Physician Signature:________________________________Date:__________________  By signing above or cosigning this note, I have reviewed this plan of care and certify a need for medically necessary rehabilitation services.      *PLEASE SIGN ABOVE AND FAX BACK ALL PAGES*

## 2022-11-29 ENCOUNTER — HOSPITAL ENCOUNTER (OUTPATIENT)
Dept: PHYSICAL THERAPY | Facility: CLINIC | Age: 52
Setting detail: THERAPIES SERIES
Discharge: HOME | End: 2022-11-29
Payer: MEDICARE

## 2022-11-29 PROCEDURE — 97110 THERAPEUTIC EXERCISES: CPT

## 2022-11-29 NOTE — FLOWSHEET NOTE
[] [facility] ([facility] &  Therapy  [address]    [phone]  F: [phone]   [x] [address]  [address]   [address]  P: [phone]  F: [phone]  [] [address] &  Therapy  [address]  P: [phone]  F: [phone]   [] [address]  P: [phone]  F: [phone]  [] [facility] ([facility] - [facility] & Therapy  [address] [facility] [address]  [place]: [phone]   F: [phone] [] [facility]  Outpatient Rehabilitation &  Therapy  [facility] [address]   [place]: [phone]  F: [phone]       Date:  2022  Patient Name:  Almarie Severin    :  1970  MRN: [id number]  Physician:  Kurt Johnson MD                              Insurance: [place] [employer] (30 visits, auth after 30, 23 remaining)           Rehab Codes: M62.81, M62.9, Z73.6, R26.89, M25.60, Z74.0   Medical Diagnosis: X35.098 (ICD-10-CM) - Status post total left knee replacement  Onset date: 22 - DOS                            Next Dr's appt. : 6 weeks  Visit# / total visits: ; Cancels/No Shows: 0/0    Subjective:    Pain:  [x] Yes  [] No Location: L knee Pain Rating: (0-10 scale) 5/10  Pain altered Tx:  [x] No  [] Yes  Action:  Comments: Patient arrives today ambulating with SPC. Reports that she feels like she does not need the walker anymore. Arrives with some pain and reports that she has not done a lot of her HEP.        Objective:  Modalities: VASO in supine, 34 degrees, min compression, 15 minutes   Precautions:  Exercises: Verbal HEP given on this date due to limited time  Exercise Reps/ Time Weight/ Level Comments              NuStep  5'    seat 8              Standing          3 way hip  2x10        Mini squats  2x10        Heel raises  2x10       Marches  2x10        Lunges          Hamstring curls  2x10             Gait training  1 lap  W/ SPC         Supine      Quad sets 2x10x5\"     Heel slides 2x10x5\"     SAQ      Knee ext with OP 5x10\"                       Seated      HS curls 2x10 lime    LAQ 2x10     Seated hip ABD 2x10 lime    Seated ISO ADD 2x10     Knee flexion stretch on EOB      Other:     Specific Instructions for next treatment: improve quad strength, improve knee flex/ext ROM, improve standing tolerance, gait training with and without AD           L AROM  11/29   Knee Flex 102 AAROM   Ext NT              Treatment Charges: Mins Units   []  Modalities     [x]  Ther Exercise 48 3   []  Manual Therapy     []  Ther Activities     []  Aquatics     []  Vasocompression     []  Other     Total Treatment time 48 3     Assessment: [x] Progressing toward goals. Initiated session on NuStep in order to improve peripheral blood blow, improve LE ROM and activity tolerance to prepare for standing therex. Patient reports more R knee pain and decreased stability throughout standing exercises. Completed seated exercises followed by supine exercises. During supine exercises, 2 bed bugs were found on patient and patient was educated on removing bed bugs prior to returning to clinic. Finished session with knee extension with OP and patient verbalizing increased pain and becoming emotional. Unable to measure knee extension ROM on this date. Will follow up with patient prior to Friday's appt. [] No change. [] Other:  [x] Patient would continue to benefit from skilled physical therapy services in order to: reduce pain, improve passive and active ROM, improve L LE strength, improve function, normalize gait, reduce edema, and improve LE power and gait speed. STG/LTG:  Problems:    [x] ? Pain: 0-8/10 pain   [x] ? ROM: decreased L knee flexion/extension A/PROM       [x] ? Strength:  Decreased L knee flexion strength   [x] ?  Function:   [x] Edema: see patellar measurements  [x] Gait Deviations: see above  [x] Other: 5TSTS, 19.5 seconds// 2mWT 215 feet, increased edema        Goals  MET NOT MET ON-  GOING  Details   Date Addressed:            STG: To be met in 10 treatments            1. ? Pain: Patient will self report worst L knee pain no greater than 3/10 in order to better tolerate ADLs/work activities with minimal dysfunction []  []  []      2. ? ROM: Patient will improve knee PROM from 0-120 degrees in order to demonstrate ability to move/reach in all planes unrestricted at PLOF []  []  []      3. Independent with Home Exercise Programs []  []  []      4. Demonstrate knowledge of fall risk prevention  []  []  []      5. ?Gait speed and activity tolerance: Patient to improve 2mWT to at least 315 ft with least restrictive assistive device and improved gait mechanics in order to reduce risk of  falls and return to PLOF []  []  []      6. ? Edema: patient to have L patellar measurements less than 2cm from initial eval in order to demonstrate edema reduction and allow for unrestricted knee motion []  []  []                              Date Addressed:            LTG: To be met in 16 treatments           1. Improve score on assessment tool KOOS from 65%% impairment to less than 40% impairment  []  []  []      2.  Strength: Pt will demonstrate improved L LE strength to 5/5  in order to demonstrate improved stability/strength necessary for unrestricted ADLs/work activities []  []  []      3. ?Gait speed and activity tolerance: Patient to improve 2mWT to at least 415 ft with least restrictive assistive device and improved gait mechanics in order to reduce risk of  falls and return to PLOF []  []  []      4. ? ROM: Patient will improve AROM from 0-120 degrees in order to demonstrate ability to move/reach in all planes unrestricted at PLOF []  []  []      5. ? Edema: patient to have L patellar measurements less than 3cm from initial eval in order to demonstrate edema reduction and allow for unrestricted knee motion []  []  []      6. ? Transfers and LE power: patient to improve 5TSTS score to less than 13 seconds in order to reduce fall risk and improve transfer abilities  []  []  []            Patient goals: to get strong and flexible       Pt. Education:  [x] Yes  [] No  [x] Reviewed Prior HEP/Ed  Method of Education: [x] Verbal  [] Demo  [] Written  Comprehension of Education:  [] Verbalizes understanding. [] Demonstrates understanding. [x] Needs review. [] Demonstrates/verbalizes HEP/Ed previously given. Plan: [x] Continue current frequency toward long and short term goals.     [x] Specific Instructions for subsequent treatments: see above      Time In: 2:57 PM            Time Out: 3:50 PM    Electronically signed by:  Lauri Metcalf PT

## 2022-12-02 ENCOUNTER — HOSPITAL ENCOUNTER (OUTPATIENT)
Dept: PHYSICAL THERAPY | Facility: CLINIC | Age: 52
Setting detail: THERAPIES SERIES
Discharge: HOME | End: 2022-12-02

## 2022-12-02 NOTE — FLOWSHEET NOTE
[] [facility]) [facility] &  Therapy  [address]    [phone]  F: [phone]   [x] [address]  [facility] 36   [address]  P: [phone]  F: [phone]  [] [address]  [address]  P: [phone]  F: [phone] [] [address]  P: [phone]  F: [phone]  [] [address]  [facility]   [address]   [place]: [phone]  F: [phone]   [] [address]  [place]: [phone]   F: [phone]     Physical Therapy Cancel/No Show note    Date: 2022  Patient: Tosha Cameron  : 1970  MRN: [id number]    Cancels/No Shows to date:     For today's appointment patient:    [x]  Cancelled    [] Rescheduled appointment    [] No-show     Reason given by patient:    []  Patient ill    []  Conflicting appointment    [] No transportation      [] Conflict with work    [] No reason given    [] Weather related    [] [id number]    [x] Other:      Comments:  bed bugs      [] Next appointment was confirmed    Electronically signed by: Edgardo Lackey PTA

## 2022-12-06 ENCOUNTER — HOSPITAL ENCOUNTER (OUTPATIENT)
Dept: PHYSICAL THERAPY | Facility: CLINIC | Age: 52
Setting detail: THERAPIES SERIES
Discharge: HOME | End: 2022-12-06
Payer: MEDICARE

## 2022-12-06 DIAGNOSIS — Z96.652 STATUS POST TOTAL LEFT KNEE REPLACEMENT: Primary | ICD-10-CM

## 2022-12-06 NOTE — FLOWSHEET NOTE
[] [facility]) - [facility] &  Therapy  [address]    [phone]  F: [phone]   [x] [address] [facility] 36   [address]  P: [phone]  F: [phone]  [] [address] [facility] &  Therapy  [address] [facility]  P: [phone]  F: [phone] [] [address] [employer]  P: [phone]  F: [phone]  [] [address]  [facility]   [address]   [place]: [phone]  F: [phone]   [] [place] [address] [facility] [address]  [place]: [phone]   F: [phone]     Physical Therapy Cancel/No Show note    Date: 2022  Patient: Peterson Caldera  : 1970  MRN: [id number]    Cancels/No Shows to date:     For today's appointment patient:    [x] Cancelled    [] Rescheduled appointment    [] No-show     Reason given by patient:    [x]  Patient ill    []  Conflicting appointment    [] No transportation      [] Conflict with work    [] No reason given    [] Weather related    [] COVID-19    [] Other:      Comments:        [x] Next appointment was confirmed    Electronically signed by:  Brunilda Rivera, PT

## 2022-12-06 NOTE — TELEPHONE ENCOUNTER
Dr. Yong Craft, pt is Sp L TKA 11/1/22 and she is still experiencing some pain and swelling at various times throughout the day. She said that she is taking Tylenol since she is unable to take NSAIDs due to having a gastric sleeve surgery. She was advised to ice, elevate, and use a compression sleeve to help with the swelling. Is there anything else that you advise for her?

## 2022-12-07 RX ORDER — TRAMADOL HYDROCHLORIDE 50 MG/1
50 TABLET ORAL EVERY 4 HOURS PRN
Qty: 42 TABLET | Refills: 0 | Status: SHIPPED | OUTPATIENT
Start: 2022-12-07 | End: 2022-12-14

## 2022-12-08 ENCOUNTER — OFFICE VISIT (OUTPATIENT)
Dept: BARIATRICS/WEIGHT MGMT | Age: 52
End: 2022-12-08

## 2022-12-08 VITALS
HEIGHT: 65 IN | HEART RATE: 80 BPM | DIASTOLIC BLOOD PRESSURE: 74 MMHG | SYSTOLIC BLOOD PRESSURE: 108 MMHG | RESPIRATION RATE: 20 BRPM | BODY MASS INDEX: 48.82 KG/M2 | WEIGHT: 293 LBS

## 2022-12-08 DIAGNOSIS — E66.01 MORBID OBESITY WITH BMI OF 50.0-59.9, ADULT (HCC): ICD-10-CM

## 2022-12-08 DIAGNOSIS — Z90.3 INTESTINAL MALABSORPTION FOLLOWING GASTRECTOMY: Primary | ICD-10-CM

## 2022-12-08 DIAGNOSIS — K91.2 INTESTINAL MALABSORPTION FOLLOWING GASTRECTOMY: Primary | ICD-10-CM

## 2022-12-08 DIAGNOSIS — Z98.84 STATUS POST LAPAROSCOPIC SLEEVE GASTRECTOMY: ICD-10-CM

## 2022-12-08 LAB — PAP SMEAR, EXTERNAL: NEGATIVE

## 2022-12-08 NOTE — PROGRESS NOTES
[address] [facility] MIN INVASIVE BARIATRIC SURG  [address] [place] [address] Blvd  [place]  [address] [facility] [address]  Dept: [phone]    SURGICAL WEIGHT LOSS MANAGEMENT PROGRAM  PROGRESS NOTE     CC: Weight Loss    Patient: aJsvir Mike      Service Date: 12/8/2022  Visit:   12 month    Medical Record #: [id number]  Date of Surgery:   11/17/2021    History: 50 y.o. female who presents today for a 12 month post sleeve gastrectomy follow up. Patient reports regular bowel movements. She denies nausea, vomiting, fever, and chills. Height: 5' 4.5\" (1.638 m)  Highest Weight:   407 lbs      Current Visit Weight Information  Weight: (!) 318 lb (144.2 kg)   BMI: Body mass index is 53.74 kg/m². Weight loss since surgery:  89 lbs    1 wk - D/C'd home on VTE Prohylaxis? [x] Yes   [] No   On VTE Proph as directed? [x] Yes   [] No    Liver pathology:    [] NA    [] No Gross path    [] Liver Steatosis   [x] Discussed w/ pt   [] Referred to GI    Exercising?    [x] Yes    [] No     Review of Systems:     General  Negative for: [] Weight Change   [x] Fatigue   [x] Fevers & Chills    [] Appetite change [] Other:    Positive for: [x] Weight Change   [] Fatigue   [] Fevers & Chills    [] Appetite change [] Other:   Cardiac  Negative for: [x] Chest Pain   [] Difficulty Breathing   [] Leg Cramps [x] Edema of Lower Extremeties    [] Left   [] Right      Positive for: [] Chest Pain   [] Difficulty Breathing   [] Leg Cramps [] Edema of Lower Extremeties    [] Left   [] Right   Pulmonary  Negative for: [x] Shortness of Breath [] Wheeze [x] Cough  [x] Calf Pain     Positive for: [] Shortness of Breath [] Wheeze [] Cough  [] Calf Pain   Gastro-Intestinal Negative for: [] Heartburn   [] Reflux   [] Dysphagia   [] Melena   [] BRBPR  [x] Vomiting   [x] Abdominal Pain   [] Diarrhea     [] Constipation  [] Other:     Positive for: [] Heartburn   [] Reflux   [] Dysphagia   [] Melena   [] BRBPR  [] Vomiting [] Abdominal Pain   [] Diarrhea     [] Constipation  [] Other:    Muskuloskeletal Negative for: [] Joint pain   [] Back pain   [] Knee Pain   [] Muscle weakness [x] Hernia   [x] Edema [] Other:     Positive for: [] Joint pain   [] Back pain   [] Knee Pain   [] Muscle weakness [] Hernia   [] Edema [] Other:    Neurologic Negative for: [x] Syncope   [] Insomnia   [x] Being treated for depression  [] Other:     Positive for: [] Syncope   [] Insomnia   [] Being treated for depression  [] Other:    Skin Negative for: [x] Wound:   [] Open   [] Draining   [] Incisional     [x] Rash   [] Hair Loss  [] Other:     Positive for: [] Wound:   [] Open   [] Draining    [] Incisional  [] Rash   [] Hair Loss  [] Other:          Physical Assessment:     /74 (Site: Left Upper Arm, Position: Sitting, Cuff Size: Large Adult)   Pulse 80   Resp 20   Ht 5' 4.5\" (1.638 m)   Wt (!) 318 lb (144.2 kg)   BMI 53.74 kg/m²   General Negative for:  [x] Lapses in memory   [x] Unusual Stress   [x] Diffic Concen [] Unable to sleep   [] Eating in response to stress   [] Other:    Positive for:  [] Lapses in memory   [] Unusual Stress   [] Diffic Concen [] Unable to sleep   [] Eating in response to stress   [] Other:   Cardio-Pulmonary   [x] Heart RRR   [x] No murmurs   [] Pulses nl x4 extrem    [x] Good inspiratory effort   [x] No wheezing     [x] Lungs clear to auscultation bilaterally    [] Other:    Gastro-Intestinal   [x] Abd S/NT/ND/Benign   [x] No abdominal mass/hernia    [x] No Splenomegaly    [] Other:    Muskuloskeletal   [x] Good muscle strength x4 extremities   [x] nl gait and ambul    [x] Nl ROM x4 extremities    [] Other:    Neurologic   [x] Alert and oriented x3    [] Other:   Skin   [x] Intact w/ no open wounds   [x] Incisions C/D/I    [] Steri strips removed   [x] No drainage or Infection    [] Other:      Assessment & Plan:      1. Intestinal malabsorption following gastrectomy    2.  Status post laparoscopic sleeve gastrectomy    3. Morbid obesity with BMI of 50.0-59.9, adult (HCC)         [x] Advance Diet    [x] Daily protein (65-75gm/day)   [x] Take Vitamins   [x] Attend Support Group    [x] Exercise Regularly   [x] Use contraception    Malabsorption, stable  Need for long term compliance and follow up stressed to patient  Dietician consult  Continue taking daily Vitamins  Recommended to complete previously ordered labs    MORBID OBESITY, BMI 53  Medical vs additional surgical options have been given for weight loss  Need for long term diet and exercise discussed to sustain weight loss  Dietician visit    Follow up: 6 months    Orders placed this encounter:   No orders of the defined types were placed in this encounter. New Prescriptions:   No orders of the defined types were placed in this encounter. Scribe Attestation:  Lor Stout, scribed on behalf of Marcia Saab DO. Electronically signed by Marcia Saab DO on 12/16/2022 at 1:06 PM    Please note that this chart was generated using voice recognition Dragon dictation software. Although every effort was made to ensure the accuracy of this automated transcription, some errors in transcription may have occurred.

## 2022-12-08 NOTE — PROGRESS NOTES
Medical Nutrition Therapy  Metabolic and Bariatric surgery  One year after surgery         Pt reports: Eating 3 meals a day but states she needs to eat more frequently because she will go long times between meals. Prior, it helped her to have alarms to tell her to eat but she has not used those recently. She states she is taking vitamins. Vitals:   Vitals:    12/08/22 1456   BP: 108/74   Site: Left Upper Arm   Position: Sitting   Cuff Size: Large Adult   Pulse: 80   Resp: 20   Weight: (!) 318 lb (144.2 kg)   Height: 5' 4.5\" (1.638 m)      Body mass index is 53.74 kg/m². Labs reviewed:     Multivitamin/mineral intake: MVI with iron, Vit C, and D  Calcium intake: Daily     Other:              Nutrition Assessment:   PES: Inadequate food and beverage intake r/t WLS as evidenced by loss of excess body weight lost 89 lbs over 1 year. Goals   60-80gm of protein  48-64oz of fluid  Vitamin adherance  Basic adherance to WLS behavious and this document has been scanned into the chart.        [x] met     []  Not met        Plan:  Set alarms on phone as reminders to eat a nutritious meal. F/u 18 months post op         David Giang, MS, RD, LD

## 2022-12-09 ENCOUNTER — HOSPITAL ENCOUNTER (OUTPATIENT)
Dept: PHYSICAL THERAPY | Facility: CLINIC | Age: 52
Setting detail: THERAPIES SERIES
Discharge: HOME | End: 2022-12-09
Payer: MEDICARE

## 2022-12-09 PROCEDURE — 97016 VASOPNEUMATIC DEVICE THERAPY: CPT

## 2022-12-09 PROCEDURE — 97110 THERAPEUTIC EXERCISES: CPT

## 2022-12-09 NOTE — FLOWSHEET NOTE
[] [facility]) - [facility] &  Therapy  [address]    [phone]  F: [phone]   [x] [address]  [facility] 36   [address]  P: [phone]  F: [phone]  [] [address] &  Therapy  [address]  P: [phone]  F: [phone]   [] [address]  P: [phone]  F: [phone]  [] [facility]) - [facility] & Therapy  [address] [facility] [address]  [place]: [phone]   F: [phone] [] [facility]  Outpatient Rehabilitation &  Therapy  [facility] [address]   [place]: [phone]  F: [phone]       Date:  2022  Patient Name:  Jasmin Hitchcock    :  1970  MRN: [id number]  Physician:  Noelle Tee MD                              Insurance: [place] [employer] (30 visits, auth after 30, 23 remaining)           Rehab Codes: M62.81, M62.9, Z73.6, R26.89, M25.60, Z74.0   Medical Diagnosis: J58.472 (ICD-10-CM) - Status post total left knee replacement  Onset date: 22 - DOS                            Next Dr's appt. : 6 weeks  Visit# / total visits: ; Cancels/No Shows: 2/0    Subjective:    Pain:  [x] Yes  [] No Location: L knee Pain Rating: (0-10 scale) 5/10  Pain altered Tx:  [x] No  [] Yes  Action:  Comments: Pt was just put on new pain medication but \"I'm bad at taking them\" and has not used any. She has been using her cane at all times. Pt mentions she still has swelling in her left knee and mentions she feels she would be doing better if there wasn't swelling.       Objective:  Modalities: VASO in supine, 34 degrees, min compression, 15 minutes   Precautions:  Exercises: Verbal HEP given on this date due to limited time  Exercise Reps/ Time Weight/ Level Comments              NuStep  5'    seat 8 Standing          3 way hip  2x10        Mini squats  2x10        Heel raises  2x10       Marches  2x10        Lunges  10x        Hamstring curls  2x10             Gait training  1 lap  W/ SPC         Supine      Quad sets 2x10x5\"     Heel slides 2x10x5\"     SAQ 2x10 1 lb Initiated resistance 12/9   Knee ext with OP 5x10\"                       Seated      HS curls 2x10 lime    LAQ 2x10 1 lb Initiated resistance 12/9   Seated hip ABD 2x10 lime    Seated ISO ADD 2x10     Knee flexion stretch on EOB      Other:     Specific Instructions for next treatment: improve quad strength, improve knee flex/ext ROM, improve standing tolerance, gait training with and without AD           L AROM  11/29 L AROM  12/9   Knee Flex 102 AAROM 100°   Ext NT N/t               Treatment Charges: Mins Units   []  Modalities     [x]  Ther Exercise 41 3   []  Manual Therapy     []  Ther Activities     []  Aquatics     [x]  Vasocompression 15 1   []  Other     Total Treatment time 56 4     Assessment: [x] Progressing toward goals. Pt presents to PT using a straight cane which she used throughout the clinic. Began session on NuStep in order to improve peripheral blood blow, improve LE ROM and activity tolerance to prepare for standing therex. Patient then completed standing exercises in the parallel bars. After standing routine, pt completed therapeutic exercises at the mat. Began resistance with LAQ and SAQ to promote quad strengthening. Knee flexion to 100° during heel slides. Concluded treatment with application of vaso to reduce inflammation. [] No change. [] Other:  [x] Patient would continue to benefit from skilled physical therapy services in order to: reduce pain, improve passive and active ROM, improve L LE strength, improve function, normalize gait, reduce edema, and improve LE power and gait speed. STG/LTG:  Problems:    [x] ? Pain: 0-8/10 pain   [x] ? ROM: decreased L knee flexion/extension A/PROM       [x] ?  Strength: Decreased L knee flexion strength   [x] ? Function:   [x] Edema: see patellar measurements  [x] Gait Deviations: see above  [x] Other: 5TSTS, 19.5 seconds// 2mWT 215 feet, increased edema        Goals  MET NOT MET ON-  GOING  Details   Date Addressed:            STG: To be met in 10 treatments            1. ? Pain: Patient will self report worst L knee pain no greater than 3/10 in order to better tolerate ADLs/work activities with minimal dysfunction []  []  []      2. ? ROM: Patient will improve knee PROM from 0-120 degrees in order to demonstrate ability to move/reach in all planes unrestricted at PLOF []  []  []      3. Independent with Home Exercise Programs []  []  []      4. Demonstrate knowledge of fall risk prevention  []  []  []      5. ?Gait speed and activity tolerance: Patient to improve 2mWT to at least 315 ft with least restrictive assistive device and improved gait mechanics in order to reduce risk of  falls and return to PLOF []  []  []      6. ? Edema: patient to have L patellar measurements less than 2cm from initial eval in order to demonstrate edema reduction and allow for unrestricted knee motion []  []  []                              Date Addressed:            LTG: To be met in 16 treatments           1. Improve score on assessment tool KOOS from 65%% impairment to less than 40% impairment  []  []  []      2.  Strength: Pt will demonstrate improved L LE strength to 5/5  in order to demonstrate improved stability/strength necessary for unrestricted ADLs/work activities []  []  []      3. ?Gait speed and activity tolerance: Patient to improve 2mWT to at least 415 ft with least restrictive assistive device and improved gait mechanics in order to reduce risk of  falls and return to PLOF []  []  []      4. ? ROM: Patient will improve AROM from 0-120 degrees in order to demonstrate ability to move/reach in all planes unrestricted at PLOF []  []  []      5. ? Edema: patient to have L patellar measurements less than 3cm from initial eval in order to demonstrate edema reduction and allow for unrestricted knee motion []  []  []      6. ? Transfers and LE power: patient to improve 5TSTS score to less than 13 seconds in order to reduce fall risk and improve transfer abilities  []  []  []            Patient goals: to get strong and flexible       Pt. Education:  [x] Yes  [] No  [x] Reviewed Prior HEP/Ed  Method of Education: [x] Verbal  [] Demo  [] Written  Comprehension of Education:  [] Verbalizes understanding. [] Demonstrates understanding. [x] Needs review. [] Demonstrates/verbalizes HEP/Ed previously given. Plan: [x] Continue current frequency toward long and short term goals.     [x] Specific Instructions for subsequent treatments:  improve quad strength, improve knee flex/ext ROM, improve standing tolerance, gait training with and without AD      Time In: 12:04 PM            Time Out: 12:59 PM    Electronically signed by:  Ashish Olguin PTA

## 2022-12-12 NOTE — FLOWSHEET NOTE
[] [address]  [address]    [phone]  F: [phone]   [x] [address]  [facility] 36   [address]  P: [phone]  F: [phone]  [] [address] &  Therapy  [address]  P: [phone]  F: [phone]   [] [address]  P: [phone]  F: [phone]  [] [facility]  [address] [facility] [address]  [place]: [phone]   F: [phone] [] [facility]  Outpatient Rehabilitation &  Therapy  [facility] [address]   [place]: [phone]  F: [phone]      THERAPY RESPONSIBILITY OF CARE TRANSFER FORM       PATIENT NAME: Darrion Pena  MRN: [id number]   : 1970      TRANSFERRING FACILITY:    [] [facility]   [] [facility]   [x]  [facility] [place]   [] [address]   [] [facility]   [] [facility]   [] [facility]    [] Other:       ACCEPTING FACILITY   [] [facility]   [] [facility]   [x]  [facility]   [] [address]   [] [facility]   [] [facility]   [] [facility]    [] Other:          REASON FOR TRANSFER: Primary PT is moving. Transferring to returning therapist. To take effect on 22. TRANSFER OF CARE:    I am transferring the care of the above patient to: Tony Bhatt, PT Calli Scheuermann, PT  2022      ACCEPTANCE OF CARE:     I am accepting the care of the above patient.  Tony Bhatt, PT

## 2022-12-13 ENCOUNTER — HOSPITAL ENCOUNTER (OUTPATIENT)
Dept: PHYSICAL THERAPY | Facility: CLINIC | Age: 52
Setting detail: THERAPIES SERIES
Discharge: HOME | End: 2022-12-13
Payer: MEDICARE

## 2022-12-13 PROCEDURE — 97016 VASOPNEUMATIC DEVICE THERAPY: CPT

## 2022-12-13 PROCEDURE — 97110 THERAPEUTIC EXERCISES: CPT

## 2022-12-13 NOTE — FLOWSHEET NOTE
[] [address]  [address]    [phone]  F: [phone]   [x] [address]  [facility] 36   [address]  P: [phone]  F: [phone]  [] [address] &  Therapy  [address]  P: [phone]  F: [phone]   [] [address]  P: [phone]  F: [phone]  [] [facility]  [address]  [place]: [phone]   F: [phone] [] [facility]  Outpatient Rehabilitation &  Therapy  [facility] [address]   [place]: [phone]  F: [phone]      THERAPY RESPONSIBILITY OF CARE TRANSFER FORM       PATIENT NAME: Madhavi Morales  MRN: [id number]   : 1970      TRANSFERRING FACILITY:    [] [facility]   [] [facility]   [x]  [facility]   [] [facility]   [] [facility]   [] [facility]   [] [facility]    [] Other:       ACCEPTING FACILITY   [] [facility]   [] [facility]   [x]  [facility]   [] [facility]   [] [facility]   [] [facility]   [] [facility]    [] Other:          REASON FOR TRANSFER: Primary PT is moving. Transferring to returning therapist. To take effect on 22. TRANSFER OF CARE:    I am transferring the care of the above patient to: Cristina Patel, PT  Calli Scheuermann, PT  2022      ACCEPTANCE OF CARE:     I am accepting the care of the above patient.  Cristina Patel, PT

## 2022-12-13 NOTE — FLOWSHEET NOTE
[] [facility] &  Therapy  [address]    [phone]  F: [phone]   [x] [address]  [address]   [address]  P: [phone]  F: [phone]  [] [address] &  Therapy  [address]  P: [phone]  F: [phone]   [] [address]  P: [phone]  F: [phone]  [] [facility] - [facility] & Therapy  [address]  [place]: [phone]   F: [phone] [] [facility]  Outpatient Rehabilitation &  Therapy  [facility] [address]   [place]: [phone]  F: [phone]       Date:  2022  Patient Name:  Bossman Luke    :  1970  MRN: [id number]  Physician:  Dede Williamson MD                              Insurance: [employer] (30 visits, auth after 30, 23 remaining)           Rehab Codes: M62.81, M62.9, Z73.6, R26.89, M25.60, Z74.0   Medical Diagnosis: O43.691 (ICD-10-CM) - Status post total left knee replacement  Onset date: 22 - DOS                            Next Dr's appt. : 6 weeks  Visit# / total visits: 3/16; Cancels/No Shows: 2/0    Subjective:    Pain:  [x] Yes  [] No Location: L knee Pain Rating: (0-10 scale) 5/10  Pain altered Tx:  [x] No  [] Yes  Action:  Comments: Pt arrives reporting moderate pain levels and continues to take pain mediation daily. Swelling continues to be present.        Objective:  Modalities: VASO in supine, 34 degrees, min compression, 15 minutes   Precautions:  Exercises: Verbal HEP given on this date due to limited time  Exercise Reps/ Time Weight/ Level Comments              NuStep  5'    seat 8              Standing          3 way hip  2x10        Mini squats  2x10        Heel raises  2x10        Marches  2x10        Lunges  10x Hamstring curls  2x10        HS stretch on step 3x15\"ea  6\"          Gait training  1 lap  W/ SPC         Supine      Quad sets 2x10x5\"     Heel slides 2x10x5\"     SAQ 2x10 1 lb Initiated resistance 12/9   Knee ext with OP 5x10\"     SLR  10x   New 12/13                Seated      HS curls 2x10 lime    LAQ 2x10 1 lb Initiated resistance 12/9   Seated hip ABD 2x10 lime    Seated ISO ADD 2x10     Knee flexion stretch on EOB      Other:     Specific Instructions for next treatment: improve quad strength, improve knee flex/ext ROM, improve standing tolerance, gait training with and without AD           L AROM  11/29 L AROM  12/9 L AROM   Knee Flex 102 AAROM 100° 100°   Ext NT N/t N/t                Treatment Charges: Mins Units   []  Modalities     [x]  Ther Exercise 40 3   []  Manual Therapy     []  Ther Activities     []  Aquatics     [x]  Vasocompression 15 1   []  Other     Total Treatment time 55 4     Assessment: [x] Progressing toward goals. Initiated treatment with Nustep warm up with a \"stretch\" noted however, not painful. Pt continues to present with moderate L LE weakness during seated and supine exercises however, is able to complete reps asked of her. Pt demos good recall of standing exercises however, does require some cueing to improve upright posture. Ended with vasocompression to reduce edema and pain. Will continue to progress per tolerance. [] No change. [] Other:  [x] Patient would continue to benefit from skilled physical therapy services in order to: reduce pain, improve passive and active ROM, improve L LE strength, improve function, normalize gait, reduce edema, and improve LE power and gait speed. STG/LTG:  Problems:    [x] ? Pain: 0-8/10 pain   [x] ? ROM: decreased L knee flexion/extension A/PROM       [x] ? Strength:  Decreased L knee flexion strength   [x] ?  Function:   [x] Edema: see patellar measurements  [x] Gait Deviations: see above  [x] Other: 5TSTS, 19.5 seconds// 2mWT 215 feet, increased edema        Goals  MET NOT MET ON-  GOING  Details   Date Addressed:            STG: To be met in 10 treatments            1. ? Pain: Patient will self report worst L knee pain no greater than 3/10 in order to better tolerate ADLs/work activities with minimal dysfunction []  []  []      2. ? ROM: Patient will improve knee PROM from 0-120 degrees in order to demonstrate ability to move/reach in all planes unrestricted at PLOF []  []  []      3. Independent with Home Exercise Programs []  []  []      4. Demonstrate knowledge of fall risk prevention  []  []  []      5. ?Gait speed and activity tolerance: Patient to improve 2mWT to at least 315 ft with least restrictive assistive device and improved gait mechanics in order to reduce risk of  falls and return to PLOF []  []  []      6. ? Edema: patient to have L patellar measurements less than 2cm from initial eval in order to demonstrate edema reduction and allow for unrestricted knee motion []  []  []                              Date Addressed:            LTG: To be met in 16 treatments           1. Improve score on assessment tool KOOS from 65%% impairment to less than 40% impairment  []  []  []      2.  Strength: Pt will demonstrate improved L LE strength to 5/5  in order to demonstrate improved stability/strength necessary for unrestricted ADLs/work activities []  []  []      3. ?Gait speed and activity tolerance: Patient to improve 2mWT to at least 415 ft with least restrictive assistive device and improved gait mechanics in order to reduce risk of  falls and return to PLOF []  []  []      4. ? ROM: Patient will improve AROM from 0-120 degrees in order to demonstrate ability to move/reach in all planes unrestricted at PLOF []  []  []      5. ? Edema: patient to have L patellar measurements less than 3cm from initial eval in order to demonstrate edema reduction and allow for unrestricted knee motion []  []  []      6. ? Transfers and LE power: patient to improve 5TSTS score to less than 13 seconds in order to reduce fall risk and improve transfer abilities  []  []  []            Patient goals: to get strong and flexible       Pt. Education:  [x] Yes  [] No  [x] Reviewed Prior HEP/Ed  Method of Education: [x] Verbal  [] Demo  [] Written  Comprehension of Education:  [x] Verbalizes understanding. [x] Demonstrates understanding. [x] Needs review. [] Demonstrates/verbalizes HEP/Ed previously given. Access Code: [email or web address]  URL: [email or web address]  Date: 11/23/2022  Prepared by: Calli Scheuermann  Supine Bridge - 1 x daily - 7 x weekly - 2 sets - 10 reps  Long Sitting Calf Stretch with Strap - 1 x daily - 7 x weekly - 1 sets - 3 reps - 30 seconds hold  Supine Hamstring Stretch with Strap - 1 x daily - 7 x weekly - 1 sets - 3 reps - 30 seconds hold  Supine Heel Slide with Strap - 1 x daily - 7 x weekly - 2 sets - 10 reps - 5 seconds hold  Supine Quad Set - 1 x daily - 7 x weekly - 2 sets - 10 reps - 5 seconds hold  Supine Isometric Hamstring Set - 1 x daily - 7 x weekly - 2 sets - 10 reps - 5 seconds hold  Supine Gluteal Sets - 1 x daily - 7 x weekly - 2 sets - 10 reps - 5 seconds hold  Access Code: [email or web address]  URL: [email or web address]  Date: 12/13/2022  Prepared by: Kristel Jha  Supine Active Straight Leg Raise - 1 x daily - 7 x weekly - 3 sets - 10 reps  Standing March with Counter Support - 1 x daily - 7 x weekly - 3 sets - 10 reps  Standing Hip Flexion with Counter Support - 1 x daily - 7 x weekly - 3 sets - 10 reps  Standing Hip Extension with Counter Support - 1 x daily - 7 x weekly - 3 sets - 10 reps  Standing Hip Abduction with Counter Support - 1 x daily - 7 x weekly - 3 sets - 10 reps  Standing Hamstring Curl with Chair Support - 1 x daily - 7 x weekly - 3 sets - 10 reps      Plan: [x] Continue current frequency toward long and short term goals.     [x] Specific Instructions for subsequent treatments:  improve quad strength, improve knee flex/ext ROM, improve standing tolerance, gait training with and without AD      Time In: 2:05 PM            Time Out: 3:05 PM    Electronically signed by:  Jayme Hayden PTA

## 2022-12-19 ENCOUNTER — HOSPITAL ENCOUNTER (OUTPATIENT)
Dept: PHYSICAL THERAPY | Facility: CLINIC | Age: 52
Setting detail: THERAPIES SERIES
Discharge: HOME | End: 2022-12-19
Payer: MEDICARE

## 2022-12-19 PROCEDURE — 97110 THERAPEUTIC EXERCISES: CPT

## 2022-12-19 PROCEDURE — 97016 VASOPNEUMATIC DEVICE THERAPY: CPT

## 2022-12-19 NOTE — FLOWSHEET NOTE
[] [facility]) - [facility] &  Therapy  [address]    [phone]  F: [phone]   [x] [address]  [address] [facility] [address]   [address]  P: [phone]  F: [phone]  [] [address] &  Therapy  [address]  P: [phone]  F: [phone]   [] [address]  P: [phone]  F: [phone]  [] [facility]) - [facility] & Therapy  [address] [place] [address]  [place]: [phone]   F: [phone] [] [facility]  Outpatient Rehabilitation &  Therapy  [address]   [place]: [phone]  F: [phone]       Date:  2022  Patient Name:  Sravani Stuart    :  1970  MRN: [id number]  Physician:  Pamela Coffey MD                              Insurance: [employer] (30 visits, auth after 30, 23 remaining)           Rehab Codes: M62.81, M62.9, Z73.6, R26.89, M25.60, Z74.0   Medical Diagnosis: T89.320 (ICD-10-CM) - Status post total left knee replacement  Onset date: 22 - DOS                            Next Dr's appt. : 6 weeks  Visit# / total visits: ; Cancels/No Shows: 2/0    Subjective:    Pain:  [x] Yes  [] No Location: L knee Pain Rating: (0-10 scale) 5/10   Pain altered Tx:  [x] No  [] Yes  Action:  Comments: Pt arrives reporting 5/10 pain in bilateral knees. Requests ice to both knees at end of session today. Swelling in L knee continues to be present with no change. Mentions moving from sit to stand tends to be challenging.        Objective:  Modalities: VASO in supine, 34 degrees, min compression, 15 minutes to B knees on 22 per pt request   Precautions:  Exercises:   Exercise L knee  Reps/ Time Weight/ Level Comments              NuStep  5'  L1  seat 8; no UE's              Standing          3 way hip B 2x10ea  A  bilaterally on 12/19/22    Mini squats  2x10        Heel raises  2x10        Marches-alt  2x10ea        Lunges B  10x ea        Hamstring curls  2x10        HS stretch on step 3x20\"ea  6\"          Gait training  1 lap  W/ SPC         Supine      Quad sets 2x10x5\"     Heel slides 2x10x5\"     SAQ 2x10x3\" 1 lb Initiated resistance 12/9; on soccer ball 12/19   Knee ext with OP 5x10\"     SLR  7x   New 12/13; small range limited reps                 Seated      HS curls 2x10 lime    LAQ 2x10 1 lb Initiated resistance 12/9; limited range   Seated hip ABD 2x10 lime    Seated ISO ADD 2x10 Ball     Knee flexion stretch on EOB      Other:     Specific Instructions for next treatment: improve quad strength, improve knee flex/ext ROM, improve standing tolerance, gait training with and without AD           L AROM  11/29 L AROM  12/9 L AROM  12/13/22 L AROM  12/19/22   Knee Flex 102 AAROM 100° 100° 102° AA  100° A   Ext NT N/t N/t 4° A                 Treatment Charges: Mins Units   []  Modalities     [x]  Ther Exercise 45 3   []  Manual Therapy     []  Ther Activities     []  Aquatics     [x]  Vasocompression 15 1   []  Other     Total Treatment time 60 4     Assessment: [x] Progressing toward goals. Initiated warm up on nu step followed by stretching at the stairs to increase ms flexibility. Continued standing exercises with cueing for an upright posture. 3 way hip and lunges completed bilaterally this date. Limited tolerance to SLR's this date so given cues for quad set prior to lift and to ensure she is breathing with exercise. Knee ROM measured at -4° to 100° actively this date. Encouraged to utilize cane in R hand during ambulation for increased stability, however, notes she is bone on bone on R knee so alternated hand with cane per preference. Ended with vaso to B knees to reduce pain and edema. [] No change.      [] Other:  [x] Patient would continue to benefit from skilled physical therapy services in order to: reduce pain, improve passive and active ROM, improve L LE strength, improve function, normalize gait, reduce edema, and improve LE power and gait speed. STG/LTG:  Problems:    [x] ? Pain: 0-8/10 pain   [x] ? ROM: decreased L knee flexion/extension A/PROM       [x] ? Strength:  Decreased L knee flexion strength   [x] ? Function:   [x] Edema: see patellar measurements  [x] Gait Deviations: see above  [x] Other: 5TSTS, 19.5 seconds// 2mWT 215 feet, increased edema        Goals  MET NOT MET ON-  GOING  Details   Date Addressed:            STG: To be met in 10 treatments            1. ? Pain: Patient will self report worst L knee pain no greater than 3/10 in order to better tolerate ADLs/work activities with minimal dysfunction []  []  []      2. ? ROM: Patient will improve knee PROM from 0-120 degrees in order to demonstrate ability to move/reach in all planes unrestricted at PLOF []  []  []      3. Independent with Home Exercise Programs []  []  []      4. Demonstrate knowledge of fall risk prevention  []  []  []      5. ?Gait speed and activity tolerance: Patient to improve 2mWT to at least 315 ft with least restrictive assistive device and improved gait mechanics in order to reduce risk of  falls and return to PLOF []  []  []      6. ? Edema: patient to have L patellar measurements less than 2cm from initial eval in order to demonstrate edema reduction and allow for unrestricted knee motion []  []  []                              Date Addressed:            LTG: To be met in 16 treatments           1. Improve score on assessment tool KOOS from 65%% impairment to less than 40% impairment  []  []  []      2.  Strength: Pt will demonstrate improved L LE strength to 5/5  in order to demonstrate improved stability/strength necessary for unrestricted ADLs/work activities []  []  []      3. ?Gait speed and activity tolerance: Patient to improve 2mWT to at least 415 ft with least restrictive assistive device and improved gait mechanics in order to reduce risk of  falls and return to PLOF []  []  []      4. ? ROM: Patient will improve AROM from 0-120 degrees in order to demonstrate ability to move/reach in all planes unrestricted at PLOF []  []  []      5. ? Edema: patient to have L patellar measurements less than 3cm from initial eval in order to demonstrate edema reduction and allow for unrestricted knee motion []  []  []      6. ? Transfers and LE power: patient to improve 5TSTS score to less than 13 seconds in order to reduce fall risk and improve transfer abilities  []  []  []            Patient goals: to get strong and flexible       Pt. Education:  [x] Yes  [] No  [x] Reviewed Prior HEP/Ed  Method of Education: [x] Verbal  form [] Demo  [] Written  Comprehension of Education:  [x] Verbalizes understanding. [x] Demonstrates understanding. [x] Needs review. for compliance   [] Demonstrates/verbalizes HEP/Ed previously given. Access Code: [email or web address]  URL: [email or web address]  Date: 11/23/2022  Prepared by: Calli Scheuermann  Supine Bridge - 1 x daily - 7 x weekly - 2 sets - 10 reps  Long Sitting Calf Stretch with Strap - 1 x daily - 7 x weekly - 1 sets - 3 reps - 30 seconds hold  Supine Hamstring Stretch with Strap - 1 x daily - 7 x weekly - 1 sets - 3 reps - 30 seconds hold  Supine Heel Slide with Strap - 1 x daily - 7 x weekly - 2 sets - 10 reps - 5 seconds hold  Supine Quad Set - 1 x daily - 7 x weekly - 2 sets - 10 reps - 5 seconds hold  Supine Isometric Hamstring Set - 1 x daily - 7 x weekly - 2 sets - 10 reps - 5 seconds hold  Supine Gluteal Sets - 1 x daily - 7 x weekly - 2 sets - 10 reps - 5 seconds hold  Access Code: [email or web address]  URL: [email or web address]  Date: 12/13/2022  Prepared by: Génesis Marr  Supine Active Straight Leg Raise - 1 x daily - 7 x weekly - 3 sets - 10 reps  Standing March with Counter Support - 1 x daily - 7 x weekly - 3 sets - 10 reps  Standing Hip Flexion with Counter Support - 1 x daily - 7 x weekly - 3 sets - 10 reps  Standing Hip Extension with Counter Support - 1 x daily - 7 x weekly - 3 sets - 10 reps  Standing Hip Abduction with Counter Support - 1 x daily - 7 x weekly - 3 sets - 10 reps  Standing Hamstring Curl with Chair Support - 1 x daily - 7 x weekly - 3 sets - 10 reps      Plan: [x] Continue current frequency toward long and short term goals.     [x] Specific Instructions for subsequent treatments:  improve quad strength, improve knee flex/ext ROM, improve standing tolerance, gait training with and without AD      Time In: 1:05 PM            Time Out: 2:10 PM    Electronically signed by:  Rocío Choi PTA

## 2022-12-21 ENCOUNTER — HOSPITAL ENCOUNTER (OUTPATIENT)
Dept: PHYSICAL THERAPY | Facility: CLINIC | Age: 52
Setting detail: THERAPIES SERIES
Discharge: HOME | End: 2022-12-21
Payer: MEDICARE

## 2022-12-21 PROCEDURE — 97016 VASOPNEUMATIC DEVICE THERAPY: CPT

## 2022-12-21 PROCEDURE — 97110 THERAPEUTIC EXERCISES: CPT

## 2022-12-21 NOTE — FLOWSHEET NOTE
[] [facility] ([facility]) - [facility] &  Therapy  [address]    [phone]  F: [phone]   [x] [address]  [facility]   [address]  P: [phone]  F: [phone]  [] [facility] &  Therapy  [address]  P: [phone]  F: [phone]   [] [address]  P: [phone]  F: [phone]  [] [facility]) - [facility] & Therapy  [address]  [place]: [phone]   F: [phone] [] [facility]  Outpatient Rehabilitation &  Therapy  [facility] [address]   [place]: [phone]  F: [phone]       Date:  2022  Patient Name:  Blayne Lopez    :  1970  MRN: [id number]  Physician:  Noemí Green MD                              Insurance: [employer] (30 visits, auth after 30, 23 remaining)           Rehab Codes: M62.81, M62.9, Z73.6, R26.89, M25.60, Z74.0   Medical Diagnosis: N71.483 (ICD-10-CM) - Status post total left knee replacement  Onset date: 22 - DOS                            Next Dr's appt. : 6 weeks  Visit# / total visits: ; Cancels/No Shows: 2/0    Subjective:    Pain:  [x] Yes  [] No Location: L knee Pain Rating: (0-10 scale) 4-5/10   Pain altered Tx:  [x] No  [] Yes  Action:  Comments: Pt arrives 10 min late in flip flops as she just had a pedicure prior to PT. Pain about the same as last session. Presents with reduced swelling this date.        Objective:  Modalities: VASO in supine, 34 degrees, min compression, 15 minutes to B knees on 22 per pt request   Precautions:  Exercises:   Exercise L knee  Reps/ Time Weight/ Level Comments              NuStep  5'  L1  seat 8; no UE's              Standing          3 way hip B  2x10ea  A  bilaterally on 22    Mini squats  2x10 Heel raises  2x10        Marches-alt  2x10ea        Lunges B  10x ea        Hamstring curls  2x10    not today    Step ups  10x 4\" New 12/21    HS stretch on step B 3x20\"ea  6\"          Gait training  1 lap  W/ SPC         Supine      Quad sets 2x10x5\"     Heel slides 2x10x5\"     SAQ 2x10x3\" 1 lb Initiated resistance 12/9   Knee ext with OP 5x10\"     SLR w/quad  10x  New 12/13; small range limited reps     SL hip abd 15x 1 lb New 12/21   Knee hang  3' 3# New 12/21         Seated      HS curls 2x10 lime    LAQ 2x10 1 lb Initiated resistance 12/9; limited range   Seated hip ABD 2x10 lime    Seated ISO ADD 2x10 Ball  Not today    Knee flexion stretch on EOB      Timed STS's 10x 18\" table New 12/21; 42 seconds    Other:     Specific Instructions for next treatment: improve quad strength, improve knee flex/ext ROM, improve standing tolerance, gait training with and without AD           L AROM  11/29 L AROM  12/9 L AROM  12/13/22 L AROM  12/19/22 L AROM  12/21/22   Knee Flex 102 AAROM 100° 100° 102° AA  100° A 104°AA  101° A   Ext NT N/t N/t 4° A 3° A                  Treatment Charges: Mins Units   []  Modalities     [x]  Ther Exercise 40 3   []  Manual Therapy     []  Ther Activities     []  Aquatics     [x]  Vasocompression 15 1   []  Other     Total Treatment time 55 4     Assessment: [x] Progressing toward goals. Initiated warm up on nu step followed by mat exercises to improve strength and ROM. Minimal gains in flexion and extension ROM this date. Addition of side lying hip abduction with 1# ankle weight donned. Improved tolerance to SLR's this date with cueing to ensure quad set prior to lift. Implemented knee hang with weight in supine to improve knee extension ROM which was tolerated well. Completed step ups onto 4\" step to progress functional strengthening. Ended with vaso to B knees to reduce pain and edema. [] No change.      [] Other:  [x] Patient would continue to benefit from skilled physical therapy services in order to: reduce pain, improve passive and active ROM, improve L LE strength, improve function, normalize gait, reduce edema, and improve LE power and gait speed. STG/LTG:  Problems:    [x] ? Pain: 0-8/10 pain   [x] ? ROM: decreased L knee flexion/extension A/PROM       [x] ? Strength:  Decreased L knee flexion strength   [x] ? Function:   [x] Edema: see patellar measurements  [x] Gait Deviations: see above  [x] Other: 5TSTS, 19.5 seconds// 2mWT 215 feet, increased edema        Goals  MET NOT MET ON-  GOING  Details   Date Addressed:            STG: To be met in 10 treatments            1. ? Pain: Patient will self report worst L knee pain no greater than 3/10 in order to better tolerate ADLs/work activities with minimal dysfunction []  []  []      2. ? ROM: Patient will improve knee PROM from 0-120 degrees in order to demonstrate ability to move/reach in all planes unrestricted at PLOF []  []  []      3. Independent with Home Exercise Programs []  []  []      4. Demonstrate knowledge of fall risk prevention  []  []  []      5. ?Gait speed and activity tolerance: Patient to improve 2mWT to at least 315 ft with least restrictive assistive device and improved gait mechanics in order to reduce risk of  falls and return to PLOF []  []  []      6. ? Edema: patient to have L patellar measurements less than 2cm from initial eval in order to demonstrate edema reduction and allow for unrestricted knee motion []  []  []                              Date Addressed:            LTG: To be met in 16 treatments           1. Improve score on assessment tool KOOS from 65%% impairment to less than 40% impairment  []  []  []      2.  Strength: Pt will demonstrate improved L LE strength to 5/5  in order to demonstrate improved stability/strength necessary for unrestricted ADLs/work activities []  []  []      3. ?Gait speed and activity tolerance: Patient to improve 2mWT to at least 415 ft with least restrictive assistive device and improved gait mechanics in order to reduce risk of  falls and return to PLOF []  []  []      4. ? ROM: Patient will improve AROM from 0-120 degrees in order to demonstrate ability to move/reach in all planes unrestricted at PLOF []  []  []      5. ? Edema: patient to have L patellar measurements less than 3cm from initial eval in order to demonstrate edema reduction and allow for unrestricted knee motion []  []  []      6. ? Transfers and LE power: patient to improve 5TSTS score to less than 13 seconds in order to reduce fall risk and improve transfer abilities  []  []  []            Patient goals: to get strong and flexible       Pt. Education:  [x] Yes  [] No  [x] Reviewed Prior HEP/Ed  Method of Education: [x] Verbal  form, HEP compliance, knee hangs  [] Demo  [] Written  Comprehension of Education:  [x] Verbalizes understanding. [x] Demonstrates understanding. [x] Needs review. for compliance   [] Demonstrates/verbalizes HEP/Ed previously given. Access Code: [email or web address]  URL: [email or web address]  Date: 11/23/2022  Prepared by: Calli Scheuermann  Supine Bridge - 1 x daily - 7 x weekly - 2 sets - 10 reps  Long Sitting Calf Stretch with Strap - 1 x daily - 7 x weekly - 1 sets - 3 reps - 30 seconds hold  Supine Hamstring Stretch with Strap - 1 x daily - 7 x weekly - 1 sets - 3 reps - 30 seconds hold  Supine Heel Slide with Strap - 1 x daily - 7 x weekly - 2 sets - 10 reps - 5 seconds hold  Supine Quad Set - 1 x daily - 7 x weekly - 2 sets - 10 reps - 5 seconds hold  Supine Isometric Hamstring Set - 1 x daily - 7 x weekly - 2 sets - 10 reps - 5 seconds hold  Supine Gluteal Sets - 1 x daily - 7 x weekly - 2 sets - 10 reps - 5 seconds hold  Access Code: [email or web address]  URL: [email or web address]  Date: 12/13/2022  Prepared by: Diana Roth  Supine Active Straight Leg Raise - 1 x daily - 7 x weekly - 3 sets - 10 reps  Standing March with Counter Support - 1 x daily - 7 x weekly - 3 sets - 10 reps  Standing Hip Flexion with Counter Support - 1 x daily - 7 x weekly - 3 sets - 10 reps  Standing Hip Extension with Counter Support - 1 x daily - 7 x weekly - 3 sets - 10 reps  Standing Hip Abduction with Counter Support - 1 x daily - 7 x weekly - 3 sets - 10 reps  Standing Hamstring Curl with Chair Support - 1 x daily - 7 x weekly - 3 sets - 10 reps      Plan: [x] Continue current frequency toward long and short term goals.     [x] Specific Instructions for subsequent treatments:  improve quad strength, improve knee flex/ext ROM, improve standing tolerance, gait training with and without AD      Time In: 1:10 PM          Time Out: 2:10 PM    Electronically signed by:  Miguel Lynch PTA

## 2022-12-28 ENCOUNTER — HOSPITAL ENCOUNTER (OUTPATIENT)
Dept: PHYSICAL THERAPY | Facility: CLINIC | Age: 52
Setting detail: THERAPIES SERIES
Discharge: HOME | End: 2022-12-28
Payer: MEDICARE

## 2022-12-28 NOTE — FLOWSHEET NOTE
[] [facility]) [facility] &  Therapy  [address]    [phone]  F: [phone]   [x] [address]  [facility] 36   [address]  P: [phone]  F: [phone]  [] [address]  [address]  P: [phone]  F: [phone] [] [address]  P: [phone]  F: [phone]  [] [address]  [facility]   [address]   [place]: [phone]  F: [phone]   [] [place] [address] [facility] [address]  [place]: [phone]   F: [phone]     Physical Therapy Cancel/No Show note    Date: 2022  Patient: Latosha Loop  : 1970  MRN: [id number]    Cancels/No Shows to date: 3/0     For today's appointment patient:    [x]  Cancelled    [] Rescheduled appointment    [] No-show     Reason given by patient:    [x]  Patient ill    []  Conflicting appointment    [] No transportation      [] Conflict with work    [] No reason given    [] Weather related    [] [id number]    [] Other:      Comments:        [x] Next appointment was confirmed- rescheduled for     Electronically signed by: Kenia Ly PT

## 2022-12-30 ENCOUNTER — APPOINTMENT (OUTPATIENT)
Dept: PHYSICAL THERAPY | Facility: CLINIC | Age: 52
End: 2022-12-30
Payer: MEDICARE

## 2023-01-09 ENCOUNTER — HOSPITAL ENCOUNTER (OUTPATIENT)
Dept: PHYSICAL THERAPY | Facility: CLINIC | Age: 53
Setting detail: THERAPIES SERIES
Discharge: HOME | End: 2023-01-09
Payer: MEDICARE

## 2023-01-09 PROCEDURE — 97110 THERAPEUTIC EXERCISES: CPT

## 2023-01-09 NOTE — FLOWSHEET NOTE
[] [facility] ([facility]) - [facility] &  Therapy  [address]    [phone]  F: [phone]   [x] [address]  [facility] 36   [address]  P: [phone]  F: [phone]  [] [address] &  Therapy  [address]  P: [phone]  F: [phone]   [] [address]  P: [phone]  F: [phone]  [] [facility]) - [facility] & Therapy  [address]  [place]: [phone]   F: [phone] [] [facility]  Outpatient Rehabilitation &  Therapy  [facility] [address]   [place]: [phone]  F: [phone]       Date:  2023  Patient Name:  Karla Sanchez    :  1970  MRN: [id number]  Physician:  Sissy Astorga MD                              Insurance: [place] (30 visits, auth after 30, 23 remaining)           Rehab Codes: M62.81, M62.9, Z73.6, R26.89, M25.60, Z74.0   Medical Diagnosis: O59.124 (ICD-10-CM) - Status post total left knee replacement  Onset date: 22 - DOS                            Next Dr's appt. : 6 weeks  Visit# / total visits: ;      Cancels/No Shows: 3/0    Subjective:    Pain:  [x] Yes  [] No Location: L knee Pain Rating: (0-10 scale) 4/10   Pain altered Tx:  [x] No  [] Yes  Action:  Comments: Feels stiff overall and wants her other knee replacement done soon.       Objective:  Modalities: VASO in supine, 34 degrees, min compression, 15 minutes to B knees on 23 per pt request   Precautions:  Exercises:   Exercise L knee  Reps/ Time Weight/ Level Comments              NuStep  5'  L1  seat 8; no UE's              Standing          3 way hip B  2x10ea  A  bilaterally on 22    Mini squats  15x        Heel raises  2x10        Marches-alt  2x10ea        Lunges B  10x ea        Hamstring curls 2x10    not today    Step ups  10x 6\" New 12/21 increased step height 1/9    HS stretch on step B 3x20\"ea  6\"          Gait training  1 lap Not today W/ SPC         Supine      Quad sets 2x10x5\"     Heel slides 2x10x5\"     SAQ 2x10x3\" 1 lb Initiated resistance 12/9   Knee ext with OP 5x10\"     SLR w/quad  10x  New 12/13; small range limited reps     SL hip abd 15x 1 lb New 12/21   Knee hang  3' 3# New 12/21         Seated      HS curls 2x10 lime    LAQ 2x10 1 lb Initiated resistance 12/9; limited range   Seated hip ABD 2x10 lime Not today    Seated ISO ADD 2x10 Ball     Knee flexion stretch on EOB 10x5\"      Timed STS's 10x 18\" table New 12/21; 38 seconds some momentum used    Other:     Specific Instructions for next treatment: improve quad strength, improve knee flex/ext ROM, improve standing tolerance, gait training with and without AD           L AROM  11/29 L AROM  12/9 L AROM  12/13/22 L AROM  12/19/22 L AROM  12/21/22   Knee Flex 102 AAROM 100° 100° 102° AA  100° A 104°AA  101° A   Ext NT N/t N/t 4° A 3° A                  Treatment Charges: Mins Units   []  Modalities     [x]  Ther Exercise 40 3   []  Manual Therapy     []  Ther Activities     []  Aquatics     []  Vasocompression     []  Other     Total Treatment time 40 3     Assessment: [x] Progressing toward goals. Initiated warm up on nu step followed by standing exercises per patient request.  Increased step ups to 6\" step this date with no adverse effect. Completed flexion stretching at EOB today which patient found helpful. Momentum was used somewhat when completing sit to stands. When getting patient ice, there were bed bugs which were on patient's hoodie (collected with tape). Notified patient of ongoing infestation and she will get this taken care of. Currently scheduled for next week but removed from schedule Wednesday. [] No change.      [] Other:  [x] Patient would continue to benefit from skilled physical therapy services in order to: reduce pain, improve passive and active ROM, improve L LE strength, improve function, normalize gait, reduce edema, and improve LE power and gait speed. STG/LTG:  Problems:    [x] ? Pain: 0-8/10 pain   [x] ? ROM: decreased L knee flexion/extension A/PROM       [x] ? Strength:  Decreased L knee flexion strength   [x] ? Function:   [x] Edema: see patellar measurements  [x] Gait Deviations: see above  [x] Other: 5TSTS, 19.5 seconds// 2mWT 215 feet, increased edema        Goals  MET NOT MET ON-  GOING  Details   Date Addressed:            STG: To be met in 10 treatments            1. ? Pain: Patient will self report worst L knee pain no greater than 3/10 in order to better tolerate ADLs/work activities with minimal dysfunction []  []  []      2. ? ROM: Patient will improve knee PROM from 0-120 degrees in order to demonstrate ability to move/reach in all planes unrestricted at PLOF []  []  []      3. Independent with Home Exercise Programs []  []  []      4. Demonstrate knowledge of fall risk prevention  []  []  []      5. ?Gait speed and activity tolerance: Patient to improve 2mWT to at least 315 ft with least restrictive assistive device and improved gait mechanics in order to reduce risk of  falls and return to PLOF []  []  []      6. ? Edema: patient to have L patellar measurements less than 2cm from initial eval in order to demonstrate edema reduction and allow for unrestricted knee motion []  []  []                              Date Addressed:            LTG: To be met in 16 treatments           1. Improve score on assessment tool KOOS from 65%% impairment to less than 40% impairment  []  []  []      2.  Strength: Pt will demonstrate improved L LE strength to 5/5  in order to demonstrate improved stability/strength necessary for unrestricted ADLs/work activities []  []  []      3. ?Gait speed and activity tolerance: Patient to improve 2mWT to at least 415 ft with least restrictive assistive device and improved gait mechanics in order to reduce risk of  falls and return to PLOF []  []  []      4. ? ROM: Patient will improve AROM from 0-120 degrees in order to demonstrate ability to move/reach in all planes unrestricted at PLOF []  []  []      5. ? Edema: patient to have L patellar measurements less than 3cm from initial eval in order to demonstrate edema reduction and allow for unrestricted knee motion []  []  []      6. ? Transfers and LE power: patient to improve 5TSTS score to less than 13 seconds in order to reduce fall risk and improve transfer abilities  []  []  []            Patient goals: to get strong and flexible       Pt. Education:  [x] Yes  [] No  [x] Reviewed Prior HEP/Ed  Method of Education: [x] Verbal  form, HEP compliance, knee hangs  [] Demo  [] Written  Comprehension of Education:  [x] Verbalizes understanding. [x] Demonstrates understanding. [x] Needs review. for compliance   [] Demonstrates/verbalizes HEP/Ed previously given. Access Code: [email or web address]  URL: [email or web address]  Date: 11/23/2022  Prepared by: Calli Scheuermann  Supine Bridge - 1 x daily - 7 x weekly - 2 sets - 10 reps  Long Sitting Calf Stretch with Strap - 1 x daily - 7 x weekly - 1 sets - 3 reps - 30 seconds hold  Supine Hamstring Stretch with Strap - 1 x daily - 7 x weekly - 1 sets - 3 reps - 30 seconds hold  Supine Heel Slide with Strap - 1 x daily - 7 x weekly - 2 sets - 10 reps - 5 seconds hold  Supine Quad Set - 1 x daily - 7 x weekly - 2 sets - 10 reps - 5 seconds hold  Supine Isometric Hamstring Set - 1 x daily - 7 x weekly - 2 sets - 10 reps - 5 seconds hold  Supine Gluteal Sets - 1 x daily - 7 x weekly - 2 sets - 10 reps - 5 seconds hold  Access Code: [email or web address]  URL: [email or web address]  Date: 12/13/2022  Prepared by: Unique Matthews  Supine Active Straight Leg Raise - 1 x daily - 7 x weekly - 3 sets - 10 reps  Standing March with Counter Support - 1 x daily - 7 x weekly - 3 sets - 10 reps  Standing Hip Flexion with Counter Support - 1 x daily - 7 x weekly - 3 sets - 10 reps  Standing Hip Extension with Counter Support - 1 x daily - 7 x weekly - 3 sets - 10 reps  Standing Hip Abduction with Counter Support - 1 x daily - 7 x weekly - 3 sets - 10 reps  Standing Hamstring Curl with Chair Support - 1 x daily - 7 x weekly - 3 sets - 10 reps      Plan: [x] Continue current frequency toward long and short term goals.     [x] Specific Instructions for subsequent treatments:  improve quad strength, improve knee flex/ext ROM, improve standing tolerance, gait training with and without AD      Time In: 1:07 PM          Time Out: 2:00PM    Electronically signed by:  Deepa Phelan PTA

## 2023-01-11 ENCOUNTER — APPOINTMENT (OUTPATIENT)
Dept: PHYSICAL THERAPY | Facility: CLINIC | Age: 53
End: 2023-01-11
Payer: MEDICARE

## 2023-01-16 ENCOUNTER — HOSPITAL ENCOUNTER (OUTPATIENT)
Dept: PHYSICAL THERAPY | Facility: CLINIC | Age: 53
Setting detail: THERAPIES SERIES
Discharge: HOME | End: 2023-01-16
Payer: MEDICARE

## 2023-01-16 NOTE — FLOWSHEET NOTE
[] [facility]) - [facility] &  Therapy  [address]    [phone]  F: [phone]   [] [address]  [facility] 36   [address]  P: [phone]  F: [phone]  [] [address] &  Therapy  [address]  P: [phone]  F: [phone] [] [address]  P: [phone]  F: [phone]  [] [address] [facility] [address] [facility]   [address]   [place]: [phone]  F: [phone]   [] [place] [address] [facility] [address]  [place]: [phone]   F: [phone]     Physical Therapy Cancel/No Show note    Date: 2023  Patient: Martir Wright  : 1970  MRN: [id number]    Cancels/No Shows to date:     For today's appointment patient:    [x]  Cancelled    [] Rescheduled appointment    [] No-show     Reason given by patient:    []  Patient ill    []  Conflicting appointment    [] No transportation      [] Conflict with work    [] No reason given    [] Weather related    [] COVID-19    [x] Other: family emergency      Comments:        [x] Next appointment was confirmed    Electronically signed by: Magdy Gates PTA

## 2023-01-20 ENCOUNTER — HOSPITAL ENCOUNTER (OUTPATIENT)
Dept: PHYSICAL THERAPY | Facility: CLINIC | Age: 53
Setting detail: THERAPIES SERIES
Discharge: HOME | End: 2023-01-20
Payer: MEDICARE

## 2023-01-20 NOTE — FLOWSHEET NOTE
[] [facility] ([facility]) - [facility] &  Therapy  [address]    [phone]  F: [phone]   [] [address]  [facility]   [address]  P: [phone]  F: [phone]  [] [address] &  Therapy  [address]  P: [phone]  F: [phone] [] [address]  P: [phone]  F: [phone]  [] [address]  [facility]   [address]   [place]: [phone]  F: [phone]   [] [address]  [place]: [phone]   F: [phone]     Physical Therapy Cancel/No Show note    Date: 2023  Patient: Jamie Madrigal  : 1970  MRN: [id number]    Cancels/No Shows to date:     For today's appointment patient:    [x]  Cancelled    [] Rescheduled appointment    [] No-show     Reason given by patient:    []  Patient ill    []  Conflicting appointment    [] No transportation      [] Conflict with work    [] No reason given    [] Weather related    [] COVID-19    [x] Other: family emergency      Comments:        [x] Next appointment was confirmed    Electronically signed by: Nico Ferrari PT

## 2023-01-23 ENCOUNTER — HOSPITAL ENCOUNTER (OUTPATIENT)
Dept: PHYSICAL THERAPY | Facility: CLINIC | Age: 53
Setting detail: THERAPIES SERIES
Discharge: HOME | End: 2023-01-23
Payer: MEDICARE

## 2023-01-23 PROCEDURE — 97110 THERAPEUTIC EXERCISES: CPT

## 2023-01-23 NOTE — FLOWSHEET NOTE
[] [address] [facility]  [facility]  [address]    [phone]  F: [phone]   [x] [address]  [employer] [address]   [address]  P: [phone]  F: [phone]  [] [address] &  Therapy  [address]  P: [phone]  F: [phone]   [] [address]  P: [phone]  F: [phone]  [] [address] [facility] & Therapy  [address] [facility] [address]  [place]: [phone]   F: [phone] [] [facility]  Outpatient Rehabilitation &  Therapy  [facility] [address]   [place]: [phone]  F: [phone]       Date:  2023  Patient Name:  Wilbert Barrera    :  1970  MRN: [id number]  Physician:  Rodger Pino MD                              Insurance: [employer] (30 visits, auth after 30, 23 remaining)           Rehab Codes: M62.81, M62.9, Z73.6, R26.89, M25.60, Z74.0   Medical Diagnosis: H93.300 (ICD-10-CM) - Status post total left knee replacement  Onset date: 22 - DOS                            Next Dr's appt. : 6 weeks  Visit# / total visits: ;      Cancels/No Shows: 5/0    Subjective:    Pain:  [x] Yes  [] No Location: L knee Pain Rating: (0-10 scale) 4/10   Pain altered Tx:  [x] No  [] Yes  Action:  Comments: Pt arrives with same pain level and is at inferior aspect of knee. Denies completing HEP lately. Notes she has most difficulty straightening knee.       Objective:  Modalities: VASO in supine, 34 degrees, min compression, 15 minutes to B knees on 23 per pt request   Precautions:  Exercises:   Exercise L knee  Reps/ Time Weight/ Level Comments              NuStep  5'  L1  seat 8; no UE's              Standing          3 way hip B  2x10ea  A  bilaterally on 22    Mini squats 20x    incr reps  Heel raises  2x10        Marches-alt  2x10ea        Lunges B  10x2 ea    2 rounds and alternating on 1/23    Hamstring curls  2x10      Step ups  10x2 6\" New 12/21 increased step height 1/9; 2nd set 1/23    HS stretch on step B 2x30\"ea  6\"    Side stepping  30'ea  New 1/23         Gait training  1 lap  No cane and cues for TKE with heel strike 1/23         Supine      Quad sets 2x10x5\"     Heel slides 2x10x5\"     SAQ 2x10x3\" 1 lb Initiated resistance 12/9   Knee ext with OP 5x10\"     SLR w/quad  10x2  New 12/13; small range limited reps-2nd set 1/23     SL hip abd 15x 1 lb New 12/21   Knee hang  5' 3# New 12/21; incr time 1/23   Hip flexor stretch  1'  New 1/23         Seated      HS curls 2x10 lime    LAQ 2x10 1 lb Initiated resistance 12/9; limited range   Seated hip ABD 2x10 lime Not today    Seated ISO ADD 2x10 Ball  Not today    Knee flexion stretch on EOB 10x5\"   Not today    Timed STS's 10x 18\" table New 12/21; 38 seconds some momentum used- 30 seconds on 1/23   Other:     Specific Instructions for next treatment: improve quad strength, improve knee flex/ext ROM, improve standing tolerance, gait training with and without AD           L AROM  11/29 L AROM  12/9 L AROM  12/13/22 L AROM  12/19/22 L AROM  12/21/22 L AROM  1/23/23   Knee Flex 102 AAROM 100° 100° 102° AA  100° A 104°AA  101° A 105 AA   Ext NT N/t N/t 4° A 3° A 2 A                   Treatment Charges: Mins Units   []  Modalities     [x]  Ther Exercise 47 3   []  Manual Therapy     []  Ther Activities     []  Aquatics     []  Vasocompression     []  Other     Total Treatment time 47 3     Assessment: [x] Progressing toward goals. Initiated warm up on nu step followed by standing program. Progressed reps for lunges and step ups this date. Implemented side stepping to progress functional strength. Ambulated around track without AD this date with cueing for TKE with heel strike.  Offered to trial SAQ with 2# weights but declines noting 1# is a good challenge. Added second set of SLR's this date encouraging quad set prior to lift. Increased hold time for knee extension hang this date. Able to complete x10 STS's with improved time today. Encouraged to elevate and ice at home for management of sx's. No bed bugs present this date. [] No change. [] Other:  [x] Patient would continue to benefit from skilled physical therapy services in order to: reduce pain, improve passive and active ROM, improve L LE strength, improve function, normalize gait, reduce edema, and improve LE power and gait speed. STG/LTG:  Problems:    [x] ? Pain: 0-8/10 pain   [x] ? ROM: decreased L knee flexion/extension A/PROM       [x] ? Strength:  Decreased L knee flexion strength   [x] ? Function:   [x] Edema: see patellar measurements  [x] Gait Deviations: see above  [x] Other: 5TSTS, 19.5 seconds// 2mWT 215 feet, increased edema        Goals  MET NOT MET ON-  GOING  Details   Date Addressed:            STG: To be met in 10 treatments            1. ? Pain: Patient will self report worst L knee pain no greater than 3/10 in order to better tolerate ADLs/work activities with minimal dysfunction []  []  []      2. ? ROM: Patient will improve knee PROM from 0-120 degrees in order to demonstrate ability to move/reach in all planes unrestricted at PLOF []  []  []      3. Independent with Home Exercise Programs []  []  []      4. Demonstrate knowledge of fall risk prevention  []  []  []      5. ?Gait speed and activity tolerance: Patient to improve 2mWT to at least 315 ft with least restrictive assistive device and improved gait mechanics in order to reduce risk of  falls and return to PLOF []  []  []      6. ? Edema: patient to have L patellar measurements less than 2cm from initial eval in order to demonstrate edema reduction and allow for unrestricted knee motion []  []  []                              Date Addressed:            LTG: To be met in 16 treatments           1.  Improve score on assessment tool KOOS from 65%% impairment to less than 40% impairment  []  []  []      2. Strength: Pt will demonstrate improved L LE strength to 5/5  in order to demonstrate improved stability/strength necessary for unrestricted ADLs/work activities []  []  []      3. ?Gait speed and activity tolerance: Patient to improve 2mWT to at least 415 ft with least restrictive assistive device and improved gait mechanics in order to reduce risk of  falls and return to PLOF []  []  []      4. ? ROM: Patient will improve AROM from 0-120 degrees in order to demonstrate ability to move/reach in all planes unrestricted at PLOF []  []  []      5. ? Edema: patient to have L patellar measurements less than 3cm from initial eval in order to demonstrate edema reduction and allow for unrestricted knee motion []  []  []      6. ? Transfers and LE power: patient to improve 5TSTS score to less than 13 seconds in order to reduce fall risk and improve transfer abilities  []  []  []            Patient goals: to get strong and flexible       Pt. Education:  [x] Yes  [] No  [x] Reviewed Prior HEP/Ed  Method of Education: [x] Verbal  form, HEP compliance, knee hangs  [] Demo  [] Written  Comprehension of Education:  [x] Verbalizes understanding. [x] Demonstrates understanding. [x] Needs review. for compliance   [] Demonstrates/verbalizes HEP/Ed previously given. Access Code: [email or web address]  URL: [email or web address]  Date: 11/23/2022  Prepared by: Calli Scheuermann  Supine Bridge - 1 x daily - 7 x weekly - 2 sets - 10 reps  Long Sitting Calf Stretch with Strap - 1 x daily - 7 x weekly - 1 sets - 3 reps - 30 seconds hold  Supine Hamstring Stretch with Strap - 1 x daily - 7 x weekly - 1 sets - 3 reps - 30 seconds hold  Supine Heel Slide with Strap - 1 x daily - 7 x weekly - 2 sets - 10 reps - 5 seconds hold  Supine Quad Set - 1 x daily - 7 x weekly - 2 sets - 10 reps - 5 seconds hold  Supine Isometric Hamstring Set - 1 x daily - 7 x weekly - 2 sets - 10 reps - 5 seconds hold  Supine Gluteal Sets - 1 x daily - 7 x weekly - 2 sets - 10 reps - 5 seconds hold  Access Code: [email or web address]  URL: [email or web address]  Date: 12/13/2022  Prepared by: Jasmin Blank  Supine Active Straight Leg Raise - 1 x daily - 7 x weekly - 3 sets - 10 reps  Standing March with Counter Support - 1 x daily - 7 x weekly - 3 sets - 10 reps  Standing Hip Flexion with Counter Support - 1 x daily - 7 x weekly - 3 sets - 10 reps  Standing Hip Extension with Counter Support - 1 x daily - 7 x weekly - 3 sets - 10 reps  Standing Hip Abduction with Counter Support - 1 x daily - 7 x weekly - 3 sets - 10 reps  Standing Hamstring Curl with Chair Support - 1 x daily - 7 x weekly - 3 sets - 10 reps      Plan: [x] Continue current frequency toward long and short term goals.     [x] Specific Instructions for subsequent treatments:  improve quad strength, improve knee flex/ext ROM, improve standing tolerance, gait training with and without AD      Time In: 2:02 PM         Time Out: 2:54 PM    Electronically signed by:  Flores Nayak PTA

## 2023-01-26 ENCOUNTER — HOSPITAL ENCOUNTER (OUTPATIENT)
Dept: PHYSICAL THERAPY | Facility: CLINIC | Age: 53
Setting detail: THERAPIES SERIES
Discharge: HOME | End: 2023-01-26
Payer: MEDICARE

## 2023-01-26 PROCEDURE — 97110 THERAPEUTIC EXERCISES: CPT

## 2023-01-26 PROCEDURE — 97016 VASOPNEUMATIC DEVICE THERAPY: CPT

## 2023-01-26 NOTE — FLOWSHEET NOTE
[] [facility] - [facility] &  Therapy  [address]    [phone]  F: [phone]   [x] [address]  [facility] [address]   [address]  P: [phone]  F: [phone]  [] [address] &  Therapy  [address]  P: [phone]  F: [phone]   [] [address]  P: [phone]  F: [phone]  [] [facility] ([facility] - [facility] & Therapy  [address]  [place]: [phone]   F: [phone] [] [facility]  Outpatient Rehabilitation &  Therapy  [facility] [address]   [place]: [phone]  F: [phone]       Date:  2023  Patient Name:  Nuris Stokes    :  1970  MRN: [id number]  Physician:  Delmar Fuentes MD                              Insurance: [employer] [employer] (30 visits, auth after 30, 23 remaining)           Rehab Codes: M62.81, M62.9, Z73.6, R26.89, M25.60, Z74.0   Medical Diagnosis: L61.781 (ICD-10-CM) - Status post total left knee replacement  Onset date: 22 - DOS                            Next 's appt. : 6 weeks  Visit# / total visits: ; Cancels/No Shows: 5/0    Subjective:    Pain:  [x] Yes  [] No Location: L knee Pain Rating: (0-10 scale) 4/10   Pain altered Tx:  [x] No  [] Yes  Action:  Comments: Pt denies changes in pain levels however, states she has been working on ROM at home.        Objective:  Modalities: VASO in supine, 34 degrees, min compression, 15 minutes to B knees on 23 per pt request   Precautions:  Exercises: [place] Cover completed   Exercise L knee  Reps/ Time Weight/ Level Comments              NuStep  5'  L1  seat 8; no UE's              Standing          3 way hip B 2x10ea  A  bilaterally on 22    Mini squats 20x    incr reps     Heel raises 2x10        Marches-alt 2x10ea Lunges B 10x2 ea    2 rounds and alternating on 1/23- held too much pain in R knee 1/26    Hamstring curls 2x10      Step ups  10x2 6\" New 12/21 increased step height 1/9; 2nd set 1/23   HS stretch on step B 2x30\"ea  6\"    Side stepping  30'ea  New 1/23         Gait training  1 lap  No cane and cues for TKE with heel strike 1/23         Supine      Quad sets 2x10x5\"     Heel slides 2x10x5\"     SAQ 2x10x3\" 1 lb Initiated resistance 12/9   Knee ext with OP 5x10\"     SLR w/quad  10x2  New 12/13; small range limited reps-2nd set 1/23     SL hip abd 15x 1 lb New 12/21   Knee hang  5' 3# New 12/21; incr time 1/23   Hip flexor stretch  1'  New 1/23   HS stretch - L LE  3x30\" Strap           Seated      HS curls 2x10 lime    LAQ 2x10 3#  Initiated resistance 12/9; limited range   Seated hip ABD 2x10 lime    Seated ISO ADD 2x10 Ball     Knee flexion stretch on EOB 10x5\"      Timed STS's 10x 18\" table New 12/21; 38 seconds some momentum used- 30 seconds on 1/23   Other:     Specific Instructions for next treatment: improve quad strength, improve knee flex/ext ROM, improve standing tolerance, gait training without AD           L AROM  11/29 L AROM  12/9 L AROM  12/13/22 L AROM  12/19/22 L AROM  12/21/22 L AROM  1/23/23 L AROM 1/26/23   Knee Flex 102 AAROM 100° 100° 102° AA  100° A 104°AA  101° A 105  A   105 AA    Ext NT N/t N/t 4° A 3° A 2 A 2 A                     Treatment Charges: Mins Units   []  Modalities     [x]  Ther Exercise 35 2   []  Manual Therapy     []  Ther Activities     []  Aquatics     [x]  Vasocompression 15 1   []  Other     Total Treatment time 50 3     Assessment: [x] Progressing toward goals. Held Nustep warm up as machine was unavailable at start of session. Initiated treatment with flexion and extension stretching to improve ROM. Pt's ROM remains the same as last session. Increased weight for seated LAQ to 3# with good tolerance.  Completed standing interventions with intermittent VC's required to improve form with good carry over. Pt reports slight increased pain in B knees post interventions however, is tolerable. Ended with vaso per pt request to address soreness and pain. Will continue to monitor pt response to treatment and progress as able. [] No change. [x] Other: No bed bugs present this date. [x] Patient would continue to benefit from skilled physical therapy services in order to: reduce pain, improve passive and active ROM, improve L LE strength, improve function, normalize gait, reduce edema, and improve LE power and gait speed. STG/LTG:  Problems:    [x] ? Pain: 0-8/10 pain   [x] ? ROM: decreased L knee flexion/extension A/PROM       [x] ? Strength:  Decreased L knee flexion strength   [x] ? Function:   [x] Edema: see patellar measurements  [x] Gait Deviations: see above  [x] Other: 5TSTS, 19.5 seconds// 2mWT 215 feet, increased edema        Goals  MET NOT MET ON-  GOING  Details   Date Addressed:            STG: To be met in 10 treatments            1. ? Pain: Patient will self report worst L knee pain no greater than 3/10 in order to better tolerate ADLs/work activities with minimal dysfunction []  []  []      2. ? ROM: Patient will improve knee PROM from 0-120 degrees in order to demonstrate ability to move/reach in all planes unrestricted at PLOF []  []  []      3. Independent with Home Exercise Programs []  []  []      4. Demonstrate knowledge of fall risk prevention  []  []  []      5. ?Gait speed and activity tolerance: Patient to improve 2mWT to at least 315 ft with least restrictive assistive device and improved gait mechanics in order to reduce risk of  falls and return to PLOF []  []  []      6. ? Edema: patient to have L patellar measurements less than 2cm from initial eval in order to demonstrate edema reduction and allow for unrestricted knee motion []  []  []                              Date Addressed:            LTG: To be met in 16 treatments           1. Improve score on assessment tool KOOS from 65%% impairment to less than 40% impairment  []  []  []      2. Strength: Pt will demonstrate improved L LE strength to 5/5  in order to demonstrate improved stability/strength necessary for unrestricted ADLs/work activities []  []  []      3. ?Gait speed and activity tolerance: Patient to improve 2mWT to at least 415 ft with least restrictive assistive device and improved gait mechanics in order to reduce risk of  falls and return to PLOF []  []  []      4. ? ROM: Patient will improve AROM from 0-120 degrees in order to demonstrate ability to move/reach in all planes unrestricted at PLOF []  []  []      5. ? Edema: patient to have L patellar measurements less than 3cm from initial eval in order to demonstrate edema reduction and allow for unrestricted knee motion []  []  []      6. ? Transfers and LE power: patient to improve 5TSTS score to less than 13 seconds in order to reduce fall risk and improve transfer abilities  []  []  []            Patient goals: to get strong and flexible       Pt. Education:  [x] Yes  [] No  [x] Reviewed Prior HEP/Ed  Method of Education: [x] Verbal  form, HEP compliance, knee hangs  [x] Demo  [] Written  Comprehension of Education:  [x] Verbalizes understanding.  [x] Demonstrates understanding.  [x] Needs review.for compliance   [] Demonstrates/verbalizes HEP/Ed previously given.     Access Code: [email or web address]  URL: [email or web address]  Date: 11/23/2022  Prepared by: Calli Scheuermann  Supine Bridge - 1 x daily - 7 x weekly - 2 sets - 10 reps  Long Sitting Calf Stretch with Strap - 1 x daily - 7 x weekly - 1 sets - 3 reps - 30 seconds hold  Supine Hamstring Stretch with Strap - 1 x daily - 7 x weekly - 1 sets - 3 reps - 30 seconds hold  Supine Heel Slide with Strap - 1 x daily - 7 x weekly - 2 sets - 10 reps - 5 seconds hold  Supine Quad Set - 1 x daily - 7 x weekly - 2 sets - 10 reps - 5 seconds hold  Supine Isometric Hamstring Set - 1 x  daily - 7 x weekly - 2 sets - 10 reps - 5 seconds hold  Supine Gluteal Sets - 1 x daily - 7 x weekly - 2 sets - 10 reps - 5 seconds hold  Access Code: [email or web address]  URL: [email or web address]  Date: 12/13/2022  Prepared by: Lana Castro  Supine Active Straight Leg Raise - 1 x daily - 7 x weekly - 3 sets - 10 reps  Standing March with Counter Support - 1 x daily - 7 x weekly - 3 sets - 10 reps  Standing Hip Flexion with Counter Support - 1 x daily - 7 x weekly - 3 sets - 10 reps  Standing Hip Extension with Counter Support - 1 x daily - 7 x weekly - 3 sets - 10 reps  Standing Hip Abduction with Counter Support - 1 x daily - 7 x weekly - 3 sets - 10 reps  Standing Hamstring Curl with Chair Support - 1 x daily - 7 x weekly - 3 sets - 10 reps      Plan: [x] Continue current frequency toward long and short term goals.     [x] Specific Instructions for subsequent treatments:  improve quad strength, improve knee flex/ext ROM, improve standing tolerance, gait training with and without AD      Time In: 2:10 PM         Time Out: 3:00 PM    Electronically signed by:  Lana Castro PTA

## 2023-01-30 ENCOUNTER — HOSPITAL ENCOUNTER (OUTPATIENT)
Dept: PHYSICAL THERAPY | Facility: CLINIC | Age: 53
Setting detail: THERAPIES SERIES
Discharge: HOME | End: 2023-01-30
Payer: MEDICARE

## 2023-01-30 NOTE — FLOWSHEET NOTE
[] [facility]) [facility] &  Therapy  [address]    [phone]  F: [phone]   [x] [address]  [facility] [address]   [address]  P: [phone]  F: [phone]  [] [address]  [address]  P: [phone]  F: [phone] [] [address]  P: [phone]  F: [phone]  [] [address] [facility]   [address]   [place]: [phone]  F: [phone]   [] [place] [address] [facility] [address]  [place]: [phone]   F: [phone]     Physical Therapy Cancel/No Show note    Date: 2023  Patient: Rohit Logan  : 1970  MRN: [id number]    Cancels/No Shows to date:     For today's appointment patient:    [x]  Cancelled    [] Rescheduled appointment    [] No-show     Reason given by patient:    []  Patient ill    []  Conflicting appointment    [] No transportation      [] Conflict with work    [] No reason given    [] Weather related    [] [id number]    [x] Other: stomach bug    Comments:        [x] Next appointment was confirmed    Electronically signed by: Reny Beltre PTA

## 2023-02-03 ENCOUNTER — OFFICE VISIT (OUTPATIENT)
Dept: FAMILY MEDICINE CLINIC | Age: 53
End: 2023-02-03
Payer: MEDICARE

## 2023-02-03 ENCOUNTER — HOSPITAL ENCOUNTER (OUTPATIENT)
Dept: PHYSICAL THERAPY | Facility: CLINIC | Age: 53
Setting detail: THERAPIES SERIES
Discharge: HOME | End: 2023-02-03
Payer: MEDICARE

## 2023-02-03 VITALS
SYSTOLIC BLOOD PRESSURE: 123 MMHG | HEART RATE: 81 BPM | TEMPERATURE: 97.9 F | DIASTOLIC BLOOD PRESSURE: 75 MMHG | HEIGHT: 65 IN | OXYGEN SATURATION: 98 % | BODY MASS INDEX: 48.82 KG/M2 | WEIGHT: 293 LBS

## 2023-02-03 DIAGNOSIS — Z23 NEED FOR PNEUMOCOCCAL VACCINATION: ICD-10-CM

## 2023-02-03 DIAGNOSIS — E53.9 VITAMIN B DEFICIENCY: ICD-10-CM

## 2023-02-03 DIAGNOSIS — M25.561 BILATERAL CHRONIC KNEE PAIN: ICD-10-CM

## 2023-02-03 DIAGNOSIS — M51.36 DEGENERATION OF LUMBAR INTERVERTEBRAL DISC: ICD-10-CM

## 2023-02-03 DIAGNOSIS — R73.9 HYPERGLYCEMIA: ICD-10-CM

## 2023-02-03 DIAGNOSIS — F39 MOOD DISORDER (HCC): ICD-10-CM

## 2023-02-03 DIAGNOSIS — M25.562 BILATERAL CHRONIC KNEE PAIN: ICD-10-CM

## 2023-02-03 DIAGNOSIS — M54.9 CHRONIC BACK PAIN, UNSPECIFIED BACK LOCATION, UNSPECIFIED BACK PAIN LATERALITY: ICD-10-CM

## 2023-02-03 DIAGNOSIS — E66.01 CLASS 3 SEVERE OBESITY WITH BODY MASS INDEX (BMI) OF 50.0 TO 59.9 IN ADULT, UNSPECIFIED OBESITY TYPE, UNSPECIFIED WHETHER SERIOUS COMORBIDITY PRESENT (HCC): ICD-10-CM

## 2023-02-03 DIAGNOSIS — Z12.31 SCREENING MAMMOGRAM FOR BREAST CANCER: Primary | ICD-10-CM

## 2023-02-03 DIAGNOSIS — Z12.4 PAP SMEAR FOR CERVICAL CANCER SCREENING: ICD-10-CM

## 2023-02-03 DIAGNOSIS — E66.01 MORBID OBESITY WITH BMI OF 50.0-59.9, ADULT (HCC): ICD-10-CM

## 2023-02-03 DIAGNOSIS — Z13.220 SCREENING, LIPID: ICD-10-CM

## 2023-02-03 DIAGNOSIS — F41.1 GAD (GENERALIZED ANXIETY DISORDER): ICD-10-CM

## 2023-02-03 DIAGNOSIS — G89.29 CHRONIC BACK PAIN, UNSPECIFIED BACK LOCATION, UNSPECIFIED BACK PAIN LATERALITY: ICD-10-CM

## 2023-02-03 DIAGNOSIS — Z98.84 S/P LAPAROSCOPIC SLEEVE GASTRECTOMY: ICD-10-CM

## 2023-02-03 DIAGNOSIS — Z11.4 SCREENING FOR HIV (HUMAN IMMUNODEFICIENCY VIRUS): ICD-10-CM

## 2023-02-03 DIAGNOSIS — Z13.29 SCREENING FOR THYROID DISORDER: ICD-10-CM

## 2023-02-03 DIAGNOSIS — G89.29 BILATERAL CHRONIC KNEE PAIN: ICD-10-CM

## 2023-02-03 DIAGNOSIS — Z11.59 NEED FOR HEPATITIS C SCREENING TEST: ICD-10-CM

## 2023-02-03 DIAGNOSIS — N32.81 OAB (OVERACTIVE BLADDER): ICD-10-CM

## 2023-02-03 DIAGNOSIS — R32 URINARY INCONTINENCE, UNSPECIFIED TYPE: ICD-10-CM

## 2023-02-03 DIAGNOSIS — Z87.891 EX-SMOKER FOR MORE THAN 1 YEAR: ICD-10-CM

## 2023-02-03 DIAGNOSIS — G47.33 OSA (OBSTRUCTIVE SLEEP APNEA): ICD-10-CM

## 2023-02-03 DIAGNOSIS — K21.9 GASTROESOPHAGEAL REFLUX DISEASE, UNSPECIFIED WHETHER ESOPHAGITIS PRESENT: ICD-10-CM

## 2023-02-03 DIAGNOSIS — E55.9 VITAMIN D DEFICIENCY: ICD-10-CM

## 2023-02-03 PROBLEM — M46.00 SPINAL ENTHESOPATHY (HCC): Status: ACTIVE | Noted: 2018-03-12

## 2023-02-03 PROBLEM — S83.249A TEAR OF MEDIAL MENISCUS OF KNEE: Status: RESOLVED | Noted: 2019-03-01 | Resolved: 2023-02-03

## 2023-02-03 PROBLEM — M19.019 OSTEOARTHRITIS OF ACROMIOCLAVICULAR JOINT: Status: ACTIVE | Noted: 2018-03-13

## 2023-02-03 PROBLEM — M19.019 OSTEOARTHRITIS OF ACROMIOCLAVICULAR JOINT: Status: RESOLVED | Noted: 2018-03-13 | Resolved: 2023-02-03

## 2023-02-03 PROBLEM — M84.376A STRESS FRACTURE OF METATARSAL BONE: Status: ACTIVE | Noted: 2023-02-03

## 2023-02-03 PROBLEM — M51.369 DEGENERATION OF LUMBAR INTERVERTEBRAL DISC: Status: ACTIVE | Noted: 2018-03-12

## 2023-02-03 PROBLEM — N39.41 URGE INCONTINENCE OF URINE: Status: ACTIVE | Noted: 2023-02-03

## 2023-02-03 PROBLEM — N39.41 URGE INCONTINENCE OF URINE: Status: RESOLVED | Noted: 2023-02-03 | Resolved: 2023-02-03

## 2023-02-03 PROBLEM — H25.819 COMBINED FORMS OF AGE-RELATED CATARACT: Status: ACTIVE | Noted: 2023-02-03

## 2023-02-03 PROBLEM — R60.9 EDEMA: Status: RESOLVED | Noted: 2018-03-12 | Resolved: 2023-02-03

## 2023-02-03 PROBLEM — M76.70 PERONEUS LONGUS TENDINITIS: Status: RESOLVED | Noted: 2019-03-01 | Resolved: 2023-02-03

## 2023-02-03 PROBLEM — M77.30 CALCANEAL SPUR: Status: ACTIVE | Noted: 2019-05-02

## 2023-02-03 PROBLEM — R60.9 EDEMA: Status: ACTIVE | Noted: 2018-03-12

## 2023-02-03 PROBLEM — M84.376A STRESS FRACTURE OF METATARSAL BONE: Status: RESOLVED | Noted: 2023-02-03 | Resolved: 2023-02-03

## 2023-02-03 PROBLEM — F41.9 ANXIETY: Status: ACTIVE | Noted: 2020-04-30

## 2023-02-03 PROBLEM — S83.249A TEAR OF MEDIAL MENISCUS OF KNEE: Status: ACTIVE | Noted: 2019-03-01

## 2023-02-03 PROBLEM — M76.70 PERONEUS LONGUS TENDINITIS: Status: ACTIVE | Noted: 2019-03-01

## 2023-02-03 PROBLEM — K52.832 LYMPHOCYTIC COLITIS: Status: RESOLVED | Noted: 2021-04-27 | Resolved: 2023-02-03

## 2023-02-03 PROBLEM — H44.50 DEGENERATIVE DISORDER OF EYE: Status: ACTIVE | Noted: 2023-02-03

## 2023-02-03 PROCEDURE — G8417 CALC BMI ABV UP PARAM F/U: HCPCS | Performed by: FAMILY MEDICINE

## 2023-02-03 PROCEDURE — G8484 FLU IMMUNIZE NO ADMIN: HCPCS | Performed by: FAMILY MEDICINE

## 2023-02-03 PROCEDURE — 97110 THERAPEUTIC EXERCISES: CPT

## 2023-02-03 PROCEDURE — 1036F TOBACCO NON-USER: CPT | Performed by: FAMILY MEDICINE

## 2023-02-03 PROCEDURE — G8427 DOCREV CUR MEDS BY ELIG CLIN: HCPCS | Performed by: FAMILY MEDICINE

## 2023-02-03 PROCEDURE — 90471 IMMUNIZATION ADMIN: CPT | Performed by: FAMILY MEDICINE

## 2023-02-03 PROCEDURE — 99204 OFFICE O/P NEW MOD 45 MIN: CPT | Performed by: FAMILY MEDICINE

## 2023-02-03 PROCEDURE — 90677 PCV20 VACCINE IM: CPT | Performed by: FAMILY MEDICINE

## 2023-02-03 PROCEDURE — 3017F COLORECTAL CA SCREEN DOC REV: CPT | Performed by: FAMILY MEDICINE

## 2023-02-03 PROCEDURE — 97016 VASOPNEUMATIC DEVICE THERAPY: CPT

## 2023-02-03 SDOH — ECONOMIC STABILITY: FOOD INSECURITY: WITHIN THE PAST 12 MONTHS, YOU WORRIED THAT YOUR FOOD WOULD RUN OUT BEFORE YOU GOT MONEY TO BUY MORE.: SOMETIMES TRUE

## 2023-02-03 SDOH — ECONOMIC STABILITY: INCOME INSECURITY: HOW HARD IS IT FOR YOU TO PAY FOR THE VERY BASICS LIKE FOOD, HOUSING, MEDICAL CARE, AND HEATING?: SOMEWHAT HARD

## 2023-02-03 SDOH — ECONOMIC STABILITY: HOUSING INSECURITY
IN THE LAST 12 MONTHS, WAS THERE A TIME WHEN YOU DID NOT HAVE A STEADY PLACE TO SLEEP OR SLEPT IN A SHELTER (INCLUDING NOW)?: NO

## 2023-02-03 SDOH — ECONOMIC STABILITY: FOOD INSECURITY: WITHIN THE PAST 12 MONTHS, THE FOOD YOU BOUGHT JUST DIDN'T LAST AND YOU DIDN'T HAVE MONEY TO GET MORE.: SOMETIMES TRUE

## 2023-02-03 ASSESSMENT — ENCOUNTER SYMPTOMS
DIARRHEA: 0
EYE REDNESS: 0
STRIDOR: 0
ABDOMINAL PAIN: 0
NAUSEA: 0
COUGH: 0
BLOOD IN STOOL: 0
SORE THROAT: 0
SHORTNESS OF BREATH: 0
CONSTIPATION: 0
EYE PAIN: 0
EYE DISCHARGE: 0
PHOTOPHOBIA: 0
VOMITING: 0
BACK PAIN: 0

## 2023-02-03 ASSESSMENT — PATIENT HEALTH QUESTIONNAIRE - PHQ9
1. LITTLE INTEREST OR PLEASURE IN DOING THINGS: 1
3. TROUBLE FALLING OR STAYING ASLEEP: 1
4. FEELING TIRED OR HAVING LITTLE ENERGY: 1
SUM OF ALL RESPONSES TO PHQ QUESTIONS 1-9: 7
SUM OF ALL RESPONSES TO PHQ QUESTIONS 1-9: 7
10. IF YOU CHECKED OFF ANY PROBLEMS, HOW DIFFICULT HAVE THESE PROBLEMS MADE IT FOR YOU TO DO YOUR WORK, TAKE CARE OF THINGS AT HOME, OR GET ALONG WITH OTHER PEOPLE: 1
SUM OF ALL RESPONSES TO PHQ9 QUESTIONS 1 & 2: 2
9. THOUGHTS THAT YOU WOULD BE BETTER OFF DEAD, OR OF HURTING YOURSELF: 0
2. FEELING DOWN, DEPRESSED OR HOPELESS: 1
SUM OF ALL RESPONSES TO PHQ QUESTIONS 1-9: 7
5. POOR APPETITE OR OVEREATING: 1
7. TROUBLE CONCENTRATING ON THINGS, SUCH AS READING THE NEWSPAPER OR WATCHING TELEVISION: 1
8. MOVING OR SPEAKING SO SLOWLY THAT OTHER PEOPLE COULD HAVE NOTICED. OR THE OPPOSITE, BEING SO FIGETY OR RESTLESS THAT YOU HAVE BEEN MOVING AROUND A LOT MORE THAN USUAL: 0
SUM OF ALL RESPONSES TO PHQ QUESTIONS 1-9: 7
6. FEELING BAD ABOUT YOURSELF - OR THAT YOU ARE A FAILURE OR HAVE LET YOURSELF OR YOUR FAMILY DOWN: 1

## 2023-02-03 NOTE — PROGRESS NOTES
Subjective:      Patient ID: Tosha Cameron is a 50 y.o. female. Has not seen a PCP for a long time. Here for a NP appt. [facility] seen Dr Grimaldo [place] Dr Shauna Hughes sees her for Irritable bladder . [facility] sees Dr Renate Peters  for psoriasis and Seborrhic dermatitis- controlled wit current meds.  was started on Lexapro by Dr at [facility] for past few years ( 11 ) , recently only televisits ,   Sees Dr Mitchel Almazan- Just [place] mammogram last week at [facility] and saw Dr Geena Chavez last month for PAP. Seeing [facility] for DENISHA - sees them Q  6 months. Seen Dr Shauna Hughes for bladder irritability- improved after weight loss surgery- so off meds. Nov 2022 Dr Terra Lowery replaced left knee. [facility] H/O smoker 1/2 PPD x 28 years , quit 8 years ago. Review of Systems   Constitutional:  Negative for chills and fever. HENT:  Negative for congestion, ear pain, hearing loss, nosebleeds, sore throat and tinnitus. Eyes:  Negative for photophobia, pain, discharge and redness. Respiratory:  Negative for cough, shortness of breath and stridor. Cardiovascular:  Negative for chest pain, palpitations and leg swelling. Gastrointestinal:  Negative for abdominal pain, blood in stool, constipation, diarrhea, nausea and vomiting. Endocrine: Negative for polydipsia. Genitourinary:  Negative for dysuria, flank pain, frequency, hematuria and urgency. Musculoskeletal:  Positive for gait problem and joint swelling. Negative for back pain, myalgias and neck pain. B/L knee swelling   Skin:  Negative for rash. Allergic/Immunologic: Negative for environmental allergies. Neurological:  Negative for dizziness, tremors, seizures, weakness and headaches. Hematological:  Does not bruise/bleed easily. Psychiatric/Behavioral:  Negative for hallucinations and suicidal ideas. The patient is not nervous/anxious. Objective:   Physical Exam  Constitutional:       General: She is not in acute distress.      Appearance: She is well-developed. HENT:      Head: Normocephalic and atraumatic. Right Ear: External ear normal.      Left Ear: External ear normal.      Nose: Nose normal.      Mouth/Throat:      Mouth: Mucous membranes are moist.   Eyes:      Conjunctiva/sclera: Conjunctivae normal.      Pupils: Pupils are equal, round, and reactive to light. Cardiovascular:      Rate and Rhythm: Normal rate and regular rhythm. Heart sounds: Normal heart sounds. Pulmonary:      Effort: Pulmonary effort is normal.      Breath sounds: Normal breath sounds. Abdominal:      General: Bowel sounds are normal. There is no distension. Palpations: Abdomen is soft. Tenderness: There is no abdominal tenderness. Musculoskeletal:         General: Normal range of motion. Cervical back: Normal range of motion and neck supple. Skin:     General: Skin is warm and dry. Neurological:      General: No focal deficit present. Mental Status: She is alert and oriented to person, place, and time. Mental status is at baseline. Psychiatric:         Mood and Affect: Mood normal.         Behavior: Behavior normal.         Thought Content: Thought content normal.         Judgment: Judgment normal.        Vitals:    02/03/23 1107   BP: 123/75   Pulse: 81   Temp: 97.9 °F (36.6 °C)   SpO2: 98%         Assessment:      Diagnosis Orders   1. Screening mammogram for breast cancer        2. Screening for thyroid disorder        3. Screening, lipid        4. Vitamin D deficiency        5. Vitamin B deficiency        6. Hyperglycemia        7. Class 3 severe obesity with body mass index (BMI) of 50.0 to 59.9 in adult, unspecified obesity type, unspecified whether serious comorbidity present ([facility]        8. Screening for HIV (human immunodeficiency virus)        9. Need for hepatitis C screening test        10. Pap smear for cervical cancer screening        11. Need for pneumococcal vaccination        12.  LEI (generalized anxiety disorder) 13. Urinary incontinence, unspecified type        14. S/P laparoscopic sleeve gastrectomy        15. Morbid obesity with BMI of 50.0-59.9, adult ([facility]        16. Degeneration of lumbar intervertebral disc        17. Bilateral chronic knee pain        18. DENISHA (obstructive sleep apnea)        19. Mood disorder ([facility]        20. Chronic back pain, unspecified back location, unspecified back pain laterality        21. OAB (overactive bladder)        22. Gastroesophageal reflux disease, unspecified whether esophagitis present              Plan:     Orders Placed This Encounter   Procedures    Pneumococcal, PCV20, PREVNAR 20, (age 25 yrs+), IM, PF    CBC    Comprehensive Metabolic Panel    Folate    Hemoglobin A1C    Hepatitis C Antibody    HIV Screen    Lipid Panel    T4, Free    TSH    Vitamin B12    Vitamin D 25 Hydroxy    Insulin, Free    Sedimentation Rate       Outpatient Encounter Medications as of 2/3/2023   Medication Sig Dispense Refill    famotidine (PEPCID) 20 MG tablet take 1 tablet by mouth twice a day 240 tablet 0    ketoconazole (NIZORAL) 2 % shampoo Apply 3-4 times weekly to scalp, leave on for five minutes prior to washing off 120 mL 2    triamcinolone (KENALOG) 0.025 % cream Apply to rash on face and ears twice daily, as needed, for scaling 80 g 2    fluocinonide (LIDEX) 0.05 % external solution Apply to scalp twice daily, as needed, for itching 60 mL 2    dicyclomine (BENTYL) 20 MG tablet Take 1 tablet by mouth every 8 hours as needed (diarrhea) 120 tablet 0    ondansetron (ZOFRAN) 4 MG tablet Take 1 tablet by mouth every 8 hours as needed for Nausea or Vomiting 30 tablet 2    [DISCONTINUED] aspirin EC 81 MG EC tablet Take 1 tablet by mouth in the morning and 1 tablet in the evening.  90 tablet 1    [DISCONTINUED] cefdinir (OMNICEF) 300 MG capsule Take 1 capsule by mouth 2 times daily (Patient not taking: Reported on 2/3/2023) 20 capsule 1    [DISCONTINUED] vitamin B-12 (CYANOCOBALAMIN) 500 MCG tablet Take 1 tablet by mouth daily (Patient not taking: Reported on 2/3/2023) 30 tablet 11    [DISCONTINUED] folic acid (FOLVITE) 1 MG tablet Take 1 tablet by mouth daily (Patient not taking: Reported on 2/3/2023) 30 tablet 0    [DISCONTINUED] vitamin D (ERGOCALCIFEROL) 1.25 MG (46674 UT) CAPS capsule Take 1 capsule by mouth once a week for 8 doses 8 capsule 0    [DISCONTINUED] metroNIDAZOLE (METROCREAM) 0.75 % cream Apply topically 2 times daily. 45 g 3    [DISCONTINUED] APLENZIN 174 MG TB24 take 1 tablet by mouth every morning      [DISCONTINUED] simethicone (MYLICON) 80 MG chewable tablet Take 1 tablet by mouth 4 times daily as needed for Flatulence 180 tablet 3    [DISCONTINUED] Multiple Vitamins-Minerals (THERAPEUTIC MULTIVITAMIN-MINERALS) tablet Take 1 tablet by mouth daily (Patient not taking: Reported on 2/3/2023)      [DISCONTINUED] Sulfacetamide Sodium, Acne, 10 % LOTN Apply to face daily (Patient not taking: Reported on 2/3/2023) 118 mL 2    [DISCONTINUED] escitalopram (LEXAPRO) 20 MG tablet Take 10 mg by mouth daily      [DISCONTINUED] albuterol (PROVENTIL) nebulizer solution 2.5 mg        No facility-administered encounter medications on file as of 2/3/2023.      20 minutes spent with patient counseling/educating on acute/chronic medical health problems, medications,  along with treatment options. Reviewed multiple labs/imaging/medications with patient during this time. Following Diet discussion/education was done on Weight Lose Diet. In addition Exercise plan and depression screen were discussed. Recent labs/imaging were discussed and reviewed with patient.

## 2023-02-03 NOTE — FLOWSHEET NOTE
[] [facility] ([facility] &  Therapy  [address]    [phone]  F: [phone]   [x] [address]  [facility] [address]   [address]  P: [phone]  F: [phone]  [] [address] &  Therapy  [address]  P: [phone]  F: [phone]   [] [address]  P: [phone]  F: [phone]  [] [facility]) - [facility] & Therapy  [address]  [place]: [phone]   F: [phone] [] [facility]  Outpatient Rehabilitation &  Therapy  [facility] [address]   [place]: [phone]  F: [phone]       Date:  2/3/2023  Patient Name:  Kimberly Cooper    :  1970  MRN: [id number]  Physician:  Tameka Burnett MD                              Insurance: [employer] (30 visits, auth after 30, 23 remaining)           Rehab Codes: M62.81, M62.9, Z73.6, R26.89, M25.60, Z74.0   Medical Diagnosis: C80.548 (ICD-10-CM) - Status post total left knee replacement  Onset date: 22 - DOS                            Next Dr's appt. : 6 weeks  Visit# / total visits: ;      Cancels/No Shows: 6/0    Subjective:    Pain:  [x] Yes  [] No Location: L knee Pain Rating: (0-10 scale) 7/10   Pain altered Tx:  [x] No  [] Yes  Action:  Comments: Pt arrived to physical therapy 15 mins late but able to accommodate. Pt reported prior to coming to therapy pt was walking around [place] for 20 mins with SPC \"to kill time\". Pt noted currently pain in L knee is rated 7/10 due to prolonged walking. Pt noted compliance with HEP and apply ice to manage pain/edema post-exs.          Objective:  Modalities: VASO in supine, 34 degrees, min compression, 15 minutes to B knees on 23 per pt request   Precautions:  Exercises: Bold completed   Exercise L knee Reps/ Time Weight/ Level Comments              NuStep  5'  L1  seat 8; no UE's              Standing          3 way hip B 2x10ea  A  bilaterally on 12/19/22    Mini squats 20x    incr reps 1/23    Heel raises 2x10        Marches-alt 2x10ea        Lunges B 10x2 ea    2 rounds and alternating on 1/23- held too much pain in R knee 1/26    Hamstring curls 2x10      Step ups  10x2 6\" New 12/21 increased step height 1/9; 2nd set 1/23   HS stretch on step B 2x30\"ea  6\"    Side stepping  30'eax2  New 1/23; inc distance 2/3   TKE  15x3s Blue  New 2/3   Tandem stance  30s ea   New 2/3-min to no sway   nBOS on foam w/ ball movements 10x ea  4#MB New 2/3-overhead, chest pass, rotation   Tandem stance on  foam 30s ea  New 2/3         Gait training  2 lap  No cane and cues for TKE with heel strike 1/23; resumed use of cane d/t inc pain/swelling 2/3         Supine      Quad sets 2x10x5\"     Heel slides 2x10x5\"     SAQ 2x10x3\" 1 lb Initiated resistance 12/9   Knee ext with OP 5x10\"     SLR w/quad  10x2  New 12/13; small range limited reps-2nd set 1/23     SL hip abd 15x 1 lb New 12/21   Knee hang  5' 15# New 12/21; incr time 1/23; inc wt 2/3   Hip flexor stretch  1'  New 1/23   HS stretch - L LE  3x30\" Strap                 Seated      HS curls 2x10 lime    LAQ 2x10 3#  Initiated resistance 12/9; limited range   Seated hip ABD 2x10 lime    Seated ISO ADD 2x10 Ball     Knee flexion stretch on EOB 10x5\"      Timed STS's 10x 18\" table New 12/21; 38 seconds some momentum used- 30 seconds on 1/23   Other:     Specific Instructions for next treatment: improve quad strength, improve knee flex/ext ROM, improve standing tolerance, gait training without AD           L AROM  11/29 L AROM  12/9 L AROM  12/13/22 L AROM  12/19/22 L AROM  12/21/22 L AROM  1/23/23 L AROM 1/26/23 2/3/23   Knee Flex 102 AAROM 100° 100° 102° AA  100° A 104°AA  101° A 105  A   105 AA  100 A  115 AA    Ext NT N/t N/t 4° A 3° A 2 A 2 A  3° Treatment Charges: Mins Units   []  Modalities     [x]  Ther Exercise 40 3   []  Manual Therapy     []  Ther Activities     []  Aquatics     [x]  Vasocompression 10 1   []  Other     Total Treatment time 50 4         Assessment: [x] Progressing toward goals. Hold NuStep warm up this date due to late arrival.  Completed heel slide followed by supine knee extension hang with increased weight to further restore to full L TKE. Pt with improved L knee flexion AAROM after heel slide and no change in knee extension ROM after knee hang. Cues provided with seated LAQ for proper L quad activation with added hold time to increase progression. Added resisted TKE with max cuing provided for proper technique and prevent increased ant/post/rotational trunk lean compensation. Pt continued to ambulate with SPC placed on L side due to increased pain and instability of RLE, stated \"my R knee hurts more than my L knee\". Ambulated 2 laps with SPC due to increased pain and swelling around L knee joint this date from 20 mins walking in [place] prior coming to therapy with cues provided for proper heel strike and increased step length. End session with 10 mins vasocompression to B knee to manage pain and edema. [] No change. [x] Other: No bed bugs present this date. [x] Patient would continue to benefit from skilled physical therapy services in order to: reduce pain, improve passive and active ROM, improve L LE strength, improve function, normalize gait, reduce edema, and improve LE power and gait speed. STG/LTG:  Problems:    [x] ? Pain: 0-8/10 pain   [x] ? ROM: decreased L knee flexion/extension A/PROM       [x] ? Strength:  Decreased L knee flexion strength   [x] ?  Function:   [x] Edema: see patellar measurements  [x] Gait Deviations: see above  [x] Other: 5TSTS, 19.5 seconds// 2mWT 215 feet, increased edema        Goals  MET NOT MET ON-  GOING  Details   Date Addressed:            STG: To be met in 10 treatments            1. ? Pain: Patient will self report worst L knee pain no greater than 3/10 in order to better tolerate ADLs/work activities with minimal dysfunction []  []  []      2. ? ROM: Patient will improve knee PROM from 0-120 degrees in order to demonstrate ability to move/reach in all planes unrestricted at PLOF []  []  []      3. Independent with Home Exercise Programs []  []  []      4. Demonstrate knowledge of fall risk prevention  []  []  []      5. ?Gait speed and activity tolerance: Patient to improve 2mWT to at least 315 ft with least restrictive assistive device and improved gait mechanics in order to reduce risk of  falls and return to PLOF []  []  []      6. ? Edema: patient to have L patellar measurements less than 2cm from initial eval in order to demonstrate edema reduction and allow for unrestricted knee motion []  []  []                              Date Addressed:            LTG: To be met in 16 treatments           1. Improve score on assessment tool KOOS from 65%% impairment to less than 40% impairment  []  []  []      2.  Strength: Pt will demonstrate improved L LE strength to 5/5  in order to demonstrate improved stability/strength necessary for unrestricted ADLs/work activities []  []  []      3. ?Gait speed and activity tolerance: Patient to improve 2mWT to at least 415 ft with least restrictive assistive device and improved gait mechanics in order to reduce risk of  falls and return to PLOF []  []  []      4. ? ROM: Patient will improve AROM from 0-120 degrees in order to demonstrate ability to move/reach in all planes unrestricted at PLOF []  []  []      5. ? Edema: patient to have L patellar measurements less than 3cm from initial eval in order to demonstrate edema reduction and allow for unrestricted knee motion []  []  []      6. ? Transfers and LE power: patient to improve 5TSTS score to less than 13 seconds in order to reduce fall risk and improve transfer abilities  []  [] []            Patient goals: to get strong and flexible       Pt. Education:  [x] Yes  [] No  [x] Reviewed Prior HEP/Ed  Method of Education: [x] Verbal  form, HEP compliance, knee hangs  [x] Demo  [] Written  Comprehension of Education:  [x] Verbalizes understanding. [x] Demonstrates understanding. [x] Needs review. for compliance   [] Demonstrates/verbalizes HEP/Ed previously given. Access Code: [email or web address]  URL: [facility]/  Date: 11/23/2022  Prepared by: Calli Scheuermann  Supine Bridge - 1 x daily - 7 x weekly - 2 sets - 10 reps  Long Sitting Calf Stretch with Strap - 1 x daily - 7 x weekly - 1 sets - 3 reps - 30 seconds hold  Supine Hamstring Stretch with Strap - 1 x daily - 7 x weekly - 1 sets - 3 reps - 30 seconds hold  Supine Heel Slide with Strap - 1 x daily - 7 x weekly - 2 sets - 10 reps - 5 seconds hold  Supine Quad Set - 1 x daily - 7 x weekly - 2 sets - 10 reps - 5 seconds hold  Supine Isometric Hamstring Set - 1 x daily - 7 x weekly - 2 sets - 10 reps - 5 seconds hold  Supine Gluteal Sets - 1 x daily - 7 x weekly - 2 sets - 10 reps - 5 seconds hold  Access Code: [email or web address]  URL: [facility]/  Date: 12/13/2022  Prepared by: Any Pretty  Supine Active Straight Leg Raise - 1 x daily - 7 x weekly - 3 sets - 10 reps  Standing March with Counter Support - 1 x daily - 7 x weekly - 3 sets - 10 reps  Standing Hip Flexion with Counter Support - 1 x daily - 7 x weekly - 3 sets - 10 reps  Standing Hip Extension with Counter Support - 1 x daily - 7 x weekly - 3 sets - 10 reps  Standing Hip Abduction with Counter Support - 1 x daily - 7 x weekly - 3 sets - 10 reps  Standing Hamstring Curl with Chair Support - 1 x daily - 7 x weekly - 3 sets - 10 reps      Plan: [x] Continue current frequency toward long and short term goals.     [x] Specific Instructions for subsequent treatments:  improve quad strength, improve knee flex/ext ROM, improve standing tolerance, gait training with and without AD      Time In: 1:45 PM         Time Out: 2:40 PM    Electronically signed by:   Brunilda Londono, PT

## 2023-02-08 ENCOUNTER — HOSPITAL ENCOUNTER (OUTPATIENT)
Dept: PHYSICAL THERAPY | Facility: CLINIC | Age: 53
Setting detail: THERAPIES SERIES
Discharge: HOME | End: 2023-02-08
Payer: MEDICAID

## 2023-02-08 PROCEDURE — 97016 VASOPNEUMATIC DEVICE THERAPY: CPT

## 2023-02-08 PROCEDURE — 97110 THERAPEUTIC EXERCISES: CPT

## 2023-02-08 NOTE — FLOWSHEET NOTE
[] [facility] ([facility]) - [facility] &  Therapy  [address]    [phone]  F: [phone]   [x] [address]  [facility]   [address]  P: [phone]  F: [phone]  [] [address] &  Therapy  [address]  P: [phone]  F: [phone]   [] [address]  P: [phone]  F: [phone]  [] [facility] ([facility]) - [facility] & Therapy  [address]  [place]: [phone]   F: [phone] [] [facility] [place] [facility]""  Outpatient Rehabilitation &  Therapy  [facility] [address]   [place]: [phone]  F: [phone]       Date:  2023  Patient Name:  Rick Urban    :  1970  MRN: [id number]  Physician:  Cristi Warner MD                              Insurance: [employer] (30 visits, auth after 30, 23 remaining)           Rehab Codes: M62.81, M62.9, Z73.6, R26.89, M25.60, Z74.0   Medical Diagnosis: L39.990 (ICD-10-CM) - Status post total left knee replacement  Onset date: 22 - DOS                            Next 's appt. :  Visit# / total visits: 10/16; Cancels/No Shows: 6/0    Subjective:    Pain:  [x] Yes  [] No Location: L knee Pain Rating: (0-10 scale) 5/10   Pain altered Tx:  [] No  [x] Yes  Action: minimal progressions d/t soreness from last visit   Comments: Pt arrives noting ongoing pain and swelling through L knee stating that she needs to make a follow up appt with her surgeon.  Pt states that she was very sore after previous session and \"did too much\"       Objective:  Modalities: VASO in supine, 34 degrees, min compression, 10 minutes to L knee on 23   Precautions:  Exercises: Bold completed   Exercise L knee  Reps/ Time Weight/ Level Comments              NuStep  5'  L1  seat 8; no UE's              Standing 3 way hip B 2x10ea  A  bilaterally on 12/19/22    Mini squats 20x    incr reps 1/23    Heel raises 2x10        Marches-alt 2x10ea        Lunges B 10x2 ea    2 rounds and alternating on 1/23- held too much pain in R knee 1/26    Hamstring curls 2x10      Step ups  10x2 6\" New 12/21 increased step height 1/9; 2nd set 1/23  1 set 2/8   HS stretch on step B 2x30\"ea  6\"    Side stepping  30'eax2  New 1/23; inc distance 2/3   TKE  15x3s Blue  New 2/3   Tandem stance  30s ea   New 2/3-min to no sway   nBOS on foam w/ ball movements 10x ea  6#MB New 2/3-overhead, chest pass, rotation   Tandem stance on  foam 30s ea  New 2/3         Gait training  2 lap  No cane and cues for TKE with heel strike 1/23; resumed use of cane d/t inc pain/swelling 2/3         Supine      Quad sets 2x10x5\"     Heel slides 2x10x5\"     SAQ 2x10x3\" 1 lb Initiated resistance 12/9   Knee ext with OP 3x1' 5# Ankle propped on foam roll    SLR w/quad  10x2  New 12/13; small range limited reps-2nd set 1/23     SL hip abd 15x 1 lb New 12/21   Knee hang  5' 15# New 12/21; incr time 1/23; inc wt 2/3   Hip flexor stretch  1'  New 1/23   HS stretch - L LE  3x30\" Strap                 Seated      HS curls 2x10 lime    LAQ 2x10 3#  Initiated resistance 12/9; limited range   Seated hip ABD 2x10 lime    Seated ISO ADD 2x10 Ball     Knee flexion stretch on EOB 10x5\"      Timed STS's 10x 18\" table New 12/21; 38 seconds some momentum used- 30 seconds on 1/23   Other:     Specific Instructions for next treatment: improve quad strength, improve knee flex/ext ROM, improve standing tolerance, gait training without AD           L AROM  11/29 L AROM  12/9 L AROM  12/13/22 L AROM  12/19/22 L AROM  12/21/22 L AROM  1/23/23 L AROM 1/26/23 2/3/23 2/8   Knee Flex 102 AAROM 100° 100° 102° AA  100° A 104°AA  101° A 105  A   105 AA  100 A  115 AA  100 A      Ext NT N/t N/t 4° A 3° A 2 A 2 A  3°  Lacking 3                     L knee suprapatellar: 60 cm   L knee mid patellar: 56 cm  L knee infrapatellar: 53 cm    Treatment Charges: Mins Units   []  Modalities     [x]  Ther Exercise 40 3   []  Manual Therapy     []  Ther Activities     []  Aquatics     [x]  Vasocompression 10 1   []  Other     Total Treatment time 50 4     Reassessment billed under therapeutic exercise       Assessment: [x] Progressing toward goals. Initiated session with nustep warm up followed by heel slides and knee extension stretching to improve knee ROM. Reassessed progress towards goals this date, pt is demonstrating improvements in reduction of knee edema, improved strength and mobility through LLE from initial evaluation. Pt is most limited by ongoing increase in knee pain up to 9/10 intermittently and limited knee AROM with pain at end range. Continued with charted exercises with minimal progressions this date due to increased soreness after last session. Continue to progress standing/functional strength and restore full knee range of motion at upcoming sessions as able. [] No change. [x] Other: No bed bugs present this date. [x] Patient would continue to benefit from skilled physical therapy services in order to: reduce pain, improve passive and active ROM, improve L LE strength, improve function, normalize gait, reduce edema, and improve LE power and gait speed. STG/LTG:  Problems:    [x] ? Pain: 0-8/10 pain   [x] ? ROM: decreased L knee flexion/extension A/PROM       [x] ? Strength:  Decreased L knee flexion strength   [x] ?  Function:   [x] Edema: see patellar measurements  [x] Gait Deviations: see above  [x] Other: 5TSTS, 19.5 seconds// 2mWT 215 feet, increased edema        Goals  MET NOT MET ON-  GOING  Details   Date Addressed: 2/8/23 by primary PT            STG: To be met in 10 treatments            1. ? Pain: Patient will self report worst L knee pain no greater than 3/10 in order to better tolerate ADLs/work activities with minimal dysfunction []  []  [x]  Up to 9/10 intermittently     2. ? ROM: Patient will improve knee PROM from 0-120 degrees in order to demonstrate ability to move/reach in all planes unrestricted at PLOF []  []  [x]  2-115 PROM     3. Independent with Home Exercise Programs [x]  []  []      4. Demonstrate knowledge of fall risk prevention  [x]  []  []      5. ?Gait speed and activity tolerance: Patient to improve 2mWT to at least 315 ft with least restrictive assistive device and improved gait mechanics in order to reduce risk of  falls and return to PLOF [x]  []  []   328 ft   6. ? Edema: patient to have L patellar measurements less than 2cm from initial eval in order to demonstrate edema reduction and allow for unrestricted knee motion [x]  []  []   MET improvements globally                            Date Addressed:            LTG: To be met in 16 treatments           1. Improve score on assessment tool KOOS from 65%% impairment to less than 40% impairment  []  []  []      2. Strength: Pt will demonstrate improved L LE strength to 5/5  in order to demonstrate improved stability/strength necessary for unrestricted ADLs/work activities []  []  []      3. ?Gait speed and activity tolerance: Patient to improve 2mWT to at least 415 ft with least restrictive assistive device and improved gait mechanics in order to reduce risk of  falls and return to PLOF []  []  []      4. ? ROM: Patient will improve AROM from 0-120 degrees in order to demonstrate ability to move/reach in all planes unrestricted at PLOF []  []  []      5. ? Edema: patient to have L patellar measurements less than 3cm from initial eval in order to demonstrate edema reduction and allow for unrestricted knee motion []  []  []      6. ? Transfers and LE power: patient to improve 5TSTS score to less than 13 seconds in order to reduce fall risk and improve transfer abilities  []  []  []            Patient goals: to get strong and flexible       Pt.  Education:  [x] Yes  [] No  [x] Reviewed Prior HEP/Ed  Method of Education: [x] Verbal  form, HEP compliance, knee hangs  [x] Demo  [] Written  Comprehension of Education:  [x] Verbalizes understanding. [x] Demonstrates understanding. [x] Needs review. for compliance   [] Demonstrates/verbalizes HEP/Ed previously given. Access Code: [email or web address]  URL: [email or web address]  Date: 11/23/2022  Prepared by: Calli Scheuermann  Supine Bridge - 1 x daily - 7 x weekly - 2 sets - 10 reps  Long Sitting Calf Stretch with Strap - 1 x daily - 7 x weekly - 1 sets - 3 reps - 30 seconds hold  Supine Hamstring Stretch with Strap - 1 x daily - 7 x weekly - 1 sets - 3 reps - 30 seconds hold  Supine Heel Slide with Strap - 1 x daily - 7 x weekly - 2 sets - 10 reps - 5 seconds hold  Supine Quad Set - 1 x daily - 7 x weekly - 2 sets - 10 reps - 5 seconds hold  Supine Isometric Hamstring Set - 1 x daily - 7 x weekly - 2 sets - 10 reps - 5 seconds hold  Supine Gluteal Sets - 1 x daily - 7 x weekly - 2 sets - 10 reps - 5 seconds hold  Access Code: [email or web address]  URL: [email or web address]  Date: 12/13/2022  Prepared by: Blossom Morris  Supine Active Straight Leg Raise - 1 x daily - 7 x weekly - 3 sets - 10 reps  Standing March with Counter Support - 1 x daily - 7 x weekly - 3 sets - 10 reps  Standing Hip Flexion with Counter Support - 1 x daily - 7 x weekly - 3 sets - 10 reps  Standing Hip Extension with Counter Support - 1 x daily - 7 x weekly - 3 sets - 10 reps  Standing Hip Abduction with Counter Support - 1 x daily - 7 x weekly - 3 sets - 10 reps  Standing Hamstring Curl with Chair Support - 1 x daily - 7 x weekly - 3 sets - 10 reps      Plan: [x] Continue current frequency toward long and short term goals.     [x] Specific Instructions for subsequent treatments:  improve quad strength, improve knee flex/ext ROM, improve standing tolerance, LLE stability       Time In: 3:05 pm          Time Out: 4:00 pm     Electronically signed by:  Karma Mead PT

## 2023-02-13 ENCOUNTER — HOSPITAL ENCOUNTER (OUTPATIENT)
Dept: PHYSICAL THERAPY | Facility: CLINIC | Age: 53
Setting detail: THERAPIES SERIES
Discharge: HOME | End: 2023-02-13
Payer: MEDICAID

## 2023-02-13 PROCEDURE — 97110 THERAPEUTIC EXERCISES: CPT

## 2023-02-13 PROCEDURE — 97016 VASOPNEUMATIC DEVICE THERAPY: CPT

## 2023-02-13 NOTE — FLOWSHEET NOTE
[] [facility] ([facility]) - [facility] &  Therapy  [address]    [phone]  F: [phone]   [x] [address]  [facility]   [address]  P: [phone]  F: [phone]  [] [address] &  Therapy  [address]  P: [phone]  F: [phone]   [] [address]  P: [phone]  F: [phone]  [] [facility] ([facility]) - [facility] & Therapy  [address]  [place]: [phone]   F: [phone] [] [facility]  Outpatient Rehabilitation &  Therapy  [facility] [address]   [place]: [phone]  F: [phone]       Date:  2023  Patient Name:  He Arboleda    :  1970  MRN: [id number]  Physician:  Bethany Cueto MD                              Insurance: [employer] (30 visits, auth after 30, 23 remaining)           Rehab Codes: M62.81, M62.9, Z73.6, R26.89, M25.60, Z74.0   Medical Diagnosis: K35.515 (ICD-10-CM) - Status post total left knee replacement  Onset date: 22 - DOS                            Next 's appt. :  Visit# / total visits: ; Cancels/No Shows: 6/0    Subjective:    Pain:  [x] Yes  [] No Location: L knee Pain Rating: (0-10 scale) 4/10   Pain altered Tx:  [x] No  [] Yes  Action:   Comments: Pt arrives 8 min late. States pain is 4/10 today. Pt reports soreness medial portion of knee.      Objective:  Modalities: VASO in supine, 34 degrees, min compression, 15 minutes to L knee on 23   Precautions:  Exercises: Vega Ahn completed   Exercise L knee  Reps/ Time Weight/ Level Comments              NuStep  6'  L1  seat 8; no UE's              Standing          3 way hip B 2x10ea  A  bilaterally on 22    Mini squats 20x    incr reps     Heel raises 2x10        Marches-alt 2x10ea        Lunges B 10x2 ea    2 rounds and alternating on 1/23- held too much pain in R knee 1/26    Hamstring curls 2x10      Step ups  10x2 6\" New 12/21 increased step height 1/9; 2nd set 1/23  1 set 2/8   HS stretch on step B 2x30\"ea  6\"    Gastroc stretc  2z30\"     Side stepping  30'eax2  New 1/23; inc distance 2/3   TKE  20x3s Blue  New 2/, in c2/13   Tandem stance  30s ea   New 2/3-min to no sway   nBOS on foam w/ ball movements 10x ea  6#MB New 2/3-overhead, chest pass, rotation   Tandem stance on  foam 30s ea  New 2/3         Gait training  2 lap  No cane and cues for TKE with heel strike 1/23; resumed use of cane d/t inc pain/swelling 2/3         Supine      Quad sets 2x10x5\"     Heel slides 2x10x5\"     SAQ 2x10x3\" 1 lb Initiated resistance 12/9, resumed and increased 2/13   Knee ext with OP 3x1' 5# Ankle propped on foam roll    SLR w/quad  10x2  New 12/13; small range limited reps-2nd set 1/23     SL hip abd 15x 1 lb New 12/21   Knee hang  5' 15# New 12/21; incr time 1/23; inc wt 2/3   Hip flexor stretch  1'  New 1/23   HS stretch - L LE  3x30\" Strap                 Seated      HS curls 2x10 lime    LAQ 2x15 3#  Initiated resistance 12/9; limited range, inc reps 2/13   Seated hip ABD 2x10 lime    Seated ISO ADD 2x10 Ball     Knee flexion stretch on EOB 10x5\"      Timed STS's 10x 18\" table New 12/21; 38 seconds some momentum used- 30 seconds on 1/23   Other:     Specific Instructions for next treatment: improve quad strength, improve knee flex/ext ROM, improve standing tolerance, gait training without AD           L AROM  11/29 L AROM  12/9 L AROM  12/13/22 L AROM  12/19/22 L AROM  12/21/22 L AROM  1/23/23 L AROM 1/26/23 2/3/23 2/8   Knee Flex 102 AAROM 100° 100° 102° AA  100° A 104°AA  101° A 105  A   105 AA  100 A  115 AA  100 A      Ext NT N/t N/t 4° A 3° A 2 A 2 A  3°  Lacking 3                     L knee suprapatellar: 60 cm   L knee mid patellar: 56 cm  L knee infrapatellar: 53 cm    Treatment Charges: Mins Units []  Modalities     [x]  Ther Exercise 40 3   []  Manual Therapy     []  Ther Activities     []  Aquatics     [x]  Vasocompression 15 1   []  Other     Total Treatment time 55 4       Assessment: [x] Progressing toward goals. Initiated session with nustep warm up followed by standing interventions this date. Small progressions made through out session with increased reps. Cueing intermittently provided to ensure proper execution of exercises. Ended with vasocompression to address soreness. [] No change. [x] Other: No bed bugs present this date. [x] Patient would continue to benefit from skilled physical therapy services in order to: reduce pain, improve passive and active ROM, improve L LE strength, improve function, normalize gait, reduce edema, and improve LE power and gait speed. STG/LTG:  Problems:    [x] ? Pain: 0-8/10 pain   [x] ? ROM: decreased L knee flexion/extension A/PROM       [x] ? Strength:  Decreased L knee flexion strength   [x] ? Function:   [x] Edema: see patellar measurements  [x] Gait Deviations: see above  [x] Other: 5TSTS, 19.5 seconds// 2mWT 215 feet, increased edema        Goals  MET NOT MET ON-  GOING  Details   Date Addressed: 2/8/23 by primary PT            STG: To be met in 10 treatments            1. ? Pain: Patient will self report worst L knee pain no greater than 3/10 in order to better tolerate ADLs/work activities with minimal dysfunction []  []  [x]  Up to 9/10 intermittently     2. ? ROM: Patient will improve knee PROM from 0-120 degrees in order to demonstrate ability to move/reach in all planes unrestricted at PLOF []  []  [x]  2-115 PROM     3. Independent with Home Exercise Programs [x]  []  []      4.  Demonstrate knowledge of fall risk prevention  [x]  []  []      5. ?Gait speed and activity tolerance: Patient to improve 2mWT to at least 315 ft with least restrictive assistive device and improved gait mechanics in order to reduce risk of  falls and return to PLOF [x]  []  []   328 ft   6. ? Edema: patient to have L patellar measurements less than 2cm from initial eval in order to demonstrate edema reduction and allow for unrestricted knee motion [x]  []  []   MET improvements globally                            Date Addressed:            LTG: To be met in 16 treatments           1. Improve score on assessment tool KOOS from 65%% impairment to less than 40% impairment  []  []  []      2. Strength: Pt will demonstrate improved L LE strength to 5/5  in order to demonstrate improved stability/strength necessary for unrestricted ADLs/work activities []  []  []      3. ?Gait speed and activity tolerance: Patient to improve 2mWT to at least 415 ft with least restrictive assistive device and improved gait mechanics in order to reduce risk of  falls and return to PLOF []  []  []      4. ? ROM: Patient will improve AROM from 0-120 degrees in order to demonstrate ability to move/reach in all planes unrestricted at PLOF []  []  []      5. ? Edema: patient to have L patellar measurements less than 3cm from initial eval in order to demonstrate edema reduction and allow for unrestricted knee motion []  []  []      6. ? Transfers and LE power: patient to improve 5TSTS score to less than 13 seconds in order to reduce fall risk and improve transfer abilities  []  []  []            Patient goals: to get strong and flexible       Pt. Education:  [] Yes  [x] No  [] Reviewed Prior HEP/Ed  Method of Education: [] Verbal  [] Demo  [] Written  Comprehension of Education:  [] Verbalizes understanding. [] Demonstrates understanding. [x] Needs review. for compliance   [] Demonstrates/verbalizes HEP/Ed previously given. Access Code: [email or web address]  URL: [email or web address]  Date: 11/23/2022  Prepared by: Luster Carrion Scheuermann  Supine Bridge - 1 x daily - 7 x weekly - 2 sets - 10 reps  Long Sitting Calf Stretch with Strap - 1 x daily - 7 x weekly - 1 sets - 3 reps - 30 seconds hold  Supine Hamstring Stretch with Strap - 1 x daily - 7 x weekly - 1 sets - 3 reps - 30 seconds hold  Supine Heel Slide with Strap - 1 x daily - 7 x weekly - 2 sets - 10 reps - 5 seconds hold  Supine Quad Set - 1 x daily - 7 x weekly - 2 sets - 10 reps - 5 seconds hold  Supine Isometric Hamstring Set - 1 x daily - 7 x weekly - 2 sets - 10 reps - 5 seconds hold  Supine Gluteal Sets - 1 x daily - 7 x weekly - 2 sets - 10 reps - 5 seconds hold  Access Code: [email or web address]  URL: [email or web address]  Date: 12/13/2022  Prepared by: Geneva Wong  Supine Active Straight Leg Raise - 1 x daily - 7 x weekly - 3 sets - 10 reps  Standing March with Counter Support - 1 x daily - 7 x weekly - 3 sets - 10 reps  Standing Hip Flexion with Counter Support - 1 x daily - 7 x weekly - 3 sets - 10 reps  Standing Hip Extension with Counter Support - 1 x daily - 7 x weekly - 3 sets - 10 reps  Standing Hip Abduction with Counter Support - 1 x daily - 7 x weekly - 3 sets - 10 reps  Standing Hamstring Curl with Chair Support - 1 x daily - 7 x weekly - 3 sets - 10 reps      Plan: [x] Continue current frequency toward long and short term goals.     [x] Specific Instructions for subsequent treatments:  improve quad strength, improve knee flex/ext ROM, improve standing tolerance, LLE stability       Time In: 11:08 am          Time Out: 12:10 pm     Electronically signed by:  Daril Lanes, PTA

## 2023-02-15 ENCOUNTER — HOSPITAL ENCOUNTER (OUTPATIENT)
Dept: PHYSICAL THERAPY | Facility: CLINIC | Age: 53
Setting detail: THERAPIES SERIES
Discharge: HOME | End: 2023-02-15
Payer: MEDICAID

## 2023-02-15 PROCEDURE — 97016 VASOPNEUMATIC DEVICE THERAPY: CPT

## 2023-02-15 PROCEDURE — 97110 THERAPEUTIC EXERCISES: CPT

## 2023-02-15 NOTE — FLOWSHEET NOTE
[] [facility] ([facility]) - [facility] &  Therapy  [address]    [phone]  F: [phone]   [x] [address]  [facility]   [address]  P: [phone]  F: [phone]  [] [address] &  Therapy  [address]  P: [phone]  F: [phone]   [] [address]  P: [phone]  F: [phone]  [] [facility] ([facility]) - [facility] & Therapy  [address]  [place]: [phone]   F: [phone] [] [facility]  Outpatient Rehabilitation &  Therapy  [facility] [address]   [place]: [phone]  F: [phone]       Date:  2/15/2023  Patient Name:  Adrienne Frederick    :  1970  MRN: [id number]  Physician:  Jhonny Jacques MD                              Insurance: [employer] (30 visits, auth after 30, 23 remaining)           Rehab Codes: M62.81, M62.9, Z73.6, R26.89, M25.60, Z74.0   Medical Diagnosis: F76.162 (ICD-10-CM) - Status post total left knee replacement  Onset date: 22 - DOS                            Next Dr's appt. : 23  Visit# / total visits: ; Cancels/No Shows: 6/0    Subjective:    Pain:  [x] Yes  [] No Location: L knee Pain Rating: (0-10 scale) 4/10   Pain altered Tx:  [x] No  [] Yes  Action:   Comments: Pt arrives with minimal pain this date. Pt does note increased soreness and swelling following previous session.      Objective:  Modalities: VASO in supine, 34 degrees, min compression, 15 minutes to L knee on 02/15/23   Precautions:  Exercises: Maycol Flax completed   Exercise L knee  Reps/ Time Weight/ Level Comments              NuStep  5'  L1  seat 8; no UE's              Standing          3 way hip B 2x10ea  A  bilaterally on 22    Mini squats 20x    incr reps     Heel raises 2x10 Marches-alt 2x10ea        Lunges B 10x2 ea    2 rounds and alternating on 1/23- held too much pain in R knee 1/26    Hamstring curls 2x10      Step ups  10x2 6\" New 12/21 increased step height 1/9; 2nd set 1/23  1 set 2/8   Step Down 10x 4\" New 2/15   Lateral step up 10x 4\" New 2/15   HS stretch on step B 2x30\"ea  6\"    Gastroc stretc  2z30\"     Side stepping  30'eax2  New 1/23; inc distance 2/3   TKE  20x3s Blue  New 2/, in c2/13   Tandem stance  30s ea   New 2/3-min to no sway   nBOS on foam w/ ball movements 10x ea  6#MB New 2/3-overhead, chest pass, rotation   Tandem stance on  foam 30s ea  New 2/3         Gait training  2 lap  No cane and cues for TKE with heel strike 1/23; resumed use of cane d/t inc pain/swelling 2/3         Supine      Quad sets 2x10x5\"     Heel slides 2x10x5\"     SAQ 2x10x3\" 1 lb Initiated resistance 12/9, resumed and increased 2/13   Knee ext with OP 3x1' 5# Ankle propped on foam roll    SLR w/quad  10x2  New 12/13; small range limited reps-2nd set 1/23     SL hip abd 15x 1 lb New 12/21   Knee hang  5' 15# New 12/21; incr time 1/23; inc wt 2/3   Hip flexor stretch  1'  New 1/23   HS stretch - L LE  3x30\" Strap                 Seated      HS curls 2x10 lime    LAQ 2x15 3#  Initiated resistance 12/9; limited range, inc reps 2/13   Seated hip ABD 2x10 lime    Seated ISO ADD 2x10 Ball     Knee flexion stretch on EOB 10x5\"      Timed STS's 10x 18\" table New 12/21; 38 seconds some momentum used- 30 seconds on 1/23   Other:     Specific Instructions for next treatment: improve quad strength, improve knee flex/ext ROM, improve standing tolerance, gait training without AD           L AROM  11/29 L AROM  12/9 L AROM  12/13/22 L AROM  12/19/22 L AROM  12/21/22 L AROM  1/23/23 L AROM 1/26/23 2/3/23 2/8 2/15   Knee Flex 102 AAROM 100° 100° 102° AA  100° A 104°AA  101° A 105  A   105 AA  100 A  115 AA  100 A    108 AA    Ext NT N/t N/t 4° A 3° A 2 A 2 A  3°  Lacking 3  Lacking 2 L knee suprapatellar: 60 cm   L knee mid patellar: 56 cm  L knee infrapatellar: 53 cm    Treatment Charges: Mins Units   []  Modalities     [x]  Ther Exercise 40 3   []  Manual Therapy     []  Ther Activities     []  Aquatics     [x]  Vasocompression 15 1   []  Other     Total Treatment time 55 4       Assessment: [x] Progressing toward goals. Initiated session with nustep warm up followed by mat table stretching to improve knee ROM. Knee extension does improve to lacking 2 degrees following stretching interventions. Pt continues to note slight increase in soreness at end range flexion and extension. Able to progress standing interventions with step downs and lateral step ups with good tolerance. Pt notes fatigue and some soreness through R leg post session. Ended again with vaso to reduce soreness and effusion post exercise. [] No change. [x] Other: No bed bugs present this date. [x] Patient would continue to benefit from skilled physical therapy services in order to: reduce pain, improve passive and active ROM, improve L LE strength, improve function, normalize gait, reduce edema, and improve LE power and gait speed. STG/LTG:  Problems:    [x] ? Pain: 0-8/10 pain   [x] ? ROM: decreased L knee flexion/extension A/PROM       [x] ? Strength:  Decreased L knee flexion strength   [x] ?  Function:   [x] Edema: see patellar measurements  [x] Gait Deviations: see above  [x] Other: 5TSTS, 19.5 seconds// 2mWT 215 feet, increased edema        Goals  MET NOT MET ON-  GOING  Details   Date Addressed: 2/8/23 by primary PT            STG: To be met in 10 treatments            1. ? Pain: Patient will self report worst L knee pain no greater than 3/10 in order to better tolerate ADLs/work activities with minimal dysfunction []  []  [x]  Up to 9/10 intermittently     2. ? ROM: Patient will improve knee PROM from 0-120 degrees in order to demonstrate ability to move/reach in all planes unrestricted at PLOF []  []  [x] 2-115 PROM     3. Independent with Home Exercise Programs [x]  []  []      4. Demonstrate knowledge of fall risk prevention  [x]  []  []      5. ?Gait speed and activity tolerance: Patient to improve 2mWT to at least 315 ft with least restrictive assistive device and improved gait mechanics in order to reduce risk of  falls and return to PLOF [x]  []  []   328 ft   6. ? Edema: patient to have L patellar measurements less than 2cm from initial eval in order to demonstrate edema reduction and allow for unrestricted knee motion [x]  []  []   MET improvements globally                            Date Addressed:            LTG: To be met in 16 treatments           1. Improve score on assessment tool KOOS from 65%% impairment to less than 40% impairment  []  []  []      2. Strength: Pt will demonstrate improved L LE strength to 5/5  in order to demonstrate improved stability/strength necessary for unrestricted ADLs/work activities []  []  []      3. ?Gait speed and activity tolerance: Patient to improve 2mWT to at least 415 ft with least restrictive assistive device and improved gait mechanics in order to reduce risk of  falls and return to PLOF []  []  []      4. ? ROM: Patient will improve AROM from 0-120 degrees in order to demonstrate ability to move/reach in all planes unrestricted at PLOF []  []  []      5. ? Edema: patient to have L patellar measurements less than 3cm from initial eval in order to demonstrate edema reduction and allow for unrestricted knee motion []  []  []      6. ? Transfers and LE power: patient to improve 5TSTS score to less than 13 seconds in order to reduce fall risk and improve transfer abilities  []  []  []            Patient goals: to get strong and flexible       Pt. Education:  [] Yes  [x] No  [] Reviewed Prior HEP/Ed  Method of Education: [] Verbal  [] Demo  [] Written  Comprehension of Education:  [] Verbalizes understanding. [] Demonstrates understanding. [x] Needs review. for compliance   [] Demonstrates/verbalizes HEP/Ed previously given. Access Code: [email or web address]  URL: [email or web address]  Date: 11/23/2022  Prepared by: Calli Scheuermann  Supine Bridge - 1 x daily - 7 x weekly - 2 sets - 10 reps  Long Sitting Calf Stretch with Strap - 1 x daily - 7 x weekly - 1 sets - 3 reps - 30 seconds hold  Supine Hamstring Stretch with Strap - 1 x daily - 7 x weekly - 1 sets - 3 reps - 30 seconds hold  Supine Heel Slide with Strap - 1 x daily - 7 x weekly - 2 sets - 10 reps - 5 seconds hold  Supine Quad Set - 1 x daily - 7 x weekly - 2 sets - 10 reps - 5 seconds hold  Supine Isometric Hamstring Set - 1 x daily - 7 x weekly - 2 sets - 10 reps - 5 seconds hold  Supine Gluteal Sets - 1 x daily - 7 x weekly - 2 sets - 10 reps - 5 seconds hold  Access Code: [email or web address]  URL: [email or web address]  Date: 12/13/2022  Prepared by: Katey Schroeder  Supine Active Straight Leg Raise - 1 x daily - 7 x weekly - 3 sets - 10 reps  Standing March with Counter Support - 1 x daily - 7 x weekly - 3 sets - 10 reps  Standing Hip Flexion with Counter Support - 1 x daily - 7 x weekly - 3 sets - 10 reps  Standing Hip Extension with Counter Support - 1 x daily - 7 x weekly - 3 sets - 10 reps  Standing Hip Abduction with Counter Support - 1 x daily - 7 x weekly - 3 sets - 10 reps  Standing Hamstring Curl with Chair Support - 1 x daily - 7 x weekly - 3 sets - 10 reps      Plan: [x] Continue current frequency toward long and short term goals.     [x] Specific Instructions for subsequent treatments:  improve quad strength, improve knee flex/ext ROM, improve standing tolerance, LLE stability       Time In: 2:06 pm            Time Out: 3:06 pm     Electronically signed by:  Daxa Hilton PT

## 2023-02-21 ENCOUNTER — OFFICE VISIT (OUTPATIENT)
Dept: ORTHOPEDIC SURGERY | Age: 53
End: 2023-02-21
Payer: MEDICAID

## 2023-02-21 ENCOUNTER — HOSPITAL ENCOUNTER (OUTPATIENT)
Dept: PHYSICAL THERAPY | Facility: CLINIC | Age: 53
Setting detail: THERAPIES SERIES
Discharge: HOME | End: 2023-02-21
Payer: MEDICAID

## 2023-02-21 VITALS — BODY MASS INDEX: 48.82 KG/M2 | RESPIRATION RATE: 14 BRPM | HEIGHT: 65 IN | WEIGHT: 293 LBS

## 2023-02-21 DIAGNOSIS — Z96.652 STATUS POST TOTAL LEFT KNEE REPLACEMENT: Primary | ICD-10-CM

## 2023-02-21 PROCEDURE — 97110 THERAPEUTIC EXERCISES: CPT

## 2023-02-21 PROCEDURE — 99213 OFFICE O/P EST LOW 20 MIN: CPT | Performed by: PHYSICIAN ASSISTANT

## 2023-02-21 PROCEDURE — 97140 MANUAL THERAPY 1/> REGIONS: CPT

## 2023-02-21 PROCEDURE — 97016 VASOPNEUMATIC DEVICE THERAPY: CPT

## 2023-02-21 RX ORDER — MELOXICAM 15 MG/1
15 TABLET ORAL DAILY
Qty: 90 TABLET | Refills: 0 | Status: SHIPPED | OUTPATIENT
Start: 2023-02-21

## 2023-02-21 NOTE — FLOWSHEET NOTE
[] [facility] ([facility]) - [facility] &  Therapy  [address]    [phone]  F: [phone]   [x] [address]  [facility] [address]   [address]  P: [phone]  F: [phone]  [] [address] &  Therapy  [address] [place] [address]  P: [phone]  F: [phone]   [] [address]  P: [phone]  F: [phone]  [] [facility] ([facility]) - [facility] & Therapy  [address]  [place]: [phone]   F: [phone] [] [address]  Outpatient Rehabilitation &  Therapy  [facility] [address]   [place]: [phone]  F: [phone]       Date:  2023  Patient Name:  Tegan Ahn    :  1970  MRN: [id number]  Physician:  José Miguel Schneider MD                              Insurance: [employer] (30 visits, auth after 30, 23 remaining)           Rehab Codes: M62.81, M62.9, Z73.6, R26.89, M25.60, Z74.0   Medical Diagnosis: U86.888 (ICD-10-CM) - Status post total left knee replacement  Onset date: 22 - DOS                            Next Dr's appt. : 23  Visit# / total visits: 16; Cancels/No Shows: 6/0    Subjective:    Pain:  [x] Yes  [] No Location: L knee Pain Rating: (0-10 scale) 3/10   Pain altered Tx:  [x] No  [] Yes  Action:   Comments: Pt arrives with pain, soreness, and tightness this date. Notes doctor is prescribing another anti-inflammatory medication d/t continued swelling. Pt saw doctor this morning and suggested use of massage during therapy at future sessions. Reports she has been regularly icing and completing HEP.        Objective:  Modalities: VASO in supine, 34 degrees, min compression, 15 minutes to L knee on 23   Precautions:  Exercises: Bold completed   Exercise L knee  Reps/ Time Weight/ Level Comments NuStep  5'  L1  seat 8; no UE's              Standing          3 way hip B 2x10ea  A  bilaterally on 12/19/22    Mini squats 20x    incr reps 1/23    Heel raises 2x10        Marches-alt 2x10ea        Lunges B 10x2 ea    2 rounds and alternating on 1/23- held too much pain in R knee 1/26    Hamstring curls 2x10      Step ups  10x2 6\" New 12/21 increased step height 1/9; 2nd set 1/23  1 set 2/8   Step Down 12x 4\" New 2/15   Lateral step up 12x 4\" New 2/15   HS stretch on step B 2x30\"ea  6\"    Gastroc stretch  2x30\"     Side stepping  30'eax2  New 1/23; inc distance 2/3   TKE  20x3s Blue  New 2/, inc 2/13   Tandem stance  30s ea   New 2/3-min to no sway   nBOS on foam w/ ball movements 10x ea  6#MB New 2/3-overhead, chest pass, rotation   Tandem stance on  foam 30s ea  New 2/3         Gait training  2 lap  No cane and cues for TKE with heel strike 1/23; resumed use of cane d/t inc pain/swelling 2/3         Supine      Quad sets 2x10x5\"     Heel slides 2x10x5\"     SAQ 2x10x3\" 1# Initiated resistance 12/9, resumed and increased 2/13   Knee ext with OP 3' 5# Ankle propped on half foam roll - progressed time 2/21   Knee ext OP 10x10\"  New 2/21   SLR w/quad  10x2  New 12/13; small range limited reps-2nd set 1/23     SL hip abd 15x 1 lb New 12/21   Knee hang  5' 15# New 12/21; incr time 1/23; inc wt 2/3   Hip flexor stretch  1'  New 1/23   HS stretch - L LE  3x30\" Strap           Prone    New 2/21   HS curls  2x10     Reverse TKE 10x3\"  Bolster at anterior ankle          Seated      HS curls 2x10 lime    LAQ 2x15 3#  Initiated resistance 12/9; limited range, inc reps 2/13   Seated hip ABD 2x10 lime    Seated ISO ADD 2x10 Ball     Knee flexion stretch on EOB 10x5\"      Timed STS's 10x 18\" table New 12/21; 38 seconds some momentum used- 30 seconds on 1/23   Other: manual via roller stick to L quad, IT band and calf in supine/SL/prone x10'     Specific Instructions for next treatment: improve quad strength, improve knee flex/ext ROM, improve standing tolerance, gait training without AD           L AROM  11/29 L AROM  12/9 L AROM  12/13/22 L AROM  12/19/22 L AROM  12/21/22 L AROM  1/23/23 L AROM 1/26/23 2/3/23 2/8 2/15 2/21   Knee Flex 102 AAROM 100° 100° 102° AA  100° A 104°AA  101° A 105  A   105 AA  100 A  115 AA  100 A    108 AA  100 A  108 AA   Ext NT N/t N/t 4° A 3° A 2 A 2 A  3°  Lacking 3  Lacking 2 lacking 2                       L knee suprapatellar: 60 cm   L knee mid patellar: 56 cm  L knee infrapatellar: 53 cm    Treatment Charges: Mins Units   []  Modalities     [x]  Ther Exercise 40 2   [x]  Manual Therapy 10 1   []  Ther Activities     []  Aquatics     [x]  Vasocompression 15 1   []  Other     Total Treatment time 65 4       Assessment: [x] Progressing toward goals. Initiated warm up on nu step followed by ROM mat exercises. Progressed hold time for weighted knee extension hangs then applied OP to progress knee extension ROM. Implemented massage per 's request via roller stick to L quad, IT band, and calf to increase tissue extensibility. Added prone reverse TKEs and knee extension with OP to progress knee extension ROM. Maintained same knee measurements as last session. Ended with vaso for management of pain and edema. [] No change. [x] Other: No bed bugs present this date. [x] Patient would continue to benefit from skilled physical therapy services in order to: reduce pain, improve passive and active ROM, improve L LE strength, improve function, normalize gait, reduce edema, and improve LE power and gait speed. STG/LTG:  Problems:    [x] ? Pain: 0-8/10 pain   [x] ? ROM: decreased L knee flexion/extension A/PROM       [x] ? Strength:  Decreased L knee flexion strength   [x] ?  Function:   [x] Edema: see patellar measurements  [x] Gait Deviations: see above  [x] Other: 5TSTS, 19.5 seconds// 2mWT 215 feet, increased edema        Goals  MET NOT MET ON-  GOING  Details   Date Addressed: 2/8/23 by primary PT            STG: To be met in 10 treatments            1. ? Pain: Patient will self report worst L knee pain no greater than 3/10 in order to better tolerate ADLs/work activities with minimal dysfunction []  []  [x]  Up to 9/10 intermittently     2. ? ROM: Patient will improve knee PROM from 0-120 degrees in order to demonstrate ability to move/reach in all planes unrestricted at PLOF []  []  [x]  2-115 PROM     3. Independent with Home Exercise Programs [x]  []  []      4. Demonstrate knowledge of fall risk prevention  [x]  []  []      5. ?Gait speed and activity tolerance: Patient to improve 2mWT to at least 315 ft with least restrictive assistive device and improved gait mechanics in order to reduce risk of  falls and return to PLOF [x]  []  []   328 ft   6. ? Edema: patient to have L patellar measurements less than 2cm from initial eval in order to demonstrate edema reduction and allow for unrestricted knee motion [x]  []  []   MET improvements globally                            Date Addressed:            LTG: To be met in 16 treatments           1. Improve score on assessment tool KOOS from 65%% impairment to less than 40% impairment  []  []  []      2.  Strength: Pt will demonstrate improved L LE strength to 5/5  in order to demonstrate improved stability/strength necessary for unrestricted ADLs/work activities []  []  []      3. ?Gait speed and activity tolerance: Patient to improve 2mWT to at least 415 ft with least restrictive assistive device and improved gait mechanics in order to reduce risk of  falls and return to PLOF []  []  []      4. ? ROM: Patient will improve AROM from 0-120 degrees in order to demonstrate ability to move/reach in all planes unrestricted at PLOF []  []  []      5. ? Edema: patient to have L patellar measurements less than 3cm from initial eval in order to demonstrate edema reduction and allow for unrestricted knee motion []  []  []      6. ? Transfers and LE power: patient to improve 5TSTS score to less than 13 seconds in order to reduce fall risk and improve transfer abilities  []  []  []            Patient goals: to get strong and flexible       Pt. Education:  [] Yes  [x] No  [] Reviewed Prior HEP/Ed  Method of Education: [] Verbal  [] Demo  [] Written  Comprehension of Education:  [] Verbalizes understanding. [] Demonstrates understanding. [x] Needs review. for compliance   [] Demonstrates/verbalizes HEP/Ed previously given. Access Code: [email or web address]  URL: [employer]/  Date: 11/23/2022  Prepared by: Calli Scheuermann  Supine Bridge - 1 x daily - 7 x weekly - 2 sets - 10 reps  Long Sitting Calf Stretch with Strap - 1 x daily - 7 x weekly - 1 sets - 3 reps - 30 seconds hold  Supine Hamstring Stretch with Strap - 1 x daily - 7 x weekly - 1 sets - 3 reps - 30 seconds hold  Supine Heel Slide with Strap - 1 x daily - 7 x weekly - 2 sets - 10 reps - 5 seconds hold  Supine Quad Set - 1 x daily - 7 x weekly - 2 sets - 10 reps - 5 seconds hold  Supine Isometric Hamstring Set - 1 x daily - 7 x weekly - 2 sets - 10 reps - 5 seconds hold  Supine Gluteal Sets - 1 x daily - 7 x weekly - 2 sets - 10 reps - 5 seconds hold  Access Code: [email or web address]  URL: [employer]/  Date: 12/13/2022  Prepared by: Tony Lindo  Supine Active Straight Leg Raise - 1 x daily - 7 x weekly - 3 sets - 10 reps  Standing March with Counter Support - 1 x daily - 7 x weekly - 3 sets - 10 reps  Standing Hip Flexion with Counter Support - 1 x daily - 7 x weekly - 3 sets - 10 reps  Standing Hip Extension with Counter Support - 1 x daily - 7 x weekly - 3 sets - 10 reps  Standing Hip Abduction with Counter Support - 1 x daily - 7 x weekly - 3 sets - 10 reps  Standing Hamstring Curl with Chair Support - 1 x daily - 7 x weekly - 3 sets - 10 reps      Plan: [x] Continue current frequency toward long and short term goals.     [x] Specific Instructions for subsequent treatments:  improve quad strength, improve knee flex/ext ROM, improve standing tolerance, LLE stability, consider manual massage via hypervolt if tolerated        Time In: 1:05 pm          Time Out: 2:20 pm     Electronically signed by:  PONCHO Garcia   This documentation has been reviewed and entirety of treatment session with direct supervision by clinical instructor, Eliu Gipson PTA

## 2023-02-21 NOTE — PROGRESS NOTES
[address] [facility], [address] [facility] [address], [address] [facility], [address] [facility]           Dept Phone: [phone]           Dept Fax:  [phone] [address] [facility]          Dept Phone: [phone]           Dept Fax:  [phone]      Chief Compliant:  Chief Complaint   Patient presents with    Knee Pain     left    Follow-up        History of Present Illness:  Judy Null returns today. This is a 50 y.o. female who presents to the clinic today for follow up of left knee pain. Patient with history of left total knee arthroplasty on 11/1/2022. She believes she is making progress but reports she was recommended to follow-up for encouragement of PT as she continues to have discomfort with physical therapy exercises. Of note patient was recommended to follow-up in 6 weeks since Dr. Tiffanie Meeks last appointment on 11/16/2020 to help but has yet to be seen in the last 3 months since. According to PT notes patient appears to continue to make progress with range of motion but their main concern is continued pain with exercises. Patient is ambulating without any assist devices feel like she does have quite a bit of strength in this left knee. She does note a lot of her pain is actually in the right knee given the severity of arthritis over there but does continue to note feelings of stiffness in the left. She denies any new injury or trauma no knee joint warmth, redness, fever or chills. .       Review of Systems   Constitutional: Negative for fever, chills, sweats, recent illness, or recent injury. Neurological: Negative for headaches, numbness, or weakness. Integumentary: Negative for rash, itching, ecchymosis, abrasions, or laceration.    Musculoskeletal: Positive for Knee Pain (left) and Follow-up       Physical Exam:  Constitutional: Patient is oriented to person, place, and time. Patient appears well-developed and well nourished. Musculoskeletal:    Left Knee    Gait:  Antalgic without assistive devices   Incision:  {Well healed without any incisional erythema. Tenderness:  None   Flexion ROM:  105   Extension ROM:  -3   Effusion:  Mild   DVT Evaluation:  No evidence of DVT seen on physical exam.     Patient with some mild discomfort with terminal flexion and extension. Unable to appreciate any instability with varus or valgus stress. Neurological: Patient is alert and oriented to person, place, and time. Normal strenght. No sensory deficit. Skin: Skin is warm and dry  Psychiatric: Behavior is normal. Thought content normal.  Nursing note and vitals reviewed. Labs and Imaging:     XR taken today:  No results found. X-rays taken in clinic and preliminarily reviewed by me 2/21/23:   Repeat x-rays today demonstrate patient status post left total knee arthroplasty components. Excellent position without evidence of hardware complication or loosening. Right knee with severe osteoarthritis approaching bone-on-bone degenerative changes of the right knee. Assessment and Plan:  1. Status post total left knee replacement              PLAN:  This is a 50 y.o. female who presents to the clinic today for follow up status post left total knee arthroplasty on 11/1/2022. 1.  Patient is given reassurance that radiographically and on examination I am unable to appreciate any obvious instability or loosening. She is educated that although her pain is slightly higher than we would anticipate at 3 months she is still early in the process and I anticipate this to continue to improve. 2.  Patient's range of motion about 3 to 105 degrees on exam well within acceptable limits plus she is outside the window for manipulation under anesthesia although I do not think this is required at this time.   3.  Patient is not currently taking anything for pain or any anti-inflammatories I do believe she would benefit from daily NSAID not only given her swelling in the left knee but her chronic pain in the right knee secondary to osteoarthritis. Meloxicam 15 mg daily is electronically sent to patient pharmacy. 4.  Continue with physical therapy  5. Follow-up in 2 months however patient may call or return sooner for any questions or concerns      Please note that this chart was generated using voice recognition Dragon dictation software. Although every effort was made to ensure the accuracy of this automated transcription, some errors in transcription may have occurred.

## 2023-02-24 ENCOUNTER — HOSPITAL ENCOUNTER (OUTPATIENT)
Dept: PHYSICAL THERAPY | Facility: CLINIC | Age: 53
Setting detail: THERAPIES SERIES
Discharge: HOME | End: 2023-02-24
Payer: MEDICAID

## 2023-02-24 PROCEDURE — 97110 THERAPEUTIC EXERCISES: CPT

## 2023-02-24 PROCEDURE — 97140 MANUAL THERAPY 1/> REGIONS: CPT

## 2023-02-24 PROCEDURE — 97016 VASOPNEUMATIC DEVICE THERAPY: CPT

## 2023-02-24 NOTE — FLOWSHEET NOTE
[] [facility] ([facility]) - [facility] &  Therapy  [address]    [phone]  F: [phone]   [x] [address]  [facility] [address]   [address]  P: [phone]  F: [phone]  [] [address] &  Therapy  [address]  P: [phone]  F: [phone]   [] [address]  P: [phone]  F: [phone]  [] [facility]) - [facility] & Therapy  [address]  [place]: [phone]   F: [phone] [] [facility]  Outpatient Rehabilitation &  Therapy  [facility] [address]   [place]: [phone]  F: [phone]       Date:  2023  Patient Name:  Karla Sanchez    :  1970  MRN: [id number]  Physician:  Sissy Astorga MD                              Insurance: [employer] (30 visits, auth after 30, 23 remaining)           Rehab Codes: M62.81, M62.9, Z73.6, R26.89, M25.60, Z74.0   Medical Diagnosis: B17.929 (ICD-10-CM) - Status post total left knee replacement  Onset date: 22 - DOS                            Next 's appt. : 23  Visit# / total visits: 14/16; Cancels/No Shows: 6/0    Subjective:    Pain:  [x] Yes  [] No Location: L knee Pain Rating: (0-10 scale) 3/10   Pain altered Tx:  [x] No  [] Yes  Action:   Comments: Pt states the prescribed anti-inflammatory medication is helping. Pt continues to report good compliance with HEP and icing.        Objective:  Modalities: VASO in supine, 34 degrees, min compression, 15 minutes to L knee on 23   Precautions:  Exercises: Mitzi Presley completed   Exercise L knee  Reps/ Time Weight/ Level Comments              NuStep  5'  L1  seat 8; no UE's              Standing          3 way hip B 2x10ea  A  bilaterally on 22    Mini squats 20x    incr reps     Heel raises 2x10 Marches-alt 2x10ea        Lunges B 10x2 ea    2 rounds and alternating on 1/23- held too much pain in R knee 1/26    Hamstring curls 2x10      Step ups  10x2 6\" New 12/21 increased step height 1/9; 2nd set 1/23  1 set 2/8   Step Down 12x 4\" New 2/15   Lateral step up 12x 4\" New 2/15   HS stretch on step B 2x30\"ea  6\"    Gastroc stretch  3x30\"     Side stepping  30'eax2  New 1/23; inc distance 2/3   TKE  20x3s Blue  New 2/, inc 2/13   Tandem stance  30s ea   New 2/3-min to no sway   nBOS on foam w/ ball movements 10x ea  6#MB New 2/3-overhead, chest pass, rotation   Tandem stance on  foam 30s ea  New 2/3         Gait training  2 lap  No cane and cues for TKE with heel strike 1/23; resumed use of cane d/t inc pain/swelling 2/3         Supine      Quad sets 2x10x5\"     Heel slides 2x10x5\"     SAQ 2x10x3\" 1# Initiated resistance 12/9, resumed and increased 2/13   Knee ext with OP 3' 5# Ankle propped on half foam roll - progressed time 2/21   Knee ext OP 10x10\"  New 2/21   SLR w/quad  10x2  New 12/13; small range limited reps-2nd set 1/23     SL hip abd 15x 1 lb New 12/21   Knee hang  5' 15# New 12/21; incr time 1/23; inc wt 2/3   Hip flexor stretch  1'  New 1/23   HS stretch - L LE  3x30\" Strap           Prone    New 2/21   HS curls  2x10     Reverse TKE 10x3\"  Bolster at anterior ankle          Seated      HS curls 2x10 lime    LAQ 2x15 3#  Initiated resistance 12/9; limited range, inc reps 2/13   Seated hip ABD 2x10 lime    Seated ISO ADD 2x10 Ball     Knee flexion stretch on EOB 10x5\"      Timed STS's 10x 18\" table New 12/21; 38 seconds some momentum used- 30 seconds on 1/23   Other: manual via roller stick to L quad, IT band and calf in supine/SL/prone x10'     Specific Instructions for next treatment: improve quad strength, improve knee flex/ext ROM, improve standing tolerance, gait training without AD           L AROM  11/29 L AROM  12/9 L AROM  12/13/22 L AROM  12/19/22 L AROM  12/21/22 L AROM  1/23/23 L AROM 1/26/23 2/3/23 2/8 2/15 2/21 2/24   Knee Flex 102 AAROM 100° 100° 102° AA  100° A 104°AA  101° A 105  A   105 AA  100 A  115 AA  100 A    108 AA  100 A  108  AA    Ext NT N/t N/t 4° A 3° A 2 A 2 A  3°  Lacking 3  Lacking 2 lacking 2  Lacking 1 deg                        L knee suprapatellar: 60 cm   L knee mid patellar: 56 cm  L knee infrapatellar: 53 cm    Treatment Charges: Mins Units   []  Modalities     [x]  Ther Exercise 24 1   [x]  Manual Therapy 10 1   []  Ther Activities     []  Aquatics     [x]  Vasocompression 15 1   []  Other     Total Treatment time 49 3       Assessment: [x] Progressing toward goals. Started treatment with Nustep warm up followed by standing gastroc, HS, and knee flexion stretch to improve L ROM and tissue extensibility. Resumed manual via roller stick as pt believes this to be beneficial. Pt is able to obtain 110 deg flexion and is lacking 1 deg extension post manual. Completed step exercises and squats to work on quad strength with mild muscular fatigue noted however, no increase in pain. Ended with vaso to reduce pain and edema. [] No change. [x] Other: No bed bugs present this date. [x] Patient would continue to benefit from skilled physical therapy services in order to: reduce pain, improve passive and active ROM, improve L LE strength, improve function, normalize gait, reduce edema, and improve LE power and gait speed. STG/LTG:  Problems:    [x] ? Pain: 0-8/10 pain   [x] ? ROM: decreased L knee flexion/extension A/PROM       [x] ? Strength:  Decreased L knee flexion strength   [x] ?  Function:   [x] Edema: see patellar measurements  [x] Gait Deviations: see above  [x] Other: 5TSTS, 19.5 seconds// 2mWT 215 feet, increased edema        Goals  MET NOT MET ON-  GOING  Details   Date Addressed: 2/8/23 by primary PT            STG: To be met in 10 treatments            1. ? Pain: Patient will self report worst L knee pain no greater than 3/10 in order to better tolerate ADLs/work activities with minimal dysfunction []  []  [x]  Up to 9/10 intermittently     2. ? ROM: Patient will improve knee PROM from 0-120 degrees in order to demonstrate ability to move/reach in all planes unrestricted at PLOF []  []  [x]  2-115 PROM     3. Independent with Home Exercise Programs [x]  []  []      4. Demonstrate knowledge of fall risk prevention  [x]  []  []      5. ?Gait speed and activity tolerance: Patient to improve 2mWT to at least 315 ft with least restrictive assistive device and improved gait mechanics in order to reduce risk of  falls and return to PLOF [x]  []  []   328 ft   6. ? Edema: patient to have L patellar measurements less than 2cm from initial eval in order to demonstrate edema reduction and allow for unrestricted knee motion [x]  []  []   MET improvements globally                            Date Addressed:            LTG: To be met in 16 treatments           1. Improve score on assessment tool KOOS from 65%% impairment to less than 40% impairment  []  []  []      2.  Strength: Pt will demonstrate improved L LE strength to 5/5  in order to demonstrate improved stability/strength necessary for unrestricted ADLs/work activities []  []  []      3. ?Gait speed and activity tolerance: Patient to improve 2mWT to at least 415 ft with least restrictive assistive device and improved gait mechanics in order to reduce risk of  falls and return to PLOF []  []  []      4. ? ROM: Patient will improve AROM from 0-120 degrees in order to demonstrate ability to move/reach in all planes unrestricted at PLOF []  []  []      5. ? Edema: patient to have L patellar measurements less than 3cm from initial eval in order to demonstrate edema reduction and allow for unrestricted knee motion []  []  []      6. ? Transfers and LE power: patient to improve 5TSTS score to less than 13 seconds in order to reduce fall risk and improve transfer abilities  []  []  []            Patient goals: to get strong and flexible       Pt. Education:  [] Yes  [x] No  [] Reviewed Prior HEP/Ed  Method of Education: [] Verbal  [] Demo  [] Written  Comprehension of Education:  [] Verbalizes understanding. [] Demonstrates understanding. [] Needs review. [x] Demonstrates/verbalizes HEP/Ed previously given. Access Code: [email or web address]  URL: [email or web address]  Date: 11/23/2022  Prepared by: Calli Scheuermann  Supine Bridge - 1 x daily - 7 x weekly - 2 sets - 10 reps  Long Sitting Calf Stretch with Strap - 1 x daily - 7 x weekly - 1 sets - 3 reps - 30 seconds hold  Supine Hamstring Stretch with Strap - 1 x daily - 7 x weekly - 1 sets - 3 reps - 30 seconds hold  Supine Heel Slide with Strap - 1 x daily - 7 x weekly - 2 sets - 10 reps - 5 seconds hold  Supine Quad Set - 1 x daily - 7 x weekly - 2 sets - 10 reps - 5 seconds hold  Supine Isometric Hamstring Set - 1 x daily - 7 x weekly - 2 sets - 10 reps - 5 seconds hold  Supine Gluteal Sets - 1 x daily - 7 x weekly - 2 sets - 10 reps - 5 seconds hold  Date: 12/13/2022  Prepared by: Inell Constant  Supine Active Straight Leg Raise - 1 x daily - 7 x weekly - 3 sets - 10 reps  Standing March with Counter Support - 1 x daily - 7 x weekly - 3 sets - 10 reps  Standing Hip Flexion with Counter Support - 1 x daily - 7 x weekly - 3 sets - 10 reps  Standing Hip Extension with Counter Support - 1 x daily - 7 x weekly - 3 sets - 10 reps  Standing Hip Abduction with Counter Support - 1 x daily - 7 x weekly - 3 sets - 10 reps  Standing Hamstring Curl with Chair Support - 1 x daily - 7 x weekly - 3 sets - 10 reps      Plan: [x] Continue current frequency toward long and short term goals.     [x] Specific Instructions for subsequent treatments:  improve quad strength, improve knee flex/ext ROM, improve standing tolerance, LLE stability, consider manual massage via hypervolt if tolerated        Time In: 12:02 pm          Time Out: 12:56 pm     Electronically signed by:  Quincy Bowers Raymond, MARA

## 2023-02-27 ENCOUNTER — HOSPITAL ENCOUNTER (OUTPATIENT)
Dept: PHYSICAL THERAPY | Facility: CLINIC | Age: 53
Setting detail: THERAPIES SERIES
Discharge: HOME | End: 2023-02-27
Payer: MEDICAID

## 2023-02-27 PROCEDURE — 97016 VASOPNEUMATIC DEVICE THERAPY: CPT

## 2023-02-27 PROCEDURE — 97110 THERAPEUTIC EXERCISES: CPT

## 2023-02-27 NOTE — FLOWSHEET NOTE
[] [facility] ([facility]) - [facility] &  Therapy  [address]    [phone]  F: [phone]   [x] [address]  [facility]   [address]  P: [phone]  F: [phone]  [] [address] &  Therapy  [address]  P: [phone]  F: [phone]   [] [address]  P: [phone]  F: [phone]  [] [facility] ([facility]) - [facility] & Therapy  [address]  [place]: [phone]   F: [phone] [] [facility]  Outpatient Rehabilitation &  Therapy  [facility] [address]   [place]: [phone]  F: [phone]       Date:  2023  Patient Name:  Elham Palmer    :  1970  MRN: [id number]  Physician:  José Miguel Dawson MD                              Insurance: [employer] (30 visits, auth after , 23 remaining)           Rehab Codes: M62.81, M62.9, Z73.6, R26.89, M25.60, Z74.0   Medical Diagnosis: B82.767 (ICD-10-CM) - Status post total left knee replacement  Onset date: 22 - DOS                            Next Dr's appt. : 23  Visit# / total visits: 15/16; Cancels/No Shows: 6/0    Subjective:    Pain:  [x] Yes  [] No Location: L knee Pain Rating: (0-10 scale) 3/10   Pain altered Tx:  [x] No  [] Yes  Action:   Comments: Pt notes her pain has reduced since she has started taking her anti-inflammatory medication. Pt continues to complete HEP daily.        Objective:  Modalities: VASO in supine, 34 degrees, min compression, 15 minutes to L knee on 23   Precautions:  Exercises: Bold completed   Exercise L knee  Reps/ Time Weight/ Level Comments              NuStep  5'  L1  seat 8; no UE's               Standing          3 way hip B 2x10ea  A  bilaterally on 22    Mini squats 20x    incr reps     Heel raises 2x10        Marches-alt 2x10ea        Lunges B 10x2 ea    2 rounds and alternating on 1/23- held too much pain in R knee 1/26    Hamstring curls 2x10      Step ups  10x2 6\" New 12/21 increased step height 1/9; 2nd set 1/23  1 set 2/8   Step Down 12x 6\" New 2/15; incr step height 2/27   Lateral step up 12x 6\" New 2/15; incr step height 2/27   HS stretch on step B 2x30\"ea  6\"    Gastroc stretch  3x30\"     Knee flexion stretch on step 3x15\" 12\" New 2/27   Side stepping  30'eax2  New 1/23; inc distance 2/3   [employer] 2/, inc 2/13   SLS 3x10\"  New 2/27   Hurdles in parallel bars 3Lx2 fwd New 2/27   Tandem stance  30s ea   New 2/3-min to no sway   nBOS on foam w/ ball movements 10x ea  6#MB New 2/3-overhead, chest pass, rotation   Tandem stance on  foam 30s ea  New 2/3         Gait training  2 lap  No cane and cues for TKE with heel strike 1/23; resumed use of cane d/t inc pain/swelling 2/3         Supine      Quad sets 2x10x5\"     Heel slides 2x10x5\"     SAQ 2x10x3\" 1# Initiated resistance 12/9, resumed and increased 2/13   Knee ext with OP 3' 5# Ankle propped on half foam roll - progressed time 2/21   Knee ext OP 10x10\"  New 2/21   SLR w/quad  10x2  New 12/13; small range limited reps-2nd set 1/23     SL hip abd 15x 1 lb New 12/21   Knee hang  5' 15# New 12/21; incr time 1/23; inc wt 2/3   Hip flexor stretch  1'  New 1/23   HS stretch - L LE  3x30\" Strap           Prone    New 2/21   HS curls  2x10     Reverse TKE 10x3\"  Bolster at anterior ankle          Seated      HS curls 2x10 lime    LAQ 2x15 3#  Initiated resistance 12/9; limited range, inc reps 2/13   Seated hip ABD 2x10 lime    Seated ISO ADD 2x10 Ball     Knee flexion stretch on EOB 10x5\"      Timed STS's 5x2 From chair with arms  New 12/21; 38 seconds some momentum used- 30 seconds on 1/23; first set 16\" and second set 14\" 2/27         Other: manual via roller stick to L quad, IT band and calf in supine/SL/prone x6'     Specific Instructions for next treatment: improve quad strength, improve knee flex/ext ROM, improve standing tolerance, gait training without AD           L AROM  11/29 L AROM  12/9 L AROM  12/13/22 L AROM  12/19/22 L AROM  12/21/22 L AROM  1/23/23 L AROM 1/26/23 2/3/23 2/8 2/15 2/21 2/24 2/27   Knee Flex 102 AAROM 100° 100° 102° AA  100° A 104°AA  101° A 105  A   105 AA  100 A  115 AA  100 A    108 AA  100 A  108  AA  110 AA   Ext NT N/t N/t 4° A 3° A 2 A 2 A  3°  Lacking 3  Lacking 2 lacking 2  Lacking 1 deg  Lacking 0.5 deg                        L knee suprapatellar: 60 cm   L knee mid patellar: 56 cm  L knee infrapatellar: 53 cm    Treatment Charges: Mins Units   []  Modalities     [x]  Ther Exercise 36 3   [x]  Manual Therapy 6 --   []  Ther Activities     []  Aquatics     [x]  Vasocompression 15 1   []  Other     Total Treatment time 57 4       Assessment: [x] Progressing toward goals. Held Nustep warm up this date d/t late arrival. Progressed step downs and lateral step ups with 6\" step. Pt expressed facial grimaces and SOB during stepping exercises but denies any pain during exercise. Provided vc's during TKE to avoid L pelvic drop with good carryover. Added SLS on R LE to challenge pt's stability requiring min finger taps on // bars to avoid LOB. Implemented hurdles this date to improve hip/knee flexion during functional activities. Completed timed STS's from a chair to improve pt's transfer abilities and was able to complete 5 reps within 14-16 seconds. Continued with manual via roller stick which pt found beneficial. After manual measured L knee flexion and pt was able to maintain 110° AAROM and was lacking 0.5° knee extension. Ended session with vaso to reduce edema. Pt notes soreness post tx. Plan to check goals for possible discharge next session.    [] No change.     [x] Other: No bed bugs present this date.   [x] Patient would continue to benefit from skilled physical therapy services in order to: reduce pain,  improve passive and active ROM, improve L LE strength, improve function, normalize gait, reduce edema, and improve LE power and gait speed. STG/LTG:  Problems:    [x] ? Pain: 0-8/10 pain   [x] ? ROM: decreased L knee flexion/extension A/PROM       [x] ? Strength:  Decreased L knee flexion strength   [x] ? Function:   [x] Edema: see patellar measurements  [x] Gait Deviations: see above  [x] Other: 5TSTS, 19.5 seconds// 2mWT 215 feet, increased edema        Goals  MET NOT MET ON-  GOING  Details   Date Addressed: 2/8/23 by primary PT            STG: To be met in 10 treatments            1. ? Pain: Patient will self report worst L knee pain no greater than 3/10 in order to better tolerate ADLs/work activities with minimal dysfunction []  []  [x]  Up to 9/10 intermittently     2. ? ROM: Patient will improve knee PROM from 0-120 degrees in order to demonstrate ability to move/reach in all planes unrestricted at PLOF []  []  [x]  2-115 PROM     3. Independent with Home Exercise Programs [x]  []  []      4. Demonstrate knowledge of fall risk prevention  [x]  []  []      5. ?Gait speed and activity tolerance: Patient to improve 2mWT to at least 315 ft with least restrictive assistive device and improved gait mechanics in order to reduce risk of  falls and return to PLOF [x]  []  []   328 ft   6. ? Edema: patient to have L patellar measurements less than 2cm from initial eval in order to demonstrate edema reduction and allow for unrestricted knee motion [x]  []  []   MET improvements globally                            Date Addressed:            LTG: To be met in 16 treatments           1. Improve score on assessment tool KOOS from 65%% impairment to less than 40% impairment  []  []  []      2.  Strength: Pt will demonstrate improved L LE strength to 5/5  in order to demonstrate improved stability/strength necessary for unrestricted ADLs/work activities []  []  []      3. ?Gait speed and activity tolerance: Patient to improve 2mWT to at least 415 ft with least restrictive assistive device and improved gait mechanics in order to reduce risk of  falls and return to PLOF []  []  []      4. ? ROM: Patient will improve AROM from 0-120 degrees in order to demonstrate ability to move/reach in all planes unrestricted at PLOF []  []  []      5. ? Edema: patient to have L patellar measurements less than 3cm from initial eval in order to demonstrate edema reduction and allow for unrestricted knee motion []  []  []      6. ? Transfers and LE power: patient to improve 5TSTS score to less than 13 seconds in order to reduce fall risk and improve transfer abilities  []  []  []            Patient goals: to get strong and flexible       Pt. Education:  [] Yes  [x] No  [] Reviewed Prior HEP/Ed  Method of Education: [x] Verbal proper form for TKEs [x] Demo hurdles and SLS [] Written  Comprehension of Education:  [] Verbalizes understanding. [] Demonstrates understanding. [] Needs review. [x] Demonstrates/verbalizes HEP/Ed previously given. Access Code: [email or web address]  URL: [employer]/  Date: 11/23/2022  Prepared by: Calli Scheuermann  Supine Bridge - 1 x daily - 7 x weekly - 2 sets - 10 reps  Long Sitting Calf Stretch with Strap - 1 x daily - 7 x weekly - 1 sets - 3 reps - 30 seconds hold  Supine Hamstring Stretch with Strap - 1 x daily - 7 x weekly - 1 sets - 3 reps - 30 seconds hold  Supine Heel Slide with Strap - 1 x daily - 7 x weekly - 2 sets - 10 reps - 5 seconds hold  Supine Quad Set - 1 x daily - 7 x weekly - 2 sets - 10 reps - 5 seconds hold  Supine Isometric Hamstring Set - 1 x daily - 7 x weekly - 2 sets - 10 reps - 5 seconds hold  Supine Gluteal Sets - 1 x daily - 7 x weekly - 2 sets - 10 reps - 5 seconds hold  Date: 12/13/2022  Prepared by: Clarke Jeffers  Supine Active Straight Leg Raise - 1 x daily - 7 x weekly - 3 sets - 10 reps  Standing March with Counter Support - 1 x daily - 7 x weekly - 3 sets - 10 reps  Standing Hip Flexion with Counter Support - 1 x daily - 7 x weekly - 3 sets - 10 reps  Standing Hip Extension with Counter Support - 1 x daily - 7 x weekly - 3 sets - 10 reps  Standing Hip Abduction with Counter Support - 1 x daily - 7 x weekly - 3 sets - 10 reps  Standing Hamstring Curl with Chair Support - 1 x daily - 7 x weekly - 3 sets - 10 reps      Plan: [x] Continue current frequency toward long and short term goals.     [x] Specific Instructions for subsequent treatments:  improve quad strength, improve knee flex/ext ROM, improve standing tolerance, LLE stability, consider manual massage via hypervolt if tolerated        Time In: 1:15 pm          Time Out: 2:15 pm     Electronically signed by:  Latonia ISAAC  This documentation has been reviewed and entirety of treatment session with direct supervision by clinical instructor, Sakshi Collins PTA

## 2023-03-01 ENCOUNTER — HOSPITAL ENCOUNTER (OUTPATIENT)
Dept: PHYSICAL THERAPY | Facility: CLINIC | Age: 53
Setting detail: THERAPIES SERIES
Discharge: HOME | End: 2023-03-01
Payer: MEDICAID

## 2023-03-01 PROCEDURE — 97110 THERAPEUTIC EXERCISES: CPT

## 2023-03-01 NOTE — DISCHARGE SUMMARY
[] [facility]) [facility]STEP [facility] &  Therapy  [address]  [phone]  F: [phone] [x] [address]  [facility]   [address]  P: [phone]  F: [phone] [] [address] &  Therapy  [address]  P: [phone]  F: [phone] [] [address]  P: [phone]  F: [phone] [] [address]  [address] [facility]   [address]   [place]: [phone]  F: [phone]      Physical Therapy Discharge Note    Date: 3/1/2023      Patient: Faizan Arreola  : 1970  MRN: [id number]    Physician:  Jelly Franklin MD                              Insurance: [employer] (30 visits, auth after 30, 23 remaining)           Rehab Codes: M62.81, M62.9, Z73.6, R26.89, M25.60, Z74.0   Medical Diagnosis: R02.817 (ICD-10-CM) - Status post total left knee replacement  Onset date: 22 - DOS                            Next 's appt. : 23  Visit# / total visits: 16/16;                               Cancels/No Shows: 6/0  Date of initial visit: 22                Date of final visit: 3/1/23    Subjective:    Pain:  [x] Yes  [] No  Location: L knee         Pain Rating: (0-10 scale) 'minimal'/10   Pain altered Tx:  [x] No  [] Yes  Action:   Comments: Pt arrives with minimal pain in L knee but does report ongoing pain in R knee.         Objective:  Test Measurements:  L knee suprapatellar: 60 cm   L knee mid patellar: 54.5 cm  L knee infrapatellar: 52.5 cm               STRENGTH     Left Right   Hip Flex 5 5   Ext NT NT   ABD 5 5   ADD 5 5   Knee Flex 5 5   Ext 5 5   Ankle DF 5 5   PF 5 5                     ROM  ° AROM     Left Right   Knee Flex 110 A  115 P     Ext 0 A                   Function:  Functional Test: KOOS Score: 65% functionally impaired at initial evaluation  Score: 3/28 or 11% impaired at discharge          Assessment:  [x] Progressing toward goals. Initiated session with nustep warm up followed by reassessment of progress towards goals. Pt demonstrates significant progress towards all short and long term goals established at initial evaluation. Pt demonstrates improvements globally in reduction of L knee pain, improved knee range of motion, LE strength, and gait. Pt is most limited at this time with limited knee flexion ROM and reduced activity tolerance indicated by 2MWT. HEP was updated and encouraged pt to continue with HEP independently to further improve strength and mobility. Pt verbalizes understanding and is prepared for discharge following this appointment. [] No change. [x] Other: No bed bugs present this date. [] Patient would continue to benefit from skilled physical therapy services in order to: reduce pain, improve passive and active ROM, improve L LE strength, improve function, normalize gait, reduce edema, and improve LE power and gait speed. STG/LTG:  Problems:    [x] ? Pain: 0-8/10 pain   [x] ? ROM: decreased L knee flexion/extension A/PROM       [x] ? Strength:  Decreased L knee flexion strength   [x] ? Function:   [x] Edema: see patellar measurements  [x] Gait Deviations: see above  [x] Other: 5TSTS, 19.5 seconds// 2mWT 215 feet, increased edema        Goals  MET NOT MET ON-  GOING  Details   Date Addressed: 2/8/23 by primary PT            STG: To be met in 10 treatments            1. ? Pain: Patient will self report worst L knee pain no greater than 3/10 in order to better tolerate ADLs/work activities with minimal dysfunction [x]  []  []  MET    2. ? ROM: Patient will improve knee PROM from 0-120 degrees in order to demonstrate ability to move/reach in all planes unrestricted at PLOF []  []  [x]  0-110  PROM     3. Independent with Home Exercise Programs [x]  []  []      4. Demonstrate knowledge of fall risk prevention  [x]  []  []      5. ?Gait speed and activity tolerance: Patient to improve 2mWT to at least 315 ft with least restrictive assistive device and improved gait mechanics in order to reduce risk of  falls and return to PLOF [x]  []  []   328 ft   6. ? Edema: patient to have L patellar measurements less than 2cm from initial eval in order to demonstrate edema reduction and allow for unrestricted knee motion [x]  []  []   MET improvements globally                            Date Addressed:            LTG: To be met in 16 treatments           1. Improve score on assessment tool KOOS from 65%% impairment to less than 40% impairment  [x]  []  []  11%     2.  Strength: Pt will demonstrate improved L LE strength to 5/5  in order to demonstrate improved stability/strength necessary for unrestricted ADLs/work activities [x]  []  []      3. ?Gait speed and activity tolerance: Patient to improve 2mWT to at least 415 ft with least restrictive assistive device and improved gait mechanics in order to reduce risk of  falls and return to PLOF []  []  [x]   354 ft SPC   4. ? ROM: Patient will improve AROM from 0-120 degrees in order to demonstrate ability to move/reach in all planes unrestricted at PLOF []  []  [x]   0-110   5. ? Edema: patient to have L patellar measurements less than 3cm from initial eval in order to demonstrate edema reduction and allow for unrestricted knee motion [x]  []  []      6. ? Transfers and LE power: patient to improve 5TSTS score to less than 13 seconds in order to reduce fall risk and improve transfer abilities  [x]  []  []   MET- 11.27 seconds          Patient goals: to get strong and flexible       Treatment to Date:  [x] Therapeutic Exercise    [] Modalities:  [] Therapeutic Activity    [] Ultrasound  [] Electrical Stimulation  [x] Gait Training     [] Massage       [] Lumbar/Cervical Traction  [x] Neuromuscular Re-education [] Cold/hotpack [] Iontophoresis: 4 mg/mL  [x] Instruction in Home Exercise Program                     Dexamethasone Sodium  [x] Manual Therapy             Phosphate 40-80 mAmin  [] Aquatic Therapy                   [x] Vasocompression/    [] Other:             Game Ready    Discharge Status:     [] Pt recovered from conditions. Treatment goals were met. [] Pt received maximum benefit. No further therapy indicated at this time. [x] Pt to continue exercise/home instructions independently. [] Therapy interrupted due to:    [] Pt has 2 or more no shows/cancels, is discontinued per our policy. [x] Pt has completed prescribed number of treatment sessions. [] Other:         Electronically signed by Jelly Stephenson PT on 3/1/2023 at 1:56 PM      If you have any questions or concerns, please don't hesitate to call.   Thank you for your referral.

## 2023-03-01 NOTE — FLOWSHEET NOTE
[] [facility] ([facility]) - [facility] &  Therapy  [address]    [phone]  F: [phone]   [x] [address]  [facility]   [address]  P: [phone]  F: [phone]  [] [address] &  Therapy  [address]  P: [phone]  F: [phone]   [] [address]  P: [phone]  F: [phone]  [] [facility] ([facility]) - [facility] & Therapy  [address] [facility] [address] [facility] [address]  [place]: [phone]   F: [phone] [] [facility]""  Outpatient Rehabilitation &  Therapy  [facility] [address]   [place]: [phone]  F: [phone]       Date:  3/1/2023  Patient Name:  Narciso Patella    :  1970  MRN: [id number]  Physician:  Estefany Cosby MD                              Insurance: [place] (30 visits, auth after 30, 23 remaining)           Rehab Codes: M62.81, M62.9, Z73.6, R26.89, M25.60, Z74.0   Medical Diagnosis: B48.497 (ICD-10-CM) - Status post total left knee replacement  Onset date: 22 - DOS                            Next Dr's appt. : 23  Visit# / total visits: 1616;      Cancels/No Shows: 6/0    Subjective:    Pain:  [x] Yes  [] No Location: L knee Pain Rating: (0-10 scale) 'minimal'/10   Pain altered Tx:  [x] No  [] Yes  Action:   Comments: Pt arrives with minimal pain in L knee but does report ongoing pain in R knee.        Objective:  Modalities: VASO in supine, 34 degrees, min compression, 15 minutes to L knee on 23   Precautions:  Exercises: Eulalio Em completed   Exercise L knee  Reps/ Time Weight/ Level Comments              NuStep  5'  L1  seat 8; no UE's               Standing          3 way hip B 2x10ea  A  bilaterally on 22    Mini squats 20x    incr reps     Heel raises 2x10        Marches-alt 2x10ea        Lunges B 10x2 ea    2 rounds and alternating on 1/23- held too much pain in R knee 1/26    Hamstring curls 2x10      Step ups  10x2 6\" New 12/21 increased step height 1/9; 2nd set 1/23  1 set 2/8   Step Down 12x 6\" New 2/15; incr step height 2/27   Lateral step up 12x 6\" New 2/15; incr step height 2/27   HS stretch on step B 2x30\"ea  6\"    Gastroc stretch  3x30\"     Knee flexion stretch on step 3x15\" 12\" New 2/27   Side stepping  30'eax2  New 1/23; inc distance 2/3   Li [employer] 2/, inc 2/13   SLS 3x10\"  New 2/27   Hurdles in parallel bars 3Lx2 fwd New 2/27   Tandem stance  30s ea   New 2/3-min to no sway   nBOS on foam w/ ball movements 10x ea  6#MB New 2/3-overhead, chest pass, rotation   Tandem stance on  foam 30s ea  New 2/3         Gait training  2 lap  No cane and cues for TKE with heel strike 1/23; resumed use of cane d/t inc pain/swelling 2/3         Supine      Quad sets 2x10x5\"     Heel slides 2x10x5\"     SAQ 2x10x3\" 1# Initiated resistance 12/9, resumed and increased 2/13   Knee ext with OP 3' 5# Ankle propped on half foam roll - progressed time 2/21   Knee ext OP 10x10\"  New 2/21   SLR w/quad  10x2  New 12/13; small range limited reps-2nd set 1/23     SL hip abd 15x 1 lb New 12/21   Knee hang  5' 15# New 12/21; incr time 1/23; inc wt 2/3   Hip flexor stretch  1'  New 1/23   HS stretch - L LE  3x30\" Strap           Prone    New 2/21   HS curls  2x10     Reverse TKE 10x3\"  Bolster at anterior ankle          Seated      HS curls 2x10 lime    LAQ 2x15 3#  Initiated resistance 12/9; limited range, inc reps 2/13   Seated hip ABD 2x10 lime    Seated ISO ADD 2x10 Ball     Knee flexion stretch on EOB 10x5\"      Timed STS's 5x2 From chair with arms  New 12/21; 38 seconds some momentum used- 30 seconds on 1/23; first set 16\" and second set 14\" 2/27         Other: manual via roller stick to L quad, IT band and calf in supine/SL/prone x6'     Specific Instructions for next treatment: improve quad strength, improve knee flex/ext ROM, improve standing tolerance, gait training without AD           L AROM  11/29 L AROM  12/9 L AROM  12/13/22 L AROM  12/19/22 L AROM  12/21/22 L AROM  1/23/23 L AROM 1/26/23 2/3/23 2/8 2/15 2/21 2/24 2/27   Knee Flex 102 AAROM 100° 100° 102° AA  100° A 104°AA  101° A 105  A   105 AA  100 A  115 AA  100 A    108 AA  100 A  108  AA  110 AA   Ext NT N/t N/t 4° A 3° A 2 A 2 A  3°  Lacking 3  Lacking 2 lacking 2  Lacking 1 deg  Lacking 0.5 deg                        Reassessed by primary PT 3/1/23  L knee suprapatellar: 60 cm   L knee mid patellar: 54.5 cm  L knee infrapatellar: 52.5 cm       STRENGTH     Left Right   Hip Flex 5 5   Ext NT NT   ABD 5 5   ADD 5 5   Knee Flex 5 5   Ext 5 5   Ankle DF 5 5   PF 5 5                ROM  ° AROM     Left Right   Knee Flex 110 A  115 P     Ext 0 A                 Functional Test: KOOS Score: 65% functionally impaired at initial evaluation  Score: 3/28 or 11% impaired at discharge         Treatment Charges: Mins Units   []  Modalities     [x]  Ther Exercise 40 3   [x]  Manual Therapy 6 --   []  Ther Activities     []  Aquatics     []  Vasocompression     []  Other     Total Treatment time 46 3     Reassessment billed under therapeutic exercise     Assessment: [x] Progressing toward goals. Initiated session with nustep warm up followed by reassessment of progress towards goals. Pt demonstrates significant progress towards all short and long term goals established at initial evaluation. Pt demonstrates improvements globally in reduction of L knee pain, improved knee range of motion, LE strength, and gait. Pt is most limited at this time with limited knee flexion ROM and reduced activity tolerance indicated by 2MWT. HEP was updated and encouraged pt to continue with HEP independently to further improve strength and mobility. Pt verbalizes understanding and is prepared for discharge following this appointment. [] No change.      [x] Other: No bed bugs present this date. [] Patient would continue to benefit from skilled physical therapy services in order to: reduce pain, improve passive and active ROM, improve L LE strength, improve function, normalize gait, reduce edema, and improve LE power and gait speed. STG/LTG:  Problems:    [x] ? Pain: 0-8/10 pain   [x] ? ROM: decreased L knee flexion/extension A/PROM       [x] ? Strength:  Decreased L knee flexion strength   [x] ? Function:   [x] Edema: see patellar measurements  [x] Gait Deviations: see above  [x] Other: 5TSTS, 19.5 seconds// 2mWT 215 feet, increased edema        Goals  MET NOT MET ON-  GOING  Details   Date Addressed: 2/8/23 by primary PT            STG: To be met in 10 treatments            1. ? Pain: Patient will self report worst L knee pain no greater than 3/10 in order to better tolerate ADLs/work activities with minimal dysfunction [x]  []  []  MET    2. ? ROM: Patient will improve knee PROM from 0-120 degrees in order to demonstrate ability to move/reach in all planes unrestricted at PLOF []  []  [x]  0-110  PROM     3. Independent with Home Exercise Programs [x]  []  []      4. Demonstrate knowledge of fall risk prevention  [x]  []  []      5. ?Gait speed and activity tolerance: Patient to improve 2mWT to at least 315 ft with least restrictive assistive device and improved gait mechanics in order to reduce risk of  falls and return to PLOF [x]  []  []   328 ft   6. ? Edema: patient to have L patellar measurements less than 2cm from initial eval in order to demonstrate edema reduction and allow for unrestricted knee motion [x]  []  []   MET improvements globally                            Date Addressed:            LTG: To be met in 16 treatments           1. Improve score on assessment tool KOOS from 65%% impairment to less than 40% impairment  [x]  []  []  11%     2.  Strength: Pt will demonstrate improved L LE strength to 5/5  in order to demonstrate improved stability/strength necessary for unrestricted ADLs/work activities [x]  []  []      3. ?Gait speed and activity tolerance: Patient to improve 2mWT to at least 415 ft with least restrictive assistive device and improved gait mechanics in order to reduce risk of  falls and return to PLOF []  []  [x]   354 ft SPC   4. ? ROM: Patient will improve AROM from 0-120 degrees in order to demonstrate ability to move/reach in all planes unrestricted at PLOF []  []  [x]   0-110   5. ? Edema: patient to have L patellar measurements less than 3cm from initial eval in order to demonstrate edema reduction and allow for unrestricted knee motion [x]  []  []      6. ? Transfers and LE power: patient to improve 5TSTS score to less than 13 seconds in order to reduce fall risk and improve transfer abilities  [x]  []  []   MET- 11.27 seconds          Patient goals: to get strong and flexible       Pt. Education:  [x] Yes  [] No  [] Reviewed Prior HEP/Ed  Method of Education: [x] Verbal   [x] Demo  [x] Written  Comprehension of Education:  [x] Verbalizes understanding. [x] Demonstrates understanding. [] Needs review. [] Demonstrates/verbalizes HEP/Ed previously given. Access Code: [email or web address]  URL: [email or web address]  Date: 11/23/2022  Prepared by: Calli Scheuermann  Supine Bridge - 1 x daily - 7 x weekly - 2 sets - 10 reps  Long Sitting Calf Stretch with Strap - 1 x daily - 7 x weekly - 1 sets - 3 reps - 30 seconds hold  Supine Hamstring Stretch with Strap - 1 x daily - 7 x weekly - 1 sets - 3 reps - 30 seconds hold  Supine Heel Slide with Strap - 1 x daily - 7 x weekly - 2 sets - 10 reps - 5 seconds hold  Supine Quad Set - 1 x daily - 7 x weekly - 2 sets - 10 reps - 5 seconds hold  Supine Isometric Hamstring Set - 1 x daily - 7 x weekly - 2 sets - 10 reps - 5 seconds hold  Supine Gluteal Sets - 1 x daily - 7 x weekly - 2 sets - 10 reps - 5 seconds hold  Date: 12/13/2022  Prepared by: Cm Aviles  Supine Active Straight Leg Raise - 1 x daily - 7 x weekly - 3 sets - 10 reps  Standing March with Counter Support - 1 x daily - 7 x weekly - 3 sets - 10 reps  Standing Hip Flexion with Counter Support - 1 x daily - 7 x weekly - 3 sets - 10 reps  Standing Hip Extension with Counter Support - 1 x daily - 7 x weekly - 3 sets - 10 reps  Standing Hip Abduction with Counter Support - 1 x daily - 7 x weekly - 3 sets - 10 reps  Standing Hamstring Curl with Chair Support - 1 x daily - 7 x weekly - 3 sets - 10 reps    Date: 03/01/2023  Prepared by: Gerhardt Rous    Exercises  Sit to Stand - 1 x daily - 7 x weekly - 2 sets - 10 reps  Mini Squat with Counter Support - 1 x daily - 7 x weekly - 2 sets - 10 reps  Side Stepping with Resistance at Ankles - 1 x daily - 7 x weekly - 2 sets - 10 reps  Standing Knee Flexion Stretch on Step - 1 x daily - 7 x weekly - 3 sets - 15\" hold  Mini Lunge - 1 x daily - 7 x weekly - 2 sets - 10 reps  Step Up - 1 x daily - 7 x weekly - 3 sets - 10 reps  Lateral Step Up - 1 x daily - 7 x weekly - 3 sets - 10 reps  Heel Raises with Counter Support - 1 x daily - 7 x weekly - 2 sets - 10 reps  Single Leg Stance - 1 x daily - 7 x weekly - 3 sets - 10\" hold      Plan: [] Continue current frequency toward long and short term goals.     [x] Specific Instructions for subsequent treatments: pt to be discharged, continue with HEP independently       Time In: 1:05 pm            Time Out: 1:51 pm     Electronically signed by:  Beny Sepulveda PT

## 2023-03-13 ENCOUNTER — HOSPITAL ENCOUNTER (OUTPATIENT)
Age: 53
Discharge: HOME | End: 2023-03-13
Payer: MEDICAID

## 2023-03-13 DIAGNOSIS — E53.8 LOW FOLATE: ICD-10-CM

## 2023-03-13 DIAGNOSIS — Z11.59 NEED FOR HEPATITIS C SCREENING TEST: ICD-10-CM

## 2023-03-13 DIAGNOSIS — E53.8 LOW VITAMIN B12 LEVEL: ICD-10-CM

## 2023-03-13 DIAGNOSIS — G89.29 BILATERAL CHRONIC KNEE PAIN: ICD-10-CM

## 2023-03-13 DIAGNOSIS — M25.562 BILATERAL CHRONIC KNEE PAIN: ICD-10-CM

## 2023-03-13 DIAGNOSIS — E66.01 CLASS 3 SEVERE OBESITY WITH BODY MASS INDEX (BMI) OF 50.0 TO 59.9 IN ADULT, UNSPECIFIED OBESITY TYPE, UNSPECIFIED WHETHER SERIOUS COMORBIDITY PRESENT (HCC): ICD-10-CM

## 2023-03-13 DIAGNOSIS — R73.9 HYPERGLYCEMIA: ICD-10-CM

## 2023-03-13 DIAGNOSIS — E55.9 VITAMIN D DEFICIENCY: ICD-10-CM

## 2023-03-13 DIAGNOSIS — Z11.4 SCREENING FOR HIV (HUMAN IMMUNODEFICIENCY VIRUS): ICD-10-CM

## 2023-03-13 DIAGNOSIS — M25.561 BILATERAL CHRONIC KNEE PAIN: ICD-10-CM

## 2023-03-13 DIAGNOSIS — Z13.220 SCREENING, LIPID: ICD-10-CM

## 2023-03-13 DIAGNOSIS — E66.01 MORBID OBESITY WITH BMI OF 50.0-59.9, ADULT (HCC): ICD-10-CM

## 2023-03-13 DIAGNOSIS — Z98.84 S/P LAPAROSCOPIC SLEEVE GASTRECTOMY: ICD-10-CM

## 2023-03-13 DIAGNOSIS — E53.9 VITAMIN B DEFICIENCY: ICD-10-CM

## 2023-03-13 DIAGNOSIS — Z13.29 SCREENING FOR THYROID DISORDER: ICD-10-CM

## 2023-03-13 LAB
25(OH)D3 SERPL-MCNC: 23 NG/ML
25(OH)D3 SERPL-MCNC: 27.5 NG/ML
ABSOLUTE EOS #: 0.11 K/UL (ref 0–0.44)
ABSOLUTE IMMATURE GRANULOCYTE: <0.03 K/UL (ref 0–0.3)
ABSOLUTE LYMPH #: 1.73 K/UL (ref 1.1–3.7)
ABSOLUTE MONO #: 0.28 K/UL (ref 0.1–1.2)
ALBUMIN SERPL-MCNC: 4.1 G/DL (ref 3.5–5.2)
ALBUMIN SERPL-MCNC: 4.1 G/DL (ref 3.5–5.2)
ALBUMIN/GLOBULIN RATIO: 1.3 (ref 1–2.5)
ALBUMIN/GLOBULIN RATIO: 1.3 (ref 1–2.5)
ALP SERPL-CCNC: 102 U/L (ref 35–104)
ALP SERPL-CCNC: 99 U/L (ref 35–104)
ALT SERPL-CCNC: 7 U/L (ref 5–33)
ALT SERPL-CCNC: 8 U/L (ref 5–33)
ANION GAP SERPL CALCULATED.3IONS-SCNC: 11 MMOL/L (ref 9–17)
ANION GAP SERPL CALCULATED.3IONS-SCNC: 12 MMOL/L (ref 9–17)
AST SERPL-CCNC: 16 U/L
AST SERPL-CCNC: 21 U/L
BASOPHILS # BLD: 1 % (ref 0–2)
BASOPHILS ABSOLUTE: 0.03 K/UL (ref 0–0.2)
BILIRUB SERPL-MCNC: 1 MG/DL (ref 0.3–1.2)
BILIRUB SERPL-MCNC: 1 MG/DL (ref 0.3–1.2)
BUN SERPL-MCNC: 16 MG/DL (ref 6–20)
BUN SERPL-MCNC: 16 MG/DL (ref 6–20)
CALCIUM SERPL-MCNC: 9 MG/DL (ref 8.6–10.4)
CALCIUM SERPL-MCNC: 9.3 MG/DL (ref 8.6–10.4)
CHLORIDE SERPL-SCNC: 103 MMOL/L (ref 98–107)
CHLORIDE SERPL-SCNC: 104 MMOL/L (ref 98–107)
CHOLEST SERPL-MCNC: 191 MG/DL
CHOLEST SERPL-MCNC: 195 MG/DL
CHOLESTEROL/HDL RATIO: 4.2
CHOLESTEROL/HDL RATIO: 4.2
CO2 SERPL-SCNC: 23 MMOL/L (ref 20–31)
CO2 SERPL-SCNC: 25 MMOL/L (ref 20–31)
CREAT SERPL-MCNC: 0.83 MG/DL (ref 0.5–0.9)
CREAT SERPL-MCNC: 0.84 MG/DL (ref 0.5–0.9)
EOSINOPHILS RELATIVE PERCENT: 3 % (ref 1–4)
ERYTHROCYTE [SEDIMENTATION RATE] IN BLOOD BY WESTERGREN METHOD: 55 MM/HR (ref 0–30)
FERRITIN SERPL-MCNC: 18 NG/ML (ref 13–150)
FOLATE SERPL-MCNC: NORMAL NG/ML
GFR SERPL CREATININE-BSD FRML MDRD: >60 ML/MIN/1.73M2
GFR SERPL CREATININE-BSD FRML MDRD: >60 ML/MIN/1.73M2
GLUCOSE SERPL-MCNC: 74 MG/DL (ref 70–99)
GLUCOSE SERPL-MCNC: 77 MG/DL (ref 70–99)
HCT VFR BLD AUTO: 37.2 % (ref 36.3–47.1)
HCT VFR BLD AUTO: 37.2 % (ref 36.3–47.1)
HDLC SERPL-MCNC: 46 MG/DL
HDLC SERPL-MCNC: 46 MG/DL
HGB BLD-MCNC: 12.1 G/DL (ref 11.9–15.1)
HGB BLD-MCNC: 12.1 G/DL (ref 11.9–15.1)
HIV 1+2 AB+HIV1 P24 AG SERPL QL IA: NONREACTIVE
IMMATURE GRANULOCYTES: 0 %
IRON SATURATION: 19 % (ref 20–55)
IRON SERPL-MCNC: 66 UG/DL (ref 37–145)
LDLC SERPL CALC-MCNC: 127 MG/DL (ref 0–130)
LDLC SERPL CALC-MCNC: 130 MG/DL (ref 0–130)
LYMPHOCYTES # BLD: 45 % (ref 24–43)
MAGNESIUM SERPL-MCNC: 2.1 MG/DL (ref 1.6–2.6)
MCH RBC QN AUTO: 28.5 PG (ref 25.2–33.5)
MCH RBC QN AUTO: 28.5 PG (ref 25.2–33.5)
MCHC RBC AUTO-ENTMCNC: 32.5 G/DL (ref 28.4–34.8)
MCHC RBC AUTO-ENTMCNC: 32.5 G/DL (ref 28.4–34.8)
MCV RBC AUTO: 87.7 FL (ref 82.6–102.9)
MCV RBC AUTO: 87.7 FL (ref 82.6–102.9)
MONOCYTES # BLD: 7 % (ref 3–12)
NRBC AUTOMATED: 0 PER 100 WBC
NRBC AUTOMATED: 0 PER 100 WBC
PDW BLD-RTO: 12.8 % (ref 11.8–14.4)
PDW BLD-RTO: 12.8 % (ref 11.8–14.4)
PLATELET # BLD AUTO: 234 K/UL (ref 138–453)
PLATELET # BLD AUTO: 234 K/UL (ref 138–453)
PMV BLD AUTO: 10.4 FL (ref 8.1–13.5)
PMV BLD AUTO: 10.4 FL (ref 8.1–13.5)
POTASSIUM SERPL-SCNC: 4.6 MMOL/L (ref 3.7–5.3)
POTASSIUM SERPL-SCNC: 4.6 MMOL/L (ref 3.7–5.3)
PROT SERPL-MCNC: 7.2 G/DL (ref 6.4–8.3)
PROT SERPL-MCNC: 7.3 G/DL (ref 6.4–8.3)
PTH-INTACT SERPL-MCNC: 67.3 PG/ML (ref 14–72)
RBC # BLD: 4.24 M/UL (ref 3.95–5.11)
RBC # BLD: 4.24 M/UL (ref 3.95–5.11)
SEG NEUTROPHILS: 44 % (ref 36–65)
SEGMENTED NEUTROPHILS ABSOLUTE COUNT: 1.72 K/UL (ref 1.5–8.1)
SODIUM SERPL-SCNC: 139 MMOL/L (ref 135–144)
SODIUM SERPL-SCNC: 139 MMOL/L (ref 135–144)
T4 FREE SERPL-MCNC: 1.15 NG/DL (ref 0.93–1.7)
TIBC SERPL-MCNC: 355 UG/DL (ref 250–450)
TRIGL SERPL-MCNC: 91 MG/DL
TRIGL SERPL-MCNC: 93 MG/DL
TSH SERPL-ACNC: 0.66 UIU/ML (ref 0.3–5)
TSH SERPL-ACNC: 0.66 UIU/ML (ref 0.3–5)
UNSATURATED IRON BINDING CAPACITY: 289 UG/DL (ref 112–347)
VIT B12 SERPL-MCNC: 292 PG/ML (ref 232–1245)
WBC # BLD AUTO: 3.9 K/UL (ref 3.5–11.3)
WBC # BLD AUTO: 3.9 K/UL (ref 3.5–11.3)

## 2023-03-13 PROCEDURE — 82746 ASSAY OF FOLIC ACID SERUM: CPT

## 2023-03-13 PROCEDURE — 82728 ASSAY OF FERRITIN: CPT

## 2023-03-13 PROCEDURE — 80053 COMPREHEN METABOLIC PANEL: CPT

## 2023-03-13 PROCEDURE — 83735 ASSAY OF MAGNESIUM: CPT

## 2023-03-13 PROCEDURE — 82306 VITAMIN D 25 HYDROXY: CPT

## 2023-03-13 PROCEDURE — 83525 ASSAY OF INSULIN: CPT

## 2023-03-13 PROCEDURE — 83540 ASSAY OF IRON: CPT

## 2023-03-13 PROCEDURE — 87389 HIV-1 AG W/HIV-1&-2 AB AG IA: CPT

## 2023-03-13 PROCEDURE — 84590 ASSAY OF VITAMIN A: CPT

## 2023-03-13 PROCEDURE — 80061 LIPID PANEL: CPT

## 2023-03-13 PROCEDURE — 83527 ASSAY OF INSULIN: CPT

## 2023-03-13 PROCEDURE — 83550 IRON BINDING TEST: CPT

## 2023-03-13 PROCEDURE — 85025 COMPLETE CBC W/AUTO DIFF WBC: CPT

## 2023-03-13 PROCEDURE — 82607 VITAMIN B-12: CPT

## 2023-03-13 PROCEDURE — 86803 HEPATITIS C AB TEST: CPT

## 2023-03-13 PROCEDURE — 85652 RBC SED RATE AUTOMATED: CPT

## 2023-03-13 PROCEDURE — 36415 COLL VENOUS BLD VENIPUNCTURE: CPT

## 2023-03-13 PROCEDURE — 84443 ASSAY THYROID STIM HORMONE: CPT

## 2023-03-13 PROCEDURE — 83036 HEMOGLOBIN GLYCOSYLATED A1C: CPT

## 2023-03-13 PROCEDURE — 83970 ASSAY OF PARATHORMONE: CPT

## 2023-03-13 PROCEDURE — 84630 ASSAY OF ZINC: CPT

## 2023-03-13 PROCEDURE — 84439 ASSAY OF FREE THYROXINE: CPT

## 2023-03-13 PROCEDURE — 84425 ASSAY OF VITAMIN B-1: CPT

## 2023-03-13 PROCEDURE — 85027 COMPLETE CBC AUTOMATED: CPT

## 2023-03-14 DIAGNOSIS — E55.9 VITAMIN D DEFICIENCY: Primary | ICD-10-CM

## 2023-03-14 LAB
EST. AVERAGE GLUCOSE BLD GHB EST-MCNC: 100 MG/DL
EST. AVERAGE GLUCOSE BLD GHB EST-MCNC: 100 MG/DL
HBA1C MFR BLD: 5.1 % (ref 4–6)
HBA1C MFR BLD: 5.1 % (ref 4–6)
HCV AB SER QL: NONREACTIVE
VIT B12 SERPL-MCNC: 250 PG/ML (ref 232–1245)

## 2023-03-14 RX ORDER — ERGOCALCIFEROL 1.25 MG/1
50000 CAPSULE ORAL WEEKLY
Qty: 8 CAPSULE | Refills: 0 | Status: SHIPPED | OUTPATIENT
Start: 2023-03-14 | End: 2023-05-03

## 2023-03-15 ENCOUNTER — OFFICE VISIT (OUTPATIENT)
Dept: FAMILY MEDICINE CLINIC | Age: 53
End: 2023-03-15
Payer: MEDICAID

## 2023-03-15 VITALS
HEART RATE: 69 BPM | DIASTOLIC BLOOD PRESSURE: 64 MMHG | OXYGEN SATURATION: 96 % | SYSTOLIC BLOOD PRESSURE: 115 MMHG | HEIGHT: 65 IN | WEIGHT: 293 LBS | TEMPERATURE: 97.7 F | BODY MASS INDEX: 48.82 KG/M2

## 2023-03-15 DIAGNOSIS — E55.9 VITAMIN D DEFICIENCY: ICD-10-CM

## 2023-03-15 DIAGNOSIS — E78.5 DYSLIPIDEMIA: ICD-10-CM

## 2023-03-15 DIAGNOSIS — E66.01 MORBID OBESITY WITH BMI OF 50.0-59.9, ADULT (HCC): ICD-10-CM

## 2023-03-15 DIAGNOSIS — E53.8 B12 DEFICIENCY: ICD-10-CM

## 2023-03-15 DIAGNOSIS — F39 MOOD DISORDER (HCC): ICD-10-CM

## 2023-03-15 DIAGNOSIS — R70.0 ELEVATED ERYTHROCYTE SEDIMENTATION RATE: ICD-10-CM

## 2023-03-15 DIAGNOSIS — K21.9 GASTROESOPHAGEAL REFLUX DISEASE, UNSPECIFIED WHETHER ESOPHAGITIS PRESENT: Primary | ICD-10-CM

## 2023-03-15 DIAGNOSIS — F41.1 GAD (GENERALIZED ANXIETY DISORDER): ICD-10-CM

## 2023-03-15 DIAGNOSIS — E61.1 IRON DEFICIENCY: ICD-10-CM

## 2023-03-15 DIAGNOSIS — L40.9 PSORIASIS: ICD-10-CM

## 2023-03-15 DIAGNOSIS — G47.33 OSA (OBSTRUCTIVE SLEEP APNEA): ICD-10-CM

## 2023-03-15 DIAGNOSIS — L21.9 SEBORRHEIC DERMATITIS: ICD-10-CM

## 2023-03-15 DIAGNOSIS — Z98.84 S/P LAPAROSCOPIC SLEEVE GASTRECTOMY: ICD-10-CM

## 2023-03-15 LAB
INSULIN FREE: 8 UIU/ML (ref 3–25)
INSULIN: 10 UIU/ML (ref 3–25)
ZINC: 70 UG/DL (ref 60–120)

## 2023-03-15 PROCEDURE — 99214 OFFICE O/P EST MOD 30 MIN: CPT | Performed by: FAMILY MEDICINE

## 2023-03-15 RX ORDER — MAGNESIUM 200 MG
1 TABLET ORAL
Qty: 90 TABLET | Refills: 5 | Status: SHIPPED | OUTPATIENT
Start: 2023-03-15

## 2023-03-15 RX ORDER — ESCITALOPRAM OXALATE 20 MG/1
20 TABLET ORAL DAILY
Qty: 90 TABLET | Refills: 1 | Status: SHIPPED | OUTPATIENT
Start: 2023-03-15

## 2023-03-15 RX ORDER — ROSUVASTATIN CALCIUM 5 MG/1
5 TABLET, COATED ORAL NIGHTLY
Qty: 90 TABLET | Refills: 3 | Status: SHIPPED | OUTPATIENT
Start: 2023-03-15

## 2023-03-15 SDOH — ECONOMIC STABILITY: FOOD INSECURITY: WITHIN THE PAST 12 MONTHS, THE FOOD YOU BOUGHT JUST DIDN'T LAST AND YOU DIDN'T HAVE MONEY TO GET MORE.: SOMETIMES TRUE

## 2023-03-15 SDOH — ECONOMIC STABILITY: INCOME INSECURITY: HOW HARD IS IT FOR YOU TO PAY FOR THE VERY BASICS LIKE FOOD, HOUSING, MEDICAL CARE, AND HEATING?: SOMEWHAT HARD

## 2023-03-15 SDOH — ECONOMIC STABILITY: FOOD INSECURITY: WITHIN THE PAST 12 MONTHS, YOU WORRIED THAT YOUR FOOD WOULD RUN OUT BEFORE YOU GOT MONEY TO BUY MORE.: SOMETIMES TRUE

## 2023-03-15 ASSESSMENT — ANXIETY QUESTIONNAIRES
IF YOU CHECKED OFF ANY PROBLEMS ON THIS QUESTIONNAIRE, HOW DIFFICULT HAVE THESE PROBLEMS MADE IT FOR YOU TO DO YOUR WORK, TAKE CARE OF THINGS AT HOME, OR GET ALONG WITH OTHER PEOPLE: NOT DIFFICULT AT ALL
GAD7 TOTAL SCORE: 0
4. TROUBLE RELAXING: 0
1. FEELING NERVOUS, ANXIOUS, OR ON EDGE: 0
7. FEELING AFRAID AS IF SOMETHING AWFUL MIGHT HAPPEN: 0
6. BECOMING EASILY ANNOYED OR IRRITABLE: 0
3. WORRYING TOO MUCH ABOUT DIFFERENT THINGS: 0
5. BEING SO RESTLESS THAT IT IS HARD TO SIT STILL: 0
2. NOT BEING ABLE TO STOP OR CONTROL WORRYING: 0

## 2023-03-15 ASSESSMENT — ENCOUNTER SYMPTOMS
BLOOD IN STOOL: 0
STRIDOR: 0
EYE DISCHARGE: 0
NAUSEA: 0
BACK PAIN: 0
EYE REDNESS: 0
DIARRHEA: 0
VOMITING: 0
SORE THROAT: 0
ABDOMINAL PAIN: 0
PHOTOPHOBIA: 0
COUGH: 0
EYE PAIN: 0
CONSTIPATION: 0
SHORTNESS OF BREATH: 0

## 2023-03-15 ASSESSMENT — PATIENT HEALTH QUESTIONNAIRE - PHQ9
SUM OF ALL RESPONSES TO PHQ QUESTIONS 1-9: 3
8. MOVING OR SPEAKING SO SLOWLY THAT OTHER PEOPLE COULD HAVE NOTICED. OR THE OPPOSITE, BEING SO FIGETY OR RESTLESS THAT YOU HAVE BEEN MOVING AROUND A LOT MORE THAN USUAL: 0
2. FEELING DOWN, DEPRESSED OR HOPELESS: 1
SUM OF ALL RESPONSES TO PHQ QUESTIONS 1-9: 3
10. IF YOU CHECKED OFF ANY PROBLEMS, HOW DIFFICULT HAVE THESE PROBLEMS MADE IT FOR YOU TO DO YOUR WORK, TAKE CARE OF THINGS AT HOME, OR GET ALONG WITH OTHER PEOPLE: 2
6. FEELING BAD ABOUT YOURSELF - OR THAT YOU ARE A FAILURE OR HAVE LET YOURSELF OR YOUR FAMILY DOWN: 1
4. FEELING TIRED OR HAVING LITTLE ENERGY: 1
5. POOR APPETITE OR OVEREATING: 0
6. FEELING BAD ABOUT YOURSELF - OR THAT YOU ARE A FAILURE OR HAVE LET YOURSELF OR YOUR FAMILY DOWN: 0
4. FEELING TIRED OR HAVING LITTLE ENERGY: 1
3. TROUBLE FALLING OR STAYING ASLEEP: 0
9. THOUGHTS THAT YOU WOULD BE BETTER OFF DEAD, OR OF HURTING YOURSELF: 0
5. POOR APPETITE OR OVEREATING: 1
10. IF YOU CHECKED OFF ANY PROBLEMS, HOW DIFFICULT HAVE THESE PROBLEMS MADE IT FOR YOU TO DO YOUR WORK, TAKE CARE OF THINGS AT HOME, OR GET ALONG WITH OTHER PEOPLE: 0
7. TROUBLE CONCENTRATING ON THINGS, SUCH AS READING THE NEWSPAPER OR WATCHING TELEVISION: 0
SUM OF ALL RESPONSES TO PHQ QUESTIONS 1-9: 3
1. LITTLE INTEREST OR PLEASURE IN DOING THINGS: 1
2. FEELING DOWN, DEPRESSED OR HOPELESS: 0
SUM OF ALL RESPONSES TO PHQ QUESTIONS 1-9: 6
9. THOUGHTS THAT YOU WOULD BE BETTER OFF DEAD, OR OF HURTING YOURSELF: 0
SUM OF ALL RESPONSES TO PHQ9 QUESTIONS 1 & 2: 1
1. LITTLE INTEREST OR PLEASURE IN DOING THINGS: 1
7. TROUBLE CONCENTRATING ON THINGS, SUCH AS READING THE NEWSPAPER OR WATCHING TELEVISION: 1
SUM OF ALL RESPONSES TO PHQ QUESTIONS 1-9: 6
SUM OF ALL RESPONSES TO PHQ QUESTIONS 1-9: 3
SUM OF ALL RESPONSES TO PHQ9 QUESTIONS 1 & 2: 2
SUM OF ALL RESPONSES TO PHQ QUESTIONS 1-9: 6
8. MOVING OR SPEAKING SO SLOWLY THAT OTHER PEOPLE COULD HAVE NOTICED. OR THE OPPOSITE, BEING SO FIGETY OR RESTLESS THAT YOU HAVE BEEN MOVING AROUND A LOT MORE THAN USUAL: 0
3. TROUBLE FALLING OR STAYING ASLEEP: 1
SUM OF ALL RESPONSES TO PHQ QUESTIONS 1-9: 6

## 2023-03-15 NOTE — PROGRESS NOTES
Subjective:      Patient ID: Latosha Key is a 50 y.o. female. States here to discuss labs.  has been tired and low energy lately. Used to be on Lexapro in the past - used to help - stopped after televisits started. Would like to go back on it as it used to help . SHE was on 20 mg dose. States sleep is ok with her CPAP.  still takes mobic for her left knee pain and swelling. Review of Systems   Constitutional:  Negative for chills and fever. HENT:  Negative for congestion, ear pain, hearing loss, nosebleeds, sore throat and tinnitus. Eyes:  Negative for photophobia, pain, discharge and redness. Respiratory:  Negative for cough, shortness of breath and stridor. Cardiovascular:  Negative for chest pain, palpitations and leg swelling. Gastrointestinal:  Negative for abdominal pain, blood in stool, constipation, diarrhea, nausea and vomiting. Endocrine: Negative for polydipsia. Genitourinary:  Negative for dysuria, flank pain, frequency, hematuria and urgency. Musculoskeletal:  Positive for arthralgias and gait problem. Negative for back pain, myalgias and neck pain. Skin:  Negative for rash. Allergic/Immunologic: Negative for environmental allergies. Neurological:  Negative for dizziness, tremors, seizures, weakness and headaches. Hematological:  Does not bruise/bleed easily. Psychiatric/Behavioral:  Negative for hallucinations and suicidal ideas. The patient is not nervous/anxious. Objective:   Physical Exam  Constitutional:       General: She is not in acute distress. Appearance: She is well-developed. HENT:      Head: Normocephalic and atraumatic. Right Ear: External ear normal.      Left Ear: External ear normal.      Nose: Nose normal.      Mouth/Throat:      Mouth: Mucous membranes are moist.   Eyes:      Conjunctiva/sclera: Conjunctivae normal.      Pupils: Pupils are equal, round, and reactive to light.    Cardiovascular:      Rate and Rhythm: Normal rate and regular rhythm. Heart sounds: Normal heart sounds. Pulmonary:      Effort: Pulmonary effort is normal.      Breath sounds: Normal breath sounds. Abdominal:      General: Bowel sounds are normal. There is no distension. Palpations: Abdomen is soft. Tenderness: There is no abdominal tenderness. Musculoskeletal:         General: Normal range of motion. Cervical back: Normal range of motion and neck supple. Skin:     General: Skin is warm and dry. Neurological:      General: No focal deficit present. Mental Status: She is alert and oriented to person, place, and time. Mental status is at baseline. Psychiatric:         Mood and Affect: Mood normal.         Behavior: Behavior normal.         Thought Content: Thought content normal.         Judgment: Judgment normal.        Vitals:    03/15/23 1403   BP: 115/64   Pulse: 69   Temp: 97.7 °F (36.5 °C)   SpO2: 96%         Assessment:      Diagnosis Orders   1. Gastroesophageal reflux disease, unspecified whether esophagitis present        2. Morbid obesity with BMI of 50.0-59.9, [facility] ([facility]        3. Vitamin D deficiency        4. LEI (generalized anxiety disorder)        5. Mood disorder ([facility]        6. B12 deficiency        7. Dyslipidemia        8. Iron deficiency        9. S/P laparoscopic sleeve gastrectomy        10. DENISHA (obstructive sleep apnea)        11. Elevated erythrocyte sedimentation rate        12. Psoriasis        13. Seborrheic dermatitis              Plan:   No orders of the defined types were placed in this encounter.       Outpatient Encounter Medications as of 3/15/2023   Medication Sig Dispense Refill    escitalopram (LEXAPRO) 20 MG tablet Take 1 tablet by mouth daily 90 tablet 1    Cyanocobalamin (B-12) 1000 MCG SUBL Place 1 tablet under the tongue daily (with breakfast) 90 tablet 5    rosuvastatin (CRESTOR) 5 MG tablet Take 1 tablet by mouth nightly 90 tablet 3    vitamin D (ERGOCALCIFEROL) 1.25 MG (19229 UT) CAPS capsule Take 1 capsule by mouth once a week for 8 doses 8 capsule 0    famotidine (PEPCID) 20 MG tablet take 1 tablet by mouth twice a day 240 tablet 0    ketoconazole (NIZORAL) 2 % shampoo Apply 3-4 times weekly to scalp, leave on for five minutes prior to washing off 120 mL 2    triamcinolone (KENALOG) 0.025 % cream Apply to rash on face and ears twice daily, as needed, for scaling 80 g 2    fluocinonide (LIDEX) 0.05 % external solution Apply to scalp twice daily, as needed, for itching 60 mL 2    dicyclomine (BENTYL) 20 MG tablet Take 1 tablet by mouth every 8 hours as needed (diarrhea) 120 tablet 0    ondansetron (ZOFRAN) 4 MG tablet Take 1 tablet by mouth every 8 hours as needed for Nausea or Vomiting 30 tablet 2    [DISCONTINUED] meloxicam (MOBIC) 15 MG tablet Take 1 tablet by mouth daily 90 tablet 0     No facility-administered encounter medications on file as of 3/15/2023.      20 minutes spent with patient counseling/educating on acute/chronic medical health problems, medications,  along with treatment options. Reviewed multiple labs/imaging/medications with patient during this time. Following Diet discussion/education was done on Diabetic Diet and Weight Lose Diet. In addition Exercise plan and depression screen were discussed. Recent labs/imaging were discussed and reviewed with patient.

## 2023-03-16 LAB
RETINYL PALMITATE: <0.02 MG/L (ref 0–0.1)
VITAMIN A LEVEL: 0.5 MG/L (ref 0.3–1.2)
VITAMIN A, INTERP: NORMAL

## 2023-03-18 LAB — VIT B1 PYROPHOSHATE BLD-SCNC: 82 NMOL/L (ref 70–180)

## 2023-03-30 LAB
FOLATE SERPL-MCNC: 6 NG/ML
VIT B12 SERPL-MCNC: 292 PG/ML (ref 232–1245)

## 2023-03-31 LAB — FOLATE SERPL-MCNC: 6 NG/ML

## 2023-04-27 ENCOUNTER — OFFICE VISIT (OUTPATIENT)
Dept: ORTHOPEDIC SURGERY | Age: 53
End: 2023-04-27
Payer: MEDICAID

## 2023-04-27 VITALS — RESPIRATION RATE: 14 BRPM | BODY MASS INDEX: 48.82 KG/M2 | HEIGHT: 65 IN | WEIGHT: 293 LBS

## 2023-04-27 DIAGNOSIS — Z96.652 STATUS POST TOTAL LEFT KNEE REPLACEMENT: Primary | ICD-10-CM

## 2023-04-27 DIAGNOSIS — M17.12 PRIMARY OSTEOARTHRITIS OF LEFT KNEE: ICD-10-CM

## 2023-04-27 PROCEDURE — 99212 OFFICE O/P EST SF 10 MIN: CPT | Performed by: PHYSICIAN ASSISTANT

## 2023-04-27 NOTE — PROGRESS NOTES
[address] [facility], 20 [facility] [address], 0352 [facility], [address] [facility]           Dept Phone: [phone]           Dept Fax:  [phone] 320 [facility]           Jam Hernandez          Dept Phone: [phone]           Dept Fax:  [phone]      Chief Compliant:  Chief Complaint   Patient presents with    Knee Pain     Left TKA 11/1/22         History of Present Illness:  Judy Carrasquillo returns today. This is a 50 y.o. female who presents to the clinic today for follow up of left knee arthroplasty on 11/1/2022. Patient returns today for routine reevaluation 6-month status post left TKA. At this point in February she was having some stiffness. Continue physical therapy feels that she did  Extracted just a few weeks PT thereafter. Prior she does note some stiffness when she first arises from a seated position or gets up in the morning but overall feels that she is doing very well. Note patient with history of chronic right knee OA and significant pain. She has discussed possible total knee arthroplasty with Dr. Megan Abrams in the past and does inquire about getting this set up as this continues to severely painful despite corticosteroid injections in this condition but      Review of Systems   Constitutional: Negative for fever, chills, sweats, recent illness, or recent injury. Neurological: Negative for headaches, numbness, or weakness. Integumentary: Negative for rash, itching, ecchymosis, abrasions, or laceration. Musculoskeletal: Positive for Knee Pain (Left TKA 11/1/22 )       Physical Exam:  Constitutional: Patient is oriented to person, place, and time. Patient appears well-developed and well nourished. Musculoskeletal:    Left Knee    Gait:  Normal.   Incision:  {Well healed without any incisional erythema.     Tenderness:  none   Flexion ROM:  115

## 2023-05-10 ENCOUNTER — OFFICE VISIT (OUTPATIENT)
Dept: ORTHOPEDIC SURGERY | Age: 53
End: 2023-05-10
Payer: MEDICAID

## 2023-05-10 VITALS — HEIGHT: 65 IN | BODY MASS INDEX: 48.82 KG/M2 | WEIGHT: 293 LBS | RESPIRATION RATE: 12 BRPM

## 2023-05-10 DIAGNOSIS — G89.29 CHRONIC PAIN OF RIGHT KNEE: Primary | ICD-10-CM

## 2023-05-10 DIAGNOSIS — M25.561 CHRONIC PAIN OF RIGHT KNEE: Primary | ICD-10-CM

## 2023-05-10 PROCEDURE — 99213 OFFICE O/P EST LOW 20 MIN: CPT | Performed by: ORTHOPAEDIC SURGERY

## 2023-05-10 NOTE — PROGRESS NOTES
Elmer Rivera M.D.            [address] [place] [address], 1740 [facility],[address], [address] [facility]           Dept Phone: [phone]           Dept Fax:  [address]           Jam Hernandez          Dept Phone: [phone]           Dept Fax:  [phone]      Chief Compliant:  Chief Complaint   Patient presents with    Knee Pain     right        History of Present Illness: This is a 50 y.o. female who presents to the clinic today for evaluation / follow up of right knee pain. Patient is a 27-year-old female who has a history of a left total knee arthroplasty performed in November 2022 she is doing great with this she has no complaints. She has no swelling no redness no warmth she denies any fever or chills this was due to the fact that the patient did have a sed rate of 52 but this has not been chronically elevated since 9/20/2019 prior to her surgery and must be elevated for various reasons. She has no complaints with the left    In regards to her right the patient had previous arthroscopy examination of his right knee done elsewhere she has had several rounds of steroid injections as well as his Visco shots. She is here today to discuss possible total knee arthroplasty on the right. Review of Systems   Constitutional: Negative for fever, chills, sweats. Eyes: Negative for changes in vision, or pain. HENT: Negative for ear ache, epistaxis, or sore throat. Respiratory/Cardio: Negative for Chest pain, palpitations, SOB, or cough. Gastrointestinal: Negative for abdominal pain, N/V/D. Genitourinary: Negative for dysuria, frequency, urgency, or hematuria. Neurological: Negative for headache, numbness, or weakness. Integumentary: Negative for rash, itching, laceration, or abrasion.    Musculoskeletal: Positive for Knee Pain (right)       Physical

## 2023-05-31 ENCOUNTER — HOSPITAL ENCOUNTER (OUTPATIENT)
Dept: PHYSICAL THERAPY | Facility: CLINIC | Age: 53
Setting detail: THERAPIES SERIES
Discharge: HOME | End: 2023-05-31
Payer: MEDICAID

## 2023-05-31 PROCEDURE — 97110 THERAPEUTIC EXERCISES: CPT

## 2023-05-31 PROCEDURE — 97161 PT EVAL LOW COMPLEX 20 MIN: CPT

## 2023-05-31 NOTE — CONSULTS
[] [facility]) - [facility] &  Therapy  [address]  [phone]  F: [phone] [x] [address]  [place] [address]   [address]  P: [phone]  F: [phone] [] [address] &  Therapy  [address] [facility]  P: [phone]  F: [phone] [] [address] [employer]  P: [phone]  F: [phone] [] [address]  [facility]   [address]   [place]: [phone]  F: [phone]    Physical Therapy Lower Extremity Evaluation    Date:  2023  Patient: Tracie Zhang  : 1970  MRN: [id number]  Physician: Regis Figueroa MD     Insurance: [facility] after 30v, 22 remaining per jose year)  Medical Diagnosis: M25.561, G89.29 (ICD-10-CM) - Chronic pain of right knee  Rehab Codes: M25.561, M25.461,  M62.81, R26.89,   Onset date: referral 5/10/23 (chronic onset)      Next 's appt.: 23    Subjective:   CC/HPI: 50 y.o. female presents to physical therapy with complaint of chronic R knee pain. Pain began insidiously several years ago. Pt denies trauma. Pt reports that pain worsened overtime. Pt did have a R knee arthroscopy previously in 2017. Pain is described as constant with activity including standing, walking, stair climbing. Pt denies use of buckling or that knee will give out on her. Pt does not have to use cane for ambulation as she did initially following L knee replacement. Pt denies use of knee brace. Pt denies any numbness or tingling. Pt has tried managing pain with ice, creams, pain medications, previous injections with temporary relief. Pt does report swelling in R knee. Pt reports that the plan is to have a R TKA later this year, date is undetermined.      Pt previously seen following L TKA on 23 and

## 2023-06-05 ENCOUNTER — OFFICE VISIT (OUTPATIENT)
Dept: DERMATOLOGY | Age: 53
End: 2023-06-05
Payer: MEDICAID

## 2023-06-05 VITALS
WEIGHT: 293 LBS | HEART RATE: 64 BPM | HEIGHT: 64 IN | SYSTOLIC BLOOD PRESSURE: 126 MMHG | OXYGEN SATURATION: 99 % | DIASTOLIC BLOOD PRESSURE: 75 MMHG | BODY MASS INDEX: 50.02 KG/M2

## 2023-06-05 DIAGNOSIS — L71.9 ROSACEA: ICD-10-CM

## 2023-06-05 DIAGNOSIS — L40.8 SEBOPSORIASIS: Primary | ICD-10-CM

## 2023-06-05 PROCEDURE — 99213 OFFICE O/P EST LOW 20 MIN: CPT | Performed by: PHYSICIAN ASSISTANT

## 2023-06-05 RX ORDER — KETOCONAZOLE 20 MG/ML
SHAMPOO TOPICAL
Qty: 120 ML | Refills: 2 | Status: SHIPPED | OUTPATIENT
Start: 2023-06-05

## 2023-06-05 RX ORDER — FLUOCINONIDE TOPICAL SOLUTION USP, 0.05% 0.5 MG/ML
SOLUTION TOPICAL
Qty: 60 ML | Refills: 2 | Status: SHIPPED | OUTPATIENT
Start: 2023-06-05

## 2023-06-05 RX ORDER — SULFACETAMIDE SODIUM 100 MG/ML
LOTION TOPICAL
Qty: 118 ML | Refills: 4 | Status: SHIPPED | OUTPATIENT
Start: 2023-06-05

## 2023-06-05 NOTE — PROGRESS NOTES
Outpatient Medications   Medication Sig Dispense Refill    ketoconazole (NIZORAL) 2 % shampoo Apply 3-4 times weekly to scalp, leave on for five minutes prior to washing off 120 mL 2    fluocinonide (LIDEX) 0.05 % external solution Apply to scalp twice daily, as needed, for itching 60 mL 2    Sulfacetamide Sodium, Acne, 10 % LOTN Apply to face every morning. 118 mL 4    metroNIDAZOLE (METROCREAM) 0.75 % cream Apply to affected areas of face, nightly. 45 g 3    meloxicam (MOBIC) 15 MG tablet Take 1 tablet by mouth daily      ferrous sulfate (IRON 325) 325 (65 Fe) MG tablet Take 1 tablet by mouth daily (with breakfast) 90 tablet 1    ascorbic acid (V-R VITAMIN C) 250 MG tablet Take 1 tablet by mouth daily 90 tablet 1    vitamin D (ERGOCALCIFEROL) 1.25 MG (38335 UT) CAPS capsule Take 1 capsule by mouth once a week 12 capsule 1    escitalopram (LEXAPRO) 20 MG tablet Take 1 tablet by mouth daily 90 tablet 1    Cyanocobalamin (B-12) 1000 MCG SUBL Place 1 tablet under the tongue daily (with breakfast) 90 tablet 5    rosuvastatin (CRESTOR) 5 MG tablet Take 1 tablet by mouth nightly 90 tablet 3    famotidine (PEPCID) 20 MG tablet take 1 tablet by mouth twice a day 240 tablet 0    triamcinolone (KENALOG) 0.025 % cream Apply to rash on face and ears twice daily, as needed, for scaling 80 g 2    dicyclomine (BENTYL) 20 MG tablet Take 1 tablet by mouth every 8 hours as needed (diarrhea) 120 tablet 0    ondansetron (ZOFRAN) 4 MG tablet Take 1 tablet by mouth every 8 hours as needed for Nausea or Vomiting 30 tablet 2     No current facility-administered medications for this visit.        ALLERGIES:   Allergies   Allergen Reactions    Bupropion     No Known Allergies        SOCIAL HISTORY:  Social History     Tobacco Use    Smoking status: Former     Packs/day: 0.60     Years: 28.00     Pack years: 16.80     Types: Cigarettes     Quit date:      Years since quittin.4    Smokeless tobacco: Never   Substance Use Topics

## 2023-06-21 ENCOUNTER — HOSPITAL ENCOUNTER (OUTPATIENT)
Dept: PHYSICAL THERAPY | Facility: CLINIC | Age: 53
Setting detail: THERAPIES SERIES
Discharge: HOME | End: 2023-06-21
Payer: MEDICAID

## 2023-06-21 NOTE — FLOWSHEET NOTE
[] [facility] - [facility] &  Therapy  [address]    [phone]  F: [phone]   [x] [address] [facility] [address]   [address]  P: [phone]  F: [phone]  [] [address] &  Therapy  [address] [facility] [address]  P: [phone]  F: [phone] [] [address]  P: [phone]  F: [phone]  [] [address] [facility]   [address]   [place]: [phone]  F: [phone]   [] [place] [id number] [facility] [address]  [place]: [phone]   F: [phone]     Physical Therapy Cancel/No Show note    Date: 2023  Patient: Davida Mathews  : 1970  MRN: [id number]    Cancels/No Shows to date:     For today's appointment patient:    [x]  Cancelled    [] Rescheduled appointment    [] No-show     Reason given by patient:    [x]  Patient ill    []  Conflicting appointment    [] No transportation      [] Conflict with work    [] No reason given    [] Weather related    [] COVID-19    [] Other:      Comments:        [x] Next appointment was  NOT confirmed. Pt will call back to reschedule.     Electronically signed by: Beth Silva PT

## 2023-07-03 ENCOUNTER — TELEPHONE (OUTPATIENT)
Dept: ORTHOPEDIC SURGERY | Age: 53
End: 2023-07-03

## 2023-07-03 NOTE — TELEPHONE ENCOUNTER
Patient called in stating she spoke with her insurance company and was advised the contract has been signed. Please advise thank you!

## 2023-07-05 NOTE — TELEPHONE ENCOUNTER
Called patient and left voice message that James extended patients to be seen until 7/31/23, As of 8/1/23 we will no longer be in network.

## 2023-07-20 ENCOUNTER — OFFICE VISIT (OUTPATIENT)
Dept: FAMILY MEDICINE CLINIC | Age: 53
End: 2023-07-20
Payer: MEDICAID

## 2023-07-20 VITALS
OXYGEN SATURATION: 98 % | WEIGHT: 293 LBS | SYSTOLIC BLOOD PRESSURE: 130 MMHG | BODY MASS INDEX: 48.82 KG/M2 | TEMPERATURE: 98.1 F | HEART RATE: 63 BPM | DIASTOLIC BLOOD PRESSURE: 62 MMHG | HEIGHT: 65 IN

## 2023-07-20 DIAGNOSIS — Z00.00 ROUTINE GENERAL MEDICAL EXAMINATION AT A HEALTH CARE FACILITY: Primary | ICD-10-CM

## 2023-07-20 DIAGNOSIS — Z76.89 ESTABLISHING CARE WITH NEW DOCTOR, ENCOUNTER FOR: ICD-10-CM

## 2023-07-20 DIAGNOSIS — H61.21 HEARING LOSS OF RIGHT EAR DUE TO CERUMEN IMPACTION: ICD-10-CM

## 2023-07-20 PROCEDURE — 69209 REMOVE IMPACTED EAR WAX UNI: CPT | Performed by: NURSE PRACTITIONER

## 2023-07-20 PROCEDURE — 99396 PREV VISIT EST AGE 40-64: CPT | Performed by: NURSE PRACTITIONER

## 2023-07-20 RX ORDER — BUPROPION HYDROBROMIDE 174 MG/1
TABLET, EXTENDED RELEASE ORAL
COMMUNITY

## 2023-07-20 RX ORDER — ESCITALOPRAM OXALATE 10 MG/1
10 TABLET ORAL DAILY
COMMUNITY

## 2023-07-20 ASSESSMENT — ENCOUNTER SYMPTOMS
CONSTIPATION: 0
NAUSEA: 0
ABDOMINAL PAIN: 0
DIARRHEA: 0
SINUS PRESSURE: 0
SORE THROAT: 0
SHORTNESS OF BREATH: 0
TROUBLE SWALLOWING: 0
COUGH: 0
COLOR CHANGE: 0
CHEST TIGHTNESS: 0

## 2023-07-20 NOTE — PROGRESS NOTES
Visit Information    Have you changed or started any medications since your last visit including any over-the-counter medicines, vitamins, or herbal medicines? no   Have you stopped taking any of your medications? Is so, why? -  no  Are you having any side effects from any of your medications? - no    Have you seen any other physician or provider since your last visit?  no   Have you had any other diagnostic tests since your last visit?  no   Have you been seen in the emergency room and/or had an admission in a hospital since we last saw you?  no   Have you had your routine dental cleaning in the past 6 months?  no     Do you have an active MyChart account? If no, what is the barrier?   Yes    Patient Care Team:  JONATHAN Quach - CNP as PCP - General (Family Nurse Practitioner)  Mehrdad Gomes MD as PCP - Empaneled Provider  Alcides Bustos MD as Consulting Physician (Gastroenterology)  Virginie Payne DPM as Physician (Podiatry)    Medical History Review  Past Medical, Family, and Social History reviewed and  contribute to the patient presenting condition    Health Maintenance   Topic Date Due    DTaP/Tdap/Td vaccine (1 - Tdap) 02/28/2033 (Originally 11/28/1991)    Flu vaccine (1) 02/28/2033 (Originally 8/1/2023)    Shingles vaccine (1 of 2) 02/28/2033 (Originally 11/28/2022)    Breast cancer screen  02/01/2024    Lipids  03/13/2024    Depression Monitoring  04/12/2024    Cervical cancer screen  11/30/2025    Colorectal Cancer Screen  04/08/2031    Pneumococcal 0-64 years Vaccine  Completed    COVID-19 Vaccine  Completed    Hepatitis C screen  Completed    HIV screen  Completed    Hepatitis A vaccine  Aged Out    Hib vaccine  Aged Out    Meningococcal (ACWY) vaccine  Aged Out    Diabetes screen  Discontinued
--Jesus Flores, JONATHAN - CNP

## 2023-09-04 DIAGNOSIS — F39 MOOD DISORDER (HCC): ICD-10-CM

## 2023-09-04 DIAGNOSIS — F41.1 GAD (GENERALIZED ANXIETY DISORDER): ICD-10-CM

## 2023-09-05 RX ORDER — ESCITALOPRAM OXALATE 20 MG/1
TABLET ORAL
Qty: 90 TABLET | Refills: 1 | OUTPATIENT
Start: 2023-09-05

## 2023-10-30 DIAGNOSIS — E61.1 IRON DEFICIENCY: ICD-10-CM

## 2023-10-30 RX ORDER — FERROUS SULFATE 325(65) MG
1 TABLET ORAL
Qty: 90 TABLET | Refills: 1 | OUTPATIENT
Start: 2023-10-30

## 2023-12-08 NOTE — PROGRESS NOTES
Dermatology Patient Note  [address]  [address] [place] [address] Road [address]  Dept: [phone]  Dept Fax: [phone]      VISITDATE: 12/11/2023   REFERRING PROVIDER: No ref. provider found      Mala Freeman is a 48 y.o. female  who presents today in the office for:    Other (Dry skin of face that is starting to spread behind ears. Pt states she is using creams with minimal relief. Pt did not bring creams today and does not know what creams they are./)      HISTORY OF PRESENT ILLNESS:  Patient presents for evaluation of dry skin on the face. She is currently prescribed ketoconazole shampoo and lidex solution for sebopsoriasis, and sulfacetamide lotion and metrocream for rosacea. Patient states that the dry skin on her face is beginning to spread behind the ears. She is using creams with minimal relief.        MEDICAL PROBLEMS:  Patient Active Problem List    Diagnosis Date Noted    B12 deficiency 03/15/2023     Priority: Medium    Dyslipidemia 03/15/2023     Priority: Medium    Iron deficiency 03/15/2023     Priority: Medium    Elevated erythrocyte sedimentation rate 03/15/2023     Priority: Medium    Psoriasis 03/15/2023     Priority: Medium     Dr Yi Fraire      Seborrheic dermatitis 03/15/2023     Priority: Medium    LEI (generalized anxiety disorder) 02/03/2023     Priority: Medium    Mood disorder ([facility]) 02/03/2023     Priority: Medium    Morbid obesity with BMI of 50.0-59.9, adult ([facility]) 05/24/2022     Priority: Medium    Vitamin D deficiency 03/01/2019     Priority: Medium    GERD (gastroesophageal reflux disease) 02/03/2023     Dr Clyda Bloch      Ex-smoker for more than 1 year 02/03/2023     Smoker 1/2 PPD x 28 years, quit 8 years ago      S/P laparoscopic sleeve gastrectomy 11/17/2021 Nov 2021 surgery , follows with Dr Smita Warner regularly      Bilateral chronic knee pain 03/03/2020    DENISHA (obstructive sleep apnea)

## 2023-12-11 ENCOUNTER — OFFICE VISIT (OUTPATIENT)
Dept: DERMATOLOGY | Age: 53
End: 2023-12-11
Payer: COMMERCIAL

## 2023-12-11 VITALS
WEIGHT: 293 LBS | BODY MASS INDEX: 56.25 KG/M2 | TEMPERATURE: 97.2 F | OXYGEN SATURATION: 99 % | DIASTOLIC BLOOD PRESSURE: 78 MMHG | HEART RATE: 60 BPM | SYSTOLIC BLOOD PRESSURE: 120 MMHG

## 2023-12-11 DIAGNOSIS — L71.9 ROSACEA: ICD-10-CM

## 2023-12-11 DIAGNOSIS — L40.8 SEBOPSORIASIS: Primary | ICD-10-CM

## 2023-12-11 DIAGNOSIS — L21.9 SEBORRHEIC DERMATITIS: ICD-10-CM

## 2023-12-11 PROCEDURE — G8427 DOCREV CUR MEDS BY ELIG CLIN: HCPCS | Performed by: PHYSICIAN ASSISTANT

## 2023-12-11 PROCEDURE — 99213 OFFICE O/P EST LOW 20 MIN: CPT | Performed by: PHYSICIAN ASSISTANT

## 2023-12-11 PROCEDURE — G8417 CALC BMI ABV UP PARAM F/U: HCPCS | Performed by: PHYSICIAN ASSISTANT

## 2023-12-11 PROCEDURE — G8484 FLU IMMUNIZE NO ADMIN: HCPCS | Performed by: PHYSICIAN ASSISTANT

## 2023-12-11 PROCEDURE — 1036F TOBACCO NON-USER: CPT | Performed by: PHYSICIAN ASSISTANT

## 2023-12-11 PROCEDURE — 3017F COLORECTAL CA SCREEN DOC REV: CPT | Performed by: PHYSICIAN ASSISTANT

## 2023-12-11 RX ORDER — FERROUS SULFATE 325(65) MG
1 TABLET ORAL
COMMUNITY
Start: 2023-10-03

## 2023-12-11 RX ORDER — KETOCONAZOLE 20 MG/G
CREAM TOPICAL
Qty: 15 G | Refills: 6 | Status: SHIPPED | OUTPATIENT
Start: 2023-12-11

## 2023-12-11 RX ORDER — TRIAMCINOLONE ACETONIDE 0.25 MG/G
CREAM TOPICAL
Qty: 80 G | Refills: 2 | Status: SHIPPED | OUTPATIENT
Start: 2023-12-11

## 2024-01-03 ENCOUNTER — OFFICE VISIT (OUTPATIENT)
Dept: ORTHOPEDIC SURGERY | Age: 54
End: 2024-01-03
Payer: COMMERCIAL

## 2024-01-03 DIAGNOSIS — M25.561 CHRONIC PAIN OF RIGHT KNEE: Primary | ICD-10-CM

## 2024-01-03 DIAGNOSIS — G89.29 CHRONIC PAIN OF RIGHT KNEE: Primary | ICD-10-CM

## 2024-01-03 PROCEDURE — G8484 FLU IMMUNIZE NO ADMIN: HCPCS | Performed by: ORTHOPAEDIC SURGERY

## 2024-01-03 PROCEDURE — G8428 CUR MEDS NOT DOCUMENT: HCPCS | Performed by: ORTHOPAEDIC SURGERY

## 2024-01-03 PROCEDURE — 1036F TOBACCO NON-USER: CPT | Performed by: ORTHOPAEDIC SURGERY

## 2024-01-03 PROCEDURE — 99213 OFFICE O/P EST LOW 20 MIN: CPT | Performed by: ORTHOPAEDIC SURGERY

## 2024-01-03 PROCEDURE — G8417 CALC BMI ABV UP PARAM F/U: HCPCS | Performed by: ORTHOPAEDIC SURGERY

## 2024-01-03 PROCEDURE — 3017F COLORECTAL CA SCREEN DOC REV: CPT | Performed by: ORTHOPAEDIC SURGERY

## 2024-01-03 NOTE — PROGRESS NOTES
by Delonte Alcantar DO at [facility] OR    TONSILLECTOMY      TOTAL KNEE ARTHROPLASTY Left 11/1/2022    KNEE TOTAL ARTHROPLASTY performed by Jose Palafox MD at [email or web address] OR    UPPER GASTROINTESTINAL ENDOSCOPY N/A 04/08/2021    EGD BIOPSY OF GASTRIC AND GASTRIC POLYPECTOMY performed by Alana Resendiz MD at [facility] OR     Family History   Problem Relation Age of Onset    Asthma Mother     Parkinsonism Mother     Arthritis Father     Cancer Father    Plan  Patient would like to be scheduled for right total knee arthroplasty.  She is obviously aware of the preoperative postoperative course as well as risk and benefits as has she has gone through as before but she is also aware of her increased risk for postoperative morbidity given her high BMI but she did extremely well last time and does not have any history of diabetes or any other significant medical comorbidities.  We will get her scheduled accordingly.      Provider Attestation:  I, Jose Palafox, personally performed the services described in this documentation. All medical record entries made by the scribe were at my direction and in my presence. I have reviewed the chart and discharge instructions and agree that the records reflect my personal performance and is accurate and complete. Jose Palafox MD. 01/03/24      Please note that this chart was generated using voice recognition Dragon dictation software.  Although every effort was made to ensure the accuracy of this automated transcription, some errors in transcription may have occurred.

## 2024-01-18 ENCOUNTER — TELEPHONE (OUTPATIENT)
Dept: ORTHOPEDIC SURGERY | Age: 54
End: 2024-01-18

## 2024-01-29 ENCOUNTER — OFFICE VISIT (OUTPATIENT)
Dept: PRIMARY CARE CLINIC | Age: 54
End: 2024-01-29
Payer: COMMERCIAL

## 2024-01-29 VITALS
HEART RATE: 66 BPM | DIASTOLIC BLOOD PRESSURE: 84 MMHG | TEMPERATURE: 97.4 F | SYSTOLIC BLOOD PRESSURE: 120 MMHG | OXYGEN SATURATION: 99 %

## 2024-01-29 DIAGNOSIS — J98.8 RESPIRATORY INFECTION: Primary | ICD-10-CM

## 2024-01-29 DIAGNOSIS — B37.9 ANTIBIOTIC-INDUCED YEAST INFECTION: ICD-10-CM

## 2024-01-29 DIAGNOSIS — T36.95XA ANTIBIOTIC-INDUCED YEAST INFECTION: ICD-10-CM

## 2024-01-29 PROCEDURE — 1036F TOBACCO NON-USER: CPT | Performed by: FAMILY MEDICINE

## 2024-01-29 PROCEDURE — G8484 FLU IMMUNIZE NO ADMIN: HCPCS | Performed by: FAMILY MEDICINE

## 2024-01-29 PROCEDURE — 99213 OFFICE O/P EST LOW 20 MIN: CPT | Performed by: FAMILY MEDICINE

## 2024-01-29 PROCEDURE — 3017F COLORECTAL CA SCREEN DOC REV: CPT | Performed by: FAMILY MEDICINE

## 2024-01-29 PROCEDURE — G8427 DOCREV CUR MEDS BY ELIG CLIN: HCPCS | Performed by: FAMILY MEDICINE

## 2024-01-29 PROCEDURE — G8417 CALC BMI ABV UP PARAM F/U: HCPCS | Performed by: FAMILY MEDICINE

## 2024-01-29 RX ORDER — FLUCONAZOLE 150 MG/1
150 TABLET ORAL ONCE
Qty: 2 TABLET | Refills: 0 | Status: SHIPPED | OUTPATIENT
Start: 2024-01-29 | End: 2024-01-29

## 2024-01-29 RX ORDER — ERGOCALCIFEROL 1.25 MG/1
50000 CAPSULE ORAL
COMMUNITY
Start: 2023-04-12

## 2024-01-29 RX ORDER — AZITHROMYCIN 250 MG/1
TABLET, FILM COATED ORAL
Qty: 1 PACKET | Refills: 0 | Status: SHIPPED | OUTPATIENT
Start: 2024-01-29 | End: 2024-02-08

## 2024-01-29 ASSESSMENT — ENCOUNTER SYMPTOMS
SHORTNESS OF BREATH: 0
SORE THROAT: 0
WHEEZING: 0
COUGH: 1

## 2024-01-29 NOTE — PATIENT INSTRUCTIONS
Take zpak as prescribed for respiratory infection  Take diflucan as needed if symptoms of yeast infection develop  Continue over the counter cough/cold medications as needed for symptoms  If symptoms worsen or do not improve please follow-up with PCP or return to clinic

## 2024-01-29 NOTE — PROGRESS NOTES
prednisone due to it making her feel jittery  Patient declined flu/covid testing at this time    No orders of the defined types were placed in this encounter.    Orders Placed This Encounter   Medications    azithromycin (ZITHROMAX) 250 MG tablet     Sig: Use as directed     Dispense:  1 packet     Refill:  0    fluconazole (DIFLUCAN) 150 MG tablet     Sig: Take 1 tablet by mouth once for 1 dose If symptoms persist after 72 hours then take 2nd dose     Dispense:  2 tablet     Refill:  0      Patient given educational materials - see patient instructions.  Discussed use, benefit, and side effects of prescribed medications.  All patient questions answered.  Pt voiced understanding.  Patient agreed with treatment plan. Follow up as directed.     Electronicallysigned by DEEPIKA Medrano MD on 1/29/2024 at 5:31 PM

## 2024-02-01 ENCOUNTER — OFFICE VISIT (OUTPATIENT)
Dept: FAMILY MEDICINE CLINIC | Age: 54
End: 2024-02-01
Payer: COMMERCIAL

## 2024-02-01 VITALS
TEMPERATURE: 97.8 F | HEART RATE: 70 BPM | SYSTOLIC BLOOD PRESSURE: 128 MMHG | BODY MASS INDEX: 48.82 KG/M2 | HEIGHT: 65 IN | DIASTOLIC BLOOD PRESSURE: 72 MMHG | WEIGHT: 293 LBS | OXYGEN SATURATION: 98 %

## 2024-02-01 DIAGNOSIS — Z12.31 ENCOUNTER FOR SCREENING MAMMOGRAM FOR MALIGNANT NEOPLASM OF BREAST: ICD-10-CM

## 2024-02-01 DIAGNOSIS — M79.672 LEFT FOOT PAIN: Primary | ICD-10-CM

## 2024-02-01 PROCEDURE — 99213 OFFICE O/P EST LOW 20 MIN: CPT | Performed by: NURSE PRACTITIONER

## 2024-02-01 PROCEDURE — G8427 DOCREV CUR MEDS BY ELIG CLIN: HCPCS | Performed by: NURSE PRACTITIONER

## 2024-02-01 PROCEDURE — 1036F TOBACCO NON-USER: CPT | Performed by: NURSE PRACTITIONER

## 2024-02-01 PROCEDURE — G8484 FLU IMMUNIZE NO ADMIN: HCPCS | Performed by: NURSE PRACTITIONER

## 2024-02-01 PROCEDURE — 3017F COLORECTAL CA SCREEN DOC REV: CPT | Performed by: NURSE PRACTITIONER

## 2024-02-01 PROCEDURE — G8417 CALC BMI ABV UP PARAM F/U: HCPCS | Performed by: NURSE PRACTITIONER

## 2024-02-01 ASSESSMENT — PATIENT HEALTH QUESTIONNAIRE - PHQ9
4. FEELING TIRED OR HAVING LITTLE ENERGY: 0
SUM OF ALL RESPONSES TO PHQ QUESTIONS 1-9: 0
5. POOR APPETITE OR OVEREATING: 0
8. MOVING OR SPEAKING SO SLOWLY THAT OTHER PEOPLE COULD HAVE NOTICED. OR THE OPPOSITE, BEING SO FIGETY OR RESTLESS THAT YOU HAVE BEEN MOVING AROUND A LOT MORE THAN USUAL: 0
SUM OF ALL RESPONSES TO PHQ QUESTIONS 1-9: 0
2. FEELING DOWN, DEPRESSED OR HOPELESS: 0
6. FEELING BAD ABOUT YOURSELF - OR THAT YOU ARE A FAILURE OR HAVE LET YOURSELF OR YOUR FAMILY DOWN: 0
7. TROUBLE CONCENTRATING ON THINGS, SUCH AS READING THE NEWSPAPER OR WATCHING TELEVISION: 0
SUM OF ALL RESPONSES TO PHQ9 QUESTIONS 1 & 2: 0
9. THOUGHTS THAT YOU WOULD BE BETTER OFF DEAD, OR OF HURTING YOURSELF: 0
SUM OF ALL RESPONSES TO PHQ QUESTIONS 1-9: 0
1. LITTLE INTEREST OR PLEASURE IN DOING THINGS: 0
3. TROUBLE FALLING OR STAYING ASLEEP: 0
10. IF YOU CHECKED OFF ANY PROBLEMS, HOW DIFFICULT HAVE THESE PROBLEMS MADE IT FOR YOU TO DO YOUR WORK, TAKE CARE OF THINGS AT HOME, OR GET ALONG WITH OTHER PEOPLE: 0
SUM OF ALL RESPONSES TO PHQ QUESTIONS 1-9: 0

## 2024-02-01 ASSESSMENT — ENCOUNTER SYMPTOMS
CHEST TIGHTNESS: 0
SHORTNESS OF BREATH: 0
COUGH: 0
COLOR CHANGE: 0
ABDOMINAL PAIN: 0

## 2024-02-01 NOTE — PROGRESS NOTES
Visit Information    Have you changed or started any medications since your last visit including any over-the-counter medicines, vitamins, or herbal medicines? no   Have you stopped taking any of your medications? Is so, why? -  no  Are you having any side effects from any of your medications? - no    Have you seen any other physician or provider since your last visit?  no   Have you had any other diagnostic tests since your last visit?  no   Have you been seen in the emergency room and/or had an admission in a hospital since we last saw you?  no   Have you had your routine dental cleaning in the past 6 months?  no     Do you have an active MyChart account? If no, what is the barrier?  Yes    Patient Care Team:  Guerda Jarrell APRN - CNP as PCP - General (Family Nurse Practitioner)  Guerda Jarrell APRN - CNP as PCP - Empaneled Provider  Alana Resendiz MD as Consulting Physician (Gastroenterology)  Rick Jha DPM as Physician (Podiatry)    Medical History Review  Past Medical, Family, and Social History reviewed and  contribute to the patient presenting condition    Health Maintenance   Topic Date Due    Hepatitis B vaccine (1 of 3 - 3-dose series) Never done    COVID-19 Vaccine (4 - 2023-24 season) 09/01/2023    Breast cancer screen  02/01/2024    DTaP/Tdap/Td vaccine (1 - Tdap) 02/28/2033 (Originally 11/28/1991)    Flu vaccine (1) 02/28/2033 (Originally 8/1/2023)    Shingles vaccine (1 of 2) 02/28/2033 (Originally 11/28/2022)    Depression Monitoring  04/12/2024    Cervical cancer screen  11/30/2025    Lipids  03/13/2028    Colorectal Cancer Screen  04/08/2031    Pneumococcal 0-64 years Vaccine  Completed    Hepatitis C screen  Completed    HIV screen  Completed    Hepatitis A vaccine  Aged Out    Hib vaccine  Aged Out    Polio vaccine  Aged Out    Meningococcal (ACWY) vaccine  Aged Out    Diabetes screen  Discontinued             
exercise.    Electronically signed by JONATHAN Hendrickson CNP,CNP on 2/1/2024 at 2:22 PM

## 2024-02-12 ENCOUNTER — HOSPITAL ENCOUNTER (OUTPATIENT)
Dept: PREADMISSION TESTING | Age: 54
Discharge: HOME | End: 2024-02-16
Payer: COMMERCIAL

## 2024-02-12 ENCOUNTER — HOSPITAL ENCOUNTER (OUTPATIENT)
Dept: GENERAL RADIOLOGY | Age: 54
Discharge: HOME | End: 2024-02-14
Payer: COMMERCIAL

## 2024-02-12 ENCOUNTER — HOSPITAL ENCOUNTER (OUTPATIENT)
Age: 54
Discharge: HOME | End: 2024-02-12
Payer: COMMERCIAL

## 2024-02-12 ENCOUNTER — HOSPITAL ENCOUNTER (OUTPATIENT)
Age: 54
Discharge: HOME | End: 2024-02-14
Payer: COMMERCIAL

## 2024-02-12 ENCOUNTER — OFFICE VISIT (OUTPATIENT)
Dept: FAMILY MEDICINE CLINIC | Age: 54
End: 2024-02-12
Payer: COMMERCIAL

## 2024-02-12 VITALS
SYSTOLIC BLOOD PRESSURE: 128 MMHG | BODY MASS INDEX: 48.82 KG/M2 | HEIGHT: 65 IN | TEMPERATURE: 97.2 F | OXYGEN SATURATION: 98 % | DIASTOLIC BLOOD PRESSURE: 82 MMHG | WEIGHT: 293 LBS | HEART RATE: 73 BPM

## 2024-02-12 VITALS
TEMPERATURE: 98.1 F | WEIGHT: 293 LBS | HEART RATE: 68 BPM | OXYGEN SATURATION: 99 % | BODY MASS INDEX: 58.41 KG/M2 | RESPIRATION RATE: 16 BRPM | DIASTOLIC BLOOD PRESSURE: 78 MMHG | SYSTOLIC BLOOD PRESSURE: 140 MMHG

## 2024-02-12 DIAGNOSIS — Z01.818 PRE-OP TESTING: ICD-10-CM

## 2024-02-12 DIAGNOSIS — Z01.818 PREOPERATIVE CLEARANCE: Primary | ICD-10-CM

## 2024-02-12 DIAGNOSIS — Z01.818 PREOPERATIVE CLEARANCE: ICD-10-CM

## 2024-02-12 DIAGNOSIS — Z01.818 PRE-OP TESTING: Primary | ICD-10-CM

## 2024-02-12 LAB
ANION GAP SERPL CALCULATED.3IONS-SCNC: 8 MMOL/L (ref 9–17)
BACTERIA URNS QL MICRO: NORMAL
BASOPHILS # BLD: <0.03 K/UL (ref 0–0.2)
BASOPHILS NFR BLD: 0 % (ref 0–2)
BILIRUB UR QL STRIP: NEGATIVE
BUN SERPL-MCNC: 11 MG/DL (ref 6–20)
CALCIUM SERPL-MCNC: 8.9 MG/DL (ref 8.6–10.4)
CASTS #/AREA URNS LPF: NORMAL /LPF (ref 0–8)
CHLORIDE SERPL-SCNC: 101 MMOL/L (ref 98–107)
CLARITY UR: CLEAR
CO2 SERPL-SCNC: 30 MMOL/L (ref 20–31)
COLOR UR: YELLOW
CREAT SERPL-MCNC: 1 MG/DL (ref 0.5–0.9)
EOSINOPHIL # BLD: 0.17 K/UL (ref 0–0.44)
EOSINOPHILS RELATIVE PERCENT: 4 % (ref 1–4)
EPI CELLS #/AREA URNS HPF: NORMAL /HPF (ref 0–5)
ERYTHROCYTE [DISTWIDTH] IN BLOOD BY AUTOMATED COUNT: 13.2 % (ref 11.8–14.4)
GFR SERPL CREATININE-BSD FRML MDRD: >60 ML/MIN/1.73M2
GLUCOSE SERPL-MCNC: 68 MG/DL (ref 70–99)
GLUCOSE UR STRIP-MCNC: NEGATIVE MG/DL
HCT VFR BLD AUTO: 35.6 % (ref 36.3–47.1)
HGB BLD-MCNC: 10.8 G/DL (ref 11.9–15.1)
HGB UR QL STRIP.AUTO: NEGATIVE
IMM GRANULOCYTES # BLD AUTO: <0.03 K/UL (ref 0–0.3)
IMM GRANULOCYTES NFR BLD: 0 %
KETONES UR STRIP-MCNC: NEGATIVE MG/DL
LEUKOCYTE ESTERASE UR QL STRIP: ABNORMAL
LYMPHOCYTES NFR BLD: 1.55 K/UL (ref 1.1–3.7)
LYMPHOCYTES RELATIVE PERCENT: 32 % (ref 24–43)
MCH RBC QN AUTO: 27.8 PG (ref 25.2–33.5)
MCHC RBC AUTO-ENTMCNC: 30.3 G/DL (ref 28.4–34.8)
MCV RBC AUTO: 91.8 FL (ref 82.6–102.9)
MONOCYTES NFR BLD: 0.47 K/UL (ref 0.1–1.2)
MONOCYTES NFR BLD: 10 % (ref 3–12)
NEUTROPHILS NFR BLD: 54 % (ref 36–65)
NEUTS SEG NFR BLD: 2.6 K/UL (ref 1.5–8.1)
NITRITE UR QL STRIP: NEGATIVE
NRBC BLD-RTO: 0 PER 100 WBC
PH UR STRIP: 7 [PH] (ref 5–8)
PLATELET # BLD AUTO: 229 K/UL (ref 138–453)
PMV BLD AUTO: 9.6 FL (ref 8.1–13.5)
POTASSIUM SERPL-SCNC: 4.8 MMOL/L (ref 3.7–5.3)
PROT UR STRIP-MCNC: NEGATIVE MG/DL
RBC # BLD AUTO: 3.88 M/UL (ref 3.95–5.11)
RBC #/AREA URNS HPF: NORMAL /HPF (ref 0–4)
SODIUM SERPL-SCNC: 139 MMOL/L (ref 135–144)
SP GR UR STRIP: 1.02 (ref 1–1.03)
UROBILINOGEN UR STRIP-ACNC: NORMAL EU/DL (ref 0–1)
WBC #/AREA URNS HPF: NORMAL /HPF (ref 0–5)
WBC OTHER # BLD: 4.8 K/UL (ref 3.5–11.3)

## 2024-02-12 PROCEDURE — 99213 OFFICE O/P EST LOW 20 MIN: CPT | Performed by: NURSE PRACTITIONER

## 2024-02-12 PROCEDURE — G8484 FLU IMMUNIZE NO ADMIN: HCPCS | Performed by: NURSE PRACTITIONER

## 2024-02-12 PROCEDURE — 71046 X-RAY EXAM CHEST 2 VIEWS: CPT

## 2024-02-12 PROCEDURE — 1036F TOBACCO NON-USER: CPT | Performed by: NURSE PRACTITIONER

## 2024-02-12 PROCEDURE — G8417 CALC BMI ABV UP PARAM F/U: HCPCS | Performed by: NURSE PRACTITIONER

## 2024-02-12 PROCEDURE — 93005 ELECTROCARDIOGRAM TRACING: CPT

## 2024-02-12 PROCEDURE — G8427 DOCREV CUR MEDS BY ELIG CLIN: HCPCS | Performed by: NURSE PRACTITIONER

## 2024-02-12 PROCEDURE — 3017F COLORECTAL CA SCREEN DOC REV: CPT | Performed by: NURSE PRACTITIONER

## 2024-02-12 PROCEDURE — 85025 COMPLETE CBC W/AUTO DIFF WBC: CPT

## 2024-02-12 PROCEDURE — 87086 URINE CULTURE/COLONY COUNT: CPT

## 2024-02-12 PROCEDURE — 80048 BASIC METABOLIC PNL TOTAL CA: CPT

## 2024-02-12 PROCEDURE — 81001 URINALYSIS AUTO W/SCOPE: CPT

## 2024-02-12 PROCEDURE — 87641 MR-STAPH DNA AMP PROBE: CPT

## 2024-02-12 PROCEDURE — 36415 COLL VENOUS BLD VENIPUNCTURE: CPT

## 2024-02-12 NOTE — DISCHARGE INSTRUCTIONS
DAY OF SURGERY/PROCEDURE  GUIDELINES    As a patient at the [facility], you can expect quality medical and nursing care that is centered on your individual needs. It is our goal to make your surgical experience as comfortable and excellent as possible.  ________________________________________________________________________    The following instructions are general guidelines, if any information on this sheet is different from what your doctor has instructed you to do, please follow your doctor's instructions.    Please arrive on       Enter through entrance C. Check in at registration     Upon arrival you will be taken to the pre-operative area to get ready for surgery, your family will stay in the waiting room and visit with you once you are ready for surgery. Due to special limitations please limit visitation to 1-2 members of your family at a time. When it is time for surgery your family will return to the waiting room.    Nothing to eat, drink, smoke, suck or chew after midnight (no water, gum, mints, cigarettes, cigars, pipes, snuff, chewing tobacco, etc.) or your surgery may be canceled.     Take a shower or bath on the morning of your surgery/procedure (Hibiclens if directed) Do not apply any lotions.    Brush your teeth, but do not swallow any water    IN CASE OF ILLNESS - If you have a cold or flu symptoms (high fever, runny nose, sore throat, cough, etc.) rash, nausea, vomiting, loose stools, and/or recent contact with someone who has a contagious disease (chick pox, measles, etc.) please call your doctor before coming to the surgery center    Take a small sip of water with heart, blood pressure, and/or seizure medication the morning of surgery.     If applicable bring your:  Eyeglasses and Case (If you wear contacts they have to be removed before surgery, bring case and solution)  CPAP     DO NOT take anticoagulants (blood thinners, aspirin or aspirin-containing products) as

## 2024-02-12 NOTE — PROGRESS NOTES
Visit Information    Have you changed or started any medications since your last visit including any over-the-counter medicines, vitamins, or herbal medicines? no   Have you stopped taking any of your medications? Is so, why? -  no  Are you having any side effects from any of your medications? - no    Have you seen any other physician or provider since your last visit?  no   Have you had any other diagnostic tests since your last visit?  no   Have you been seen in the emergency room and/or had an admission in a hospital since we last saw you?  no   Have you had your routine dental cleaning in the past 6 months?  no     Do you have an active MyChart account? If no, what is the barrier?  Yes    Patient Care Team:  Guerda Jarrell APRN - CNP as PCP - General (Family Nurse Practitioner)  Guerda Jarrell APRN - CNP as PCP - Empaneled Provider  Alana Resendiz MD as Consulting Physician (Gastroenterology)  Rick Jha DPM as Physician (Podiatry)    Medical History Review  Past Medical, Family, and Social History reviewed and  contribute to the patient presenting condition    Health Maintenance   Topic Date Due    Hepatitis B vaccine (1 of 3 - 3-dose series) Never done    COVID-19 Vaccine (4 - 2023-24 season) 09/01/2023    Breast cancer screen  02/01/2024    DTaP/Tdap/Td vaccine (1 - Tdap) 02/28/2033 (Originally 11/28/1991)    Flu vaccine (1) 02/28/2033 (Originally 8/1/2023)    Shingles vaccine (1 of 2) 02/28/2033 (Originally 11/28/2022)    Depression Monitoring  02/10/2025    Cervical cancer screen  11/30/2025    Lipids  03/13/2028    Colorectal Cancer Screen  04/08/2031    Pneumococcal 0-64 years Vaccine  Completed    Hepatitis C screen  Completed    HIV screen  Completed    Hepatitis A vaccine  Aged Out    Hib vaccine  Aged Out    Polio vaccine  Aged Out    Meningococcal (ACWY) vaccine  Aged Out    Diabetes screen  Discontinued             
Maintenance   Topic Date Due    Hepatitis B vaccine (1 of 3 - 3-dose series) Never done    COVID-19 Vaccine (4 - 2023-24 season) 09/01/2023    Breast cancer screen  02/01/2024    DTaP/Tdap/Td vaccine (1 - Tdap) 02/28/2033 (Originally 11/28/1991)    Flu vaccine (1) 02/28/2033 (Originally 8/1/2023)    Shingles vaccine (1 of 2) 02/28/2033 (Originally 11/28/2022)    Depression Monitoring  02/10/2025    Cervical cancer screen  11/30/2025    Lipids  03/13/2028    Colorectal Cancer Screen  04/08/2031    Pneumococcal 0-64 years Vaccine  Completed    Hepatitis C screen  Completed    HIV screen  Completed    Hepatitis A vaccine  Aged Out    Hib vaccine  Aged Out    Polio vaccine  Aged Out    Meningococcal (ACWY) vaccine  Aged Out    Diabetes screen  Discontinued       Subjective:      Review of Systems   Constitutional:  Negative for appetite change, chills, diaphoresis, fatigue and fever.   HENT:  Positive for rhinorrhea (seasonal allergies). Negative for congestion, ear discharge and ear pain.    Eyes:  Negative for redness.   Respiratory:  Negative for cough, chest tightness and shortness of breath.    Cardiovascular:  Negative for chest pain, palpitations and leg swelling.   Gastrointestinal:  Negative for abdominal pain, constipation, diarrhea, nausea and vomiting.   Genitourinary:  Negative for difficulty urinating, dysuria, frequency, urgency and vaginal bleeding.   Musculoskeletal:  Positive for arthralgias (right knee).   Skin:  Negative for rash and wound.   Allergic/Immunologic: Positive for environmental allergies.   Neurological:  Negative for dizziness, weakness, light-headedness and headaches.   Hematological:  Negative for adenopathy.         Objective:      Physical Exam  Vitals and nursing note reviewed.   Constitutional:       General: She is not in acute distress.     Appearance: Normal appearance. She is well-developed. She is morbidly obese. She is not ill-appearing, toxic-appearing or diaphoretic.

## 2024-02-13 LAB
EKG ATRIAL RATE: 66 BPM
EKG P AXIS: 25 DEGREES
EKG P-R INTERVAL: 164 MS
EKG Q-T INTERVAL: 402 MS
EKG QRS DURATION: 86 MS
EKG QTC CALCULATION (BAZETT): 421 MS
EKG R AXIS: 58 DEGREES
EKG T AXIS: 40 DEGREES
EKG VENTRICULAR RATE: 66 BPM
MICROORGANISM SPEC CULT: NORMAL
MRSA, DNA, NASAL: NEGATIVE
SPECIMEN DESCRIPTION: NORMAL
SPECIMEN DESCRIPTION: NORMAL

## 2024-02-14 DIAGNOSIS — D64.9 ANEMIA, UNSPECIFIED TYPE: Primary | ICD-10-CM

## 2024-02-16 ENCOUNTER — HOSPITAL ENCOUNTER (OUTPATIENT)
Dept: MAMMOGRAPHY | Age: 54
End: 2024-02-16
Payer: COMMERCIAL

## 2024-02-16 ENCOUNTER — CASE MANAGEMENT (OUTPATIENT)
Age: 54
End: 2024-02-16

## 2024-02-16 ENCOUNTER — HOSPITAL ENCOUNTER (OUTPATIENT)
Age: 54
Discharge: HOME | End: 2024-02-16
Payer: COMMERCIAL

## 2024-02-16 VITALS — BODY MASS INDEX: 50.02 KG/M2 | HEIGHT: 64 IN | WEIGHT: 293 LBS

## 2024-02-16 DIAGNOSIS — M17.11 PRIMARY OSTEOARTHRITIS OF RIGHT KNEE: Primary | ICD-10-CM

## 2024-02-16 DIAGNOSIS — Z12.31 ENCOUNTER FOR SCREENING MAMMOGRAM FOR MALIGNANT NEOPLASM OF BREAST: ICD-10-CM

## 2024-02-16 DIAGNOSIS — D64.9 ANEMIA, UNSPECIFIED TYPE: ICD-10-CM

## 2024-02-16 LAB
FERRITIN SERPL-MCNC: 22 NG/ML (ref 13–150)
FOLATE SERPL-MCNC: 7.9 NG/ML (ref 4.8–24.2)
IRON SATN MFR SERPL: 17 % (ref 20–55)
IRON SERPL-MCNC: 59 UG/DL (ref 37–145)
TIBC SERPL-MCNC: 353 UG/DL (ref 250–450)
UNSATURATED IRON BINDING CAPACITY: 294 UG/DL (ref 112–347)
VIT B12 SERPL-MCNC: 250 PG/ML (ref 232–1245)

## 2024-02-16 PROCEDURE — 77063 BREAST TOMOSYNTHESIS BI: CPT

## 2024-02-16 PROCEDURE — 82746 ASSAY OF FOLIC ACID SERUM: CPT

## 2024-02-16 PROCEDURE — 36415 COLL VENOUS BLD VENIPUNCTURE: CPT

## 2024-02-16 PROCEDURE — 83540 ASSAY OF IRON: CPT

## 2024-02-16 PROCEDURE — 82607 VITAMIN B-12: CPT

## 2024-02-16 PROCEDURE — 83550 IRON BINDING TEST: CPT

## 2024-02-16 PROCEDURE — 82728 ASSAY OF FERRITIN: CPT

## 2024-02-16 NOTE — PROGRESS NOTES
Pre-op phone call    Spoke with patient preop concerning:     Procedure right  Knee arthroplasty with Dr. Palafox  on  2/22/24 .    DME: Patient has rolling walker.    DME: Patient needs  raised toilet seat .    Therapy after surgery: Pt states she had [facility] last year and was happy with the company. She would like to use the same one.     Other concerns: Pt requested a raised toilet seat- round shape. Raised toilet seat order faxed to [employer] at [phone]

## 2024-02-21 ENCOUNTER — ANESTHESIA EVENT (OUTPATIENT)
Dept: OPERATING ROOM | Age: 54
End: 2024-02-21
Payer: COMMERCIAL

## 2024-02-22 ENCOUNTER — APPOINTMENT (OUTPATIENT)
Dept: GENERAL RADIOLOGY | Age: 54
End: 2024-02-22
Attending: ORTHOPAEDIC SURGERY
Payer: COMMERCIAL

## 2024-02-22 ENCOUNTER — HOSPITAL ENCOUNTER (OUTPATIENT)
Age: 54
Discharge: HOME HEALTH | End: 2024-02-24
Attending: ORTHOPAEDIC SURGERY | Admitting: ORTHOPAEDIC SURGERY
Payer: COMMERCIAL

## 2024-02-22 ENCOUNTER — ANESTHESIA (OUTPATIENT)
Dept: OPERATING ROOM | Age: 54
End: 2024-02-22
Payer: COMMERCIAL

## 2024-02-22 DIAGNOSIS — G89.18 POST-OP PAIN: ICD-10-CM

## 2024-02-22 DIAGNOSIS — M17.11 PRIMARY OSTEOARTHRITIS OF RIGHT KNEE: Primary | ICD-10-CM

## 2024-02-22 PROCEDURE — 3700000000 HC ANESTHESIA ATTENDED CARE: Performed by: ORTHOPAEDIC SURGERY

## 2024-02-22 PROCEDURE — 97166 OT EVAL MOD COMPLEX 45 MIN: CPT

## 2024-02-22 PROCEDURE — 6360000002 HC RX W HCPCS

## 2024-02-22 PROCEDURE — C1776 JOINT DEVICE (IMPLANTABLE): HCPCS | Performed by: ORTHOPAEDIC SURGERY

## 2024-02-22 PROCEDURE — 2580000003 HC RX 258: Performed by: ANESTHESIOLOGY

## 2024-02-22 PROCEDURE — 99253 IP/OBS CNSLTJ NEW/EST LOW 45: CPT | Performed by: STUDENT IN AN ORGANIZED HEALTH CARE EDUCATION/TRAINING PROGRAM

## 2024-02-22 PROCEDURE — 97535 SELF CARE MNGMENT TRAINING: CPT

## 2024-02-22 PROCEDURE — 2580000003 HC RX 258: Performed by: ORTHOPAEDIC SURGERY

## 2024-02-22 PROCEDURE — C9290 INJ, BUPIVACAINE LIPOSOME: HCPCS | Performed by: ORTHOPAEDIC SURGERY

## 2024-02-22 PROCEDURE — 6370000000 HC RX 637 (ALT 250 FOR IP): Performed by: ORTHOPAEDIC SURGERY

## 2024-02-22 PROCEDURE — 6370000000 HC RX 637 (ALT 250 FOR IP): Performed by: NURSE ANESTHETIST, CERTIFIED REGISTERED

## 2024-02-22 PROCEDURE — C1713 ANCHOR/SCREW BN/BN,TIS/BN: HCPCS | Performed by: ORTHOPAEDIC SURGERY

## 2024-02-22 PROCEDURE — 7100000001 HC PACU RECOVERY - ADDTL 15 MIN: Performed by: ORTHOPAEDIC SURGERY

## 2024-02-22 PROCEDURE — 2720000010 HC SURG SUPPLY STERILE: Performed by: ORTHOPAEDIC SURGERY

## 2024-02-22 PROCEDURE — 6360000002 HC RX W HCPCS: Performed by: ORTHOPAEDIC SURGERY

## 2024-02-22 PROCEDURE — 2709999900 HC NON-CHARGEABLE SUPPLY: Performed by: ORTHOPAEDIC SURGERY

## 2024-02-22 PROCEDURE — 2700000000 HC OXYGEN THERAPY PER DAY

## 2024-02-22 PROCEDURE — 6370000000 HC RX 637 (ALT 250 FOR IP): Performed by: STUDENT IN AN ORGANIZED HEALTH CARE EDUCATION/TRAINING PROGRAM

## 2024-02-22 PROCEDURE — 27447 TOTAL KNEE ARTHROPLASTY: CPT | Performed by: ORTHOPAEDIC SURGERY

## 2024-02-22 PROCEDURE — 3700000001 HC ADD 15 MINUTES (ANESTHESIA): Performed by: ORTHOPAEDIC SURGERY

## 2024-02-22 PROCEDURE — 73560 X-RAY EXAM OF KNEE 1 OR 2: CPT

## 2024-02-22 PROCEDURE — 3600000015 HC SURGERY LEVEL 5 ADDTL 15MIN: Performed by: ORTHOPAEDIC SURGERY

## 2024-02-22 PROCEDURE — 6370000000 HC RX 637 (ALT 250 FOR IP)

## 2024-02-22 PROCEDURE — 7100000000 HC PACU RECOVERY - FIRST 15 MIN: Performed by: ORTHOPAEDIC SURGERY

## 2024-02-22 PROCEDURE — 6360000002 HC RX W HCPCS: Performed by: SPECIALIST

## 2024-02-22 PROCEDURE — 81025 URINE PREGNANCY TEST: CPT

## 2024-02-22 PROCEDURE — 97162 PT EVAL MOD COMPLEX 30 MIN: CPT

## 2024-02-22 PROCEDURE — 2500000003 HC RX 250 WO HCPCS: Performed by: ORTHOPAEDIC SURGERY

## 2024-02-22 PROCEDURE — 97110 THERAPEUTIC EXERCISES: CPT

## 2024-02-22 PROCEDURE — 97116 GAIT TRAINING THERAPY: CPT

## 2024-02-22 PROCEDURE — 3600000005 HC SURGERY LEVEL 5 BASE: Performed by: ORTHOPAEDIC SURGERY

## 2024-02-22 PROCEDURE — 2500000003 HC RX 250 WO HCPCS: Performed by: NURSE ANESTHETIST, CERTIFIED REGISTERED

## 2024-02-22 PROCEDURE — 6360000002 HC RX W HCPCS: Performed by: NURSE ANESTHETIST, CERTIFIED REGISTERED

## 2024-02-22 PROCEDURE — 97530 THERAPEUTIC ACTIVITIES: CPT

## 2024-02-22 DEVICE — CEMENT BNE 40GM W/ GENT HI VISC RADPQ FOR REV SURG: Type: IMPLANTABLE DEVICE | Site: KNEE | Status: FUNCTIONAL

## 2024-02-22 DEVICE — COMPONENT PAT DIA34MM THK7.8MM THN KNEE POLY 3 PEG SER A: Type: IMPLANTABLE DEVICE | Site: KNEE | Status: FUNCTIONAL

## 2024-02-22 DEVICE — IMPLANTABLE DEVICE: Type: IMPLANTABLE DEVICE | Site: KNEE | Status: FUNCTIONAL

## 2024-02-22 DEVICE — STEM TIB L80MM DIA12.5MM KNEE PRI FIN VANGUARD COMPLT SYS: Type: IMPLANTABLE DEVICE | Site: KNEE | Status: FUNCTIONAL

## 2024-02-22 DEVICE — TRAY TIB L71MM KNEE COPOLYESTER SIL INTLOK PRI CEM VANGUARD: Type: IMPLANTABLE DEVICE | Site: KNEE | Status: FUNCTIONAL

## 2024-02-22 RX ORDER — MIDAZOLAM HYDROCHLORIDE 2 MG/2ML
2 INJECTION, SOLUTION INTRAMUSCULAR; INTRAVENOUS
Status: DISCONTINUED | OUTPATIENT
Start: 2024-02-22 | End: 2024-02-23

## 2024-02-22 RX ORDER — OXYCODONE HYDROCHLORIDE 5 MG/1
10 TABLET ORAL EVERY 4 HOURS PRN
Status: DISCONTINUED | OUTPATIENT
Start: 2024-02-22 | End: 2024-02-24 | Stop reason: HOSPADM

## 2024-02-22 RX ORDER — OXYCODONE HYDROCHLORIDE 5 MG/1
10 TABLET ORAL PRN
Status: COMPLETED | OUTPATIENT
Start: 2024-02-22 | End: 2024-02-22

## 2024-02-22 RX ORDER — CALCIUM CHLORIDE 100 MG/ML
INJECTION INTRAVENOUS; INTRAVENTRICULAR PRN
Status: DISCONTINUED | OUTPATIENT
Start: 2024-02-22 | End: 2024-02-22 | Stop reason: ALTCHOICE

## 2024-02-22 RX ORDER — CEFDINIR 300 MG/1
300 CAPSULE ORAL 2 TIMES DAILY
Qty: 20 CAPSULE | Refills: 1 | Status: SHIPPED | OUTPATIENT
Start: 2024-02-22 | End: 2024-02-23

## 2024-02-22 RX ORDER — SODIUM CHLORIDE 0.9 % (FLUSH) 0.9 %
5-40 SYRINGE (ML) INJECTION EVERY 12 HOURS SCHEDULED
Status: DISCONTINUED | OUTPATIENT
Start: 2024-02-22 | End: 2024-02-24 | Stop reason: HOSPADM

## 2024-02-22 RX ORDER — SODIUM CHLORIDE 9 MG/ML
INJECTION, SOLUTION INTRAVENOUS CONTINUOUS
Status: DISCONTINUED | OUTPATIENT
Start: 2024-02-22 | End: 2024-02-22

## 2024-02-22 RX ORDER — DEXAMETHASONE SODIUM PHOSPHATE 10 MG/ML
10 INJECTION, SOLUTION INTRAMUSCULAR; INTRAVENOUS ONCE
Status: DISCONTINUED | OUTPATIENT
Start: 2024-02-22 | End: 2024-02-22

## 2024-02-22 RX ORDER — SODIUM CHLORIDE 9 MG/ML
INJECTION, SOLUTION INTRAVENOUS PRN
Status: DISCONTINUED | OUTPATIENT
Start: 2024-02-22 | End: 2024-02-24 | Stop reason: HOSPADM

## 2024-02-22 RX ORDER — SODIUM CHLORIDE 9 MG/ML
INJECTION, SOLUTION INTRAMUSCULAR; INTRAVENOUS; SUBCUTANEOUS
Status: DISPENSED
Start: 2024-02-22 | End: 2024-02-22

## 2024-02-22 RX ORDER — ESCITALOPRAM OXALATE 10 MG/1
10 TABLET ORAL DAILY
Status: DISCONTINUED | OUTPATIENT
Start: 2024-02-23 | End: 2024-02-22

## 2024-02-22 RX ORDER — BUPIVACAINE HYDROCHLORIDE 2.5 MG/ML
INJECTION, SOLUTION EPIDURAL; INFILTRATION; INTRACAUDAL
Status: DISPENSED
Start: 2024-02-22 | End: 2024-02-22

## 2024-02-22 RX ORDER — ESCITALOPRAM OXALATE 10 MG/1
10 TABLET ORAL DAILY
Status: DISCONTINUED | OUTPATIENT
Start: 2024-02-22 | End: 2024-02-24 | Stop reason: HOSPADM

## 2024-02-22 RX ORDER — PHENYLEPHRINE HCL IN 0.9% NACL 1 MG/10 ML
SYRINGE (ML) INTRAVENOUS PRN
Status: DISCONTINUED | OUTPATIENT
Start: 2024-02-22 | End: 2024-02-22 | Stop reason: SDUPTHER

## 2024-02-22 RX ORDER — DEXMEDETOMIDINE HYDROCHLORIDE 100 UG/ML
INJECTION, SOLUTION INTRAVENOUS PRN
Status: DISCONTINUED | OUTPATIENT
Start: 2024-02-22 | End: 2024-02-22 | Stop reason: SDUPTHER

## 2024-02-22 RX ORDER — ALBUTEROL SULFATE 90 UG/1
AEROSOL, METERED RESPIRATORY (INHALATION) PRN
Status: DISCONTINUED | OUTPATIENT
Start: 2024-02-22 | End: 2024-02-22 | Stop reason: SDUPTHER

## 2024-02-22 RX ORDER — CALCIUM CHLORIDE 100 MG/ML
INJECTION INTRAVENOUS; INTRAVENTRICULAR
Status: DISPENSED
Start: 2024-02-22 | End: 2024-02-22

## 2024-02-22 RX ORDER — ACETAMINOPHEN 325 MG/1
650 TABLET ORAL EVERY 6 HOURS
Status: DISCONTINUED | OUTPATIENT
Start: 2024-02-22 | End: 2024-02-24 | Stop reason: HOSPADM

## 2024-02-22 RX ORDER — ACETAMINOPHEN 500 MG
1000 TABLET ORAL ONCE
Status: COMPLETED | OUTPATIENT
Start: 2024-02-22 | End: 2024-02-22

## 2024-02-22 RX ORDER — MORPHINE SULFATE 2 MG/ML
1 INJECTION, SOLUTION INTRAMUSCULAR; INTRAVENOUS EVERY 5 MIN PRN
Status: DISCONTINUED | OUTPATIENT
Start: 2024-02-22 | End: 2024-02-23

## 2024-02-22 RX ORDER — OXYCODONE HYDROCHLORIDE AND ACETAMINOPHEN 5; 325 MG/1; MG/1
1 TABLET ORAL EVERY 4 HOURS PRN
Qty: 42 TABLET | Refills: 0 | Status: SHIPPED | OUTPATIENT
Start: 2024-02-22 | End: 2024-02-23 | Stop reason: HOSPADM

## 2024-02-22 RX ORDER — GABAPENTIN 400 MG/1
CAPSULE ORAL
Status: COMPLETED
Start: 2024-02-22 | End: 2024-02-22

## 2024-02-22 RX ORDER — ONDANSETRON 2 MG/ML
4 INJECTION INTRAMUSCULAR; INTRAVENOUS EVERY 6 HOURS PRN
Status: DISCONTINUED | OUTPATIENT
Start: 2024-02-22 | End: 2024-02-24 | Stop reason: HOSPADM

## 2024-02-22 RX ORDER — SODIUM CHLORIDE, SODIUM LACTATE, POTASSIUM CHLORIDE, CALCIUM CHLORIDE 600; 310; 30; 20 MG/100ML; MG/100ML; MG/100ML; MG/100ML
INJECTION, SOLUTION INTRAVENOUS CONTINUOUS
Status: DISCONTINUED | OUTPATIENT
Start: 2024-02-22 | End: 2024-02-24 | Stop reason: HOSPADM

## 2024-02-22 RX ORDER — ONDANSETRON 2 MG/ML
INJECTION INTRAMUSCULAR; INTRAVENOUS PRN
Status: DISCONTINUED | OUTPATIENT
Start: 2024-02-22 | End: 2024-02-22 | Stop reason: SDUPTHER

## 2024-02-22 RX ORDER — LABETALOL HYDROCHLORIDE 5 MG/ML
10 INJECTION, SOLUTION INTRAVENOUS
Status: DISCONTINUED | OUTPATIENT
Start: 2024-02-22 | End: 2024-02-23

## 2024-02-22 RX ORDER — ASPIRIN 81 MG/1
81 TABLET ORAL 2 TIMES DAILY
Status: DISCONTINUED | OUTPATIENT
Start: 2024-02-22 | End: 2024-02-24 | Stop reason: HOSPADM

## 2024-02-22 RX ORDER — OXYCODONE HYDROCHLORIDE 5 MG/1
5 TABLET ORAL PRN
Status: COMPLETED | OUTPATIENT
Start: 2024-02-22 | End: 2024-02-22

## 2024-02-22 RX ORDER — PROPOFOL 10 MG/ML
INJECTION, EMULSION INTRAVENOUS PRN
Status: DISCONTINUED | OUTPATIENT
Start: 2024-02-22 | End: 2024-02-22 | Stop reason: SDUPTHER

## 2024-02-22 RX ORDER — SODIUM CHLORIDE 0.9 % (FLUSH) 0.9 %
5-40 SYRINGE (ML) INJECTION PRN
Status: DISCONTINUED | OUTPATIENT
Start: 2024-02-22 | End: 2024-02-22

## 2024-02-22 RX ORDER — DEXAMETHASONE SODIUM PHOSPHATE 10 MG/ML
INJECTION, SOLUTION INTRAMUSCULAR; INTRAVENOUS PRN
Status: DISCONTINUED | OUTPATIENT
Start: 2024-02-22 | End: 2024-02-22 | Stop reason: SDUPTHER

## 2024-02-22 RX ORDER — SODIUM CHLORIDE 0.9 % (FLUSH) 0.9 %
5-40 SYRINGE (ML) INJECTION PRN
Status: DISCONTINUED | OUTPATIENT
Start: 2024-02-22 | End: 2024-02-24 | Stop reason: HOSPADM

## 2024-02-22 RX ORDER — MIDAZOLAM HYDROCHLORIDE 1 MG/ML
INJECTION INTRAMUSCULAR; INTRAVENOUS PRN
Status: DISCONTINUED | OUTPATIENT
Start: 2024-02-22 | End: 2024-02-22 | Stop reason: SDUPTHER

## 2024-02-22 RX ORDER — SODIUM CHLORIDE 9 MG/ML
INJECTION, SOLUTION INTRAVENOUS PRN
Status: DISCONTINUED | OUTPATIENT
Start: 2024-02-22 | End: 2024-02-22

## 2024-02-22 RX ORDER — GABAPENTIN 400 MG/1
800 CAPSULE ORAL ONCE
Status: DISCONTINUED | OUTPATIENT
Start: 2024-02-22 | End: 2024-02-24 | Stop reason: HOSPADM

## 2024-02-22 RX ORDER — METOCLOPRAMIDE HYDROCHLORIDE 5 MG/ML
10 INJECTION INTRAMUSCULAR; INTRAVENOUS
Status: DISCONTINUED | OUTPATIENT
Start: 2024-02-22 | End: 2024-02-23

## 2024-02-22 RX ORDER — MEPERIDINE HYDROCHLORIDE 50 MG/ML
12.5 INJECTION INTRAMUSCULAR; INTRAVENOUS; SUBCUTANEOUS ONCE
Status: DISCONTINUED | OUTPATIENT
Start: 2024-02-22 | End: 2024-02-23

## 2024-02-22 RX ORDER — ACETAMINOPHEN 500 MG
TABLET ORAL
Status: COMPLETED
Start: 2024-02-22 | End: 2024-02-22

## 2024-02-22 RX ORDER — TRANEXAMIC ACID 10 MG/ML
INJECTION, SOLUTION INTRAVENOUS
Status: COMPLETED
Start: 2024-02-22 | End: 2024-02-22

## 2024-02-22 RX ORDER — KETOROLAC TROMETHAMINE 30 MG/ML
INJECTION, SOLUTION INTRAMUSCULAR; INTRAVENOUS PRN
Status: DISCONTINUED | OUTPATIENT
Start: 2024-02-22 | End: 2024-02-22 | Stop reason: SDUPTHER

## 2024-02-22 RX ORDER — BUPROPION HYDROCHLORIDE 100 MG/1
100 TABLET ORAL DAILY
Status: DISCONTINUED | OUTPATIENT
Start: 2024-02-23 | End: 2024-02-24 | Stop reason: HOSPADM

## 2024-02-22 RX ORDER — SCOLOPAMINE TRANSDERMAL SYSTEM 1 MG/1
1 PATCH, EXTENDED RELEASE TRANSDERMAL ONCE
Status: DISCONTINUED | OUTPATIENT
Start: 2024-02-22 | End: 2024-02-22

## 2024-02-22 RX ORDER — OXYCODONE HYDROCHLORIDE 5 MG/1
5 TABLET ORAL EVERY 4 HOURS PRN
Status: DISCONTINUED | OUTPATIENT
Start: 2024-02-22 | End: 2024-02-24 | Stop reason: HOSPADM

## 2024-02-22 RX ORDER — GABAPENTIN 800 MG/1
800 TABLET ORAL ONCE
Status: DISCONTINUED | OUTPATIENT
Start: 2024-02-22 | End: 2024-02-22

## 2024-02-22 RX ORDER — SODIUM CHLORIDE 0.9 % (FLUSH) 0.9 %
5-40 SYRINGE (ML) INJECTION EVERY 12 HOURS SCHEDULED
Status: DISCONTINUED | OUTPATIENT
Start: 2024-02-22 | End: 2024-02-22

## 2024-02-22 RX ORDER — ROCURONIUM BROMIDE 10 MG/ML
INJECTION, SOLUTION INTRAVENOUS PRN
Status: DISCONTINUED | OUTPATIENT
Start: 2024-02-22 | End: 2024-02-22 | Stop reason: SDUPTHER

## 2024-02-22 RX ORDER — HYDRALAZINE HYDROCHLORIDE 20 MG/ML
10 INJECTION INTRAMUSCULAR; INTRAVENOUS
Status: DISCONTINUED | OUTPATIENT
Start: 2024-02-22 | End: 2024-02-23

## 2024-02-22 RX ORDER — FENTANYL CITRATE 50 UG/ML
INJECTION, SOLUTION INTRAMUSCULAR; INTRAVENOUS
Status: COMPLETED
Start: 2024-02-22 | End: 2024-02-22

## 2024-02-22 RX ORDER — FENTANYL CITRATE 50 UG/ML
INJECTION, SOLUTION INTRAMUSCULAR; INTRAVENOUS PRN
Status: DISCONTINUED | OUTPATIENT
Start: 2024-02-22 | End: 2024-02-22 | Stop reason: SDUPTHER

## 2024-02-22 RX ORDER — TRANEXAMIC ACID 10 MG/ML
INJECTION, SOLUTION INTRAVENOUS PRN
Status: DISCONTINUED | OUTPATIENT
Start: 2024-02-22 | End: 2024-02-22 | Stop reason: SDUPTHER

## 2024-02-22 RX ORDER — LIDOCAINE HYDROCHLORIDE 10 MG/ML
INJECTION, SOLUTION INFILTRATION; PERINEURAL PRN
Status: DISCONTINUED | OUTPATIENT
Start: 2024-02-22 | End: 2024-02-22 | Stop reason: SDUPTHER

## 2024-02-22 RX ORDER — GLYCOPYRROLATE 0.2 MG/ML
INJECTION INTRAMUSCULAR; INTRAVENOUS PRN
Status: DISCONTINUED | OUTPATIENT
Start: 2024-02-22 | End: 2024-02-22 | Stop reason: SDUPTHER

## 2024-02-22 RX ORDER — ONDANSETRON 2 MG/ML
4 INJECTION INTRAMUSCULAR; INTRAVENOUS
Status: DISCONTINUED | OUTPATIENT
Start: 2024-02-22 | End: 2024-02-23

## 2024-02-22 RX ORDER — MIDAZOLAM HYDROCHLORIDE 1 MG/ML
INJECTION INTRAMUSCULAR; INTRAVENOUS
Status: COMPLETED
Start: 2024-02-22 | End: 2024-02-22

## 2024-02-22 RX ORDER — DIPHENHYDRAMINE HYDROCHLORIDE 50 MG/ML
12.5 INJECTION INTRAMUSCULAR; INTRAVENOUS
Status: DISCONTINUED | OUTPATIENT
Start: 2024-02-22 | End: 2024-02-23

## 2024-02-22 RX ORDER — OXYCODONE HYDROCHLORIDE 5 MG/1
TABLET ORAL
Status: COMPLETED
Start: 2024-02-22 | End: 2024-02-22

## 2024-02-22 RX ORDER — SODIUM CHLORIDE, SODIUM LACTATE, POTASSIUM CHLORIDE, CALCIUM CHLORIDE 600; 310; 30; 20 MG/100ML; MG/100ML; MG/100ML; MG/100ML
INJECTION, SOLUTION INTRAVENOUS CONTINUOUS
Status: DISCONTINUED | OUTPATIENT
Start: 2024-02-22 | End: 2024-02-22

## 2024-02-22 RX ADMIN — Medication 100 MCG: at 13:40

## 2024-02-22 RX ADMIN — DEXMEDETOMIDINE HCL 8 MCG: 100 INJECTION INTRAVENOUS at 11:55

## 2024-02-22 RX ADMIN — OXYCODONE HYDROCHLORIDE 10 MG: 5 TABLET ORAL at 15:02

## 2024-02-22 RX ADMIN — PROPOFOL 200 MG: 10 INJECTION, EMULSION INTRAVENOUS at 11:55

## 2024-02-22 RX ADMIN — GABAPENTIN 800 MG: 400 CAPSULE ORAL at 10:54

## 2024-02-22 RX ADMIN — ACETAMINOPHEN 650 MG: 325 TABLET ORAL at 23:45

## 2024-02-22 RX ADMIN — ACETAMINOPHEN 1000 MG: 500 TABLET ORAL at 10:52

## 2024-02-22 RX ADMIN — ONDANSETRON 4 MG: 2 INJECTION INTRAMUSCULAR; INTRAVENOUS at 12:00

## 2024-02-22 RX ADMIN — DEXMEDETOMIDINE HCL 8 MCG: 100 INJECTION INTRAVENOUS at 12:30

## 2024-02-22 RX ADMIN — DEXMEDETOMIDINE HCL 8 MCG: 100 INJECTION INTRAVENOUS at 12:00

## 2024-02-22 RX ADMIN — ESCITALOPRAM OXALATE 10 MG: 10 TABLET ORAL at 21:38

## 2024-02-22 RX ADMIN — FENTANYL CITRATE 100 MCG: 50 INJECTION INTRAMUSCULAR; INTRAVENOUS at 11:07

## 2024-02-22 RX ADMIN — SODIUM CHLORIDE, POTASSIUM CHLORIDE, SODIUM LACTATE AND CALCIUM CHLORIDE: 600; 310; 30; 20 INJECTION, SOLUTION INTRAVENOUS at 11:51

## 2024-02-22 RX ADMIN — DEXMEDETOMIDINE HCL 8 MCG: 100 INJECTION INTRAVENOUS at 11:51

## 2024-02-22 RX ADMIN — LIDOCAINE HYDROCHLORIDE 50 MG: 10 INJECTION, SOLUTION INFILTRATION; PERINEURAL at 11:55

## 2024-02-22 RX ADMIN — SODIUM CHLORIDE, POTASSIUM CHLORIDE, SODIUM LACTATE AND CALCIUM CHLORIDE: 600; 310; 30; 20 INJECTION, SOLUTION INTRAVENOUS at 13:37

## 2024-02-22 RX ADMIN — SODIUM CHLORIDE, PRESERVATIVE FREE 7 ML: 5 INJECTION INTRAVENOUS at 21:41

## 2024-02-22 RX ADMIN — ALBUTEROL SULFATE 4 PUFF: 90 AEROSOL, METERED RESPIRATORY (INHALATION) at 12:05

## 2024-02-22 RX ADMIN — SODIUM CHLORIDE, PRESERVATIVE FREE 10 ML: 5 INJECTION INTRAVENOUS at 21:39

## 2024-02-22 RX ADMIN — MIDAZOLAM 2 MG: 1 INJECTION INTRAMUSCULAR; INTRAVENOUS at 11:51

## 2024-02-22 RX ADMIN — SODIUM CHLORIDE, POTASSIUM CHLORIDE, SODIUM LACTATE AND CALCIUM CHLORIDE 590 ML: 600; 310; 30; 20 INJECTION, SOLUTION INTRAVENOUS at 21:44

## 2024-02-22 RX ADMIN — DEXMEDETOMIDINE HCL 8 MCG: 100 INJECTION INTRAVENOUS at 12:15

## 2024-02-22 RX ADMIN — ALBUTEROL SULFATE 4 PUFF: 90 AEROSOL, METERED RESPIRATORY (INHALATION) at 13:49

## 2024-02-22 RX ADMIN — ROCURONIUM BROMIDE 50 MG: 10 SOLUTION INTRAVENOUS at 11:55

## 2024-02-22 RX ADMIN — ASPIRIN 81 MG: 81 TABLET, COATED ORAL at 21:38

## 2024-02-22 RX ADMIN — TRANEXAMIC ACID 1 G: 10 INJECTION, SOLUTION INTRAVENOUS at 13:30

## 2024-02-22 RX ADMIN — Medication 3000 MG: at 11:59

## 2024-02-22 RX ADMIN — DEXAMETHASONE SODIUM PHOSPHATE 10 MG: 10 INJECTION INTRAMUSCULAR; INTRAVENOUS at 12:00

## 2024-02-22 RX ADMIN — MIDAZOLAM HYDROCHLORIDE 2 MG: 1 INJECTION, SOLUTION INTRAMUSCULAR; INTRAVENOUS at 11:07

## 2024-02-22 RX ADMIN — FENTANYL CITRATE 100 MCG: 50 INJECTION, SOLUTION INTRAMUSCULAR; INTRAVENOUS at 11:55

## 2024-02-22 RX ADMIN — TRANEXAMIC ACID 1 G: 10 INJECTION, SOLUTION INTRAVENOUS at 12:04

## 2024-02-22 RX ADMIN — Medication 1000 MG: at 10:52

## 2024-02-22 RX ADMIN — GLYCOPYRROLATE 0.4 MG: 0.2 INJECTION INTRAMUSCULAR; INTRAVENOUS at 11:51

## 2024-02-22 RX ADMIN — ACETAMINOPHEN 650 MG: 325 TABLET ORAL at 17:38

## 2024-02-22 RX ADMIN — KETOROLAC TROMETHAMINE 30 MG: 30 INJECTION, SOLUTION INTRAMUSCULAR; INTRAVENOUS at 12:00

## 2024-02-22 ASSESSMENT — PAIN DESCRIPTION - LOCATION
LOCATION: KNEE
LOCATION: LEG

## 2024-02-22 ASSESSMENT — PAIN SCALES - GENERAL
PAINLEVEL_OUTOF10: 4
PAINLEVEL_OUTOF10: 7

## 2024-02-22 ASSESSMENT — PAIN DESCRIPTION - DESCRIPTORS
DESCRIPTORS: ACHING;SHARP
DESCRIPTORS: ACHING
DESCRIPTORS: ACHING

## 2024-02-22 ASSESSMENT — PAIN DESCRIPTION - ORIENTATION
ORIENTATION: RIGHT
ORIENTATION: RIGHT

## 2024-02-22 ASSESSMENT — PAIN - FUNCTIONAL ASSESSMENT
PAIN_FUNCTIONAL_ASSESSMENT: PREVENTS OR INTERFERES SOME ACTIVE ACTIVITIES AND ADLS
PAIN_FUNCTIONAL_ASSESSMENT: PREVENTS OR INTERFERES SOME ACTIVE ACTIVITIES AND ADLS
PAIN_FUNCTIONAL_ASSESSMENT: 0-10

## 2024-02-22 NOTE — PROGRESS NOTES
Physical Therapy  [facility]  Physical Therapy Initial Assessment    Name: Mary Morataya  : 1970  MRN: [id number]  Date of Service: 2024    Discharge Recommendations:  Patient would benefit from continued therapy after discharge   PT Equipment Recommendations  Equipment Needed: No (has RW that patient should use at all times)     Patient Diagnosis(es): The encounter diagnosis was Primary osteoarthritis of right knee.  Past Medical History:  has a past medical history of Allergic rhinitis, Anxiety, Anxiety and depression, Arthritis, Asthma, Calcaneal spur, Combined forms of age-related cataract, Diverticulitis, Diverticulitis of large intestine without perforation or abscess without bleeding, Edema, Essential hypertension, Headache(784.0), HTN (hypertension), Lymphocytic colitis, OAB (overactive bladder), Obesity, Osteoarthritis of acromioclavicular joint, Overactive bladder, Overactive bladder, Peroneus longus tendinitis, Psoriasis, Seborrheic dermatitis, Sleep apnea, Spinal enthesopathy (HCC), Stress fracture of metatarsal bone, Tear of medial meniscus of knee, Urge incontinence of urine, Use of cane as ambulatory aid, and Wellness examination.  Past Surgical History:  has a past surgical history that includes Tonsillectomy; pr arthroscopy knee diagnostic w/wo synovial bx spx (Right, 2017); Colonoscopy (N/A, 2020); Cataract removal (Bilateral); Upper gastrointestinal endoscopy (N/A, 2021); Colonoscopy (N/A, 2021); Cholecystectomy, laparoscopic (N/A, 12/15/2020); eye surgery (Bilateral); Sleeve Gastrectomy (2021); Sleeve Gastrectomy (N/A, 2021); and Total knee arthroplasty (Left, 2022).    Assessment   Body Structures, Functions, Activity Limitations Requiring Skilled Therapeutic Intervention: Decreased functional mobility ;Decreased safe awareness;Decreased strength;Decreased ROM;Decreased endurance;Decreased balance  Assessment:  Dynamic - MIN-MOD A)  Standing: Impaired (Static - CGA-MIN A; Dynamic - CGA-MIN A)  Bed mobility  Supine to Sit: Minimal assistance  Sit to Supine: Moderate assistance  Scooting: Contact guard assistance  Bed Mobility Comments: assistance to progress BLEs into and OOB  Transfers  Sit to Stand: Minimal Assistance  Stand to Sit: Contact guard assistance  Comment: RW used for transfers, verbal cueing for UE placement and use  Ambulation  WB Status: WBAT RLE  Ambulation  Surface: Level tile  Device: Rolling Walker (patient's bariatric walker from previous surgery)  Assistance: Minimal assistance  Quality of Gait: Step-to gait pattern with decreased stance on RLE  Gait Deviations: Slow Sweetie;Increased CHRISTIANO;Decreased step length;Decreased step height  Distance: 60ft , 5 ft x 2 to and from WC  Comments: limited due to patient fatigue and breathing; unsafe to ambulate further     Balance  Posture: Good  Sitting - Static: Good  Sitting - Dynamic: Fair  Standing - Static: Fair;-  Standing - Dynamic: Poor;+  Comments: standing balance assessed with RW    Total Knee Arthroplasty Exercise Program: Quad sets, glut sets, hamstring sets, hip abduction/adduction, ankle pumps, supine and seated heel slides, short arc quads, LAQs. Reps: 15 with Akshat intermittently    AM-PAC - Mobility  AM-PAC Basic Mobility - Inpatient   How much help is needed turning from your back to your side while in a flat bed without using bedrails?: A Little  How much help is needed moving from lying on your back to sitting on the side of a flat bed without using bedrails?: A Little  How much help is needed moving to and from a bed to a chair?: A Little  How much help is needed standing up from a chair using your arms?: A Little  How much help is needed walking in hospital room?: A Little  How much help is needed climbing 3-5 steps with a railing?: Total  AM-PAC Inpatient Mobility Raw Score : 16  AM-PAC Inpatient T-Scale Score : 40.78  Mobility Inpatient CMS

## 2024-02-22 NOTE — ANESTHESIA POSTPROCEDURE EVALUATION
Department of Anesthesiology  Postprocedure Note    Patient: Mary Morataya  MRN: [id number]  YOB: 1970  Date of evaluation: 2/22/2024    Procedure Summary       Date: 02/22/24 Room / Location: [address]    Anesthesia Start: 1151 Anesthesia Stop: 1403    Procedure: RIGHT KNEE TOTAL ARTHROPLASTY WITH RYDER BIOMET AND GPS (Right: Knee) Diagnosis:       Primary osteoarthritis of right knee      (Primary osteoarthritis of right knee [M17.11])    Surgeons: Jose Palafox MD Responsible Provider: Jany Leiva MD    Anesthesia Type: general ASA Status: 3            Anesthesia Type: No value filed.    Isa Phase I: Isa Score: 8    Isa Phase II:      Anesthesia Post Evaluation    Patient location during evaluation: PACU  Patient participation: complete - patient participated  Level of consciousness: awake and alert  Airway patency: patent  Nausea & Vomiting: no nausea and no vomiting  Cardiovascular status: blood pressure returned to baseline  Respiratory status: acceptable and room air  Hydration status: euvolemic  Pain management: adequate and satisfactory to patient    No notable events documented.

## 2024-02-22 NOTE — DISCHARGE INSTR - COC
Continuity of Care Form    Patient Name: Mary Morataya   :  1970  MRN:  [id number]    Admit date:  2024  Discharge date:  2024    Code Status Order: Full Code   Advance Directives:   Advance Care Flowsheet Documentation       Date/Time Healthcare Directive Type of Healthcare Directive Copy in Chart Healthcare Agent Appointed Healthcare Agent's Name Healthcare Agent's Phone Number    24 1002 No, patient does not have an advance directive for healthcare treatment -- -- -- -- --            Admitting Physician:  Jose Palafox MD  PCP: Guerda Jarrell, APRN - CNP    Discharging Nurse: Cielo MINAYA  Discharging Hospital Unit/Room#: [facility] OR POOL /NONE  Discharging Unit Phone Number: [phone]    Emergency Contact:   Extended Emergency Contact Information  Primary Emergency Contact: Lisseth Flores  Home Phone: [phone]  Mobile Phone: [phone]  Relation: Child  Secondary Emergency Contact: Levon Gutierrez  Mobile Phone: [phone]  Relation: Boyfriend  Preferred language: English   needed? No    Past Surgical History:  Past Surgical History:   Procedure Laterality Date    CATARACT REMOVAL Bilateral     CHOLECYSTECTOMY, LAPAROSCOPIC N/A 12/15/2020    CHOLECYSTECTOMY LAPAROSCOPIC ROBOTIC XI performed by Alfonso Hines MD at [facility] OR    COLONOSCOPY N/A 2020    COLONOSCOPY POLYPECTOMY SNARE/COLD BIOPSY performed by Alana Resendiz MD at [facility] ENDO    COLONOSCOPY N/A 2021    COLONOSCOPY WITH BIOPSY RANDOM RIGHT AND LEFT COLON performed by [facility]iz MD at [facility] OR    EYE SURGERY Bilateral     cataract removed with lens implants    SC ARTHROSCOPY KNEE DIAGNOSTIC W/WO SYNOVIAL BX SPX Right 2017    RIGHT KNEE ARTHROSCOPY WITH MEDIAL MENISECTOMY AND CHONDROPLASTY performed by Davey Shah DO at [facility] OR    SLEEVE GASTRECTOMY  2021    ROBOTIC LAPAROSCOPIC GASTRECTOMY SLEEVE, EGD-    SLEEVE GASTRECTOMY N/A 2021    XI ROBOTIC LAPAROSCOPIC  WERNER on 2/22/24 at 3:21 PM EST    PHYSICIAN SECTION    Prognosis: Good    Condition at Discharge: Stable    Rehab Potential (if transferring to Rehab): Good    Recommended Labs or Other Treatments After Discharge: PT/OT    Physician Certification: I certify the above information and transfer of Mary Morataya  is necessary for the continuing treatment of the diagnosis listed and that she requires Home Care for less 30 days.     Update Admission H&P: No change in H&P    PHYSICIAN SIGNATURE:  Electronically signed by Jose Palafox MD on 2/22/24 at 11:10 AM EST

## 2024-02-22 NOTE — ANESTHESIA POSTPROCEDURE EVALUATION
Department of Anesthesiology  Postprocedure Note    Patient: Mary Morataya  MRN: [id number]  YOB: 1970  Date of evaluation: 2/22/2024    Procedure Summary       Date: 02/22/24 Room / Location: [address]    Anesthesia Start: 1151 Anesthesia Stop: 1403    Procedure: RIGHT KNEE TOTAL ARTHROPLASTY WITH RYDER BIOMET AND GPS (Right: Knee) Diagnosis:       Primary osteoarthritis of right knee      (Primary osteoarthritis of right knee [M17.11])    Surgeons: Jose Palafox MD Responsible Provider: Jany Leiva MD    Anesthesia Type: general ASA Status: 3            Anesthesia Type: No value filed.    Isa Phase I: Isa Score: 8    Isa Phase II:      Anesthesia Post Evaluation    No notable events documented.

## 2024-02-22 NOTE — OP NOTE
Operative Note      Patient: Mary Morataya  YOB: 1970  MRN: [id number]    Date of Procedure: 2/22/2024    Pre-Op Diagnosis Codes:     * Primary osteoarthritis of right knee [M17.11]    Post-Op Diagnosis: Same       Procedure(s):  RIGHT KNEE TOTAL ARTHROPLASTY WITH RYDER BIOMET AND GPS    Surgeon(s):  Jose Palafox MD    Assistant:   Resident: Wallace Sheriff DO Joe Bielecki, CST    Anesthesia: General    Estimated Blood Loss (mL): less than 50     Complications: None    Specimens:   * No specimens in log *    Implants:  Implant Name Type Inv. Item Serial No.  Lot No. LRB No. Used Action   CEMENT BNE 40GM W/ GENT HI VISC RADPQ FOR REV SURG - [id number]  CEMENT BNE 40GM W/ GENT HI VISC RADPQ FOR REV SURG  [employer]- [id number] Right 2 Implanted   TRAY TIB L71MM KNEE COPOLYESTER VERNA INTLOK JOÃO CANDI VANGUARD - [id number]  TRAY TIB L71MM KNEE COPOLYESTER VERNA INTLOK JOÃO CANDI VANGUARD  [employer]- [id number] Right 1 Implanted   COMPONENT PAT [id number] THK7.8MM THN KNEE POLY 3 PEG SER A - [id number]  COMPONENT PAT [id number] THK7.8MM THN KNEE POLY 3 PEG SER A  [employer]- [id number] Right 1 Implanted   STEM TIB L80MM DIA12.5MM KNEE JOÃO FIN VANGUARD COMPLT SYS - [id number]  STEM TIB L80MM DIA12.5MM KNEE JOÃO FIN VANGUARD COMPLT SYS  [employer]- [id number] Right 1 Implanted   COMPONENT FEM 67.5MM R KNEE CO CHROM NP CRUCE RET INTLOK - [id number]  COMPONENT FEM 67.5MM R KNEE CO CHROM NP CRUCE RET INTLOK  [employer]- [id number] Right 1 Implanted   BEARING TIB 71X12 MM KNEE ANTR STBL VIVACIT-E VANGUARD - [id number]  BEARING TIB 71X12 MM KNEE ANTR STBL VIVACIT-E VANGUARD  [employer]- [id number] Right 1 Implanted         Drains: * No LDAs found *    Findings: Severe degenerative joint disease right knee  BMI 60        Detailed Description of Procedure:       Patient is a 53 y.o. female with a long standing history of  right DJD of the knee.  Patient had her left knee replaced in November 2022 and doing well with this patient has failed all types of conservative treatment included physical therapy, weight-loss counseling, NSAIDS, and injections. The patient has significant restriction of activities of daily living and/or work/job place abilities, and, therefore, has been counseled for TKA. Patient is aware of all the risks and benefits as highlighted in the surgical consent form.     The patient was given 3 grams of Ancef in the holding area as well as an adductor canal block. The patient was then taken to the operating suite where general anesthesia was administered.  A tourniquet was applied to effected leg and then prepped and draped in the usual sterile fashion. Time out was called to verify laterality. The leg was examined   and the tourniquet inflated to 350 mm of Hg.       Midline incision was utilized and taken down to the joint capsule where a medial parapatellar approach to the knee was performed. After a complete synovectomy, the patella was elevated, calibrated for thickness, and the above sized, patellar triple pegged drills guide positioned medially and then drilled. Atrial patellar button was positioned in order to re-established the original thickness. This was then replaced with a protective patellar plate.     The knee was then flexed and revealed significant  arthrosis. Osteophytes were removed via rongeur. A drill hole was made in the distal femur and the femoral canal was then irrigated and suctioned. A fluted IM padmini was inserted and a distal femoral cut was made at 5 degrees of valgus at the +2 setting.     The proximal tibia was then exposed after resection of the ACL and lateral meniscus. An extra-medullary tibial cutting jug was then aligned in reference to the tibia tubercle and ankle mortise and positional with a slight posterior slope. The cut was made with minimal cutting of the lowest depth of the

## 2024-02-22 NOTE — DISCHARGE INSTRUCTIONS
DISCHARGE INSTRUCTIONS  Caring for yourself after joint replacement surgery (Total Hip and Total Knee Replacement)    Activity and Therapy  Receive physical therapy three times per week. (Pain medication one hour prior to therapy)   Perform PT exercises on own when not receiving home or outpatient PT.  Ideally exercises should be at least two times a day.  Increase level of activity and ambulation each day.  Perform deep breathing exercises daily.  Patient provides self-care when possible.  Work on Range of motion for Total knee patients.   No pillow under the knee for Total knee patients.  Elevate the surgical leg when seated.  No driving until cleared by surgeon      Diet:  Increase oral intake of fruits, fiber and water to prevent constipation.  Drink fluids frequently and take stool softeners to aid in bowel motility.  Increase protein intake/reduce high-sugar intake to help promote healing and prevent infection.    Incision Care:  Keep Aquacel or other dressing intact and remove as directed by surgeon, unless saturated, in which case, call surgeon and request instructions.  If dressing falls off, call surgeon.  Clement Hose on in the am and off in the pm to reduce swelling.  Ice affected area four times a day, for twenty minutes.    Pain Medications and Anticoagulant  You have been place on an anticoagulant to prevent blood clots.  Take this medication exactly as prescribed.  Be alert for signs of bleeding.  Take care not to injure yourself.  You have been provided pain medicine to control your pain.  Do not take more narcotics than prescribed.  You may begin weaning from narcotics as your pain level improves by decreasing the amount or frequency of the narcotics.  You may also take plain acetaminophen as an alternate to the narcotics.   Never exceed the recommended dosage.   Ice, rest and elevating the surgical limb also help with pain control.      When to call the Surgeon:  Increased redness, warmth, drainage,

## 2024-02-22 NOTE — CONSULTS
[facility]  Office: [phone]  Winston Garcia DO, Son Nagy DO, Bladimir Comer DO, Jayjay Garcia DO, Jocelynn Vargas MD, Chantel Wade MD, Ling Resnediz MD, Anabela Aldrich MD,  Emanuel Posey MD, Saul Lee MD, Sheri Bonilla MD,  Jacek [place] DO, Madisyn Jesus MD, Orville Bowers MD, Lj Garcia DO, Goldie Baeza MD,  Rick Benitez DO, Kelsey Hogan MD, Candy Campos MD, Jeannine Guerra MD, Xochitl Ross MD,  Morgan Smith MD, Monserrat Kaminski MD, Seven Galvan MD, Arlene Arias MD, Lebron Hurley MD, Krystal Alcantar MD, Peng Doyle DO, Biju Huang DO, Richard Gonzalez MD,  Raymundo Aguilar MD, Shirley Waterhouse, CNP,  Mckenzie Laboy CNP, Pelon Weinberg, CNP,  Diane Jerez, DNP, Chio Schulte, CNP, Mana Acuna, CNP, Lali Oropeza CNP, Makeda Golden, CNP, Jodi Oliveira, CNP, Jailyn Bergeron, PA-C, Kathryn King PA-C, Missy Rodriguez, CNP, Kaya Flores, CNP, Virginie Rouse, CNP, Joanne Chavez, CNS, Rita Rea, CNP, Litzy Palomo, CNP, Tracy Schwab, CNP         [facility]   IN-PATIENT SERVICE   [facility]    CONSULTATION / HISTORY AND PHYSICAL EXAMINATION            Date:   2/22/2024  Patient name:  Mary Morataya  Date of admission:  2/22/2024  9:36 AM  MRN:   [id number]  Account:  [id number]  YOB: 1970  PCP:    Guerda Jarrell APRN - CNP  Room:   [address] [place] [address]  Code Status:    Full Code    Physician Requesting Consult: Jose Palafox MD    Reason for Consult:  Medical management     History Obtained From:     patient    History of Present Illness:     Mary Morataya is a 53 y.o. female with a past medical history of osteoarthritis of the right knee who presented to this facility on 2/22/2024 for elective right knee total arthroplasty. The procedure was without complication. Internal medicine has been consulted for post-operative medical management.     Past Medical History:     Past Medical History:   Diagnosis Date                Last Modified POA    * (Principal) Primary osteoarthritis of right knee 2/22/2024 Yes    Morbid obesity with BMI of 50.0-59.9, adult (HCC) 2/22/2024 Yes    LEI (generalized anxiety disorder) 2/22/2024 Yes    DENISHA (obstructive sleep apnea) 2/22/2024 Yes    Overview Signed 2/3/2023 11:42 AM by Yvette Adamson MD Dr Rulong at [facility] 12 years , using machine for this Time.  Sx are controlled Q  6 months            Plan:     Osteoarthritis of the right knee   -S/P right knee total arthroplasty   -PT/OT   -BID aspirin for DVT prophylaxis   -Dilaudid and oxycodone pain panel     Sleep apnea   -Continue home CPAP     Anxiety   -Continue home Wellbutrin and Lexapro     Patient is admitted as observation status. Expected length of stay < 48 hours. Patient is admitted in the Med/Surge    Orville Bowers MD  2/22/2024  6:05 PM    Copy sent to Dr. Jarrell, Guerda STAPLES, APRN - CNP

## 2024-02-22 NOTE — PROGRESS NOTES
Occupational Therapy  [facility]  Occupational Therapy Initial Assessment    Name: Mary Morataya  : 1970  MRN: [id number]  Date of Service: 2024    Discharge Recommendations:  Patient would benefit from continued therapy after discharge  OT Equipment Recommendations  Equipment Needed:  (AE/DME recommendations TBD)     Patient Diagnosis(es): The encounter diagnosis was Primary osteoarthritis of right knee.  Past Medical History:  has a past medical history of Allergic rhinitis, Anxiety, Anxiety and depression, Arthritis, Asthma, Calcaneal spur, Combined forms of age-related cataract, Diverticulitis, Diverticulitis of large intestine without perforation or abscess without bleeding, Edema, Essential hypertension, Headache(784.0), HTN (hypertension), Lymphocytic colitis, OAB (overactive bladder), Obesity, Osteoarthritis of acromioclavicular joint, Overactive bladder, Overactive bladder, Peroneus longus tendinitis, Psoriasis, Seborrheic dermatitis, Sleep apnea, Spinal enthesopathy (HCC), Stress fracture of metatarsal bone, Tear of medial meniscus of knee, Urge incontinence of urine, Use of cane as ambulatory aid, and Wellness examination.  Past Surgical History:  has a past surgical history that includes Tonsillectomy; pr arthroscopy knee diagnostic w/wo synovial bx spx (Right, 2017); Colonoscopy (N/A, 2020); Cataract removal (Bilateral); Upper gastrointestinal endoscopy (N/A, 2021); Colonoscopy (N/A, 2021); Cholecystectomy, laparoscopic (N/A, 12/15/2020); eye surgery (Bilateral); Sleeve Gastrectomy (2021); Sleeve Gastrectomy (N/A, 2021); and Total knee arthroplasty (Left, 2022).    Assessment   Assessment: Pt seen for therapy eval/treatment POD #0 s/p R TKA with Dr. Palafox. Pt reports PLOF to be IND for all ADLs and IADLs inclduign driving with no use of AE/DME. At this time pt is MIN-MOD A bed mobility, MIN A sit<>stand, CGA-MIN A  with rails  Entrance Stairs - Number of Steps: 1 CODY  Entrance Stairs - Rails: Right  Bathroom Shower/Tub: Walk-in shower, Shower chair with back, Tub/Shower unit  Bathroom Toilet: Standard  Bathroom Equipment: Toilet raiser, Grab bars in shower, Hand-held shower, Shower chair  Bathroom Accessibility: Accessible  Home Equipment: Walker, rolling, Cane, Grab bars  Has the patient had two or more falls in the past year or any fall with injury in the past year?: No  Receives Help From: Family (Sons are able to assist; 1 son with DD and other son who works/goes to school)  ADL Assistance: Independent  Homemaking Assistance: Independent  Homemaking Responsibilities: Yes  Meal Prep Responsibility: Primary  Laundry Responsibility: Primary  Cleaning Responsibility: Primary  Shopping Responsibility: Primary  Ambulation Assistance: Independent (No use of AE/DME)  Transfer Assistance: Independent  Active : Yes  Mode of Transportation: Van  Occupation: On disability  IADL Comments: IND IADLs PLOF  Additional Comments: R-handed; states that she stays in her bedroom most of the day    Objective   Observation/Palpation  Observation: eyes closed often during session, patient lethargic  Safety Devices  Type of Devices: Left in bed;Call light within reach;Nurse notified;Gait belt (Left in PACU cot with RN present)  Restraints  Restraints Initially in Place: No    Balance  Sitting: Impaired (Static - CGA; Dynamic - MIN-MOD A)  Standing: Impaired (Static - CGA-MIN A; Dynamic - CGA-MIN A)    AROM: Within functional limits (BUE AROM WFL)  Strength: Within functional limits (BUT MMT WFL)  Coordination: Within functional limits  Sensation: Intact (Pt reports normal sensation grossly)    ADL  Feeding: Setup  Grooming: Contact guard assistance  UE Bathing: Minimal assistance  LE Bathing: Maximum assistance  UE Dressing: Moderate assistance  UE Dressing Skilled Clinical Factors: MOD A doffing soiled gown and donning clean gown while

## 2024-02-22 NOTE — ANESTHESIA PRE PROCEDURE
Department of Anesthesiology  Preprocedure Note       Name:  Mary Morataya   Age:  53 y.o.  :  1970                                          MRN:  [id number]         Date:  2024      Surgeon: Surgeon(s):  Jose Palafox MD    Procedure: Procedure(s):  RIGHT KNEE TOTAL ARTHROPLASTY WITH RYDER BIOMET AND GPS    Medications prior to admission:   Prior to Admission medications    Medication Sig Start Date End Date Taking? Authorizing Provider   Ascorbic Acid (VITAMIN C ER PO) Take by mouth daily    Carl Meier MD   vitamin D (ERGOCALCIFEROL) 1.25 MG (68157 UT) CAPS capsule Take 1 capsule by mouth once a week 23   Carl Meier MD   ketoconazole (NIZORAL) 2 % cream Apply to affected area BID 23   Coleman De La Cruz PA-C   triamcinolone (KENALOG) 0.025 % cream Apply to rash on face twice daily 23   Coleman De La Cruz PA-C   buPROPion HBr (APLENZIN) 174 MG TB24 Take by mouth    Carl Meier MD   escitalopram (LEXAPRO) 10 MG tablet Take 1 tablet by mouth daily    Carl Meier MD       Current medications:    Current Facility-Administered Medications   Medication Dose Route Frequency Provider Last Rate Last Admin   • sodium chloride flush 0.9 % injection 5-40 mL  5-40 mL IntraVENous 2 times per day Jose Palafox MD       • sodium chloride flush 0.9 % injection 5-40 mL  5-40 mL IntraVENous PRN Jose Palafox MD       • 0.9 % sodium chloride infusion   IntraVENous PRN Jose Palafox MD       • ceFAZolin (ANCEF) 3000 mg in sodium chloride 0.9% 100 mL IVPB  3,000 mg IntraVENous On Call to OR Jose Palafox MD       • acetaminophen (TYLENOL) tablet 1,000 mg  1,000 mg Oral Once Jose Palafox MD       • dexAMETHasone (PF) (DECADRON) injection 10 mg  10 mg IntraVENous Once Jose Palafox MD       • gabapentin (NEURONTIN) tablet 800 mg  800 mg Oral Once Jose Palafox MD       • scopolamine (TRANSDERM-SCOP) transdermal patch 1 patch  1 patch

## 2024-02-22 NOTE — H&P
ORTHOPEDIC PATIENT EVALUATION      HPI / Chief Complaint  Mary Morataya is a 53 y.o. female who presents for severe DJD right knee.  She presents for right total knee arthroplasty.  She had a previous left total knee arthroplasty done a year ago which is doing well    Past Medical History  Mary  has a past medical history of Allergic rhinitis, Anxiety, Anxiety and depression, Arthritis, Asthma, Calcaneal spur, Combined forms of age-related cataract, Diverticulitis, Diverticulitis of large intestine without perforation or abscess without bleeding, Edema, Essential hypertension, Headache(784.0), HTN (hypertension), Lymphocytic colitis, OAB (overactive bladder), Obesity, Osteoarthritis of acromioclavicular joint, Overactive bladder, Overactive bladder, Peroneus longus tendinitis, Psoriasis, Seborrheic dermatitis, Sleep apnea, Spinal enthesopathy (HCC), Stress fracture of metatarsal bone, Tear of medial meniscus of knee, Urge incontinence of urine, Use of cane as ambulatory aid, and Wellness examination.    Past Surgical History  Mary  has a past surgical history that includes Tonsillectomy; pr arthroscopy knee diagnostic w/wo synovial bx spx (Right, 05/16/2017); Colonoscopy (N/A, 08/05/2020); Cataract removal (Bilateral); Upper gastrointestinal endoscopy (N/A, 04/08/2021); Colonoscopy (N/A, 04/08/2021); Cholecystectomy, laparoscopic (N/A, 12/15/2020); eye surgery (Bilateral); Sleeve Gastrectomy (11/17/2021); Sleeve Gastrectomy (N/A, 11/17/2021); and Total knee arthroplasty (Left, 11/1/2022).    Current Medications  Current Facility-Administered Medications   Medication Dose Route Frequency Provider Last Rate Last Admin    sodium chloride flush 0.9 % injection 5-40 mL  5-40 mL IntraVENous 2 times per day Jose Palafox MD        sodium chloride flush 0.9 % injection 5-40 mL  5-40 mL IntraVENous PRN Jose Palafox MD        0.9 % sodium chloride infusion   IntraVENous PRN Jose Palafox MD

## 2024-02-22 NOTE — PROGRESS NOTES
Orthopedic Coordinator Note    Patient s/p right total Knee replacement on 2/22/24    The following appointments are currently scheduled:    Post-op with surgeon 3/6/24 at 0955    Physical Therapy: [facility] for PT      Wheeled walker order is not entered  Face to face documentation is n/a. Pt has a FWW.     DVT Prophylaxis: ASA 81 mg PO BID          Electronically signed by: Yesica Solo RN on 2/22/2024 at 9:03 AM

## 2024-02-22 NOTE — CARE COORDINATION
Case Management Assessment  Initial Evaluation    Date/Time of Evaluation: 2/22/2024 6:53 PM  Assessment Completed by: Zhanna Thomason    If patient is discharged prior to next notation, then this note serves as note for discharge by case management.    Patient Name: Mary Morataya                   YOB: 1970  Diagnosis: Primary osteoarthritis of right knee [M17.11]                   Date / Time: 2/22/2024  9:36 AM    Patient Admission Status: Outpatient in a bed   Readmission Risk (Low < 19, Mod (19-27), High > 27): No data recorded  Current PCP: Guerda Jarrell APRN - CNP  PCP verified by CM? Yes    Chart Reviewed: Yes      History Provided by: Patient  Patient Orientation: Alert and Oriented    Patient Cognition: Alert    Hospitalization in the last 30 days (Readmission):  No    If yes, Readmission Assessment in  Navigator will be completed.    Advance Directives:      Code Status: Full Code   Patient's Primary Decision Maker is: Legal Next of Kin (Mikal Montanez)      Discharge Planning:    Patient lives with: Children Type of Home: House  Primary Care Giver: Self  Patient Support Systems include: Children   Current Financial resources: Medicaid  Current community resources: None  Current services prior to admission: Durable Medical Equipment            Current DME: Walker, Cpap            Type of Home Care services:   (TBD)    ADLS  Prior functional level: Independent in ADLs/IADLs  Current functional level: Independent in ADLs/IADLs    PT AM-PAC: 16 /24  OT AM-PAC: 14 /24    Family can provide assistance at DC: Yes  Would you like Case Management to discuss the discharge plan with any other family members/significant others, and if so, who? No  Plans to Return to Present Housing: Yes  Other Identified Issues/Barriers to RETURNING to current housing: none  Potential Assistance needed at discharge:  (TBD)            Potential DME:    Patient expects to discharge to: House  Plan for

## 2024-02-23 PROCEDURE — 2580000003 HC RX 258: Performed by: ORTHOPAEDIC SURGERY

## 2024-02-23 PROCEDURE — 97535 SELF CARE MNGMENT TRAINING: CPT

## 2024-02-23 PROCEDURE — 6370000000 HC RX 637 (ALT 250 FOR IP): Performed by: ORTHOPAEDIC SURGERY

## 2024-02-23 PROCEDURE — 6370000000 HC RX 637 (ALT 250 FOR IP): Performed by: STUDENT IN AN ORGANIZED HEALTH CARE EDUCATION/TRAINING PROGRAM

## 2024-02-23 PROCEDURE — 99231 SBSQ HOSP IP/OBS SF/LOW 25: CPT | Performed by: STUDENT IN AN ORGANIZED HEALTH CARE EDUCATION/TRAINING PROGRAM

## 2024-02-23 PROCEDURE — 97116 GAIT TRAINING THERAPY: CPT

## 2024-02-23 PROCEDURE — 97110 THERAPEUTIC EXERCISES: CPT

## 2024-02-23 RX ORDER — CEFDINIR 300 MG/1
300 CAPSULE ORAL 2 TIMES DAILY
Qty: 20 CAPSULE | Refills: 1 | Status: SHIPPED | OUTPATIENT
Start: 2024-02-23

## 2024-02-23 RX ORDER — OXYCODONE HYDROCHLORIDE AND ACETAMINOPHEN 5; 325 MG/1; MG/1
1 TABLET ORAL EVERY 6 HOURS PRN
Qty: 12 TABLET | Refills: 0 | Status: SHIPPED | OUTPATIENT
Start: 2024-02-23 | End: 2024-02-26

## 2024-02-23 RX ADMIN — ESCITALOPRAM OXALATE 10 MG: 10 TABLET ORAL at 20:44

## 2024-02-23 RX ADMIN — ASPIRIN 81 MG: 81 TABLET, COATED ORAL at 08:31

## 2024-02-23 RX ADMIN — ACETAMINOPHEN 650 MG: 325 TABLET ORAL at 23:55

## 2024-02-23 RX ADMIN — SODIUM CHLORIDE, POTASSIUM CHLORIDE, SODIUM LACTATE AND CALCIUM CHLORIDE: 600; 310; 30; 20 INJECTION, SOLUTION INTRAVENOUS at 02:41

## 2024-02-23 RX ADMIN — BUPROPION HYDROCHLORIDE 100 MG: 100 TABLET, FILM COATED ORAL at 08:31

## 2024-02-23 RX ADMIN — OXYCODONE HYDROCHLORIDE 5 MG: 5 TABLET ORAL at 20:09

## 2024-02-23 RX ADMIN — ACETAMINOPHEN 650 MG: 325 TABLET ORAL at 17:03

## 2024-02-23 RX ADMIN — OXYCODONE HYDROCHLORIDE 10 MG: 5 TABLET ORAL at 10:35

## 2024-02-23 RX ADMIN — ACETAMINOPHEN 650 MG: 325 TABLET ORAL at 05:42

## 2024-02-23 RX ADMIN — ASPIRIN 81 MG: 81 TABLET, COATED ORAL at 20:45

## 2024-02-23 RX ADMIN — OXYCODONE HYDROCHLORIDE 10 MG: 5 TABLET ORAL at 15:32

## 2024-02-23 RX ADMIN — ACETAMINOPHEN 650 MG: 325 TABLET ORAL at 11:42

## 2024-02-23 ASSESSMENT — PAIN DESCRIPTION - LOCATION
LOCATION: LEG
LOCATION: LEG
LOCATION: KNEE
LOCATION: KNEE

## 2024-02-23 ASSESSMENT — PAIN DESCRIPTION - ORIENTATION
ORIENTATION: RIGHT
ORIENTATION: RIGHT
ORIENTATION: RIGHT;LEFT
ORIENTATION: RIGHT
ORIENTATION: RIGHT

## 2024-02-23 ASSESSMENT — PAIN DESCRIPTION - DESCRIPTORS
DESCRIPTORS: ACHING;DISCOMFORT
DESCRIPTORS: ACHING
DESCRIPTORS: ACHING;DISCOMFORT

## 2024-02-23 ASSESSMENT — PAIN SCALES - GENERAL
PAINLEVEL_OUTOF10: 2
PAINLEVEL_OUTOF10: 6
PAINLEVEL_OUTOF10: 7
PAINLEVEL_OUTOF10: 8
PAINLEVEL_OUTOF10: 7
PAINLEVEL_OUTOF10: 9

## 2024-02-23 ASSESSMENT — PAIN - FUNCTIONAL ASSESSMENT: PAIN_FUNCTIONAL_ASSESSMENT: ACTIVITIES ARE NOT PREVENTED

## 2024-02-23 NOTE — PROGRESS NOTES
taking care of personal grooming?: A Little  How much help for eating meals?: None  AM-PAC Inpatient Daily Activity Raw Score: 18  AM-PAC Inpatient ADL T-Scale Score : 38.66  ADL Inpatient CMS 0-100% Score: 46.65  ADL Inpatient CMS G-Code Modifier : CK         Goals  Short Term Goals  Time Frame for Short Term Goals: 14 visits  Short Term Goal 1: Pt to compelte all ADL transfers at MOD I with LRAD and no LOB  Short Term Goal 2: Pt to complete LB ADLs/toileting at MIN A with no LOB  Short Term Goal 3: Pt to complete standing ADL for >10 mins at SUP with no LOB and no rest break  Short Term Goal 4: Pt to IND demonstrate 2-3 EC techniques during ADLs/ADL transfers with no vc's       Therapy Time   Individual Concurrent Group Co-treatment   Time In 1001         Time Out 1045         Minutes 44         Timed Code Treatment Minutes: 43 Minutes       KEVIN NICHOLE OT

## 2024-02-23 NOTE — PLAN OF CARE
Problem: Pain  Goal: Verbalizes/displays adequate comfort level or baseline comfort level  Outcome: Progressing  Flowsheets  Taken 2/22/2024 1630 by Lina Chung, REI  Verbalizes/displays adequate comfort level or baseline comfort level:   Assess pain using appropriate pain scale   Encourage patient to monitor pain and request assistance  Taken 2/22/2024 1615 by Lina Chung, REI  Verbalizes/displays adequate comfort level or baseline comfort level:   Assess pain using appropriate pain scale   Encourage patient to monitor pain and request assistance  Taken 2/22/2024 1515 by Lina Chung, RN  Verbalizes/displays adequate comfort level or baseline comfort level:   Assess pain using appropriate pain scale   Encourage patient to monitor pain and request assistance  Taken 2/22/2024 1502 by Lina Chung RN  Verbalizes/displays adequate comfort level or baseline comfort level:   Assess pain using appropriate pain scale   Encourage patient to monitor pain and request assistance  Taken 2/22/2024 1445 by Lina Chung, REI  Verbalizes/displays adequate comfort level or baseline comfort level:   Assess pain using appropriate pain scale   Encourage patient to monitor pain and request assistance  Taken 2/22/2024 1430 by Lina Chung RN  Verbalizes/displays adequate comfort level or baseline comfort level:   Assess pain using appropriate pain scale   Encourage patient to monitor pain and request assistance     Problem: Discharge Planning  Goal: Discharge to home or other facility with appropriate resources  Outcome: Progressing  Flowsheets (Taken 2/22/2024 1742 by Sally Costa, RN)  Discharge to home or other facility with appropriate resources: Identify barriers to discharge with patient and caregiver     Problem: ABCDS Injury Assessment  Goal: Absence of physical injury  Outcome: Progressing

## 2024-02-23 NOTE — PROGRESS NOTES
Van  Occupation: On disability  IADL Comments: IND IADLs PLOF  Additional Comments: R-handed; states that she stays in her bedroom most of the day         Cognition   Orientation  Overall Orientation Status: Within Normal Limits  Orientation Level: Oriented X4  Cognition  Overall Cognitive Status: Exceptions  Following Commands: Follows multistep commands with repitition (difficulty stand to sit hand placement consistency)  Attention Span: Attends with cues to redirect  Memory: Appears intact  Safety Judgement: Decreased awareness of need for safety  Problem Solving: Decreased awareness of errors;Assistance required to identify errors made;Assistance required to generate solutions  Insights: Fully aware of deficits  Initiation: Requires cues for some  Sequencing: Requires cues for some (step training)  Cognition Comment: Pt anxious primarily with step training but wanted to practice 2nd time to decrease anxiousness.     Objective         Observation/Palpation  Observation: R LE rests in lateral rotation in supine and reclined in recliner.        Joint Mobility  ROM RLE: supine knee extension (-)20 and seated knee flex AROM 70 and 80 AAROM with much effort last 5 degrees              Bed mobility  Supine to Sit: Modified independent  Scooting: Modified independent  Bed Mobility Comments: increased time/effort R LE; pt able to don L thigh high compression stocking but places L LE onto bed; assist don shoes bedside, especially R due to ace wrap R LE  Transfers  Sit to Stand: Modified independent  Stand to Sit: Modified independent  Comment: RW; education and reminders hand placement safety with option midbar RW; bed, toilet with rails, transport chair & recliner; education R LE placement for comfort; pt indep hygiene care at toilet and wash hands at sink; min education cues safety RW placement in bathroom  Ambulation  WB Status: WBAT RLE  Ambulation  Surface: Level tile  Device: Rolling Walker (pt's bariatric  RW)  Assistance: Contact guard assistance;Stand by assistance  Quality of Gait: Step-to gait pattern with decreased stance on RLE  Gait Deviations: Slow Sweetie;Increased CHRISTIANO;Decreased step length;Decreased step height  Distance: 60+60+25ft  Comments: pt requires rest break per her request due to fatigue but also due to pain; pt unable to ambulate further distance and pt plan home PT who will be able to address gait distance tolerance further  More Ambulation?: No  Stairs/Curb  Stairs?: Yes  Stairs  Curbs: 6\" (x2)  Device: Rolling walker  Assistance: Moderate assistance;Minimal assistance  Comment: pt anxious first ascending stance R LE with mod A/cues but request 2nd repitition and only [place]/cues for anxiousness R LE stance; education for pt to keep chest up when locking elbows and not to lean forward             Total Knee Arthroplasty Exercise Program: Quad sets, glut sets, hamstring sets, hip abduction/adduction, ankle pumps, supine and seated heel slides, short arc quads, LAQs. Reps: 15; min education cues                                                    AM-PAC - Mobility    AM-PAC Basic Mobility - Inpatient   How much help is needed turning from your back to your side while in a flat bed without using bedrails?: None  How much help is needed moving from lying on your back to sitting on the side of a flat bed without using bedrails?: None  How much help is needed moving to and from a bed to a chair?: None  How much help is needed standing up from a chair using your arms?: None  How much help is needed walking in hospital room?: A Little  How much help is needed climbing 3-5 steps with a railing?: A Lot  AM-PAC Inpatient Mobility Raw Score : 21  AM-PAC Inpatient T-Scale Score : 50.25  Mobility Inpatient CMS 0-100% Score: 28.97  Mobility Inpatient CMS G-Code Modifier : CJ              Goals  Short Term Goals  Time Frame for Short Term Goals: 14 visits  Short Term Goal 1: The patient will be able to perform all

## 2024-02-23 NOTE — PROGRESS NOTES
Occupational Therapy  Facility/Department: [address]  Occupational Therapy Daily Treatment Note      Name: Mary Morataya  : 1970  MRN: [id number]  Date of Service: 2024    Discharge Recommendations:  24 hour supervision or assist  OT Equipment Recommendations  Other: TBD       Patient Diagnosis(es): The encounter diagnosis was Primary osteoarthritis of right knee.  Past Medical History:  has a past medical history of Allergic rhinitis, Anxiety, Anxiety and depression, Arthritis, Asthma, Calcaneal spur, Combined forms of age-related cataract, Diverticulitis, Diverticulitis of large intestine without perforation or abscess without bleeding, Edema, Essential hypertension, Headache(784.0), HTN (hypertension), Lymphocytic colitis, OAB (overactive bladder), Obesity, Osteoarthritis of acromioclavicular joint, Overactive bladder, Overactive bladder, Peroneus longus tendinitis, Psoriasis, Seborrheic dermatitis, Sleep apnea, Spinal enthesopathy (HCC), Stress fracture of metatarsal bone, Tear of medial meniscus of knee, Urge incontinence of urine, Use of cane as ambulatory aid, and Wellness examination.  Past Surgical History:  has a past surgical history that includes Tonsillectomy; pr arthroscopy knee diagnostic w/wo synovial bx spx (Right, 2017); Colonoscopy (N/A, 2020); Cataract removal (Bilateral); Upper gastrointestinal endoscopy (N/A, 2021); Colonoscopy (N/A, 2021); Cholecystectomy, laparoscopic (N/A, 12/15/2020); eye surgery (Bilateral); Sleeve Gastrectomy (2021); Sleeve Gastrectomy (N/A, 2021); Total knee arthroplasty (Left, 2022); and Total knee arthroplasty (Right, 2024).           Assessment   Assessment: Pt seen for therapy /treatment POD #1 s/p R TKA with Dr. Palafox. Pt reports PLOF to be IND for all ADLs and IADLs inclduign driving with no use of AE/DME.Currently set up for UB ADL, , LB slack setup, shoes min A, compression hose max A but ill  Tolerance  Activity Tolerance: Patient limited by endurance;Patient limited by fatigue;Patient limited by pain  Activity Tolerance Comments: Overall, pt did much better today with tolerating PT session, although anxious with platform step training and cues for proficiency with safety technique RW with mobility. Pt was unable to walk 200ft with RW as fatigues easily post op and with history of staying in her bedroom at home and hx R knee DJD.  Bed mobility  Sit to Supine: Stand by assistance (educate use of gaitbelt)  Scooting: Modified independent  Bed Mobility Comments: pt up in bedside chair and returned to bed  Transfers  Sit to stand: Stand by assistance  Stand to sit: Stand by assistance     Cognition  Overall Cognitive Status: Exceptions  Following Commands: Follows multistep commands with repitition (difficulty stand to sit hand placement consistency)  Attention Span: Attends with cues to redirect  Memory: Appears intact  Safety Judgement: Decreased awareness of need for safety  Problem Solving: Decreased awareness of errors;Assistance required to identify errors made;Assistance required to generate solutions  Insights: Fully aware of deficits  Initiation: Requires cues for some  Sequencing: Requires cues for some (step training)  Cognition Comment: Pt anxious primarily with step training but wanted to practice 2nd time to decrease anxiousness.  Orientation  Overall Orientation Status: Within Normal Limits  Orientation Level: Oriented X4                  Education Given To: Patient;Family  Education Provided: Role of Therapy;Transfer Training;Equipment;Plan of Care  Education Provided Comments: compression stockings + surgical soap  Education Method: Demonstration  Barriers to Learning: None  Education Outcome: Verbalized understanding;Continued education needed                                                              AM-PAC - ADL  AM-PAC Daily Activity - Inpatient   How much help is needed for putting on

## 2024-02-23 NOTE — PROGRESS NOTES
Physical Therapy  Facility/Department: [address]  Physical Therapy Daily Documentation Note    Name: Mary Morataya  : 1970  MRN: [id number]  Date of Service: 2024    Discharge Recommendations:  Patient would benefit from continued therapy after discharge   PT Equipment Recommendations  Equipment Needed: No (has bariatric RW that patient should use at all times)      Patient Diagnosis(es): The encounter diagnosis was Primary osteoarthritis of right knee.  Past Medical History:  has a past medical history of Allergic rhinitis, Anxiety, Anxiety and depression, Arthritis, Asthma, Calcaneal spur, Combined forms of age-related cataract, Diverticulitis, Diverticulitis of large intestine without perforation or abscess without bleeding, Edema, Essential hypertension, Headache(784.0), HTN (hypertension), Lymphocytic colitis, OAB (overactive bladder), Obesity, Osteoarthritis of acromioclavicular joint, Overactive bladder, Overactive bladder, Peroneus longus tendinitis, Psoriasis, Seborrheic dermatitis, Sleep apnea, Spinal enthesopathy (HCC), Stress fracture of metatarsal bone, Tear of medial meniscus of knee, Urge incontinence of urine, Use of cane as ambulatory aid, and Wellness examination.  Past Surgical History:  has a past surgical history that includes Tonsillectomy; pr arthroscopy knee diagnostic w/wo synovial bx spx (Right, 2017); Colonoscopy (N/A, 2020); Cataract removal (Bilateral); Upper gastrointestinal endoscopy (N/A, 2021); Colonoscopy (N/A, 2021); Cholecystectomy, laparoscopic (N/A, 12/15/2020); eye surgery (Bilateral); Sleeve Gastrectomy (2021); Sleeve Gastrectomy (N/A, 2021); Total knee arthroplasty (Left, 2022); and Total knee arthroplasty (Right, 2024).    Assessment   Body Structures, Functions, Activity Limitations Requiring Skilled Therapeutic Intervention: Decreased functional mobility ;Decreased safe awareness;Decreased strength;Decreased  Yes  Additional Pertinent Hx: L TKA ~1 year ago  Response To Previous Treatment: Patient with no complaints from previous session.  Family / Caregiver Present: No  Referring Practitioner: Dr Palafox  Referral Date : 02/22/24  Diagnosis: R TKA 2- WBAT  Follows Commands: Impaired  Other (Comment): anxious, lethargic, tearful at times  Subjective  Subjective: RN & patient is agreeable to therapy. Rate pain in R knee 9/10 and RN just gave meds before PT. Cold pack aftter therapy. Positioned for comfort.         Cognition   Orientation  Overall Orientation Status: Within Normal Limits  Orientation Level: Oriented X4  Cognition  Overall Cognitive Status: Exceptions  Following Commands: Follows multistep commands with repitition (difficulty stand to sit hand placement consistency)  Attention Span: Attends with cues to redirect (due to pain)  Memory: Appears intact  Safety Judgement: Decreased awareness of need for safety  Problem Solving: Decreased awareness of errors;Assistance required to identify errors made;Assistance required to generate solutions  Insights: Fully aware of deficits  Initiation: Requires cues for some  Sequencing: Requires cues for some  Cognition Comment: cues for safety hand placement especially stand to sit with RW     Objective              Joint Mobility  ROM RLE: supine knee extension (-)15 and seated knee flex AROM 65 and 75 AAROM with much effort last 5 degrees           Bed mobility  Supine to Sit: Modified independent  Sit to Supine: Modified independent (use of gaitbelt due to pain)  Scooting: Modified independent  Bed Mobility Comments: in bed before treatment and returned to bed after treatment  Transfers  Sit to Stand: Modified independent  Stand to Sit: Modified independent  Comment: RW; education and reminders hand placement safety with option midbar RW; pt uses momentum to rise with hx of doing this and did not reach back hands to sit despite education  Ambulation  WB Status: WBAT

## 2024-02-23 NOTE — PROGRESS NOTES
[facility]  Office: [phone]  Winston Garcia DO, Son Nagy, DO, Bladimir Comer DO, Jayjay Garcia, DO, Jocelynn Vargas MD, Chantel Wade MD, Ling Resendiz MD, Anabela Aldrich MD,  Emanuel Posey MD, Saul Lee MD, Sheri Bonilla MD,  Jacek Toledo DO, Madisyn Jesus MD, Orville Bowers MD, Lj Garcia DO, Goldie Baeza MD,  Rick Benitez DO, Kelsey Hogan MD, Candy Campos MD, Jeannine Guerra MD, Xochitl Ross MD,  Morgan Smith MD, Monserrat Kaminski MD, Seven Galvan MD, Arlene Arias MD, Lebron Hurley MD, Krystal Alcantar MD, Peng Doyle DO, Biju Huang DO, Richard Gonzalez MD,  Raymundo Aguilar MD, Shirley Waterhouse, CNP,  Mckenzie Laboy, CNP, Pelon Weinberg, CNP,  Diane Jerez, DNP, Chio Schulte, CNP, Mana Acuna, CNP, Lali Oropeza CNP, Makeda Golden, CNP, Jodi Oliveira, CNP, Jailyn Bergeron, PA-C, Kathryn King PA-C, Missy Rodriguez, CNP, Kaya Flores, CNP, Virginie Rouse, CNP, Joanne Chavez, CNS, Rita Rea, CNP, Litzy Palomo, CNP, Tracy Schwab, CNP         [facility]   IN-PATIENT SERVICE   [facility]    Progress Note    2/23/2024    8:25 AM    Name:   Mary Morataya  MRN:     [id number]     Acct:      [id number]   Room:   [address] [place] [address] Day:  0  Admit Date:  2/22/2024  9:36 AM    PCP:   Guerda Jarrell APRN - CNP  Code Status:  Full Code    Subjective:     Patient reports feeling well and is without complaint. There were no acute events overnight. Anticipate discharge home today.     Medications:     Allergies:  No Known Allergies    Current Meds:   Scheduled Meds:    sodium chloride flush  5-40 mL IntraVENous 2 times per day    meperidine  12.5 mg IntraVENous Once    sodium chloride flush  5-40 mL IntraVENous 2 times per day    acetaminophen  650 mg Oral Q6H    ceFAZolin (ANCEF) IVPB  3,000 mg IntraVENous Once    aspirin  81 mg Oral BID    gabapentin  800 mg Oral Once    buPROPion  100 mg Oral Daily    escitalopram  10 mg Oral Daily     ROPivacaine (NAROPIN) 0.2% 60 mL, morphine (PF) 1 MG/ML 10 mg, ketorolac (TORADOL) 30 MG/ML 30 mg, EPINEPHrine PF 1 MG/ML 1 mg in sodium chloride 0.9 % 28 mL IV syringe   Other Once     Continuous Infusions:    sodium chloride      lactated ringers IV soln 125 mL/hr at 24 0241    sodium chloride       PRN Meds: sodium chloride flush, sodium chloride, morphine, HYDROmorphone, ondansetron, metoclopramide, midazolam, diphenhydrAMINE, labetalol **OR** hydrALAZINE, sodium chloride flush, sodium chloride, oxyCODONE **OR** oxyCODONE, HYDROmorphone **OR** HYDROmorphone, ondansetron    Data:     Vitals:  /65   Pulse 64   Temp 97.3 °F (36.3 °C) (Oral)   Resp 16   Ht 1.626 m (5' 4\")   Wt (!) 160.5 kg (353 lb 12.8 oz)   LMP 2023   SpO2 97%   BMI 60.73 kg/m²   Temp (24hrs), Av.9 °F (36.6 °C), Min:97.3 °F (36.3 °C), Max:98.6 °F (37 °C)    No results for input(s): \"POCGLU\" in the last 72 hours.    I/O (24Hr):    Intake/Output Summary (Last 24 hours) at 2024 0825  Last data filed at 2024 1358  Gross per 24 hour   Intake 1200 ml   Output 100 ml   Net 1100 ml       Labs:  Hematology:No results for input(s): \"WBC\", \"RBC\", \"HGB\", \"HCT\", \"MCV\", \"MCH\", \"MCHC\", \"RDW\", \"PLT\", \"MPV\", \"SEDRATE\", \"CRP\", \"INR\", \"DDIMER\", \"QX2UUPIH\", \"LABABSO\" in the last 72 hours.    Invalid input(s): \"PT\"  Chemistry:No results for input(s): \"NA\", \"K\", \"CL\", \"CO2\", \"GLUCOSE\", \"BUN\", \"CREATININE\", \"MG\", \"ANIONGAP\", \"LABGLOM\", \"GFRAA\", \"CALCIUM\", \"CAION\", \"PHOS\", \"PSA\", \"PROBNP\", \"TROPHS\", \"CKTOTAL\", \"CKMB\", \"CKMBINDEX\", \"MYOGLOBIN\", \"DIGOXIN\", \"LACTACIDWB\" in the last 72 hours.No results for input(s): \"PROT\", \"LABALBU\", \"LABA1C\", \"A6QSXUW\", \"M4NJIDN\", \"FT4\", \"TSH\", \"AST\", \"ALT\", \"LDH\", \"GGT\", \"ALKPHOS\", \"LABGGT\", \"BILITOT\", \"BILIDIR\", \"AMMONIA\", \"AMYLASE\", \"LIPASE\", \"LACTATE\", \"CHOL\", \"HDL\", \"LDLCHOLESTEROL\", \"CHOLHDLRATIO\", \"TRIG\", \"VLDL\", \"XSH54MS\", \"PHENYTOIN\", \"PHENYF\", \"URICACID\", \"POCGLU\" in the last 72

## 2024-02-24 VITALS
HEIGHT: 64 IN | WEIGHT: 293 LBS | RESPIRATION RATE: 20 BRPM | HEART RATE: 81 BPM | DIASTOLIC BLOOD PRESSURE: 70 MMHG | BODY MASS INDEX: 50.02 KG/M2 | TEMPERATURE: 98.6 F | SYSTOLIC BLOOD PRESSURE: 130 MMHG | OXYGEN SATURATION: 94 %

## 2024-02-24 PROCEDURE — 6370000000 HC RX 637 (ALT 250 FOR IP): Performed by: ORTHOPAEDIC SURGERY

## 2024-02-24 PROCEDURE — 97535 SELF CARE MNGMENT TRAINING: CPT

## 2024-02-24 PROCEDURE — 99231 SBSQ HOSP IP/OBS SF/LOW 25: CPT | Performed by: STUDENT IN AN ORGANIZED HEALTH CARE EDUCATION/TRAINING PROGRAM

## 2024-02-24 PROCEDURE — 97110 THERAPEUTIC EXERCISES: CPT

## 2024-02-24 PROCEDURE — 97116 GAIT TRAINING THERAPY: CPT

## 2024-02-24 PROCEDURE — 6370000000 HC RX 637 (ALT 250 FOR IP): Performed by: STUDENT IN AN ORGANIZED HEALTH CARE EDUCATION/TRAINING PROGRAM

## 2024-02-24 PROCEDURE — 97530 THERAPEUTIC ACTIVITIES: CPT

## 2024-02-24 RX ORDER — CEFDINIR 300 MG/1
300 CAPSULE ORAL 2 TIMES DAILY
Qty: 20 CAPSULE | Refills: 1 | Status: SHIPPED | OUTPATIENT
Start: 2024-02-24

## 2024-02-24 RX ORDER — OXYCODONE HYDROCHLORIDE AND ACETAMINOPHEN 5; 325 MG/1; MG/1
1 TABLET ORAL EVERY 4 HOURS PRN
Qty: 42 TABLET | Refills: 0 | Status: SHIPPED | OUTPATIENT
Start: 2024-02-24 | End: 2024-02-24

## 2024-02-24 RX ADMIN — ACETAMINOPHEN 650 MG: 325 TABLET ORAL at 06:02

## 2024-02-24 RX ADMIN — ACETAMINOPHEN 650 MG: 325 TABLET ORAL at 13:24

## 2024-02-24 RX ADMIN — OXYCODONE HYDROCHLORIDE 10 MG: 5 TABLET ORAL at 02:56

## 2024-02-24 RX ADMIN — ASPIRIN 81 MG: 81 TABLET, COATED ORAL at 09:12

## 2024-02-24 RX ADMIN — OXYCODONE HYDROCHLORIDE 10 MG: 5 TABLET ORAL at 09:12

## 2024-02-24 RX ADMIN — BUPROPION HYDROCHLORIDE 100 MG: 100 TABLET, FILM COATED ORAL at 09:12

## 2024-02-24 ASSESSMENT — PAIN DESCRIPTION - LOCATION
LOCATION: KNEE
LOCATION: KNEE

## 2024-02-24 ASSESSMENT — PAIN SCALES - GENERAL
PAINLEVEL_OUTOF10: 8
PAINLEVEL_OUTOF10: 2

## 2024-02-24 ASSESSMENT — PAIN DESCRIPTION - ORIENTATION
ORIENTATION: RIGHT
ORIENTATION: RIGHT

## 2024-02-24 ASSESSMENT — PAIN DESCRIPTION - DESCRIPTORS: DESCRIPTORS: ACHING;DISCOMFORT

## 2024-02-24 NOTE — PROGRESS NOTES
Post Operative Progress Note    2/24/2024 10:40 AM  Info:   Admit Date: 2/22/2024  PCP: Guerda Jarrell, JONATHAN - TANIYA      Medications:   Scheduled Meds:   sodium chloride flush  5-40 mL IntraVENous 2 times per day    sodium chloride flush  5-40 mL IntraVENous 2 times per day    acetaminophen  650 mg Oral Q6H    ceFAZolin (ANCEF) IVPB  3,000 mg IntraVENous Once    aspirin  81 mg Oral BID    gabapentin  800 mg Oral Once    buPROPion  100 mg Oral Daily    escitalopram  10 mg Oral Daily    ROPivacaine (NAROPIN) 0.2% 60 mL, morphine (PF) 1 MG/ML 10 mg, ketorolac (TORADOL) 30 MG/ML 30 mg, EPINEPHrine PF 1 MG/ML 1 mg in sodium chloride 0.9 % 28 mL IV syringe   Other Once     Continuous Infusions:   sodium chloride      lactated ringers IV soln Stopped (02/23/24 0921)    sodium chloride       PRN Meds:sodium chloride flush, sodium chloride, sodium chloride flush, sodium chloride, oxyCODONE **OR** oxyCODONE, HYDROmorphone **OR** HYDROmorphone, ondansetron    Diet:   Diet: ADULT DIET; Regular    Subjective:   Systemic or Specific Complaints:No Complaints    Objective:     Patient Vitals for the past 24 hrs:   BP Temp Temp src Pulse Resp SpO2   02/24/24 0829 130/70 98.6 °F (37 °C) Axillary 81 20 94 %   02/24/24 0326 -- -- -- -- 16 --   02/24/24 0256 -- -- -- -- 18 --   02/23/24 2039 -- -- -- -- 16 --   02/23/24 2009 -- -- -- -- 18 --   02/23/24 1918 132/63 97.9 °F (36.6 °C) Oral 79 17 98 %   02/23/24 1602 -- -- -- -- 16 --   02/23/24 1532 -- -- -- -- 16 --   02/23/24 1105 -- -- -- -- 16 --         Wound: incision clean and dry without sign of infection   Motion: expected discomfort with range of motion in affected extremity   DVT Exam:  no evidence of DVT on physical examination         Data Review  CBC: No results for input(s): \"WBC\", \"HGB\", \"PLT\" in the last 72 hours.        Assessment:   Patient is S/P right Knee, Arthoplasty  Pain is well controlled. She has no nausea and no vomiting.    Current activity is up with

## 2024-02-24 NOTE — PROGRESS NOTES
Occupational Therapy  Facility/Department: [address]  Occupational Therapy Daily Treatment Note    Name: Mary Morataya  : 1970  MRN: [id number]  Date of Service: 2024    Discharge Recommendations:  24 hour supervision or assist  OT Equipment Recommendations  Other: TBD       Patient Diagnosis(es): The primary encounter diagnosis was Primary osteoarthritis of right knee. A diagnosis of Post-op pain was also pertinent to this visit.  Past Medical History:  has a past medical history of Allergic rhinitis, Anxiety, Anxiety and depression, Arthritis, Asthma, Calcaneal spur, Combined forms of age-related cataract, Diverticulitis, Diverticulitis of large intestine without perforation or abscess without bleeding, Edema, Essential hypertension, Headache(784.0), HTN (hypertension), Lymphocytic colitis, OAB (overactive bladder), Obesity, Osteoarthritis of acromioclavicular joint, Overactive bladder, Overactive bladder, Peroneus longus tendinitis, Psoriasis, Seborrheic dermatitis, Sleep apnea, Spinal enthesopathy (HCC), Stress fracture of metatarsal bone, Tear of medial meniscus of knee, Urge incontinence of urine, Use of cane as ambulatory aid, and Wellness examination.  Past Surgical History:  has a past surgical history that includes Tonsillectomy; pr arthroscopy knee diagnostic w/wo synovial bx spx (Right, 2017); Colonoscopy (N/A, 2020); Cataract removal (Bilateral); Upper gastrointestinal endoscopy (N/A, 2021); Colonoscopy (N/A, 2021); Cholecystectomy, laparoscopic (N/A, 12/15/2020); eye surgery (Bilateral); Sleeve Gastrectomy (2021); Sleeve Gastrectomy (N/A, 2021); Total knee arthroplasty (Left, 2022); and Total knee arthroplasty (Right, 2024).           Assessment   Performance deficits / Impairments: Decreased ADL status;Decreased functional mobility   Assessment: Pt progressing towards STGs. Pt continues to demonstrate above deficist. At this time pt should  Transfers: reviewed safe technique for car transfer, patient verablized understanding         AM-PAC - ADL  AM-PAC Daily Activity - Inpatient   How much help is needed for putting on and taking off regular lower body clothing?: A Little  How much help is needed for bathing (which includes washing, rinsing, drying)?: A Little  How much help is needed for toileting (which includes using toilet, bedpan, or urinal)?: None  How much help is needed for putting on and taking off regular upper body clothing?: None  How much help is needed for taking care of personal grooming?: None  How much help for eating meals?: None  AM-PAC Inpatient Daily Activity Raw Score: 22  AM-PAC Inpatient ADL T-Scale Score : 47.1  ADL Inpatient CMS 0-100% Score: 25.8  ADL Inpatient CMS G-Code Modifier : CJ        Goals  Short Term Goals  Time Frame for Short Term Goals: 14 visits  Short Term Goal 1: Pt to complete all ADL transfers at MOD I with LRAD and no LOB  Short Term Goal 2: Pt to complete LB ADLs/toileting at MIN A with no LOB  Short Term Goal 3: Pt to complete standing ADL for >10 mins at SUP with no LOB and no rest break  Short Term Goal 4: Pt to IND demonstrate 2-3 EC techniques during ADLs/ADL transfers with no vc's  Patient Goals   Patient goals : home       Therapy Time   Individual Concurrent Group Co-treatment   Time In 1320         Time Out 1358         Minutes 38         Timed Code Treatment Minutes: 38 Minutes       FRANK VALLE OTR/L

## 2024-02-24 NOTE — PLAN OF CARE
Problem: Pain  Goal: Verbalizes/displays adequate comfort level or baseline comfort level  Outcome: Progressing     Problem: Discharge Planning  Goal: Discharge to home or other facility with appropriate resources  Outcome: Progressing  Flowsheets (Taken 2/23/2024 2000)  Discharge to home or other facility with appropriate resources:   Identify barriers to discharge with patient and caregiver   Arrange for needed discharge resources and transportation as appropriate   Identify discharge learning needs (meds, wound care, etc)     Problem: ABCDS Injury Assessment  Goal: Absence of physical injury  Outcome: Progressing     Problem: Safety - Adult  Goal: Free from fall injury  Outcome: Progressing

## 2024-02-24 NOTE — PROGRESS NOTES
Physical Therapy  Facility/Department: [address]  Physical Therapy Daily Documentation Note    Name: Mary Morataya  : 1970  MRN: [id number]  Date of Service: 2024    Discharge Recommendations:  Patient would benefit from continued therapy after discharge   PT Equipment Recommendations  Equipment Needed: No (has bariatric RW that patient should use at all times)      Patient Diagnosis(es): The primary encounter diagnosis was Primary osteoarthritis of right knee. A diagnosis of Post-op pain was also pertinent to this visit.  Past Medical History:  has a past medical history of Allergic rhinitis, Anxiety, Anxiety and depression, Arthritis, Asthma, Calcaneal spur, Combined forms of age-related cataract, Diverticulitis, Diverticulitis of large intestine without perforation or abscess without bleeding, Edema, Essential hypertension, Headache(784.0), HTN (hypertension), Lymphocytic colitis, OAB (overactive bladder), Obesity, Osteoarthritis of acromioclavicular joint, Overactive bladder, Overactive bladder, Peroneus longus tendinitis, Psoriasis, Seborrheic dermatitis, Sleep apnea, Spinal enthesopathy (HCC), Stress fracture of metatarsal bone, Tear of medial meniscus of knee, Urge incontinence of urine, Use of cane as ambulatory aid, and Wellness examination.  Past Surgical History:  has a past surgical history that includes Tonsillectomy; pr arthroscopy knee diagnostic w/wo synovial bx spx (Right, 2017); Colonoscopy (N/A, 2020); Cataract removal (Bilateral); Upper gastrointestinal endoscopy (N/A, 2021); Colonoscopy (N/A, 2021); Cholecystectomy, laparoscopic (N/A, 12/15/2020); eye surgery (Bilateral); Sleeve Gastrectomy (2021); Sleeve Gastrectomy (N/A, 2021); Total knee arthroplasty (Left, 2022); and Total knee arthroplasty (Right, 2024).    Assessment   Body Structures, Functions, Activity Limitations Requiring Skilled Therapeutic Intervention: Decreased

## 2024-02-24 NOTE — PROGRESS NOTES
[facility]  Office: [phone]  Winston Garcia DO, Son Nagy DO, Bladimir Comer DO, Jayjay Garcia DO, Jocelynn Vargas MD, Chantel Wade MD, Ling Resendiz MD, Anabela Aldrich MD,  Emanuel Posey MD, Saul Lee MD, Sheri Bonilla MD,  Jacek Toledo DO, Madisyn Jesus MD, Orville Bowers MD, Lj Garcia DO, Goldie Baeza MD,  Rick Benitez DO, Kelsey Hogan MD, Candy Campos MD, Jeannine Guerra MD, Xochitl Ross MD,  Morgan Smith MD, Monserrat Kaminski MD, Seven Galvan MD, Arlene Arias MD, Lebron Hurley MD, Krystal Alcantar MD, Peng Doyle DO, Biju Huang DO, Richard Gonzalez MD,  Raymundo Aguilar MD, Shirley Waterhouse, CNP,  Mckenzie Laboy CNP, Pelon Weinberg, CNP,  Diane Jerez, DNP, Chio Schulte, CNP, Mana Acuna, CNP, Lali Oropeza CNP, Makeda Golden CNP, Jodi Oliveira, CNP, Jailyn Bergeron, PA-C, Kathryn King PA-C, Missy Rodriguez, CNP, Kaya Flores, CNP, Virginie Rouse, CNP, Joanne Chavez, CNS, Rita Rea, CNP, Litzy Palomo CNP, Tracy Schwab, CNP         [facility]   IN-PATIENT SERVICE   [facility]    Progress Note    2/24/2024    9:29 AM    Name:   Mary Morataya  MRN:     [id number]     Acct:      [id number]   Room:   [address] [place] [address] Day:  0  Admit Date:  2/22/2024  9:36 AM    PCP:   Guerda Jarrell APRN - CNP  Code Status:  Full Code    Subjective:     Patient continues to complain of post-operative pain but is doing well otherwise. Awaiting discharge arrangements.     Medications:     Allergies:  No Known Allergies    Current Meds:   Scheduled Meds:    sodium chloride flush  5-40 mL IntraVENous 2 times per day    sodium chloride flush  5-40 mL IntraVENous 2 times per day    acetaminophen  650 mg Oral Q6H    ceFAZolin (ANCEF) IVPB  3,000 mg IntraVENous Once    aspirin  81 mg Oral BID    gabapentin  800 mg Oral Once    buPROPion  100 mg Oral Daily    escitalopram  10 mg Oral Daily    ROPivacaine (NAROPIN) 0.2% 60 mL, morphine (PF)

## 2024-02-26 LAB — HCG, PREGNANCY URINE (POC): NEGATIVE

## 2024-03-06 ENCOUNTER — OFFICE VISIT (OUTPATIENT)
Dept: ORTHOPEDIC SURGERY | Age: 54
End: 2024-03-06

## 2024-03-06 DIAGNOSIS — Z96.651 STATUS POST TOTAL RIGHT KNEE REPLACEMENT: Primary | ICD-10-CM

## 2024-03-06 PROCEDURE — 99024 POSTOP FOLLOW-UP VISIT: CPT | Performed by: ORTHOPAEDIC SURGERY

## 2024-03-06 NOTE — PROGRESS NOTES
[facility]                   Jose Palafox M.D.            [address], [address]               [place], [place] [address]           Dept Phone: [phone]           Dept Fax:  [phone] [address]                       [address]           [place], [place] [address]          Dept Phone: [phone]           Dept Fax:  [phone]      Chief Compliant:  No chief complaint on file.       History of Present Illness:  Patient returns today status post right TKA times 2 weeks. Patient has no major complaints     Review of Systems   Constitutional: Negative for fever, chills, sweats, recent injury, recent illness  Neurological: Negative for Headaches, numbness, or weakness.    Integumentary: Negative for rash, itching, ecchymosis, or wounds.   Musculoskeletal: Positive for No chief complaint on file.       Physical Exam:  Constitutional: Patient is oriented to person, place, and time. Patient appears well-developed and well nourished.   Musculoskeletal: Normal gait. Motion 0-100 degrees with expected pain with ROM. No Calf tenderness, Negative Hebert's sign. Neurovascular intact.  Neurological: Patient is alert and oriented to person, place, and time. Normal strenght. No sensory deficit.  Skin: Skin is warm and dry. Incision is healing well without signs of redness or drainage  Nursing note and vitals reviewed.     Labs and Imaging:     XR taken today:  XR KNEE RIGHT (1-2 VIEWS)    Result Date: 3/6/2024  X-rays taken today reviewed by me show standing AP lateral view of the patient's right knee.  She has had a recent right total knee arthroplasty components are in good alignment position on AP and lateral views.  Patient has a stemmed tibial component which appears to be in good alignment.  Lateral view indicates appropriate patellofemoral height and positioning of the components.  No acute periprosthetic complication is noted          Orders Placed This

## 2024-03-18 ENCOUNTER — HOSPITAL ENCOUNTER (OUTPATIENT)
Dept: PHYSICAL THERAPY | Facility: CLINIC | Age: 54
Setting detail: THERAPIES SERIES
Discharge: HOME | End: 2024-03-18
Payer: COMMERCIAL

## 2024-03-18 PROCEDURE — 97110 THERAPEUTIC EXERCISES: CPT

## 2024-03-18 PROCEDURE — 97161 PT EVAL LOW COMPLEX 20 MIN: CPT

## 2024-03-18 NOTE — CONSULTS
[] [facility]  Outpatient Rehabilitation &  Therapy  [address]  [phone]  F: [phone] [x] [facility]  Outpatient Rehabilitation &  Therapy  [address]   [address]  P: [phone]  F: [phone] [] [facility]  Outpatient Rehabilitation &  Therapy  [address]  P: [phone]  F: [phone] [] [facility]  Outpatient Rehabilitation &  Therapy  [address]  P: [phone]  F: [phone] [] [facility]  Outpatient Rehabilitation &  Therapy  [address]   [address]   P: [phone]  F: [phone]    Physical Therapy Lower Extremity Evaluation    Date:  3/18/2024  Patient: Mary Morataya  : 1970  MRN: [id number]  Physician: Jose Palafox MD     Insurance: [facility] [employer] [facility] (Auth required after 30 visits)  Medical Diagnosis: Z96.651 (ICD-10-CM) - Status post total right knee replacement  Rehab Codes: M25.471;M25.561;M25.661;R26.2 NEC  Onset date: 24 (DOS)   Next Dr's appt.: 24    Subjective:   CC/HPI: Pt reports that she is 3 weeks PO R TKA.    Pt reports that she had home PT before she came to outpatient PT. She has been keeping up with her HEP from home PT. She just started ambulating with a cane last week without issues, just some increased discomfort. She is limited to short time on her feet or walking due to post-operative deficits.    Past Medical History:   Diagnosis Date    Allergic rhinitis     Anxiety     Anxiety and depression     Arthritis     Asthma     no longer using inhaler or nebulizer    Calcaneal spur 2019    Combined forms of age-related cataract 2/3/2023    Diverticulitis     Diverticulitis of large intestine without perforation or abscess without bleeding     Edema 3/12/2018    Essential hypertension 2018    Headache(784.0)     HTN (hypertension)     pt denies  not on meds    Lymphocytic colitis 2021    OAB

## 2024-03-20 ENCOUNTER — HOSPITAL ENCOUNTER (OUTPATIENT)
Dept: PHYSICAL THERAPY | Facility: CLINIC | Age: 54
Setting detail: THERAPIES SERIES
Discharge: HOME | End: 2024-03-20
Payer: COMMERCIAL

## 2024-03-20 PROCEDURE — 97016 VASOPNEUMATIC DEVICE THERAPY: CPT

## 2024-03-20 PROCEDURE — 97110 THERAPEUTIC EXERCISES: CPT

## 2024-03-20 NOTE — FLOWSHEET NOTE
such as grocery shopping, cleaning, and cooking.     Goals  MET NOT MET ON-  GOING  Details   Date Addressed:            STG: To be met in 8 treatments            1. ? Pain: Decrease pain levels to 3/10 to ease ADL progression. []  []  []      2. ? ROM: Increase R knee AROM to 0-100 degrees to reduce difficulty with ADLs []  []  []      3. ? Strength: Increase bilateral lower extremity strength to 5/5 throughout to ease functional limitations and mobility  []  []  []      4. Independent with Home Exercise Programs with ability to demonstrate exercises without cueing for technique.  []  []  []        []  []  []      Date Addressed:            LTG: To be met in 12 treatments           1. Improve score on Koos Jr from 50% impairment to less than 25% impairment to demonstrate improved functional mobility []  []  []      2. Reduce pain levels to 2/10 or less with walking or standing for 30 consecutive minutes for ease of grocery shopping []  []  []      3. Pt to demonstrate the ability to complete the 5 times sit to stand and timed up and go in <13 seconds demonstrating improved balance and global B LE strength for ease of community navigation []  []  []      4. Pt will navigate a full flight of stairs with normal kinematics for ease of accessing their home and the community []  []  []          Pt. Education:  [x] Yes  [] No  [x] Reviewed Prior HEP/Ed  Method of Education: [] Verbal  [] Demo  [] Written  Access Code: [email or web address]  URL: [email or web address]  Date: 03/18/2024  Prepared by: Joey Haddad     Exercises  - Supine Heel Slide with Strap  - 2-3 x daily - 7 x weekly - 1 sets - 10 reps - 10 second hold  - Seated Knee Extension Stretch with Chair  - 7 x weekly - 1 reps - 15 minute hold - daily every hour awake  - Supine Quad Set  - 2-3 x daily - 7 x weekly - 3 sets - 10 reps - 5 second hold  - Standing Hip Abduction with Counter Support  - 1 x daily - 3-4 x weekly - 2-3 sets - 10 reps  Comprehension of

## 2024-03-26 ENCOUNTER — HOSPITAL ENCOUNTER (OUTPATIENT)
Dept: PHYSICAL THERAPY | Facility: CLINIC | Age: 54
Setting detail: THERAPIES SERIES
Discharge: HOME | End: 2024-03-26
Payer: COMMERCIAL

## 2024-03-26 ENCOUNTER — APPOINTMENT (OUTPATIENT)
Dept: PHYSICAL THERAPY | Facility: CLINIC | Age: 54
End: 2024-03-26
Payer: COMMERCIAL

## 2024-03-26 NOTE — FLOWSHEET NOTE
[] [facility]  Outpatient Rehabilitation &  Therapy  [address]  [phone]  [phone] [x] [facility]  Outpatient Rehabilitation &  Therapy  [address]  P: [phone]  F: [phone] [] [facility]  Outpatient Rehabilitation &  Therapy  [address]  P: [phone]  F: [phone] [] [facility]  Outpatient Rehabilitation &  Therapy  [address]  [phone]  [phone] [] [facility]  Outpatient Rehabilitation &  Therapy  [address]   P: [phone]  F: [phone]  [] [facility]  Outpatient Rehabilitation &  Therapy  [address]  P: [phone]  F: [phone] [] [facility]  Outpatient Rehabilitation &  Therapy  [address]  P: [phone]  F: [phone] [] [facility]  Outpatient Rehabilitation &  Therapy  [address]  P: [phone]  F: [phone] [] [facility]  Outpatient Rehabilitation &  Therapy  [address]  P: [phone]  F: [phone]  [] [facility] Outpatient Rehabilitation &  Therapy  [address]  P: [phone]  F: [phone]     Therapy Cancel/No Show note    Date: 3/26/2024  Patient: Mary Morataya  : 1970  MRN: [id number]    Cancels/No Shows to date: 10    For today's appointment patient:    [x]  Cancelled    [] Rescheduled appointment    [] No-show     Reason given by patient:    []  Patient ill    []  Conflicting appointment    [] No transportation      [] Conflict with work    [] No reason given    [] Weather related    [] COVID-19    [] Other:      Comments:  Not prepared to come to PT today      [x] Next appointment was confirmed    Electronically signed by: REINIER MARTINES, PTA

## 2024-03-29 ENCOUNTER — HOSPITAL ENCOUNTER (OUTPATIENT)
Dept: PHYSICAL THERAPY | Facility: CLINIC | Age: 54
Setting detail: THERAPIES SERIES
Discharge: HOME | End: 2024-03-29
Payer: COMMERCIAL

## 2024-03-29 PROCEDURE — 97016 VASOPNEUMATIC DEVICE THERAPY: CPT

## 2024-03-29 PROCEDURE — 97110 THERAPEUTIC EXERCISES: CPT

## 2024-03-29 NOTE — FLOWSHEET NOTE
[] [facility]  Outpatient Rehabilitation &  Therapy  [address]  [phone]  [phone] [x] [facility]  Outpatient Rehabilitation &  Therapy  [address]  P: [phone]  F: [phone] [] [facility]  Outpatient Rehabilitation &  Therapy  [address]  P: [phone]  F: [phone] [] [facility]  Outpatient Rehabilitation &  Therapy  [address]  [phone]  [phone] [] [facility]  Outpatient Rehabilitation &  Therapy  [address]   P: [phone]  F: [phone]  [] [facility]  Outpatient Rehabilitation &  Therapy  [address]  P: [phone]  F: [phone] [] [facility]  Outpatient Rehabilitation &  Therapy  [address]  P: [phone]  F: [phone] [] [facility]  Outpatient Rehabilitation &  Therapy  [address]  P: [phone]  F: [phone] [] [facility]  Outpatient Rehabilitation &  Therapy  [address]  P: [phone]  F: [phone]  [] [facility] Outpatient Rehabilitation &  Therapy  [address]  P: [phone]  F: [phone]     Physical Therapy Daily Treatment Note    Date:  3/29/2024  Patient Name:  Mary Morataya    :  1970  MRN: [id number]  Physician: Jose Palafox MD                 Insurance: [employer] [facility] (Auth required after 30 visits)  Medical Diagnosis: Z96.651 (ICD-10-CM) - Status post total right knee replacement       Rehab Codes: M25.471;M25.561;M25.661;R26.2 NEC  Onset date: 24 (DOS)                            Next Dr's appt.: 24  Visit# / total visits: 3/12; Progress note for Medicare patient due at visit 8     Cancels/No Shows: 1/0    Frequency:  2 x/week for 12 visits   Subjective:    Pain:  [x] Yes  []

## 2024-04-02 ENCOUNTER — HOSPITAL ENCOUNTER (OUTPATIENT)
Dept: PHYSICAL THERAPY | Facility: CLINIC | Age: 54
Setting detail: THERAPIES SERIES
Discharge: HOME | End: 2024-04-02
Payer: COMMERCIAL

## 2024-04-02 PROCEDURE — 97110 THERAPEUTIC EXERCISES: CPT

## 2024-04-02 PROCEDURE — 97016 VASOPNEUMATIC DEVICE THERAPY: CPT

## 2024-04-02 NOTE — FLOWSHEET NOTE
[] [facility]  Outpatient Rehabilitation &  Therapy  [address]  [phone]  [phone] [x] [facility]  Outpatient Rehabilitation &  Therapy  [address]  P: [phone]  F: [phone] [] [facility]  Outpatient Rehabilitation &  Therapy  [address]  P: [phone]  F: [phone] [] [facility]  Outpatient Rehabilitation &  Therapy  [address]  [phone]  [phone] [] [facility]  Outpatient Rehabilitation &  Therapy  [address]   P: [phone]  F: [phone]  [] [facility]  Outpatient Rehabilitation &  Therapy  [address]  P: [phone]  F: [phone] [] [facility]  Outpatient Rehabilitation &  Therapy  [address]  P: [phone]  F: [phone] [] [facility]  Outpatient Rehabilitation &  Therapy  [address]  P: [phone]  F: [phone] [] [facility]  Outpatient Rehabilitation &  Therapy  [address]  P: [phone]  F: [phone]  [] [facility] Outpatient Rehabilitation &  Therapy  [address]  P: [phone]  F: [phone]     Physical Therapy Daily Treatment Note    Date:  2024  Patient Name:  Mary Morataya    :  1970  MRN: [id number]  Physician: Jose Palafox MD                 Insurance: [facility] (Auth required after 30 visits)  Medical Diagnosis: Z96.651 (ICD-10-CM) - Status post total right knee replacement       Rehab Codes: M25.471;M25.561;M25.661;R26.2 NEC  Onset date: 24 (DOS)                            Next Dr's appt.: 24  Visit# / total visits: ; Progress note for Medicare patient due at visit 8     Cancels/No Shows: 1/0    Frequency:  2 x/week for 12 visits   Subjective:    Pain:  [x] Yes  []

## 2024-04-04 ENCOUNTER — HOSPITAL ENCOUNTER (OUTPATIENT)
Dept: PHYSICAL THERAPY | Facility: CLINIC | Age: 54
Setting detail: THERAPIES SERIES
Discharge: HOME | End: 2024-04-04
Payer: COMMERCIAL

## 2024-04-04 PROCEDURE — 97016 VASOPNEUMATIC DEVICE THERAPY: CPT

## 2024-04-04 PROCEDURE — 97110 THERAPEUTIC EXERCISES: CPT

## 2024-04-04 NOTE — FLOWSHEET NOTE
Slide with Strap  - 2-3 x daily - 7 x weekly - 1 sets - 10 reps - 10 second hold  - Seated Knee Extension Stretch with Chair  - 7 x weekly - 1 reps - 15 minute hold - daily every hour awake  - Supine Quad Set  - 2-3 x daily - 7 x weekly - 3 sets - 10 reps - 5 second hold  - Standing Hip Abduction with Counter Support  - 1 x daily - 3-4 x weekly - 2-3 sets - 10 reps    Date: 03/29/2024  Exercises  - Supine Bridge  - 1 x daily - 7 x weekly - 2 sets - 10 reps  - Supine Active Straight Leg Raise  - 1 x daily - 7 x weekly - 2 sets - 10 reps  - Sidelying Hip Abduction  - 1 x daily - 7 x weekly - 2 sets - 10 reps  - Clamshell  - 1 x daily - 7 x weekly - 2 sets - 10 reps  - Sidelying Reverse Clamshell  - 1 x daily - 7 x weekly - 2 sets - 10 reps  - Standing Hip Flexion with Counter Support  - 1 x daily - 7 x weekly - 2 sets - 10 reps  - Standing Hip Extension with Counter Support  - 1 x daily - 7 x weekly - 2 sets - 10 reps  - Heel Raises with Counter Support  - 1 x daily - 7 x weekly - 2 sets - 10 reps  - Mini Squat with Counter Support  - 1 x daily - 7 x weekly - 2 sets - 10 reps  - Standing March with Counter Support  - 1 x daily - 7 x weekly - 2 sets - 10 reps  Comprehension of Education:  [x] Verbalizes understanding.  [x] Demonstrates understanding.  [] Needs review.  [x] Demonstrates/verbalizes HEP/Ed previously given.     Plan: [x] Continue current frequency toward long and short term goals.    [x] Specific Instructions for subsequent treatments: assess tolerance to previous treatment. Progress load as able.      Time In: 4:11 pm          Time Out: 5:11 pm    Electronically signed by:  Benton Jaimes PTA

## 2024-04-09 ENCOUNTER — HOSPITAL ENCOUNTER (OUTPATIENT)
Dept: PHYSICAL THERAPY | Facility: CLINIC | Age: 54
Setting detail: THERAPIES SERIES
Discharge: HOME | End: 2024-04-09
Payer: COMMERCIAL

## 2024-04-09 NOTE — FLOWSHEET NOTE
[] [facility]  Outpatient Rehabilitation &  Therapy  [address]  [phone]  [phone] [x] [facility]  Outpatient Rehabilitation &  Therapy  [address]  P: [phone]  F: [phone] [] [facility]  Outpatient Rehabilitation &  Therapy  [address]  P: [phone]  F: [phone] [] [facility]  Outpatient Rehabilitation &  Therapy  [address]  [phone]  [phone] [] [facility]  Outpatient Rehabilitation &  Therapy  [address]   P: [phone]  F: [phone]  [] [facility]  Outpatient Rehabilitation &  Therapy  [address]  P: [phone]  F: [phone] [] [facility]  Outpatient Rehabilitation &  Therapy  [address]  P: [phone]  F: [phone] [] [facility]  Outpatient Rehabilitation &  Therapy  [address]  P: [phone]  F: [phone] [] [facility]  Outpatient Rehabilitation &  Therapy  [address]  P: [phone]  F: [phone]  [] [facility] Outpatient Rehabilitation &  Therapy  [address]  P: [phone]  F: [phone]     Therapy Cancel/No Show note    Date: 2024  Patient: Mary Morataya  : 1970  MRN: [id number]    Cancels/No Shows to date: 20    For today's appointment patient:    [x]  Cancelled    [] Rescheduled appointment    [] No-show     Reason given by patient:    []  Patient ill    []  Conflicting appointment    [] No transportation      [] Conflict with work    [x] No reason given    [] Weather related    [] COVID-19    [] Other:      Comments:        [x] Next appointment was confirmed    Electronically signed by: Benton Jaimes, PTA

## 2024-04-11 ENCOUNTER — HOSPITAL ENCOUNTER (OUTPATIENT)
Dept: PHYSICAL THERAPY | Facility: CLINIC | Age: 54
Setting detail: THERAPIES SERIES
Discharge: HOME | End: 2024-04-11
Payer: COMMERCIAL

## 2024-04-11 NOTE — FLOWSHEET NOTE
[] [facility]  Outpatient Rehabilitation &  Therapy  [address]  [phone]  [phone] [x] [facility]  Outpatient Rehabilitation &  Therapy  [address]  P: [phone]  F: [phone] [] [facility]  Outpatient Rehabilitation &  Therapy  [address]  P: [phone]  F: [phone] [] [facility]  Outpatient Rehabilitation &  Therapy  [address]  [phone]  [phone] [] [facility]  Outpatient Rehabilitation &  Therapy  [address]   P: [phone]  F: [phone]  [] [facility]  Outpatient Rehabilitation &  Therapy  [address]  P: [phone]  F: [phone] [] [facility]  Outpatient Rehabilitation &  Therapy  [address]  P: [phone]  F: [phone] [] [facility]  Outpatient Rehabilitation &  Therapy  [address]  P: [phone]  F: [phone] [] [facility]  Outpatient Rehabilitation &  Therapy  [address]  P: [phone]  F: [phone]  [] [facility] Outpatient Rehabilitation &  Therapy  [address]  P: [phone]  F: [phone]     Therapy Cancel/No Show note    Date: 2024  Patient: Mary Morataya  : 1970  MRN: [id number]    Cancels/No Shows to date: 30    For today's appointment patient:    [x]  Cancelled    [] Rescheduled appointment    [] No-show     Reason given by patient:    [x]  Patient ill    []  Conflicting appointment    [] No transportation      [] Conflict with work    [] No reason given    [] Weather related    [] COVID-19    [] Other:      Comments:        [x] Next appointment was confirmed    Electronically signed by: Joey Haddad PT

## 2024-04-16 ENCOUNTER — HOSPITAL ENCOUNTER (OUTPATIENT)
Dept: PHYSICAL THERAPY | Facility: CLINIC | Age: 54
Setting detail: THERAPIES SERIES
Discharge: HOME | End: 2024-04-16
Payer: COMMERCIAL

## 2024-04-16 PROCEDURE — 97110 THERAPEUTIC EXERCISES: CPT

## 2024-04-16 NOTE — FLOWSHEET NOTE
Progress load as able.      Time In: 4:02 pm          Time Out: 4:53 pm    Electronically signed by:  Benton Jaimes PTA

## 2024-04-24 ENCOUNTER — OFFICE VISIT (OUTPATIENT)
Dept: ORTHOPEDIC SURGERY | Age: 54
End: 2024-04-24

## 2024-04-24 VITALS — RESPIRATION RATE: 14 BRPM | WEIGHT: 293 LBS | HEIGHT: 64 IN | BODY MASS INDEX: 50.02 KG/M2

## 2024-04-24 DIAGNOSIS — Z96.651 STATUS POST TOTAL RIGHT KNEE REPLACEMENT: Primary | ICD-10-CM

## 2024-04-24 PROCEDURE — 99024 POSTOP FOLLOW-UP VISIT: CPT | Performed by: PHYSICIAN ASSISTANT

## 2024-04-24 NOTE — PROGRESS NOTES
[facility] Orthopedics & Sports Medicine                   Anson Medina PA-C            [address]               [place], [place] [address]           Dept Phone: [phone]           Dept Fax:  [phone] [address]                       [address]           [place], [place] [address]          Dept Phone: [phone]           Dept Fax:  [phone]      Chief Compliant:  Chief Complaint   Patient presents with    Knee Pain     Right TKA        History of Present Illness:  Mary returns today.  This is a 53 y.o. female who presents to the clinic today for follow up of status post right total knee arthroplasty on 2/22/2024.    Patient returns today stating she is overall doing very well she does note some occasional pain to the anterior knee but feels like she has made great progress with physical therapy.  Her last visit was approximately 1 week ago continues with home exercises daily.  She states when she does have discomfort with activity she will use rest and ice which do seem to provide significant improvement.     Upon review of PT notes:  ROM Progression:  ROM EVAL 3/29 4/2 4/4 4/16   Flexion 80 90 100 100  AA  108 P   Extension -10 -8 -6 -6 rest  -4 A -5 rest  -2 A     Review of Systems   Constitutional: Negative for fever, chills, sweats, recent illness, or recent injury.   Neurological: Negative for headaches, numbness, or weakness.   Integumentary: Negative for rash, itching, ecchymosis, abrasions, or laceration.   Musculoskeletal: Positive for Knee Pain (Right TKA)       Physical Exam:  Constitutional: Patient is oriented to person, place, and time. Patient appears well-developed and well nourished.   Musculoskeletal:    Right Knee    Gait:  Antalgic.   Incision:  {Well healed without any incisional erythema.    Tenderness:  none   Flexion ROM:  100 question if it is somewhat limited secondary to body habitus   Extension ROM:  3   Effusion:  mild   DVT

## 2024-04-29 ENCOUNTER — APPOINTMENT (OUTPATIENT)
Dept: PHYSICAL THERAPY | Facility: CLINIC | Age: 54
End: 2024-04-29
Payer: COMMERCIAL

## 2024-05-01 ENCOUNTER — APPOINTMENT (OUTPATIENT)
Dept: GENERAL RADIOLOGY | Age: 54
End: 2024-05-01
Payer: OTHER MISCELLANEOUS

## 2024-05-01 ENCOUNTER — HOSPITAL ENCOUNTER (EMERGENCY)
Age: 54
Discharge: HOME | End: 2024-05-01
Attending: INTERNAL MEDICINE
Payer: OTHER MISCELLANEOUS

## 2024-05-01 VITALS
RESPIRATION RATE: 16 BRPM | BODY MASS INDEX: 48.82 KG/M2 | SYSTOLIC BLOOD PRESSURE: 148 MMHG | OXYGEN SATURATION: 98 % | TEMPERATURE: 98.3 F | HEIGHT: 65 IN | DIASTOLIC BLOOD PRESSURE: 75 MMHG | WEIGHT: 293 LBS | HEART RATE: 73 BPM

## 2024-05-01 DIAGNOSIS — S16.1XXA ACUTE STRAIN OF NECK MUSCLE, INITIAL ENCOUNTER: ICD-10-CM
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
## 2024-05-01 PROCEDURE — 6370000000 HC RX 637 (ALT 250 FOR IP): Performed by: PHYSICIAN ASSISTANT

## 2024-05-01 PROCEDURE — 99283 EMERGENCY DEPT VISIT LOW MDM: CPT

## 2024-05-01 PROCEDURE — 72040 X-RAY EXAM NECK SPINE 2-3 VW: CPT

## 2024-05-01 RX ORDER — METHOCARBAMOL 750 MG/1
750 TABLET, FILM COATED ORAL 4 TIMES DAILY
Qty: 40 TABLET | Refills: 0 | Status: SHIPPED | OUTPATIENT
Start: 2024-05-01 | End: 2024-05-11

## 2024-05-01 RX ORDER — IBUPROFEN 800 MG/1
800 TABLET ORAL ONCE
Status: COMPLETED | OUTPATIENT
Start: 2024-05-01 | End: 2024-05-01

## 2024-05-01 RX ORDER — METAXALONE 800 MG/1
800 TABLET ORAL ONCE
Status: COMPLETED | OUTPATIENT
Start: 2024-05-01 | End: 2024-05-01

## 2024-05-01 RX ORDER — NAPROXEN 500 MG/1
500 TABLET ORAL 2 TIMES DAILY WITH MEALS
Qty: 20 TABLET | Refills: 0 | Status: SHIPPED | OUTPATIENT
Start: 2024-05-01

## 2024-05-01 RX ADMIN — IBUPROFEN 800 MG: 800 TABLET ORAL at 14:32

## 2024-05-01 RX ADMIN — METAXALONE 800 MG: 800 TABLET ORAL at 14:32

## 2024-05-01 ASSESSMENT — PAIN DESCRIPTION - FREQUENCY: FREQUENCY: CONTINUOUS

## 2024-05-01 ASSESSMENT — PAIN DESCRIPTION - PAIN TYPE: TYPE: ACUTE PAIN

## 2024-05-01 ASSESSMENT — PAIN DESCRIPTION - LOCATION: LOCATION: NECK

## 2024-05-01 ASSESSMENT — PAIN - FUNCTIONAL ASSESSMENT: PAIN_FUNCTIONAL_ASSESSMENT: 0-10

## 2024-05-01 ASSESSMENT — PAIN SCALES - GENERAL
PAINLEVEL_OUTOF10: 3
PAINLEVEL_OUTOF10: 3

## 2024-05-01 ASSESSMENT — PAIN DESCRIPTION - ORIENTATION: ORIENTATION: LOWER

## 2024-05-01 ASSESSMENT — PAIN DESCRIPTION - DESCRIPTORS: DESCRIPTORS: THROBBING
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
## 2024-05-02 ENCOUNTER — APPOINTMENT (OUTPATIENT)
Dept: PHYSICAL THERAPY | Facility: CLINIC | Age: 54
End: 2024-05-02
Payer: COMMERCIAL

## 2024-05-16 ENCOUNTER — HOSPITAL ENCOUNTER (OUTPATIENT)
Dept: PHYSICAL THERAPY | Facility: CLINIC | Age: 54
Setting detail: THERAPIES SERIES
Discharge: HOME | End: 2024-05-16
Payer: COMMERCIAL

## 2024-05-16 PROCEDURE — 97016 VASOPNEUMATIC DEVICE THERAPY: CPT

## 2024-05-16 PROCEDURE — 97110 THERAPEUTIC EXERCISES: CPT

## 2024-05-16 NOTE — FLOWSHEET NOTE
[] [facility]  Outpatient Rehabilitation &  Therapy  [address]  [phone]  [phone] [x] [facility]  Outpatient Rehabilitation &  Therapy  [address]  P: [phone]  F: [phone] [] [facility]  Outpatient Rehabilitation &  Therapy  [address]  P: [phone]  F: [phone] [] [facility]  Outpatient Rehabilitation &  Therapy  [address]  [phone]  [phone] [] [facility]  Outpatient Rehabilitation &  Therapy  [address]   P: [phone]  F: [phone]  [] [facility]  Outpatient Rehabilitation &  Therapy  [address]  P: [phone]  F: [phone] [] [facility]  Outpatient Rehabilitation &  Therapy  [address]  P: [phone]  F: [phone] [] [facility]  Outpatient Rehabilitation &  Therapy  [address]  P: [phone]  F: [phone] [] [facility]  Outpatient Rehabilitation &  Therapy  [address]  P: [phone]  F: [phone]  [] [facility] Outpatient Rehabilitation &  Therapy  [address]  P: [phone]  F: [phone]     Physical Therapy Daily Treatment Note    Date:  2024  Patient Name:  Mary Morataya    :  1970  MRN: [id number]  Physician: Jose Palafox MD                 Insurance: [facility] (Auth required after 30 visits)  Medical Diagnosis: Z96.651 (ICD-10-CM) - Status post total right knee replacement       Rehab Codes: M25.471;M25.561;M25.661;R26.2 NEC  Onset date: 24 (DOS)                            Next Dr's appt.: 24  Visit# / total visits: ; Progress note for Medicare patient due at visit 8     Cancels/No Shows: 3/0    Frequency:  2 x/week for 12 visits   Subjective:      Pain:  [] Yes  [x]

## 2024-05-20 ENCOUNTER — OFFICE VISIT (OUTPATIENT)
Dept: DERMATOLOGY | Age: 54
End: 2024-05-20
Payer: COMMERCIAL

## 2024-05-20 VITALS
BODY MASS INDEX: 48.82 KG/M2 | TEMPERATURE: 98.6 F | WEIGHT: 293 LBS | SYSTOLIC BLOOD PRESSURE: 128 MMHG | HEIGHT: 65 IN | OXYGEN SATURATION: 98 % | HEART RATE: 58 BPM | DIASTOLIC BLOOD PRESSURE: 74 MMHG

## 2024-05-20 DIAGNOSIS — L71.9 ROSACEA: ICD-10-CM

## 2024-05-20 DIAGNOSIS — L68.0 HIRSUTISM: ICD-10-CM

## 2024-05-20 DIAGNOSIS — L21.9 SEBORRHEIC DERMATITIS: ICD-10-CM

## 2024-05-20 DIAGNOSIS — L91.0 HYPERTROPHIC SCAR OF SKIN: ICD-10-CM

## 2024-05-20 DIAGNOSIS — L40.8 SEBOPSORIASIS: Primary | ICD-10-CM

## 2024-05-20 DIAGNOSIS — L30.9 DERMATITIS, UNSPECIFIED: ICD-10-CM

## 2024-05-20 PROCEDURE — G8427 DOCREV CUR MEDS BY ELIG CLIN: HCPCS | Performed by: PHYSICIAN ASSISTANT

## 2024-05-20 PROCEDURE — G8417 CALC BMI ABV UP PARAM F/U: HCPCS | Performed by: PHYSICIAN ASSISTANT

## 2024-05-20 PROCEDURE — 99213 OFFICE O/P EST LOW 20 MIN: CPT | Performed by: PHYSICIAN ASSISTANT

## 2024-05-20 PROCEDURE — 3017F COLORECTAL CA SCREEN DOC REV: CPT | Performed by: PHYSICIAN ASSISTANT

## 2024-05-20 PROCEDURE — 1036F TOBACCO NON-USER: CPT | Performed by: PHYSICIAN ASSISTANT

## 2024-05-20 RX ORDER — CEPHALEXIN 500 MG/1
500 CAPSULE ORAL 3 TIMES DAILY
Qty: 21 CAPSULE | Refills: 0 | Status: SHIPPED | OUTPATIENT
Start: 2024-05-20 | End: 2024-05-27

## 2024-05-20 RX ORDER — SULFACETAMIDE SODIUM 100 MG/ML
LOTION TOPICAL
Qty: 118 ML | Refills: 3 | Status: SHIPPED | OUTPATIENT
Start: 2024-05-20

## 2024-05-20 NOTE — PATIENT INSTRUCTIONS
include alcohol, exercise, high and low temperatures, hot drinks, spicy foods and stress. Rosacea can be sun sensitive.    Is rosacea hereditary?    Rosacea does seem to run in some families, but there is no clear genetic link.    What are the symptoms of rosacea?    The rash and the blushing associated with rosacea can lead to embarrassment, lowered self-esteem and self-confidence, anxiety and even depression. Furthermore, the skin of the face is often sensitive, and the affected area can feel very hot or sting.    Some people with rosacea have eye symptoms. A few patients with rosacea may develop more serious eye problems, such as painful inflammation involving the front part of the eye (rosacea keratitis) and this may cause blurred vision. It is important that you consult a dermatologist or an    if you develop symptoms affecting the eyes.     What does rosacea look like?    Rosacea usually starts with a tendency to blush easily. After a while, the central areas of the face become a permanent deeper shade of red, with small dilated blood vessels, bumps and pus-filled spots.    Occasionally, there may be some swelling of the facial skin (lymphoedema), especially around the eyes. Occasionally, an overgrowth of the oil-secreting glands on the nose may cause the nose to become enlarged, bulbous and red (called rhinophyma). Rhinophyma is more common in men than women.    How will rosacea be diagnosed?    Rosacea can be diagnosed by its appearance. Specific tests are not usually required.    Can rosacea be cured?    No, but long-term treatments can be helpful.     How can rosacea be treated?    The inflammation that accompanies rosacea can be treated with preparations applied to the skin or taken by mouth; however, not all these will help the redness or blushing that may be associated with rosacea.    Local applications:    The inflammatory element of rosacea may be controlled with a medication applied to the

## 2024-05-20 NOTE — PROGRESS NOTES
rosacea worse.  --Use a soap substitute (emollient) to cleanse your face.  --Use an unperfumed moisturiser on a regular basis if your skin is dry or sensitive.  --Consider the lifestyle factors that can worsen rosacea   and avoid them; a written record of your flare-ups may help.  --Some Cosmetics can often cover up rosacea effectively, and some rosacea patients may benefit from the use of skin camouflage. This may help hide excessive redness.  --Unless they are specifically recommended to you by your dermatologist it may be best to avoid some treatments for acne, as they can irritate skin that is prone to rosacea.  --Do not use topical preparations containing corticosteroids, unless specifically recommended by your dermatologist as these may make rosacea worse in the long run.  --If your eyes are affected, do not ignore them - consult your dermatologist or an eye specialist doctor.  --Some drugs can aggravate blushing, and your doctor or dermatologist may make appropriate changes to your medication.    Source: [employer]  --------------------------------------------------------  Tips for rosacea sufferers:    What to look for in sunscreen:  Zinc oxide, titanium dioxide, or both  Silicone (may be listed as dimethicone, orcyclomethicone, or cyclomethicone)  No fragrance (label may say “fragrance free,” but if it says “unscented” choose another sunscreen)  Broad-spectrum protection  SPF 30 or higher    What to look for in moisturizers & sunscreen:  Hyaluronic acid  Niacinamide  Licorice (glycyrrhiza glabra, Licochalcone A)   Ceramides  Silicone (may be listed as dimethicone, orcyclomethicone, or cyclomethicone)    What to avoid in products  Fragrance  Menthol  Eucalyptus   Essential oils  Camphor  Alcohol   Peppermint  Witch Hazel      Some fragrance names  Balsam of peru (myroxylon pereirae)  Cinnamic aldehyde  Cinnamyl alcohol  Hydroxycitronellal  Isoeugenol  Eugenol  Oak silva absolute  Oil

## 2024-07-29 ENCOUNTER — OFFICE VISIT (OUTPATIENT)
Dept: FAMILY MEDICINE CLINIC | Age: 54
End: 2024-07-29
Payer: COMMERCIAL

## 2024-07-29 VITALS
OXYGEN SATURATION: 97 % | SYSTOLIC BLOOD PRESSURE: 124 MMHG | BODY MASS INDEX: 48.82 KG/M2 | WEIGHT: 293 LBS | HEIGHT: 65 IN | HEART RATE: 90 BPM | TEMPERATURE: 98.3 F | DIASTOLIC BLOOD PRESSURE: 70 MMHG

## 2024-07-29 DIAGNOSIS — M51.36 DDD (DEGENERATIVE DISC DISEASE), LUMBAR: ICD-10-CM

## 2024-07-29 DIAGNOSIS — M50.30 DDD (DEGENERATIVE DISC DISEASE), CERVICAL: Primary | ICD-10-CM

## 2024-07-29 DIAGNOSIS — R09.82 PND (POST-NASAL DRIP): ICD-10-CM

## 2024-07-29 DIAGNOSIS — J30.2 SEASONAL ALLERGIES: ICD-10-CM

## 2024-07-29 PROBLEM — F41.9 ANXIETY: Status: ACTIVE | Noted: 2020-04-30

## 2024-07-29 PROCEDURE — G8417 CALC BMI ABV UP PARAM F/U: HCPCS | Performed by: NURSE PRACTITIONER

## 2024-07-29 PROCEDURE — 99214 OFFICE O/P EST MOD 30 MIN: CPT | Performed by: NURSE PRACTITIONER

## 2024-07-29 PROCEDURE — 1036F TOBACCO NON-USER: CPT | Performed by: NURSE PRACTITIONER

## 2024-07-29 PROCEDURE — G8427 DOCREV CUR MEDS BY ELIG CLIN: HCPCS | Performed by: NURSE PRACTITIONER

## 2024-07-29 PROCEDURE — 3017F COLORECTAL CA SCREEN DOC REV: CPT | Performed by: NURSE PRACTITIONER

## 2024-07-29 RX ORDER — FLUTICASONE PROPIONATE 50 MCG
1 SPRAY, SUSPENSION (ML) NASAL DAILY
Qty: 32 G | Refills: 1 | Status: SHIPPED | OUTPATIENT
Start: 2024-07-29

## 2024-07-29 RX ORDER — CETIRIZINE HYDROCHLORIDE 10 MG/1
10 TABLET ORAL
Qty: 30 TABLET | Refills: 3 | Status: SHIPPED | OUTPATIENT
Start: 2024-07-29

## 2024-07-29 RX ORDER — HYDROXYZINE HYDROCHLORIDE 10 MG/1
TABLET, FILM COATED ORAL
COMMUNITY
Start: 2024-07-18

## 2024-07-29 ASSESSMENT — ENCOUNTER SYMPTOMS
SINUS PRESSURE: 0
EYE REDNESS: 0
EYE DISCHARGE: 1
SINUS PAIN: 0
TROUBLE SWALLOWING: 0
CHEST TIGHTNESS: 0
RHINORRHEA: 1
ABDOMINAL PAIN: 0
BACK PAIN: 1
SHORTNESS OF BREATH: 0
COUGH: 0
EYE PAIN: 0
SORE THROAT: 0
EYE ITCHING: 0

## 2024-07-29 NOTE — PROGRESS NOTES
Visit Information    Have you changed or started any medications since your last visit including any over-the-counter medicines, vitamins, or herbal medicines? no   Have you stopped taking any of your medications? Is so, why? -  no  Are you having any side effects from any of your medications? - no    Have you seen any other physician or provider since your last visit?  no   Have you had any other diagnostic tests since your last visit?  no   Have you been seen in the emergency room and/or had an admission in a hospital since we last saw you?  no   Have you had your routine dental cleaning in the past 6 months?  no     Do you have an active MyChart account? If no, what is the barrier?  Yes    Patient Care Team:  Guerda Jarrell APRN - CNP as PCP - General (Family Nurse Practitioner)  Guerda Jarrell APRN - CNP as PCP - Empaneled Provider  Alana Resendiz MD as Consulting Physician (Gastroenterology)  Rick Jha DPM as Physician (Podiatry)    Medical History Review  Past Medical, Family, and Social History reviewed and  contribute to the patient presenting condition    Health Maintenance   Topic Date Due    Hepatitis B vaccine (1 of 3 - 3-dose series) Never done    COVID-19 Vaccine (4 - 2023-24 season) 09/01/2023    DTaP/Tdap/Td vaccine (1 - Tdap) 02/28/2033 (Originally 11/28/1991)    Flu vaccine (1) 02/28/2033 (Originally 8/1/2024)    Shingles vaccine (1 of 2) 02/28/2033 (Originally 11/28/2022)    Depression Screen  02/10/2025    Cervical cancer screen  11/30/2025    Breast cancer screen  02/16/2026    Lipids  03/13/2028    Colorectal Cancer Screen  04/08/2031    Pneumococcal 0-64 years Vaccine  Completed    Hepatitis C screen  Completed    HIV screen  Completed    Hepatitis A vaccine  Aged Out    Hib vaccine  Aged Out    Polio vaccine  Aged Out    Meningococcal (ACWY) vaccine  Aged Out    Depression Monitoring  Discontinued    Diabetes screen  Discontinued             
Dose Route Frequency Provider Last Rate Last Admin    triamcinolone acetonide (KENALOG) injection 2 mg  2 mg Intra-LESional Once Coleman De La Cruz PA-C         No Known Allergies    Health Maintenance   Topic Date Due    Hepatitis B vaccine (1 of 3 - 3-dose series) Never done    COVID-19 Vaccine (4 - 2023-24 season) 09/01/2023    DTaP/Tdap/Td vaccine (1 - Tdap) 02/28/2033 (Originally 11/28/1991)    Flu vaccine (1) 02/28/2033 (Originally 8/1/2024)    Shingles vaccine (1 of 2) 02/28/2033 (Originally 11/28/2022)    Depression Screen  02/10/2025    Cervical cancer screen  11/30/2025    Breast cancer screen  02/16/2026    Lipids  03/13/2028    Colorectal Cancer Screen  04/08/2031    Pneumococcal 0-64 years Vaccine  Completed    Hepatitis C screen  Completed    HIV screen  Completed    Hepatitis A vaccine  Aged Out    Hib vaccine  Aged Out    Polio vaccine  Aged Out    Meningococcal (ACWY) vaccine  Aged Out    Depression Monitoring  Discontinued    Diabetes screen  Discontinued       Subjective:      Review of Systems   Constitutional:  Negative for activity change, appetite change, chills, diaphoresis, fatigue, fever and unexpected weight change.   HENT:  Positive for congestion, postnasal drip and rhinorrhea. Negative for ear discharge, ear pain, hearing loss, sinus pressure, sinus pain, sneezing, sore throat and trouble swallowing.    Eyes:  Positive for discharge (watery). Negative for pain, redness and itching.   Respiratory:  Negative for cough, chest tightness and shortness of breath.    Cardiovascular:  Negative for chest pain and palpitations.   Gastrointestinal:  Negative for abdominal pain.   Musculoskeletal:  Positive for back pain, neck pain and neck stiffness. Negative for gait problem and joint swelling.   Allergic/Immunologic: Positive for environmental allergies.   Neurological:  Negative for dizziness and headaches.   Psychiatric/Behavioral:  Negative for sleep disturbance.          Objective:      Physical

## 2024-08-14 ENCOUNTER — OFFICE VISIT (OUTPATIENT)
Dept: FAMILY MEDICINE CLINIC | Age: 54
End: 2024-08-14

## 2024-08-14 VITALS
OXYGEN SATURATION: 97 % | HEART RATE: 93 BPM | DIASTOLIC BLOOD PRESSURE: 68 MMHG | TEMPERATURE: 97.5 F | BODY MASS INDEX: 48.82 KG/M2 | WEIGHT: 293 LBS | SYSTOLIC BLOOD PRESSURE: 132 MMHG | HEIGHT: 65 IN

## 2024-08-14 DIAGNOSIS — T16.1XXD: Primary | ICD-10-CM

## 2024-08-14 SDOH — ECONOMIC STABILITY: INCOME INSECURITY: HOW HARD IS IT FOR YOU TO PAY FOR THE VERY BASICS LIKE FOOD, HOUSING, MEDICAL CARE, AND HEATING?: NOT HARD AT ALL

## 2024-08-14 SDOH — ECONOMIC STABILITY: FOOD INSECURITY: WITHIN THE PAST 12 MONTHS, YOU WORRIED THAT YOUR FOOD WOULD RUN OUT BEFORE YOU GOT MONEY TO BUY MORE.: NEVER TRUE

## 2024-08-14 SDOH — ECONOMIC STABILITY: FOOD INSECURITY: WITHIN THE PAST 12 MONTHS, THE FOOD YOU BOUGHT JUST DIDN'T LAST AND YOU DIDN'T HAVE MONEY TO GET MORE.: NEVER TRUE

## 2024-08-14 ASSESSMENT — ENCOUNTER SYMPTOMS
APNEA: 1
SHORTNESS OF BREATH: 0
RHINORRHEA: 0
SINUS PRESSURE: 0
SINUS PAIN: 0

## 2024-08-14 NOTE — PROGRESS NOTES
Visit Information    Have you changed or started any medications since your last visit including any over-the-counter medicines, vitamins, or herbal medicines? no   Have you stopped taking any of your medications? Is so, why? -  no  Are you having any side effects from any of your medications? - no    Have you seen any other physician or provider since your last visit?  no   Have you had any other diagnostic tests since your last visit?  no   Have you been seen in the emergency room and/or had an admission in a hospital since we last saw you?  no   Have you had your routine dental cleaning in the past 6 months?  no     Do you have an active MyChart account? If no, what is the barrier?  Yes    Patient Care Team:  Guerda Jarrell APRN - CNP as PCP - General (Family Nurse Practitioner)  Guerda Jarrell APRN - CNP as PCP - Empaneled Provider  Alana Resendiz MD as Consulting Physician (Gastroenterology)  Rick Jha DPM as Physician (Podiatry)    Medical History Review  Past Medical, Family, and Social History reviewed and  contribute to the patient presenting condition    Health Maintenance   Topic Date Due    Hepatitis B vaccine (1 of 3 - 19+ 3-dose series) Never done    COVID-19 Vaccine (4 - 2023-24 season) 09/01/2023    Cervical cancer screen  12/08/2023    DTaP/Tdap/Td vaccine (1 - Tdap) 02/28/2033 (Originally 11/28/1991)    Flu vaccine (1) 02/28/2033 (Originally 8/1/2024)    Shingles vaccine (1 of 2) 02/28/2033 (Originally 11/28/2022)    Depression Screen  02/10/2025    Breast cancer screen  02/16/2026    Lipids  03/13/2028    Colorectal Cancer Screen  04/08/2031    Pneumococcal 0-64 years Vaccine  Completed    Hepatitis C screen  Completed    HIV screen  Completed    Hepatitis A vaccine  Aged Out    Hib vaccine  Aged Out    Polio vaccine  Aged Out    Meningococcal (ACWY) vaccine  Aged Out    Depression Monitoring  Discontinued    Diabetes screen  Discontinued             
REMV EXT CANAL FOREIGN BODY          Plan:      No follow-ups on file.  Orders Placed This Encounter   Procedures    33900 - REMV EXT CANAL FOREIGN BODY     Avoid q tips in the future  Ok for continued use of debrox for wax maintenance in the future  Call INB or worsening      Patient given educational materials - see patient instructions.  Discussed use,benefit, and side effects of prescribed medications.  All patient questions answered.Pt voiced understanding. Reviewed health maintenance.  Instructed to continue currentmedications, diet and exercise.    Electronically signed by JONATHAN Hendrickson CNP,CNP on 8/14/2024 at 3:16 PM

## 2024-08-20 ENCOUNTER — HOSPITAL ENCOUNTER (OUTPATIENT)
Dept: PHYSICAL THERAPY | Facility: CLINIC | Age: 54
Setting detail: THERAPIES SERIES
Discharge: HOME | End: 2024-08-20
Payer: COMMERCIAL

## 2024-08-20 PROCEDURE — 97110 THERAPEUTIC EXERCISES: CPT

## 2024-08-20 PROCEDURE — 97161 PT EVAL LOW COMPLEX 20 MIN: CPT

## 2024-08-20 NOTE — CONSULTS
[] [facility]  Outpatient Rehabilitation &  Therapy  [address]  [phone]  F: [phone] [x] [facility]  Outpatient Rehabilitation &  Therapy  [address]   [address]  P: [phone]  F: [phone] [] [facility]  Outpatient Rehabilitation &  Therapy  [address]  P: [phone]  F: [phone] [] [facility]  Outpatient Rehabilitation &  Therapy  [address]  P: [phone]  F: [phone] [] [facility]  Outpatient Rehabilitation &  Therapy  [address]   [address]   P: [phone]  F: [phone]      Physical Therapy Spine Evaluation    Date:  2024  Patient: Mary Morataya  : 1970  MRN: [id number]  Physician: Guerda Jarrell APRN - CNP    Insurance: [facility] (AUTH REQUIRED AFTER  VS,  VS remaining)  Medical Diagnosis:   M50.30 (ICD-10-CM) - DDD (degenerative disc disease), cervical   M51.36 (ICD-10-CM) - DDD (degenerative disc disease), lumbar     Rehab Codes: M25.511;M25.512;M54.2;M54.59;R26.2 NEC;R29.3  Onset Date: 24 (Chronic)  Next 's appt.: TBD    Subjective:   CC: Pt presents with neck pain and low back pain.  HPI: Pt reports that she has neck pain extending to the bilateral shoulders, difficulty turning head and elevating the B UE above shoulder height. The low back pain started as she began walking more and will stay localized to the lumbar region. She denies pain extending down the LE. In the past she has RFAs in her lumbar spine with relief. No specific incident that she can think of that caused the neck or back pain. 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 Denies N/T in the B LE. Does note N/T in the B UE first thing in the morning which she attributes to her sleeping

## 2024-08-21 ENCOUNTER — OFFICE VISIT (OUTPATIENT)
Dept: ORTHOPEDIC SURGERY | Age: 54
End: 2024-08-21
Payer: COMMERCIAL

## 2024-08-21 VITALS — HEIGHT: 65 IN | BODY MASS INDEX: 48.82 KG/M2 | RESPIRATION RATE: 14 BRPM | WEIGHT: 293 LBS

## 2024-08-21 DIAGNOSIS — M17.11 PRIMARY OSTEOARTHRITIS OF RIGHT KNEE: ICD-10-CM

## 2024-08-21 DIAGNOSIS — Z96.651 STATUS POST TOTAL RIGHT KNEE REPLACEMENT: Primary | ICD-10-CM

## 2024-08-21 PROCEDURE — G8417 CALC BMI ABV UP PARAM F/U: HCPCS | Performed by: PHYSICIAN ASSISTANT

## 2024-08-21 PROCEDURE — 3017F COLORECTAL CA SCREEN DOC REV: CPT | Performed by: PHYSICIAN ASSISTANT

## 2024-08-21 PROCEDURE — 99212 OFFICE O/P EST SF 10 MIN: CPT | Performed by: PHYSICIAN ASSISTANT

## 2024-08-21 PROCEDURE — G8428 CUR MEDS NOT DOCUMENT: HCPCS | Performed by: PHYSICIAN ASSISTANT

## 2024-08-21 PROCEDURE — 1036F TOBACCO NON-USER: CPT | Performed by: PHYSICIAN ASSISTANT

## 2024-08-21 NOTE — PROGRESS NOTES
[facility]                   Anson Medina PA-C            [address], [address]               [place], [place] [address]           Dept Phone: [phone]           Dept Fax:  [phone] [address]                       [address]           [place], [place] [address]          Dept Phone: [phone]           Dept Fax:  [phone]      Chief Compliant:  Chief Complaint   Patient presents with    Knee Pain     SP: R TKA DOS 2/22/24        History of Present Illness:  Mary returns today.  This is a 53 y.o. female who presents to the clinic today for follow up of status post right total knee arthroplasty on 2/22/2024.    Patient returns today stating she is overall doing very well she has completed physical therapy continues with daily home exercise program.  She reports significant improvement in strength and resolution of stiffness since her last evaluation with us happy with her improvement to this point.     Review of Systems   Constitutional: Negative for fever, chills, sweats, recent illness, or recent injury.   Neurological: Negative for headaches, numbness, or weakness.   Integumentary: Negative for rash, itching, ecchymosis, abrasions, or laceration.   Musculoskeletal: Positive for Knee Pain (SP: R TKA DOS 2/22/24)       Physical Exam:  Constitutional: Patient is oriented to person, place, and time. Patient appears well-developed and well nourished.   Musculoskeletal:    Right Knee    Gait:  Antalgic.   Incision:  Well healed without any incisional erythema.    Tenderness:  none   Flexion ROM:  105   Extension ROM:  0   Effusion:  mild   DVT Evaluation:  No evidence of DVT seen on physical exam.       Neurological: Patient is alert and oriented to person, place, and time. Normal strenght. No sensory deficit.  Skin: Skin is warm and dry  Psychiatric: Behavior is normal. Thought content normal.  Nursing note and vitals reviewed.     Labs and

## 2024-08-26 ENCOUNTER — OFFICE VISIT (OUTPATIENT)
Dept: ORTHOPEDIC SURGERY | Age: 54
End: 2024-08-26
Payer: COMMERCIAL

## 2024-08-26 VITALS — RESPIRATION RATE: 14 BRPM | BODY MASS INDEX: 48.82 KG/M2 | HEIGHT: 65 IN | WEIGHT: 293 LBS

## 2024-08-26 DIAGNOSIS — M25.511 TRIGGER POINT OF RIGHT SHOULDER REGION: ICD-10-CM

## 2024-08-26 DIAGNOSIS — M25.511 RIGHT SHOULDER PAIN, UNSPECIFIED CHRONICITY: ICD-10-CM

## 2024-08-26 DIAGNOSIS — M75.82 TENDONITIS OF BOTH ROTATOR CUFFS: Primary | ICD-10-CM

## 2024-08-26 DIAGNOSIS — M25.512 TRIGGER POINT OF LEFT SHOULDER REGION: ICD-10-CM

## 2024-08-26 DIAGNOSIS — M75.81 TENDONITIS OF BOTH ROTATOR CUFFS: Primary | ICD-10-CM

## 2024-08-26 DIAGNOSIS — M25.512 LEFT SHOULDER PAIN, UNSPECIFIED CHRONICITY: ICD-10-CM

## 2024-08-26 PROCEDURE — 1036F TOBACCO NON-USER: CPT

## 2024-08-26 PROCEDURE — G8417 CALC BMI ABV UP PARAM F/U: HCPCS

## 2024-08-26 PROCEDURE — G8428 CUR MEDS NOT DOCUMENT: HCPCS

## 2024-08-26 PROCEDURE — 3017F COLORECTAL CA SCREEN DOC REV: CPT

## 2024-08-26 PROCEDURE — 99214 OFFICE O/P EST MOD 30 MIN: CPT

## 2024-08-26 RX ORDER — DICLOFENAC SODIUM 75 MG/1
75 TABLET, DELAYED RELEASE ORAL 2 TIMES DAILY WITH MEALS
Qty: 28 TABLET | Refills: 0 | Status: SHIPPED | OUTPATIENT
Start: 2024-08-26 | End: 2024-09-09

## 2024-08-26 NOTE — PROGRESS NOTES
Wellness examination.    Past Surgical History  Mary  has a past surgical history that includes Tonsillectomy; pr arthroscopy knee diagnostic w/wo synovial bx spx (Right, 05/16/2017); Colonoscopy (N/A, 08/05/2020); Cataract removal (Bilateral); Upper gastrointestinal endoscopy (N/A, 04/08/2021); Colonoscopy (N/A, 04/08/2021); Cholecystectomy, laparoscopic (N/A, 12/15/2020); eye surgery (Bilateral); Sleeve Gastrectomy (11/17/2021); Sleeve Gastrectomy (N/A, 11/17/2021); Total knee arthroplasty (Left, 11/1/2022); and Total knee arthroplasty (Right, 2/22/2024).    Current Medications  Current Outpatient Medications   Medication Sig Dispense Refill    hydrOXYzine HCl (ATARAX) 10 MG tablet       fluticasone (FLONASE) 50 MCG/ACT nasal spray 1 spray by Each Nostril route daily 32 g 1    cetirizine (ZYRTEC) 10 MG tablet Take 1 tablet by mouth nightly as needed for Allergies 30 tablet 3    mupirocin (BACTROBAN) 2 % ointment Apply to affected area BID 22 g 1    Sulfacetamide Sodium, Acne, 10 % LOTN Apply to face daily 118 mL 3    naproxen (NAPROSYN) 500 MG tablet Take 1 tablet by mouth 2 times daily (with meals) 20 tablet 0    cefdinir (OMNICEF) 300 MG capsule Take 1 capsule by mouth 2 times daily 20 capsule 1    Ascorbic Acid (VITAMIN C ER PO) Take by mouth daily      vitamin D (ERGOCALCIFEROL) 1.25 MG (26633 UT) CAPS capsule Take 1 capsule by mouth once a week      ketoconazole (NIZORAL) 2 % cream Apply to affected area BID 15 g 6    triamcinolone (KENALOG) 0.025 % cream Apply to rash on face twice daily 80 g 2    buPROPion HBr (APLENZIN) 174 MG TB24 Take by mouth      escitalopram (LEXAPRO) 10 MG tablet Take 1 tablet by mouth daily       Current Facility-Administered Medications   Medication Dose Route Frequency Provider Last Rate Last Admin    triamcinolone acetonide (KENALOG) injection 2 mg  2 mg Intra-LESional Once Coleman De La Cruz PA-C           Allergies  Allergies have been reviewed.  Mary has No Known

## 2024-09-03 ENCOUNTER — HOSPITAL ENCOUNTER (OUTPATIENT)
Dept: PHYSICAL THERAPY | Facility: CLINIC | Age: 54
Setting detail: THERAPIES SERIES
Discharge: HOME | End: 2024-09-03
Payer: COMMERCIAL

## 2024-09-03 ENCOUNTER — APPOINTMENT (OUTPATIENT)
Dept: PHYSICAL THERAPY | Facility: CLINIC | Age: 54
End: 2024-09-03
Payer: COMMERCIAL

## 2024-09-03 PROCEDURE — 20561 NDL INSJ W/O NJX 3+ MUSC: CPT

## 2024-09-03 NOTE — FLOWSHEET NOTE
[] [facility]  Outpatient Rehabilitation &  Therapy  [address]  [phone]  F: [phone] [x] [facility]  Outpatient Rehabilitation &  Therapy  [address]   [address]  P: [phone]  F: [phone] [] [facility]  Outpatient Rehabilitation &  Therapy  [address]  P: [phone]  F: [phone] [] [facility]  Outpatient Rehabilitation &  Therapy  [address]  P: [phone]  F: [phone] [] [facility]  Outpatient Rehabilitation &  Therapy  [address]   P: [phone]  F: [phone]      Physical Therapy Daily Treatment Note    Date:  9/3/2024  Patient Name:  Mary Morataya    :  1970  MRN: [id number]    hysician: Guerda Jarrell, APRN - CNP                                     [facility] (AUTH REQUIRED AFTER  VS,  VS remaining)  Medical Diagnosis:   M50.30 (ICD-10-CM) - DDD (degenerative disc disease), cervical   M51.36 (ICD-10-CM) - DDD (degenerative disc disease), lumbar                           Rehab Codes: M25.511;M25.512;M54.2;M54.59;R26.2 NEC;R29.3  Onset Date: 24 (Chronic)                     Next 's appt.: TBD    Visit# / total visits: 2  Frequency: 2x/week    Cancels/No Shows: 0/0    Subjective:    Pain:  [x] Yes  [] No Location: Neck/Back Pain Rating: (0-10 scale) Not asked/10  Pain altered Tx:  [x] No  [] Yes  Action:  Comments: Patient arrives, wanting to proceed with dry needling. Denies neck pain, stating her pain is primarily in her shoulders and low back. Reports she is getting another eval for her shoulders now that she has had xrays done.     Patient Goals: Stop pain and do activity freely     Objective:  Modalities:   Precautions:   Exercises: Bolded exercises completed 9/3/2024  Exercise Reps/ Time Weight/ Level Comments   Nu-Step            Supine

## 2024-09-10 ENCOUNTER — HOSPITAL ENCOUNTER (OUTPATIENT)
Dept: PHYSICAL THERAPY | Facility: CLINIC | Age: 54
Setting detail: THERAPIES SERIES
Discharge: HOME | End: 2024-09-10
Payer: COMMERCIAL

## 2024-09-10 PROCEDURE — 97140 MANUAL THERAPY 1/> REGIONS: CPT

## 2024-09-10 PROCEDURE — 97110 THERAPEUTIC EXERCISES: CPT

## 2024-09-23 ENCOUNTER — HOSPITAL ENCOUNTER (OUTPATIENT)
Dept: PHYSICAL THERAPY | Facility: CLINIC | Age: 54
Setting detail: THERAPIES SERIES
Discharge: HOME | End: 2024-09-23
Payer: COMMERCIAL

## 2024-09-25 ENCOUNTER — HOSPITAL ENCOUNTER (OUTPATIENT)
Dept: PHYSICAL THERAPY | Facility: CLINIC | Age: 54
Setting detail: THERAPIES SERIES
Discharge: HOME | End: 2024-09-25
Payer: COMMERCIAL

## 2024-09-30 ENCOUNTER — HOSPITAL ENCOUNTER (OUTPATIENT)
Dept: PHYSICAL THERAPY | Facility: CLINIC | Age: 54
Setting detail: THERAPIES SERIES
Discharge: HOME | End: 2024-09-30
Payer: COMMERCIAL

## 2024-09-30 PROCEDURE — 97110 THERAPEUTIC EXERCISES: CPT

## 2024-09-30 NOTE — FLOWSHEET NOTE
[] [facility]  Outpatient Rehabilitation &  Therapy  [address]  [phone]  F: [phone] [x] [facility]  Outpatient Rehabilitation &  Therapy  [address]   [address]  P: [phone]  F: [phone] [] [facility]  Outpatient Rehabilitation &  Therapy  [address]  P: [phone]  F: [phone] [] [facility]  Outpatient Rehabilitation &  Therapy  [address]  P: [phone]  F: [phone] [] [facility]  Outpatient Rehabilitation &  Therapy  [address]   P: [phone]  F: [phone]      Physical Therapy Daily Treatment Note    Date:  2024  Patient Name:  Mary Morataya    :  1970  MRN: [id number]    Physician: Guerda Jarrell APRN - CNP                                     Insurance: [facility] (AUTH REQUIRED AFTER  VS,  VS remaining)  Medical Diagnosis:   M50.30 (ICD-10-CM) - DDD (degenerative disc disease), cervical   M51.36 (ICD-10-CM) - DDD (degenerative disc disease), lumbar   Physician: Lorenzo Casanova PA-C                      Medical Diagnosis:   M75.81, M75.82 (ICD-10-CM) - Tendonitis of both rotator cuffs   M25.512 (ICD-10-CM) - Trigger point of left shoulder region   M25.511 (ICD-10-CM) - Trigger point of right shoulder region                           Rehab Codes: M25.511;M25.512;M54.2;M54.59;R26.2 NEC;R29.3  Onset Date: 24 (Chronic)                     Next 's appt.: TBD    Visit# / total visits:   Frequency: 2x/week    Cancels/No Shows: 2/0    Subjective:    Pain:  [x] Yes  [] No Location: Neck and bilateral shoulder Pain Rating: (0-10 scale) 3 B shoulders/10, 0/10 low back  Pain altered Tx:  [x] No  [] Yes  Action:  Comments: Pt reports 3/10 pain in the bilateral shoulders. 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

## 2024-10-03 ENCOUNTER — HOSPITAL ENCOUNTER (OUTPATIENT)
Dept: PHYSICAL THERAPY | Facility: CLINIC | Age: 54
Setting detail: THERAPIES SERIES
Discharge: HOME | End: 2024-10-03
Payer: COMMERCIAL

## 2024-10-03 PROCEDURE — 20561 NDL INSJ W/O NJX 3+ MUSC: CPT

## 2024-10-03 NOTE — FLOWSHEET NOTE
[] [facility]  Outpatient Rehabilitation &  Therapy  [address]  [phone]  F: [phone] [x] [facility]  Outpatient Rehabilitation &  Therapy  [address]   [address]  P: [phone]  F: [phone] [] [facility]  Outpatient Rehabilitation &  Therapy  [address]  P: [phone]  F: [phone] [] [facility]  Outpatient Rehabilitation &  Therapy  [address]  P: [phone]  F: [phone] [] [facility]  Outpatient Rehabilitation &  Therapy  [address]   [address]   P: [phone]  F: [phone]      Physical Therapy Daily Treatment Note    Date:  10/3/2024  Patient Name:  Mary Morataya    :  1970  MRN: [id number]    Physician: Guerda Jarrell APRN - CNP                                     Insurance: [facility] (AUTH REQUIRED AFTER  VS,  VS remaining)  Medical Diagnosis:   M50.30 (ICD-10-CM) - DDD (degenerative disc disease), cervical   M51.36 (ICD-10-CM) - DDD (degenerative disc disease), lumbar   Physician: Lorenzo Casanova PA-C                      Medical Diagnosis:   M75.81, M75.82 (ICD-10-CM) - Tendonitis of both rotator cuffs   M25.512 (ICD-10-CM) - Trigger point of left shoulder region   M25.511 (ICD-10-CM) - Trigger point of right shoulder region                           Rehab Codes: M25.511;M25.512;M54.2;M54.59;R26.2 NEC;R29.3  Onset Date: 24 (Chronic)                     Next 's appt.: TBD    Visit# / total visits:   Frequency: 2x/week    Cancels/No Shows: 2/0    Subjective:    Pain:  [x] Yes  [] No Location: Neck and bilateral shoulder Pain Rating: (0-10 scale) Not asked 10/3 - 3 B shoulders/10, 0/10 low back  Pain altered Tx:  [x] No  [] Yes  Action:  Comments: Patient arrives 20 min late for appt. Stating she had a few days of relief after last session of dry needling. Reports she has been doing more exercises at

## 2024-10-07 ENCOUNTER — HOSPITAL ENCOUNTER (OUTPATIENT)
Dept: PHYSICAL THERAPY | Facility: CLINIC | Age: 54
Setting detail: THERAPIES SERIES
Discharge: HOME | End: 2024-10-07
Payer: COMMERCIAL

## 2024-10-07 NOTE — FLOWSHEET NOTE
[] [facility]  Outpatient Rehabilitation &  Therapy  [address]  [phone]  [phone] [x] [facility]  Outpatient Rehabilitation &  Therapy  [address]  P: [phone]  F: [phone] [] [facility]  Outpatient Rehabilitation &  Therapy  [address]  P: [phone]  F: [phone] [] [facility]  Outpatient Rehabilitation &  Therapy  [address]  [phone]  [phone] [] [facility]  Outpatient Rehabilitation &  Therapy  [address]   P: [phone]  F: [phone]  [] [facility]  Outpatient Rehabilitation &  Therapy  [address]  P: [phone]  F: [phone] [] [facility]  Outpatient Rehabilitation &  Therapy  [address]  P: [phone]  F: [phone] [] [facility]  Outpatient Rehabilitation &  Therapy  [address]  P: [phone]  F: [phone] [] [facility]  Outpatient Rehabilitation &  Therapy  [address]  P: [phone]  F: [phone]  [] [facility] Outpatient Rehabilitation &  Therapy  [address]  P: [phone]  F: [phone]     Therapy Cancel/No Show note    Date: 10/7/2024  Patient: Mary Morataya  : 1970  MRN: [id number]    Cancels/No Shows to date: 30    For today's appointment patient:    [x]  Cancelled    [] Rescheduled appointment    [] No-show     Reason given by patient:    [x]  Patient ill    []  Conflicting appointment    [] No transportation      [] Conflict with work    [] No reason given    [] Weather related    [] COVID-19    [] Other:      Comments:        [x] Next appointment was confirmed    Electronically signed by: Joey Haddad PT

## 2024-10-10 ENCOUNTER — HOSPITAL ENCOUNTER (OUTPATIENT)
Dept: PHYSICAL THERAPY | Facility: CLINIC | Age: 54
Setting detail: THERAPIES SERIES
Discharge: HOME | End: 2024-10-10
Payer: COMMERCIAL

## 2024-10-10 NOTE — FLOWSHEET NOTE
[] [facility]  Outpatient Rehabilitation &  Therapy  [address]  [phone]  [phone] [x] [facility]  Outpatient Rehabilitation &  Therapy  [address]  P: [phone]  F: [phone] [] [facility]  Outpatient Rehabilitation &  Therapy  [address]  P: [phone]  F: [phone] [] [facility]  Outpatient Rehabilitation &  Therapy  [address]  [phone]  [phone] [] [facility]  Outpatient Rehabilitation &  Therapy  [address]   P: [phone]  F: [phone]  [] [facility]  Outpatient Rehabilitation &  Therapy  [address]  P: [phone]  F: [phone] [] [facility]  Outpatient Rehabilitation &  Therapy  [address]  P: [phone]  F: [phone] [] [facility]  Outpatient Rehabilitation &  Therapy  [address]  P: [phone]  F: [phone] [] [facility]  Outpatient Rehabilitation &  Therapy  [address]  P: [phone]  F: [phone]  [] [facility] Outpatient Rehabilitation &  Therapy  [address]  P: [phone]  F: [phone]     Therapy Cancel/No Show note    Date: 10/10/2024  Patient: Mary Morataya  : 1970  MRN: [id number]    Cancels/No Shows to date: 3/1    For today's appointment patient:    []  Cancelled    [] Rescheduled appointment    [x] No-show     Reason given by patient:    []  Patient ill    []  Conflicting appointment    [] No transportation      [] Conflict with work    [x] No reason given    [] Weather related    [] COVID-19    [x] Other:      Comments:  Pt. Must call day of for future appointments d/t CX/NS policy being broken. No further appointments scheduled at this time.       [] Next appointment was confirmed    Electronically signed

## 2024-11-06 ENCOUNTER — OFFICE VISIT (OUTPATIENT)
Dept: FAMILY MEDICINE CLINIC | Age: 54
End: 2024-11-06
Payer: COMMERCIAL

## 2024-11-06 VITALS
WEIGHT: 293 LBS | DIASTOLIC BLOOD PRESSURE: 64 MMHG | HEART RATE: 98 BPM | SYSTOLIC BLOOD PRESSURE: 122 MMHG | HEIGHT: 65 IN | TEMPERATURE: 97.2 F | BODY MASS INDEX: 48.82 KG/M2 | OXYGEN SATURATION: 96 %

## 2024-11-06 DIAGNOSIS — E66.01 MORBID OBESITY WITH BMI OF 60.0-69.9, ADULT: ICD-10-CM

## 2024-11-06 DIAGNOSIS — M79.89 LEG SWELLING: Primary | ICD-10-CM

## 2024-11-06 PROCEDURE — 99214 OFFICE O/P EST MOD 30 MIN: CPT | Performed by: NURSE PRACTITIONER

## 2024-11-06 PROCEDURE — 1036F TOBACCO NON-USER: CPT | Performed by: NURSE PRACTITIONER

## 2024-11-06 PROCEDURE — 3017F COLORECTAL CA SCREEN DOC REV: CPT | Performed by: NURSE PRACTITIONER

## 2024-11-06 PROCEDURE — G8417 CALC BMI ABV UP PARAM F/U: HCPCS | Performed by: NURSE PRACTITIONER

## 2024-11-06 PROCEDURE — G8427 DOCREV CUR MEDS BY ELIG CLIN: HCPCS | Performed by: NURSE PRACTITIONER

## 2024-11-06 PROCEDURE — G8484 FLU IMMUNIZE NO ADMIN: HCPCS | Performed by: NURSE PRACTITIONER

## 2024-11-06 RX ORDER — PHENTERMINE HYDROCHLORIDE 37.5 MG/1
37.5 TABLET ORAL
Qty: 30 TABLET | Refills: 0 | Status: SHIPPED | OUTPATIENT
Start: 2024-11-06 | End: 2024-12-06

## 2024-11-06 RX ORDER — FUROSEMIDE 20 MG/1
20 TABLET ORAL DAILY
Qty: 5 TABLET | Refills: 0 | Status: SHIPPED | OUTPATIENT
Start: 2024-11-06

## 2024-11-06 ASSESSMENT — ENCOUNTER SYMPTOMS
NAUSEA: 0
ABDOMINAL PAIN: 0
SHORTNESS OF BREATH: 0
COUGH: 0
CHEST TIGHTNESS: 0
VOMITING: 0

## 2024-11-06 NOTE — PROGRESS NOTES
Visit Information    Have you changed or started any medications since your last visit including any over-the-counter medicines, vitamins, or herbal medicines? no   Have you stopped taking any of your medications? Is so, why? -  no  Are you having any side effects from any of your medications? - no    Have you seen any other physician or provider since your last visit?  no   Have you had any other diagnostic tests since your last visit?  no   Have you been seen in the emergency room and/or had an admission in a hospital since we last saw you?  no   Have you had your routine dental cleaning in the past 6 months?  no     Do you have an active MyChart account? If no, what is the barrier?  Yes    Patient Care Team:  Guerda Jarrell APRN - CNP as PCP - General (Family Nurse Practitioner)  Guerda Jarrell APRN - CNP as PCP - Empaneled Provider  Alana Resendiz MD as Consulting Physician (Gastroenterology)  Rick Jha DPM as Physician (Podiatry)    Medical History Review  Past Medical, Family, and Social History reviewed and  contribute to the patient presenting condition    Health Maintenance   Topic Date Due    Hepatitis B vaccine (1 of 3 - 19+ 3-dose series) Never done    Cervical cancer screen  12/08/2023    COVID-19 Vaccine (4 - 2023-24 season) 09/01/2024    DTaP/Tdap/Td vaccine (1 - Tdap) 02/28/2033 (Originally 11/28/1991)    Flu vaccine (1) 02/28/2033 (Originally 8/1/2024)    Shingles vaccine (1 of 2) 02/28/2033 (Originally 11/28/2022)    Depression Screen  02/10/2025    Breast cancer screen  02/16/2026    Lipids  03/13/2028    Colorectal Cancer Screen  04/08/2031    Pneumococcal 0-64 years Vaccine  Completed    Hepatitis C screen  Completed    HIV screen  Completed    Hepatitis A vaccine  Aged Out    Hib vaccine  Aged Out    Polio vaccine  Aged Out    Meningococcal (ACWY) vaccine  Aged Out    Depression Monitoring  Discontinued    Diabetes screen  Discontinued

## 2024-11-06 NOTE — PROGRESS NOTES
[facility]  [address] [place] [address]  [phone]      Mary Morataya is a 53 y.o. female who presents today for her  medicalconditions/complaints as noted below.  Mary Morataya is c/o of Leg Swelling (Right, having drainage from scratch from cat, just happened last night) and Knee Pain (Right, had replacement last Feb, having pain, not sure is swelling in contributing)  .    HPI:    Knee Pain   Pertinent negatives include no numbness.   Patient here today for evaluation of cat scratch to right lower leg that occurred yesterday.  States she is having some weeping edema from the site but denies any redness, warmth or fever or chills.  States she has had recent leg swelling prior to this occurring for the past few weeks to months without any known cause.  She has had a history of this in the past and was on diuretics in the past but unsure why they were stopped.  States she has been eating out more than normal and sitting for long periods as she was recently a employee for  upcoming recent election that occurred and was sitting for long hours every day.  States she has been having some right knee pressure/pain since the swelling developed and wonders if it is related.  She did have a knee replacement last year and had a recent x-ray 6 months ago that was stable.  Denies any falls to the knee or redness swelling or warmth.    She would also like to take something for weight loss.  She is interested in GLP-1 injections but knows she is not diabetic.  States she has had gastric sleeve surgery in the past and does plan to follow-up with her bariatric surgeon for more dietitian counseling but does not want gastric bypass surgery at this time.  She has been trying to stay active doing water aerobics classes and walking and is trying her best with her diet but states she still struggles at times with portion control and making healthy food choices.      Please note that this

## 2024-11-15 ENCOUNTER — OFFICE VISIT (OUTPATIENT)
Dept: BARIATRICS/WEIGHT MGMT | Age: 54
End: 2024-11-15
Payer: COMMERCIAL

## 2024-11-15 VITALS
BODY MASS INDEX: 48.82 KG/M2 | HEART RATE: 70 BPM | WEIGHT: 293 LBS | SYSTOLIC BLOOD PRESSURE: 138 MMHG | HEIGHT: 65 IN | DIASTOLIC BLOOD PRESSURE: 84 MMHG

## 2024-11-15 DIAGNOSIS — Z98.84 STATUS POST LAPAROSCOPIC SLEEVE GASTRECTOMY: ICD-10-CM

## 2024-11-15 DIAGNOSIS — E66.01 MORBID OBESITY WITH BMI OF 50.0-59.9, ADULT: ICD-10-CM

## 2024-11-15 DIAGNOSIS — K90.89 OTHER SPECIFIED INTESTINAL MALABSORPTION: Primary | ICD-10-CM

## 2024-11-15 PROCEDURE — 1036F TOBACCO NON-USER: CPT | Performed by: SURGERY

## 2024-11-15 PROCEDURE — 3017F COLORECTAL CA SCREEN DOC REV: CPT | Performed by: SURGERY

## 2024-11-15 PROCEDURE — G8427 DOCREV CUR MEDS BY ELIG CLIN: HCPCS | Performed by: SURGERY

## 2024-11-15 PROCEDURE — G8417 CALC BMI ABV UP PARAM F/U: HCPCS | Performed by: SURGERY

## 2024-11-15 PROCEDURE — G8484 FLU IMMUNIZE NO ADMIN: HCPCS | Performed by: SURGERY

## 2024-11-15 PROCEDURE — 99213 OFFICE O/P EST LOW 20 MIN: CPT | Performed by: SURGERY

## 2024-11-15 NOTE — PROGRESS NOTES
[facility] INVASIVE BARIATRIC SURG  [address]  [address]  [place] [id number]  Dept: [phone]    SURGICAL WEIGHT LOSS MANAGEMENT PROGRAM  PROGRESS NOTE     CC: Weight Loss    Patient: Mary Morataya      Service Date: 11/15/2024  Visit:   3 years    Medical Record #: [id number]  Date of Surgery:  11/17/21    History: 53 y.o. female who presents today for a post op follow up for sleeve gastrectomy. Patient reports some dysphagia when eating too fast, otherwise doing well. She has been going to water bon classes at the local [facility]. She has been having difficulty with her diet. Patient reports regular bowel movements. She denies nausea, vomiting, fever, and chills.    Since her surgery, she has had bilateral knee replacements (11/1/22 and 2/22/24) and has recovered well. 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 PCP placed her on lasix and recommended she start adipex-p.     Height: 1.651 m (5' 5\")  Highest Weight: 407  lbs      Current Visit Weight Information  Weight: (!) 162.4 kg (358 lb)   BMI: Body mass index is 59.57 kg/m².    Weight loss since surgery:  49 lbs    1 wk - D/C'd home on VTE Prohylaxis?   [x] Yes   [] No   On VTE Proph as directed?   [x] Yes   [] No    Liver pathology:    [] NA    [] No Gross path    [] Liver Steatosis   [x] Discussed w/ pt   [] Referred to GI    Exercising?   [x] Yes    [] No     Review of Systems:     General  Negative for: [] Weight Change   [x] Fatigue   [x] Fevers & Chills    [] Appetite change [] Other:    Positive for: [x] Weight Change   [] Fatigue   [] Fevers & Chills    [] Appetite change [] Other:   Cardiac  Negative for: [x] Chest Pain   [] Difficulty Breathing   [] Leg Cramps [x] Edema of Lower Extremeties    [] Left   [] Right      Positive for: [] Chest Pain   [] Difficulty Breathing   [] Leg Cramps [] Edema of Lower Extremeties    [] Left   [] Right

## 2024-12-18 ENCOUNTER — OFFICE VISIT (OUTPATIENT)
Dept: BARIATRICS/WEIGHT MGMT | Age: 54
End: 2024-12-18
Payer: COMMERCIAL

## 2024-12-18 VITALS
HEIGHT: 65 IN | SYSTOLIC BLOOD PRESSURE: 120 MMHG | HEART RATE: 70 BPM | WEIGHT: 293 LBS | DIASTOLIC BLOOD PRESSURE: 60 MMHG | BODY MASS INDEX: 48.82 KG/M2

## 2024-12-18 DIAGNOSIS — G47.33 OSA (OBSTRUCTIVE SLEEP APNEA): Primary | ICD-10-CM

## 2024-12-18 DIAGNOSIS — F39 MOOD DISORDER (HCC): ICD-10-CM

## 2024-12-18 DIAGNOSIS — Z98.84 STATUS POST LAPAROSCOPIC SLEEVE GASTRECTOMY: ICD-10-CM

## 2024-12-18 DIAGNOSIS — E66.01 MORBID OBESITY WITH BMI OF 50.0-59.9, ADULT: ICD-10-CM

## 2024-12-18 PROCEDURE — G8484 FLU IMMUNIZE NO ADMIN: HCPCS | Performed by: NURSE PRACTITIONER

## 2024-12-18 PROCEDURE — 3017F COLORECTAL CA SCREEN DOC REV: CPT | Performed by: NURSE PRACTITIONER

## 2024-12-18 PROCEDURE — 99214 OFFICE O/P EST MOD 30 MIN: CPT | Performed by: NURSE PRACTITIONER

## 2024-12-18 PROCEDURE — G8427 DOCREV CUR MEDS BY ELIG CLIN: HCPCS | Performed by: NURSE PRACTITIONER

## 2024-12-18 PROCEDURE — 1036F TOBACCO NON-USER: CPT | Performed by: NURSE PRACTITIONER

## 2024-12-18 PROCEDURE — G8417 CALC BMI ABV UP PARAM F/U: HCPCS | Performed by: NURSE PRACTITIONER

## 2024-12-18 ASSESSMENT — ENCOUNTER SYMPTOMS
SHORTNESS OF BREATH: 0
NAUSEA: 0
VOMITING: 0
CONSTIPATION: 0
CHEST TIGHTNESS: 0
ABDOMINAL PAIN: 0
COUGH: 0
WHEEZING: 0

## 2024-12-18 NOTE — PATIENT INSTRUCTIONS
Goals: 64 ounces of water daily (4-16 oz water bottles)    Restart bariatric vitamins and calcium (see handout)

## 2024-12-18 NOTE — PROGRESS NOTES
[facility] INVASIVE BARIATRIC SURG  [address]  [address]  [place] [id number]  Dept: [phone]    SURGICAL WEIGHT LOSS MANAGEMENT PROGRAM  PROGRESS NOTE     CC: Weight Loss    Patient: Mary Morataya      Service Date: 12/18/2024  Visit:   Back on track/medication discussed  Medical Record #: [id number]  Date of Surgery: 11/17/2021    History: 54 y.o. female who presents today for a post op follow up for sleeve gastrectomy. Patient reports regular bowel movements. Patient denies nausea, vomiting, fever, and chills. Patient denies any pain concerns. She is here today to start back on track appointments and discuss start medication to help with weight loss.     Lowest weight after surgery was about a year post op. She fluctuated between 316 lbs and 318 l bs from December 2022 until June 2023. She had had a 30 lbs weight gain between June 2023 and February 2024.     Recent labs? None - bariatric labs ordered by Dr. Alcantar on 11/15/2024 but not completed.     Medication that can cause weight gain: lexapro    She admits that she struggle with seasonal affective disorder. She is feeling more down lately. She believes her weight gain last winter was likely due to depression and the osteoarthritis in her knees. She had the left knee replaced November 2022 and the right knee replaced Feb 2024.     She does not take a bariatric MVI or regular MVI. She doesn't take calcium supplementation. She is on vitamin d and vitamin c gummies only.     She has hx HTN, DENISHA, OA related to morbid obesity. She is complaint with her cpap nightly. She is not on medication for hypertension currently.     She is often skipping meals, she does not track or focus on protein. She admits that she over consume carbohydrates.     PCP ordered adipex in November for her to start. She states she picked it up from the pharmacy but hasn't started it yet.   Her insurance does not cover

## 2024-12-18 NOTE — PROGRESS NOTES
Medical Nutrition Therapy for Metabolic and Bariatric Surgery  Nutrition Follow-Up: Weight Loss    Nutrition Assessment:  Patient is here for back on track/weight loss medication, patient had the Gastric Sleeve in 2021.  Patient is starting adipex given by PCP.   Patient reports she needs to eat slow, if she eats too much sugar she will get nauseas/headache.   Patient reports  tolerating diet, never eating small, frequent meals (eating 2 times per day), rarely eating protein first, sometimes protein portion with all meals and snacks.   Patient is drinking at least <16 oz of water daily. Patient typically drinking sweet tea, coffee with sugar creamer.  Patient reports not consistently taking vitamins and minerals.   Patient reports not adding physical activity to daily life to remain active.   Discussed restarting bariatric MVI and calcium (provided MVI handout). Goal to drink 4 bottles of water daily.     24-hr diet recall scanned into chart.    Multivitamin/Mineral Intake: Not taking a bariatric MVI with iron. Takes Vitamin D chewable and Vitamin C chewable.    Calcium Intake: No    Vitals:   Vitals:    12/18/24 1348   BP: 120/60   Site: Right Upper Arm   Position: Sitting   Cuff Size: Large Adult   Pulse: 70   Weight: (!) 161.5 kg (356 lb)   Height: 1.651 m (5' 5\")        Body mass index is 59.24 kg/m².    Nutrition Diagnosis:  Inadequate food and beverage intake r/t WLS as evidenced by loss of excess body weight lost 51 lbs over  3 Years    Nutrition Intervention:  Diet: Bariatric Diet, 60-80 gm of protein, and 48-64 oz of fluid    Nutrition Education: Bariatric Binder (diet guidelines and recipes)    Goal:   Continue bariatric diet and continue to add variety to diet as tolerated.   Sip on water or sugar-free fluid throughout the day.   Eat small, frequent meals containing protein, as tolerated.   Consistently take MVIs and minerals to prevent nutrient deficiencies.   Discuss activity with physician and

## 2025-01-27 ENCOUNTER — TELEPHONE (OUTPATIENT)
Dept: FAMILY MEDICINE CLINIC | Age: 55
End: 2025-01-27

## 2025-01-27 ENCOUNTER — HOSPITAL ENCOUNTER (OUTPATIENT)
Age: 55
Discharge: HOME | End: 2025-01-27
Payer: COMMERCIAL

## 2025-01-27 DIAGNOSIS — K90.89 OTHER SPECIFIED INTESTINAL MALABSORPTION: ICD-10-CM

## 2025-01-27 LAB
25(OH)D3 SERPL-MCNC: 18.2 NG/ML (ref 30–100)
ALBUMIN SERPL-MCNC: 3.9 G/DL (ref 3.5–5.2)
ALBUMIN/GLOB SERPL: 1.3 {RATIO} (ref 1–2.5)
ALP SERPL-CCNC: 78 U/L (ref 35–104)
ALT SERPL-CCNC: 8 U/L (ref 10–35)
ANION GAP SERPL CALCULATED.3IONS-SCNC: 10 MMOL/L (ref 9–16)
AST SERPL-CCNC: 18 U/L (ref 10–35)
BASOPHILS # BLD: <0.03 K/UL (ref 0–0.2)
BASOPHILS NFR BLD: 0 % (ref 0–2)
BILIRUB SERPL-MCNC: 0.6 MG/DL (ref 0–1.2)
BUN SERPL-MCNC: 12 MG/DL (ref 6–20)
CALCIUM SERPL-MCNC: 9.2 MG/DL (ref 8.6–10.4)
CHLORIDE SERPL-SCNC: 104 MMOL/L (ref 98–107)
CHOLEST SERPL-MCNC: 177 MG/DL (ref 0–199)
CHOLESTEROL/HDL RATIO: 3.2
CO2 SERPL-SCNC: 26 MMOL/L (ref 20–31)
CREAT SERPL-MCNC: 1 MG/DL (ref 0.6–0.9)
EOSINOPHIL # BLD: 0.14 K/UL (ref 0–0.44)
EOSINOPHILS RELATIVE PERCENT: 3 % (ref 1–4)
ERYTHROCYTE [DISTWIDTH] IN BLOOD BY AUTOMATED COUNT: 14.6 % (ref 11.8–14.4)
EST. AVERAGE GLUCOSE BLD GHB EST-MCNC: 103 MG/DL
FERRITIN SERPL-MCNC: 10 NG/ML (ref 15–150)
FOLATE SERPL-MCNC: 7.7 NG/ML (ref 4.8–24.2)
GFR, ESTIMATED: 68 ML/MIN/1.73M2
GLUCOSE SERPL-MCNC: 86 MG/DL (ref 74–99)
HBA1C MFR BLD: 5.2 % (ref 4–6)
HCT VFR BLD AUTO: 36 % (ref 36.3–47.1)
HDLC SERPL-MCNC: 55 MG/DL
HGB BLD-MCNC: 10.9 G/DL (ref 11.9–15.1)
IMM GRANULOCYTES # BLD AUTO: <0.03 K/UL (ref 0–0.3)
IMM GRANULOCYTES NFR BLD: 0 %
IRON SATN MFR SERPL: 11 % (ref 20–55)
IRON SERPL-MCNC: 39 UG/DL (ref 37–145)
LDLC SERPL CALC-MCNC: 106 MG/DL (ref 0–100)
LYMPHOCYTES NFR BLD: 1.53 K/UL (ref 1.1–3.7)
LYMPHOCYTES RELATIVE PERCENT: 31 % (ref 24–43)
MAGNESIUM SERPL-MCNC: 2.1 MG/DL (ref 1.6–2.6)
MCH RBC QN AUTO: 26.2 PG (ref 25.2–33.5)
MCHC RBC AUTO-ENTMCNC: 30.3 G/DL (ref 28.4–34.8)
MCV RBC AUTO: 86.5 FL (ref 82.6–102.9)
MONOCYTES NFR BLD: 0.34 K/UL (ref 0.1–1.2)
MONOCYTES NFR BLD: 7 % (ref 3–12)
NEUTROPHILS NFR BLD: 59 % (ref 36–65)
NEUTS SEG NFR BLD: 2.95 K/UL (ref 1.5–8.1)
NRBC BLD-RTO: 0 PER 100 WBC
PLATELET # BLD AUTO: 215 K/UL (ref 138–453)
PMV BLD AUTO: 10.2 FL (ref 8.1–13.5)
POTASSIUM SERPL-SCNC: 4.2 MMOL/L (ref 3.7–5.3)
PROT SERPL-MCNC: 7 G/DL (ref 6.6–8.7)
PTH-INTACT SERPL-MCNC: 60 PG/ML (ref 15–65)
RBC # BLD AUTO: 4.16 M/UL (ref 3.95–5.11)
RBC # BLD: ABNORMAL 10*6/UL
SODIUM SERPL-SCNC: 140 MMOL/L (ref 136–145)
TIBC SERPL-MCNC: 348 UG/DL (ref 250–450)
TRIGL SERPL-MCNC: 79 MG/DL
TSH SERPL DL<=0.05 MIU/L-ACNC: 0.75 UIU/ML (ref 0.27–4.2)
UNSATURATED IRON BINDING CAPACITY: 309 UG/DL (ref 112–347)
VIT B12 SERPL-MCNC: 203 PG/ML (ref 232–1245)
VLDLC SERPL CALC-MCNC: 16 MG/DL (ref 1–30)
WBC OTHER # BLD: 5 K/UL (ref 3.5–11.3)

## 2025-01-27 PROCEDURE — 83540 ASSAY OF IRON: CPT

## 2025-01-27 PROCEDURE — 83970 ASSAY OF PARATHORMONE: CPT

## 2025-01-27 PROCEDURE — 36415 COLL VENOUS BLD VENIPUNCTURE: CPT

## 2025-01-27 PROCEDURE — 84590 ASSAY OF VITAMIN A: CPT

## 2025-01-27 PROCEDURE — 82728 ASSAY OF FERRITIN: CPT

## 2025-01-27 PROCEDURE — 83550 IRON BINDING TEST: CPT

## 2025-01-27 PROCEDURE — 82607 VITAMIN B-12: CPT

## 2025-01-27 PROCEDURE — 84630 ASSAY OF ZINC: CPT

## 2025-01-27 PROCEDURE — 84425 ASSAY OF VITAMIN B-1: CPT

## 2025-01-27 PROCEDURE — 82306 VITAMIN D 25 HYDROXY: CPT

## 2025-01-27 PROCEDURE — 85025 COMPLETE CBC W/AUTO DIFF WBC: CPT

## 2025-01-27 PROCEDURE — 84443 ASSAY THYROID STIM HORMONE: CPT

## 2025-01-27 PROCEDURE — 80061 LIPID PANEL: CPT

## 2025-01-27 PROCEDURE — 80053 COMPREHEN METABOLIC PANEL: CPT

## 2025-01-27 PROCEDURE — 82746 ASSAY OF FOLIC ACID SERUM: CPT

## 2025-01-27 PROCEDURE — 83036 HEMOGLOBIN GLYCOSYLATED A1C: CPT

## 2025-01-27 PROCEDURE — 83735 ASSAY OF MAGNESIUM: CPT

## 2025-01-27 NOTE — TELEPHONE ENCOUNTER
Sorry I need more documentation. E visit advised in future, but for now what exactly are her symptoms and was she on any antibiotics recently?   no

## 2025-01-27 NOTE — TELEPHONE ENCOUNTER
Patient called requesting diflucan for yeast infection sent to [facility] [place] and [place].

## 2025-01-28 ENCOUNTER — OFFICE VISIT (OUTPATIENT)
Dept: BARIATRICS/WEIGHT MGMT | Age: 55
End: 2025-01-28
Payer: COMMERCIAL

## 2025-01-28 VITALS
SYSTOLIC BLOOD PRESSURE: 126 MMHG | WEIGHT: 293 LBS | BODY MASS INDEX: 59.57 KG/M2 | OXYGEN SATURATION: 98 % | DIASTOLIC BLOOD PRESSURE: 82 MMHG | HEART RATE: 89 BPM

## 2025-01-28 DIAGNOSIS — E66.01 MORBID OBESITY WITH BMI OF 50.0-59.9, ADULT: ICD-10-CM

## 2025-01-28 DIAGNOSIS — Z98.84 STATUS POST LAPAROSCOPIC SLEEVE GASTRECTOMY: ICD-10-CM

## 2025-01-28 DIAGNOSIS — G47.33 OSA (OBSTRUCTIVE SLEEP APNEA): Primary | ICD-10-CM

## 2025-01-28 DIAGNOSIS — R79.0 LOW FERRITIN: ICD-10-CM

## 2025-01-28 DIAGNOSIS — R79.0 ABNORMAL IRON SATURATION: ICD-10-CM

## 2025-01-28 DIAGNOSIS — E53.8 B12 DEFICIENCY: ICD-10-CM

## 2025-01-28 DIAGNOSIS — E55.9 VITAMIN D DEFICIENCY: ICD-10-CM

## 2025-01-28 PROCEDURE — G8417 CALC BMI ABV UP PARAM F/U: HCPCS | Performed by: NURSE PRACTITIONER

## 2025-01-28 PROCEDURE — 99214 OFFICE O/P EST MOD 30 MIN: CPT | Performed by: NURSE PRACTITIONER

## 2025-01-28 PROCEDURE — G8427 DOCREV CUR MEDS BY ELIG CLIN: HCPCS | Performed by: NURSE PRACTITIONER

## 2025-01-28 PROCEDURE — 1036F TOBACCO NON-USER: CPT | Performed by: NURSE PRACTITIONER

## 2025-01-28 PROCEDURE — 3017F COLORECTAL CA SCREEN DOC REV: CPT | Performed by: NURSE PRACTITIONER

## 2025-01-28 RX ORDER — PHENTERMINE HYDROCHLORIDE 37.5 MG/1
37.5 TABLET ORAL
Qty: 30 TABLET | Refills: 0 | Status: SHIPPED | OUTPATIENT
Start: 2025-01-28 | End: 2025-02-27

## 2025-01-28 RX ORDER — ERGOCALCIFEROL 1.25 MG/1
50000 CAPSULE, LIQUID FILLED ORAL WEEKLY
Qty: 12 CAPSULE | Refills: 0 | Status: SHIPPED | OUTPATIENT
Start: 2025-01-28

## 2025-01-28 RX ORDER — PYRIDOXINE HCL (VITAMIN B6) 50 MG
200 TABLET ORAL DAILY
Qty: 60 TABLET | Refills: 1 | Status: SHIPPED | OUTPATIENT
Start: 2025-01-28

## 2025-01-28 RX ORDER — FERROUS SULFATE 325(65) MG
325 TABLET ORAL
Qty: 30 TABLET | Refills: 0 | Status: SHIPPED | OUTPATIENT
Start: 2025-01-28

## 2025-01-28 ASSESSMENT — ENCOUNTER SYMPTOMS
ABDOMINAL PAIN: 0
COUGH: 0
VOMITING: 0
NAUSEA: 0
SHORTNESS OF BREATH: 0
WHEEZING: 0
CHEST TIGHTNESS: 0
CONSTIPATION: 0

## 2025-01-28 NOTE — PROGRESS NOTES
Medical Nutrition Therapy for Metabolic and Bariatric Surgery  Nutrition Follow-Up: Weight Loss    Nutrition Assessment:  Patient is here for back on track/weight loss medication, patient had the Gastric Sleeve in 2021.  Patient is starting adipex given by PCP.   Patient reports  tolerating diet, never eating small, frequent meals (eating 2 times per day), rarely eating protein first, sometimes protein portion with all meals and snacks.   Patient is drinking at least 32 oz of water daily. Patient typically drinking sweet tea, coffee with sugar creamer, soda.  Patient reports not consistently taking vitamins and minerals.   Patient reports not adding physical activity to daily life to remain active.   Discussed restarting bariatric MVI and calcium (provided MVI handout), goal of 3 bottles of water daily, cutting out all sugary beverages, 60-80 grams of protein.  Provided Water Enhancers , Lean Protein List, Protein Shakes, Protein Powder and Snack Ideas Handout(s).    24-hr diet recall scanned into chart.    Multivitamin/Mineral Intake: Not taking a bariatric MVI with iron. Takes Vitamin D chewable and Vitamin C chewable.     Calcium Intake: No    Vitals:   Vitals:    01/28/25 1441   BP: 126/82   Site: Left Upper Arm   Position: Sitting   Pulse: 89   SpO2: 98%   Weight: (!) 162.4 kg (358 lb)      Body mass index is 59.57 kg/m².    Nutrition Diagnosis:  Inadequate food and beverage intake r/t WLS as evidenced by loss of excess body weight gained 2 lbs over 1 Month.    Nutrition Intervention:  Diet: Bariatric Diet, 60-80 gm of protein, and 48-64 oz of fluid    Nutrition Education: Bariatric Binder (diet guidelines and recipes)    Goal:   Continue bariatric diet and continue to add variety to diet as tolerated.   Sip on water or sugar-free fluid throughout the day.   Eat small, frequent meals containing protein, as tolerated.   Consistently take MVIs and minerals to prevent nutrient deficiencies.   Discuss activity

## 2025-01-28 NOTE — PROGRESS NOTES
[facility] INVASIVE BARIATRIC SURG  [address]  [address]  [place] [id number]  Dept: [phone]    SURGICAL WEIGHT LOSS MANAGEMENT PROGRAM  PROGRESS NOTE     CC: Weight Loss    Patient: Mary Morataya      Service Date: 1/28/2025  Visit:   Back on track/medication discussed  Medical Record #: [id number]  Date of Surgery: 11/17/2021    History: 54 y.o. female who presents today for 1 month follow up/back on track. Patient reports regular bowel movements. Patient denies nausea, vomiting, fever, and chills. Patient denies any pain concerns.  + hx of sleeve gastrectomy.     Lowest weight after surgery was about a year post op. She fluctuated between 316 lbs and 318 lbs from December 2022 until June 2023. She had had a 30 lbs weight gain between June 2023 and February 2024.       Patient was seen last month to discussed weight loss/back on track. PCP had ordered Adipex in November 2024 but she had not started it. Discussed it was okay to start the medication.   She has been taking the adipex, started in December. She has noticed that her appetite was lower slightly and it did help with increasing energy. She has gained 2 lbs since we saw her last but she is is slightly overdue for this appt.   She states that falling asleep has been hard at times but she doesn't take the medication until around 11 am- noon.     She is still skipping meals. She is drinking pop 5 out of 7 days a week, at least 20 oz. She is not tracking her protein, did not start bariatric MVI. She is drinking 2 bottles of water during the day. She cut back to 1 sweet tea per day.     She is getting fast food often - she is drinking a lot of sugary coffee drinks and makes iced coffee at home also. She uses hazelnut coffee creamer and believes she puts in about 1/4 cup per day. She states she is drinking a lot of coffee and pop because she is tired all the time. With all the sugar, she then

## 2025-01-29 LAB — ZINC SERPL-MCNC: 59.9 UG/DL (ref 60–120)

## 2025-01-30 LAB
RETINYL PALMITATE: <0.02 MG/L (ref 0–0.1)
VIT B1 PYROPHOSHATE BLD-SCNC: 107 NMOL/L (ref 70–180)
VITAMIN A LEVEL: 0.46 MG/L (ref 0.3–1.2)
VITAMIN A, INTERP: NORMAL

## 2025-01-31 ENCOUNTER — OFFICE VISIT (OUTPATIENT)
Dept: PRIMARY CARE CLINIC | Age: 55
End: 2025-01-31
Payer: COMMERCIAL

## 2025-01-31 ENCOUNTER — HOSPITAL ENCOUNTER (OUTPATIENT)
Age: 55
Setting detail: SPECIMEN
Discharge: HOME | End: 2025-01-31

## 2025-01-31 VITALS
DIASTOLIC BLOOD PRESSURE: 78 MMHG | WEIGHT: 293 LBS | SYSTOLIC BLOOD PRESSURE: 131 MMHG | OXYGEN SATURATION: 100 % | HEART RATE: 72 BPM | TEMPERATURE: 97.9 F | BODY MASS INDEX: 48.82 KG/M2 | HEIGHT: 65 IN

## 2025-01-31 DIAGNOSIS — N39.0 URINARY TRACT INFECTION WITH HEMATURIA, SITE UNSPECIFIED: Primary | ICD-10-CM

## 2025-01-31 DIAGNOSIS — R31.9 URINARY TRACT INFECTION WITH HEMATURIA, SITE UNSPECIFIED: ICD-10-CM

## 2025-01-31 DIAGNOSIS — N39.0 URINARY TRACT INFECTION WITH HEMATURIA, SITE UNSPECIFIED: ICD-10-CM

## 2025-01-31 DIAGNOSIS — R31.9 URINARY TRACT INFECTION WITH HEMATURIA, SITE UNSPECIFIED: Primary | ICD-10-CM

## 2025-01-31 LAB
BILIRUBIN, POC: NEGATIVE
BLOOD URINE, POC: ABNORMAL
CLARITY, POC: ABNORMAL
COLOR, POC: YELLOW
GLUCOSE URINE, POC: NEGATIVE MG/DL
KETONES, POC: NEGATIVE MG/DL
LEUKOCYTE EST, POC: ABNORMAL
NITRITE, POC: NEGATIVE
PH, POC: 7
PROTEIN, POC: ABNORMAL MG/DL
SPECIFIC GRAVITY, POC: 1.02
UROBILINOGEN, POC: 1 MG/DL

## 2025-01-31 PROCEDURE — 81002 URINALYSIS NONAUTO W/O SCOPE: CPT | Performed by: NURSE PRACTITIONER

## 2025-01-31 PROCEDURE — 3017F COLORECTAL CA SCREEN DOC REV: CPT | Performed by: NURSE PRACTITIONER

## 2025-01-31 PROCEDURE — 99213 OFFICE O/P EST LOW 20 MIN: CPT | Performed by: NURSE PRACTITIONER

## 2025-01-31 PROCEDURE — G8417 CALC BMI ABV UP PARAM F/U: HCPCS | Performed by: NURSE PRACTITIONER

## 2025-01-31 PROCEDURE — 1036F TOBACCO NON-USER: CPT | Performed by: NURSE PRACTITIONER

## 2025-01-31 PROCEDURE — G8427 DOCREV CUR MEDS BY ELIG CLIN: HCPCS | Performed by: NURSE PRACTITIONER

## 2025-01-31 RX ORDER — CEPHALEXIN 500 MG/1
500 CAPSULE ORAL 2 TIMES DAILY
Qty: 14 CAPSULE | Refills: 0 | Status: SHIPPED | OUTPATIENT
Start: 2025-01-31 | End: 2025-02-07

## 2025-01-31 ASSESSMENT — ENCOUNTER SYMPTOMS
NAUSEA: 0
DIARRHEA: 0
SHORTNESS OF BREATH: 0
ABDOMINAL PAIN: 0
WHEEZING: 0
CONSTIPATION: 0
COUGH: 0
RESPIRATORY NEGATIVE: 1
VOMITING: 0
CHEST TIGHTNESS: 0

## 2025-01-31 NOTE — PROGRESS NOTES
[facility] PHYSICIANS [facility], [facility] [place] [facility]  [address]  [facility] [address]  Dept: [phone]     Mary Morataya is a 54 y.o. female who presents to the urgent care today for her medicalconditions/complaints as noted below.  Mary Morataya is c/o of Dysuria (Pain and pressure with urination has throbbing on rt side when urination does have chills)    HPI:     Dysuria   This is a new problem. The current episode started in the past 7 days. The problem has been gradually worsening. The quality of the pain is described as burning. There has been no fever. There is No history of pyelonephritis. Associated symptoms include frequency and urgency. Pertinent negatives include no chills, flank pain, hematuria, nausea or vomiting. She has tried increased fluids for the symptoms. The treatment provided no relief.       Past Medical History:   Diagnosis Date    Allergic rhinitis     Anxiety     Anxiety and depression     Arthritis     Asthma     no longer using inhaler or nebulizer    Calcaneal spur 5/2/2019    Combined forms of age-related cataract 2/3/2023    Diverticulitis     Diverticulitis of large intestine without perforation or abscess without bleeding     Edema 3/12/2018    Essential hypertension 2/2/2018    Headache(784.0)     HTN (hypertension)     pt denies  not on meds    Lymphocytic colitis 4/27/2021    OAB (overactive bladder) 4/18/2014    Dr Josh Araya State sx of incontinence is a lot better since she lost weight.    Obesity     Osteoarthritis of acromioclavicular joint 3/13/2018    Overactive bladder     Overactive bladder     Peroneus longus tendinitis 3/1/2019    Psoriasis 4/13/2016    Seborrheic dermatitis 3/28/2017    Sleep apnea     uses machine nightly  cpap    Spinal enthesopathy (HCC) 3/12/2018    Stress fracture of metatarsal bone 2/3/2023    Tear of medial meniscus of knee 3/1/2019    Urge incontinence of urine 2/3/2023    Use of cane

## 2025-02-02 LAB
MICROORGANISM SPEC CULT: ABNORMAL
SERVICE CMNT-IMP: ABNORMAL
SPECIMEN DESCRIPTION: ABNORMAL

## 2025-02-26 ENCOUNTER — OFFICE VISIT (OUTPATIENT)
Dept: ORTHOPEDIC SURGERY | Age: 55
End: 2025-02-26
Payer: COMMERCIAL

## 2025-02-26 VITALS — HEIGHT: 65 IN | BODY MASS INDEX: 48.82 KG/M2 | RESPIRATION RATE: 14 BRPM | WEIGHT: 293 LBS

## 2025-02-26 DIAGNOSIS — Z96.651 STATUS POST TOTAL RIGHT KNEE REPLACEMENT: Primary | ICD-10-CM

## 2025-02-26 PROCEDURE — 99213 OFFICE O/P EST LOW 20 MIN: CPT | Performed by: PHYSICIAN ASSISTANT

## 2025-02-26 PROCEDURE — G8417 CALC BMI ABV UP PARAM F/U: HCPCS | Performed by: PHYSICIAN ASSISTANT

## 2025-02-26 PROCEDURE — G8427 DOCREV CUR MEDS BY ELIG CLIN: HCPCS | Performed by: PHYSICIAN ASSISTANT

## 2025-02-26 PROCEDURE — 3017F COLORECTAL CA SCREEN DOC REV: CPT | Performed by: PHYSICIAN ASSISTANT

## 2025-02-26 PROCEDURE — 1036F TOBACCO NON-USER: CPT | Performed by: PHYSICIAN ASSISTANT

## 2025-02-26 ASSESSMENT — ENCOUNTER SYMPTOMS
COLOR CHANGE: 0
VOMITING: 0
SHORTNESS OF BREATH: 0
COUGH: 0

## 2025-02-26 NOTE — PROGRESS NOTES
[facility] ORTHOPEDICS  [address] [place] [address]  Dept: [phone]  Dept Fax: [phone]        Ambulatory Follow Up      Subjective:   Mary Morataya is a 54 y.o. year old female who presents to our office today for routine followup regarding her   1. Status post total right knee replacement    .    Chief Complaint   Patient presents with    Knee Pain     Status post total right knee replacement       History of Present Illness  The patient is a 54-year-old female here today for a 1-year postoperative follow-up after undergoing a right total knee arthroplasty on 02/22/2024 with Dr. Meza.    She reports satisfactory progress following her right total knee arthroplasty. Approximately 2 months ago, she experienced a transient episode of sharp pain in her knee during ambulation, which she attributes to a recent dental procedure for which she was prescribed amoxicillin. The pain has since resolved.     She also mentions a fall in her kitchen where she landed on the affected knee, but no subsequent issues have arisen. She is not currently taking any analgesics for knee pain and maintains full functionality at home. The patient does have a history of a left total knee arthroplasty with Dr. Palafox in 2022 and is doing well.        Review of Systems   Constitutional:  Negative for activity change and fever.   HENT:  Negative for sneezing.    Respiratory:  Negative for cough and shortness of breath.    Cardiovascular:  Negative for chest pain.   Gastrointestinal:  Negative for vomiting.   Musculoskeletal:  Negative for arthralgias, joint swelling and myalgias.   Skin:  Negative for color change.   Neurological:  Negative for weakness and numbness.   Psychiatric/Behavioral:  Negative for sleep disturbance.          Objective :   Resp 14   Ht 1.651 m (5' 5\")   Wt (!) 163.1 kg (359 lb 9.6 oz)   BMI 59.84 kg/m²  Body mass index is 59.84

## 2025-03-11 DIAGNOSIS — L21.9 SEBORRHEIC DERMATITIS: ICD-10-CM

## 2025-03-11 RX ORDER — KETOCONAZOLE 20 MG/G
CREAM TOPICAL
Qty: 15 G | Refills: 6 | OUTPATIENT
Start: 2025-03-11

## 2025-03-16 ENCOUNTER — RESULTS FOLLOW-UP (OUTPATIENT)
Dept: BARIATRICS/WEIGHT MGMT | Age: 55
End: 2025-03-16

## 2025-03-18 DIAGNOSIS — E55.9 VITAMIN D DEFICIENCY: Primary | ICD-10-CM

## 2025-03-18 DIAGNOSIS — E53.8 VITAMIN B 12 DEFICIENCY: ICD-10-CM

## 2025-04-29 RX ORDER — ERGOCALCIFEROL 1.25 MG/1
50000 CAPSULE, LIQUID FILLED ORAL WEEKLY
Qty: 12 CAPSULE | Refills: 1 | OUTPATIENT
Start: 2025-04-29

## 2025-04-29 RX ORDER — FERROUS SULFATE 325(65) MG
325 TABLET ORAL
Qty: 90 TABLET | Refills: 1 | OUTPATIENT
Start: 2025-04-29

## 2025-04-29 RX ORDER — MULTIVITAMIN WITH IRON
1000 TABLET ORAL DAILY
Qty: 30 TABLET | Refills: 3 | OUTPATIENT
Start: 2025-04-29 | End: 2026-04-29

## 2025-05-21 ENCOUNTER — TELEPHONE (OUTPATIENT)
Age: 55
End: 2025-05-21

## 2025-05-21 NOTE — TELEPHONE ENCOUNTER
Patient has a Dermatology appointment scheduled for 6/25/2025. The appointment needs to be rescheduled. The Provider will not be in the office. I left a voicemail message to contact  Dermatology Department to reschedule appointment.

## 2025-06-20 ENCOUNTER — TELEPHONE (OUTPATIENT)
Age: 55
End: 2025-06-20

## 2025-06-20 NOTE — TELEPHONE ENCOUNTER
Patient has a Dermatology appointment scheduled for 6/25/2025.  The appointment needs to be rescheduled. The Provider will not be in the office. I left a message on the voicemail to contact  Dermatology Department to reschedule appointment.

## 2025-07-11 ENCOUNTER — OFFICE VISIT (OUTPATIENT)
Dept: ORTHOPEDIC SURGERY | Age: 55
End: 2025-07-11
Payer: COMMERCIAL

## 2025-07-11 VITALS — HEIGHT: 65 IN | BODY MASS INDEX: 48.82 KG/M2 | RESPIRATION RATE: 14 BRPM | WEIGHT: 293 LBS

## 2025-07-11 DIAGNOSIS — G89.29 CHRONIC BILATERAL LOW BACK PAIN WITH RIGHT-SIDED SCIATICA: Primary | ICD-10-CM

## 2025-07-11 DIAGNOSIS — M54.41 CHRONIC BILATERAL LOW BACK PAIN WITH RIGHT-SIDED SCIATICA: Primary | ICD-10-CM

## 2025-07-11 PROCEDURE — G8427 DOCREV CUR MEDS BY ELIG CLIN: HCPCS | Performed by: PHYSICIAN ASSISTANT

## 2025-07-11 PROCEDURE — 3017F COLORECTAL CA SCREEN DOC REV: CPT | Performed by: PHYSICIAN ASSISTANT

## 2025-07-11 PROCEDURE — 1036F TOBACCO NON-USER: CPT | Performed by: PHYSICIAN ASSISTANT

## 2025-07-11 PROCEDURE — 99213 OFFICE O/P EST LOW 20 MIN: CPT | Performed by: PHYSICIAN ASSISTANT

## 2025-07-11 PROCEDURE — G8417 CALC BMI ABV UP PARAM F/U: HCPCS | Performed by: PHYSICIAN ASSISTANT

## 2025-07-11 NOTE — PROGRESS NOTES
Overactive bladder     Peroneus longus tendinitis 3/1/2019    Psoriasis 4/13/2016    Seborrheic dermatitis 3/28/2017    Sleep apnea     uses machine nightly  cpap    Spinal enthesopathy 3/12/2018    Stress fracture of metatarsal bone 2/3/2023    Tear of medial meniscus of knee 3/1/2019    Urge incontinence of urine 2/3/2023    Use of cane as ambulatory aid     Wellness examination 08/2021    Dr. Masoud Campbell, his CNP's      Past Surgical History:   Procedure Laterality Date    CATARACT REMOVAL Bilateral     CHOLECYSTECTOMY, LAPAROSCOPIC N/A 12/15/2020    CHOLECYSTECTOMY LAPAROSCOPIC ROBOTIC XI performed by Alfonso Hines MD at [facility] OR    COLONOSCOPY N/A 08/05/2020    COLONOSCOPY POLYPECTOMY SNARE/COLD BIOPSY performed by Alana Resendiz MD at [facility] ENDO    COLONOSCOPY N/A 04/08/2021    COLONOSCOPY WITH BIOPSY RANDOM RIGHT AND LEFT COLON performed by Alana Resendiz MD at [facility] OR    EYE SURGERY Bilateral     cataract removed with lens implants    KS ARTHROSCOPY KNEE DIAGNOSTIC W/WO SYNOVIAL BX SPX Right 05/16/2017    RIGHT KNEE ARTHROSCOPY WITH MEDIAL MENISECTOMY AND CHONDROPLASTY performed by Davey Shah DO at [facility] OR    SLEEVE GASTRECTOMY  11/17/2021    ROBOTIC LAPAROSCOPIC GASTRECTOMY SLEEVE, EGD-    SLEEVE GASTRECTOMY N/A 11/17/2021    XI ROBOTIC LAPAROSCOPIC GASTRECTOMY SLEEVE, EGD- GI UNIT SCHEDULED performed by Delonte Alcantar DO at [facility] OR    TONSILLECTOMY      TOTAL KNEE ARTHROPLASTY Left 11/1/2022    KNEE TOTAL ARTHROPLASTY performed by Jose Palafox MD at [facility] OR    TOTAL KNEE ARTHROPLASTY Right 2/22/2024    RIGHT KNEE TOTAL ARTHROPLASTY WITH RYDER BIOMET AND GPS performed by Jose Palafox MD at [facility] OR    UPPER GASTROINTESTINAL ENDOSCOPY N/A 04/08/2021    EGD BIOPSY OF GASTRIC AND GASTRIC POLYPECTOMY performed by Alana Resendiz MD at [facility] OR     Family History   Problem Relation Age of Onset    Asthma Mother     Parkinsonism Mother     Arthritis Father

## (undated) DEVICE — SYRINGE MED 50ML LUERLOCK TIP

## (undated) DEVICE — ENDO KIT W/SYRINGE: Brand: MEDLINE INDUSTRIES, INC.

## (undated) DEVICE — APPLICATOR MEDICATED 26 CC SOLUTION HI LT ORNG CHLORAPREP

## (undated) DEVICE — Device: Brand: DEFENDO VALVE AND CONNECTOR KIT

## (undated) DEVICE — SUTURE STRATAFIX SPRL MCRYL + SZ 2 0 L27IN ABSRB UD W NDL SXMP1B419

## (undated) DEVICE — GLOVE ORTHO 8   MSG9480

## (undated) DEVICE — STRAP,POSITIONING,KNEE/BODY,FOAM,4X60": Brand: MEDLINE

## (undated) DEVICE — SWABSTICK MEDICATED 10% POVIDONE IOD PVP SGL ANTISEP SAT

## (undated) DEVICE — MEDI-VAC NON-CONDUCTIVE SUCTION TUBING: Brand: CARDINAL HEALTH

## (undated) DEVICE — 450 ML BOTTLE OF 0.05% CHLORHEXIDINE GLUCONATE IN 99.95% STERILE WATER FOR IRRIGATION, USP AND APPLICATOR.: Brand: IRRISEPT ANTIMICROBIAL WOUND LAVAGE

## (undated) DEVICE — TROCAR: Brand: KII FIOS FIRST ENTRY

## (undated) DEVICE — STERILE PATIENT PROTECTIVE PAD FOR IMP® KNEE POSITIONERS & COHESIVE WRAP (10 / CASE): Brand: DE MAYO KNEE POSITIONER®

## (undated) DEVICE — TUBING, SUCTION, 3/16" X 10', STRAIGHT: Brand: MEDLINE

## (undated) DEVICE — TUBING, SUCTION, 1/4" X 12', STRAIGHT: Brand: MEDLINE

## (undated) DEVICE — KIT SEP W/ BLD DRAW TB SYR NDL TRNQT PD

## (undated) DEVICE — GAUZE,SPONGE,3"X3",4PLY,NS,LF: Brand: MEDLINE

## (undated) DEVICE — Device

## (undated) DEVICE — CONNECTOR TBNG AUX H2O JET DISP FOR OLY 160/180 SER

## (undated) DEVICE — PLUMEPORT SEO LAPAROSCOPIC SMOKE FILTRATION DEVICE: Brand: PLUMEPORT

## (undated) DEVICE — DRAPE,U/ SHT,SPLIT,PLAS,STERIL: Brand: MEDLINE

## (undated) DEVICE — SOLUTION IRRIG 3000ML 0.9% SOD CHL USP UROMATIC PLAS CONT

## (undated) DEVICE — DUAL CUT SAGITTAL BLADE

## (undated) DEVICE — SUTURE VCRL + SZ 2-0 L27IN ABSRB UD CP-1 1/2 CIR REV CUT VCP266H

## (undated) DEVICE — DEFENDO AIR WATER SUCTION AND BIOPSY VALVE KIT FOR  OLYMPUS: Brand: DEFENDO AIR/WATER/SUCTION AND BIOPSY VALVE

## (undated) DEVICE — SUTURE VCRL SZ 0 L36IN ABSRB UD L36MM CT-1 1/2 CIR J946H

## (undated) DEVICE — ELECTRODE PT RET AD L9FT HI MOIST COND ADH HYDRGEL CORDED

## (undated) DEVICE — GLOVE ORANGE PI 7 1/2   MSG9075

## (undated) DEVICE — BLANKET WRM W29.9XL79.1IN UP BODY FORC AIR MISTRAL-AIR

## (undated) DEVICE — STOCKINETTE,IMPERVIOUS,12X48,STERILE: Brand: MEDLINE

## (undated) DEVICE — GLOVE SURG SZ 6 THK91MIL LTX FREE SYN POLYISOPRENE ANTI

## (undated) DEVICE — KIT AUTOTRNS APPL AERO 2 SET SYR 2 TIP FOR PLT SEP SYS GPS

## (undated) DEVICE — GLOVE SURG SZ 8 L12IN FNGR THK87MIL WHT LTX FREE

## (undated) DEVICE — VESSEL SEALER EXTEND: Brand: ENDOWRIST

## (undated) DEVICE — 4-PORT MANIFOLD: Brand: NEPTUNE 2

## (undated) DEVICE — CEMENT MIXING SYSTEM WITH FEMORAL BREAKWAY NOZZLE: Brand: REVOLUTION

## (undated) DEVICE — GOWN,AURORA,NONREINFORCED,LARGE: Brand: MEDLINE

## (undated) DEVICE — CLOSURE SKIN FLX NONINVASIVE PRELOC TECHNOLOGY FOR 24IN

## (undated) DEVICE — FORCEPS BX L240CM JAW DIA2.8MM L CAP W/ NDL MIC MESH TOOTH

## (undated) DEVICE — GLOVE SURG SZ 75 CRM LTX FREE POLYISOPRENE POLYMER BEAD ANTI

## (undated) DEVICE — SOLUTION IRRIG 1000ML 0.9% SOD CHL USP POUR PLAS BTL

## (undated) DEVICE — GLOVE SURG SZ 85 L12IN THK75MIL DK GRN LTX FREE

## (undated) DEVICE — AGGRESSIVE PLUS, ANGLED CUTTER: Brand: FORMULA

## (undated) DEVICE — Z DUP USE 2641840 CLIP INT L POLYMER LOK LIG HEM O LOK

## (undated) DEVICE — GLOVE ORANGE PI 7   MSG9070

## (undated) DEVICE — ARM DRAPE

## (undated) DEVICE — DRESSING,GAUZE,XEROFORM,CURAD,1"X8",ST: Brand: CURAD

## (undated) DEVICE — SUTURE MCRYL SZ 4-0 L27IN ABSRB UD L19MM PS-2 1/2 CIR PRIM Y426H

## (undated) DEVICE — DRESSING TRNSPAR W5XL4.5IN FLM SHT SEMIPERMEABLE WIND

## (undated) DEVICE — MASTISOL ADHESIVE LIQ 2/3ML

## (undated) DEVICE — [SLOTTED WHISKER CUTTER, ARTHROSCOPIC SHAVER BLADE,  DO NOT RESTERILIZE,  DO NOT USE IF PACKAGE IS DAMAGED,  KEEP DRY,  KEEP AWAY FROM SUNLIGHT]: Brand: FORMULA

## (undated) DEVICE — CANNULA SEAL

## (undated) DEVICE — BINDER ABD XL W15IN 62 74IN CIRC UNISX 5 PNL E CNTCT CLSR

## (undated) DEVICE — STAPLER 60 RELOAD BLUE: Brand: SUREFORM

## (undated) DEVICE — SUTURE FIBERWIRE SZ 5 L38IN NONABSORBABLE BLU L48MM 1/2 AR7211

## (undated) DEVICE — SOLUTION IV IRRIG LACTATED RINGERS 3000ML 2B7487

## (undated) DEVICE — FORCEPS BX L240CM WRK CHN 2.8MM STD CAP W/ NDL MIC MESH

## (undated) DEVICE — DRESSING PETRO W3XL8IN OIL EMUL N ADH GZ KNIT IMPREG CELOS

## (undated) DEVICE — BASIN EMSIS 700ML GRAPHITE PLAS KID SHP GRAD

## (undated) DEVICE — SNARE ENDOSCP L240CM LOOP W13MM DIA2.4MM SHT THROW SM OVL

## (undated) DEVICE — 3M™ WARMING BLANKET, UPPER BODY, 10 PER CASE, 42268: Brand: BAIR HUGGER™

## (undated) DEVICE — ADAPTER,CATHETER/SYRINGE/LUER,STERILE: Brand: MEDLINE

## (undated) DEVICE — ERBE NESSY® OMEGA PLATE USA (85+23)CM² , WITH CABLE 3 M: Brand: ERBE

## (undated) DEVICE — SUTURE MCRYL + SZ 3-0 L27IN ABSRB UD PS1 L24MM 3/8 CIR REV MCP936H

## (undated) DEVICE — COOLER THER 20-31IN L CRYO COMB W/ PD KNEE TB GRAV FLO

## (undated) DEVICE — TIP COVER ACCESSORY

## (undated) DEVICE — SUTURE STRATAFIX SYMMETRIC PDS + SZ 1 L18IN ABSRB VLT L48MM SXPP1A400

## (undated) DEVICE — DRESSING ALGINATE POST OPERATIVE 12X3.5 IN RECT PRIMASEAL

## (undated) DEVICE — SUTURE SZ 0 27IN 5/8 CIR UR-6  TAPER PT VIOLET ABSRB VICRYL J603H

## (undated) DEVICE — 2108 SERIES SAGITTAL BLADE FLARED, GROUND  (29.0 X 1.32 X 84.0MM)

## (undated) DEVICE — SOLUTION ANTIFOG VIS SYS CLEARIFY LAPSCP

## (undated) DEVICE — ADHESIVE SKIN CLSR 0.7ML TOP DERMBND ADV

## (undated) DEVICE — GARMENT,MEDLINE,DVT,INT,CALF,MED, GEN2: Brand: MEDLINE

## (undated) DEVICE — SUTURE PDS II SZ 0 L27IN ABSRB VLT UR-6 L26MM 1/2 CIR D7185

## (undated) DEVICE — ELECTRO LUBE IS A SINGLE PATIENT USE DEVICE THAT IS INTENDED TO BE USED ON ELECTROSURGICAL ELECTRODES TO REDUCE STICKING.: Brand: KEY SURGICAL ELECTRO LUBE

## (undated) DEVICE — MANIFOLD REPROC SUCT 4 PRT F/NEPTUNE 2 WST MGMT SYS

## (undated) DEVICE — FORCEPS BX L240CM JAW DIA2.4MM ORNG L CAP W/ NDL DISP RAD

## (undated) DEVICE — BLADELESS OBTURATOR: Brand: WECK VISTA

## (undated) DEVICE — GOWN,POLY REINFORCED,LG: Brand: MEDLINE

## (undated) DEVICE — POLYP TRAP: Brand: TRAPEASE®

## (undated) DEVICE — BANDAGE COMPR M W6INXL10YD WHT BGE VELC E MTRX HK AND LOOP

## (undated) DEVICE — GLOVE ORANGE PI 8   MSG9080

## (undated) DEVICE — SOLUTION IRRIG 1000ML STRL H2O USP PLAS POUR BTL

## (undated) DEVICE — GOWN,SIRUS,NONRNF,SETINSLV,XL,20/CS: Brand: MEDLINE

## (undated) DEVICE — SEAL

## (undated) DEVICE — PIN DRL DIA1/8IN QUIK REL FOR VANGUARD UNIV INSTR TOT KNEE 32486265] ZIMMER BIOMET ORTHOPEDIC]

## (undated) DEVICE — MERCY HEALTH ST CHARLES: Brand: MEDLINE INDUSTRIES, INC.

## (undated) DEVICE — INSUFFLATION TUBING SET WITH FILTER, FUNNEL CONNECTOR AND LUER LOCK: Brand: JOSNOE MEDICAL INC

## (undated) DEVICE — HOOK LOCK LATEX FREE ELASTIC BANDAGE 6INX5YD

## (undated) DEVICE — CANNULA IV 18GA L15IN BLNT FILL LUERLOCK HUB MJCT

## (undated) DEVICE — ZIMMER® STERILE DISPOSABLE TOURNIQUET CUFF WITH PROTECTIVE SLEEVE AND PLC, DUAL PORT, SINGLE BLADDER, 42 IN. (107 CM)

## (undated) DEVICE — [AGGRESSIVE PLUS CUTTER, ARTHROSCOPIC SHAVER BLADE,  DO NOT RESTERILIZE,  DO NOT USE IF PACKAGE IS DAMAGED,  KEEP DRY,  KEEP AWAY FROM SUNLIGHT]: Brand: FORMULA

## (undated) DEVICE — INTENDED TO SUPPORT AND MAINTAIN THE POSITION OF AN ANESTHETIZED PATIENT DURING SURGERY: Brand: HERMANTOR XL PINK KNEE POSITIONING PAD

## (undated) DEVICE — TOWEL,OR,DSP,ST,NATURAL,DLX,4/PK,20PK/CS: Brand: MEDLINE

## (undated) DEVICE — YANKAUER,FLEXIBLE HANDLE,REGLR CAPACITY: Brand: MEDLINE INDUSTRIES, INC.

## (undated) DEVICE — NEPTUNE E-SEP SMOKE EVACUATION PENCIL, COATED, 70MM BLADE, PUSH BUTTON SWITCH: Brand: NEPTUNE E-SEP

## (undated) DEVICE — SUTURE MCRYL SZ 4-0 L18IN ABSRB UD L16MM PC-3 3/8 CIR PRIM Y845G

## (undated) DEVICE — TUBING PMP L16FT MAIN DISP FOR AR-6400 AR-6475

## (undated) DEVICE — BITEBLOCK 54FR W/ DENT RIM BLOX

## (undated) DEVICE — MHPB TOTAL KNEE PACK: Brand: MEDLINE INDUSTRIES, INC.

## (undated) DEVICE — BLADE CLIPPER GEN PURP NS

## (undated) DEVICE — ADHESIVE SKIN CLOSURE TOP 36 CC HI VISC DERMBND MINI

## (undated) DEVICE — SUTURE ABSRB BRAID COAT VLT SH 3-0 27IN VCRL J311H

## (undated) DEVICE — VISIGI 3D®  CALIBRATION SYSTEM  SIZE 36FR STD W/ BULB: Brand: BOEHRINGER® VISIGI 3D™ SLEEVE GASTRECTOMY CALIBRATION SYSTEM, SIZE 36FR W/BULB

## (undated) DEVICE — TROCARS: Brand: KII® BALLOON BLUNT TIP SYSTEM

## (undated) DEVICE — DRSG POSTOP PRMSL AG 3.5X14IN

## (undated) DEVICE — GLOVE SURG SZ 75 L12IN FNGR THK87MIL WHT LTX FREE

## (undated) DEVICE — ZIMMER® STERILE DISPOSABLE TOURNIQUET CUFF WITH PLC, DUAL PORT, SINGLE BLADDER, 42 IN. (107 CM)

## (undated) DEVICE — JELLY,LUBE,STERILE,FLIP TOP,TUBE,2-OZ: Brand: MEDLINE

## (undated) DEVICE — CHLORAPREP 26ML ORANGE

## (undated) DEVICE — GLOVE SURG SZ 75 L12IN FNGR THK87MIL DK GRN LTX FREE ISOLEX

## (undated) DEVICE — GLOVE ORANGE PI 8 1/2   MSG9085

## (undated) DEVICE — REDUCER: Brand: ENDOWRIST

## (undated) DEVICE — SPONGE GZ W6XL6.75IN FLUF FOR PRE OP PREPPING CLN BULKEE II

## (undated) DEVICE — GLOVE SURG SZ 85 L12IN FNGR THK79MIL GRN LTX FREE

## (undated) DEVICE — GOWN,AURORA,NON-REINFORCED,2XL: Brand: MEDLINE

## (undated) DEVICE — ELECTRODE ES L3IN S STL BLDE INSUL DISP VALLEYLAB EDGE

## (undated) DEVICE — SUTURE VCRL SZ 0 L36IN ABSRB UD CT-1 L36MM 1/2 CIR TAPR PNT VCP946H

## (undated) DEVICE — COVER LT HNDL BLU PLAS

## (undated) DEVICE — SUTURE NONABSORBABLE MONOFILAMENT 3-0 PS-1 18 IN BLK ETHILON 1663H

## (undated) DEVICE — ST. ANNE'S MULTI PORT PACK: Brand: MEDLINE INDUSTRIES, INC.

## (undated) DEVICE — SUTURE VIC + SH-13-0 27IN  VCP311H

## (undated) DEVICE — NEEDLE SPNL L3.5IN PNK HUB S STL REG WALL FIT STYL W/ QNCKE

## (undated) DEVICE — STAPLER 60: Brand: SUREFORM

## (undated) DEVICE — HANDPIECE SET WITH COAXIAL HIGH FLOW TIP AND SUCTION TUBE: Brand: INTERPULSE

## (undated) DEVICE — BANDAGE COBAN 4 IN COMPR W4INXL5YD FOAM COHESIVE QUIK STK SELF ADH SFT

## (undated) DEVICE — PROTECTOR EYE PT SELF ADH NS OPT GRD LF